# Patient Record
Sex: MALE | Race: WHITE | NOT HISPANIC OR LATINO | Employment: FULL TIME | URBAN - METROPOLITAN AREA
[De-identification: names, ages, dates, MRNs, and addresses within clinical notes are randomized per-mention and may not be internally consistent; named-entity substitution may affect disease eponyms.]

---

## 2018-11-20 ENCOUNTER — OFFICE VISIT (OUTPATIENT)
Dept: URGENT CARE | Facility: CLINIC | Age: 23
End: 2018-11-20
Payer: MEDICAID

## 2018-11-20 VITALS
DIASTOLIC BLOOD PRESSURE: 74 MMHG | RESPIRATION RATE: 14 BRPM | BODY MASS INDEX: 26.46 KG/M2 | SYSTOLIC BLOOD PRESSURE: 117 MMHG | OXYGEN SATURATION: 97 % | TEMPERATURE: 99 F | HEART RATE: 102 BPM | WEIGHT: 189 LBS | HEIGHT: 71 IN

## 2018-11-20 DIAGNOSIS — J40 BRONCHITIS: Primary | ICD-10-CM

## 2018-11-20 DIAGNOSIS — R50.9 FEVER, UNSPECIFIED FEVER CAUSE: ICD-10-CM

## 2018-11-20 DIAGNOSIS — H65.92 LEFT NON-SUPPURATIVE OTITIS MEDIA: ICD-10-CM

## 2018-11-20 PROCEDURE — 99203 OFFICE O/P NEW LOW 30 MIN: CPT | Mod: 25,S$GLB,, | Performed by: NURSE PRACTITIONER

## 2018-11-20 RX ORDER — AMOXICILLIN 875 MG/1
875 TABLET, FILM COATED ORAL 2 TIMES DAILY
Qty: 20 TABLET | Refills: 0 | Status: SHIPPED | OUTPATIENT
Start: 2018-11-20 | End: 2018-11-30

## 2018-11-20 RX ORDER — DEXAMETHASONE SODIUM PHOSPHATE 4 MG/ML
8 INJECTION, SOLUTION INTRA-ARTICULAR; INTRALESIONAL; INTRAMUSCULAR; INTRAVENOUS; SOFT TISSUE
Status: COMPLETED | OUTPATIENT
Start: 2018-11-20 | End: 2018-11-20

## 2018-11-20 RX ADMIN — DEXAMETHASONE SODIUM PHOSPHATE 8 MG: 4 INJECTION, SOLUTION INTRA-ARTICULAR; INTRALESIONAL; INTRAMUSCULAR; INTRAVENOUS; SOFT TISSUE at 06:11

## 2018-11-21 NOTE — PROGRESS NOTES
"Subjective:       Patient ID: Charly Mattson is a 23 y.o. male.    Vitals:  height is 5' 11" (1.803 m) and weight is 85.7 kg (189 lb). His temperature is 99.4 °F (37.4 °C). His blood pressure is 117/74 and his pulse is 102. His respiration is 14 and oxygen saturation is 97%.     Chief Complaint: Cough    PT states very achy and c/o sweats and chills. Decreased appetite and productive cough with thick green sputum production. Left ear pain x 2 days.       Cough   This is a new problem. The current episode started in the past 7 days. The problem has been gradually worsening. The cough is productive of sputum. Associated symptoms include chills, ear pain, a fever, myalgias and sweats. Pertinent negatives include no eye redness, hemoptysis, rash, sore throat, shortness of breath or wheezing. Treatments tried: Tylenol, ibuprofen  The treatment provided mild relief.       Constitution: Positive for chills and fever. Negative for sweating and fatigue.   HENT: Positive for ear pain. Negative for congestion, sinus pain, sinus pressure, sore throat and voice change.    Neck: Negative for painful lymph nodes.   Eyes: Negative for eye redness.   Respiratory: Positive for cough and sputum production. Negative for chest tightness, bloody sputum, COPD, shortness of breath, stridor, wheezing and asthma.    Gastrointestinal: Negative for nausea and vomiting.   Musculoskeletal: Positive for muscle ache.   Skin: Negative for rash.   Allergic/Immunologic: Negative for seasonal allergies and asthma.   Hematologic/Lymphatic: Negative for swollen lymph nodes.       Objective:      Physical Exam   Constitutional: He is oriented to person, place, and time. He appears well-developed and well-nourished. He is cooperative.  Non-toxic appearance. He does not appear ill. No distress.   HENT:   Head: Normocephalic and atraumatic.   Right Ear: Hearing, external ear and ear canal normal. Tympanic membrane is erythematous.   Left Ear: Hearing, " tympanic membrane, external ear and ear canal normal.   Nose: Nose normal. No mucosal edema, rhinorrhea or nasal deformity. No epistaxis. Right sinus exhibits no maxillary sinus tenderness and no frontal sinus tenderness. Left sinus exhibits no maxillary sinus tenderness and no frontal sinus tenderness.   Mouth/Throat: Uvula is midline, oropharynx is clear and moist and mucous membranes are normal. No trismus in the jaw. Normal dentition. No uvula swelling. No posterior oropharyngeal erythema.   Eyes: Conjunctivae and lids are normal. No scleral icterus.   Sclera clear bilat   Neck: Trachea normal, full passive range of motion without pain and phonation normal. Neck supple.   Cardiovascular: Normal rate, regular rhythm, normal heart sounds, intact distal pulses and normal pulses.   Pulmonary/Chest: Effort normal and breath sounds normal. No respiratory distress.   Abdominal: Soft. Normal appearance and bowel sounds are normal. He exhibits no distension. There is no tenderness.   Musculoskeletal: Normal range of motion. He exhibits no edema or deformity.   Lymphadenopathy:     He has cervical adenopathy.   Neurological: He is alert and oriented to person, place, and time. He exhibits normal muscle tone. Coordination normal.   Skin: Skin is warm, dry and intact. He is not diaphoretic. No pallor.   Psychiatric: He has a normal mood and affect. His speech is normal and behavior is normal. Judgment and thought content normal. Cognition and memory are normal.   Nursing note and vitals reviewed.      Assessment:       1. Bronchitis    2. Left non-suppurative otitis media    3. Fever, unspecified fever cause        Plan:         Bronchitis    Left non-suppurative otitis media    Fever, unspecified fever cause

## 2018-11-21 NOTE — PATIENT INSTRUCTIONS
Bronchitis, Antibiotic Treatment (Adult)    Bronchitis is an infection of the air passages (bronchial tubes) in your lungs. It often occurs when you have a cold. This illness is contagious during the first few days and is spread through the air by coughing and sneezing, or by direct contact (touching the sick person and then touching your own eyes, nose, or mouth).  Symptoms of bronchitis include cough with mucus (phlegm) and low-grade fever. Bronchitis usually lasts 7 to 14 days. Mild cases can be treated with simple home remedies. More severe infection is treated with an antibiotic.  Home care  Follow these guidelines when caring for yourself at home:  · If your symptoms are severe, rest at home for the first 2 to 3 days. When you go back to your usual activities, don't let yourself get too tired.  · Do not smoke. Also avoid being exposed to secondhand smoke.  · You may use over-the-counter medicines to control fever or pain, unless another medicine was prescribed. (Note: If you have chronic liver or kidney disease or have ever had a stomach ulcer or gastrointestinal bleeding, talk with your healthcare provider before using these medicines. Also talk to your provider if you are taking medicine to prevent blood clots.) Aspirin should never be given to anyone younger than 18 years of age who is ill with a viral infection or fever. It may cause severe liver or brain damage.  · Your appetite may be poor, so a light diet is fine. Avoid dehydration by drinking 6 to 8 glasses of fluids per day (such as water, soft drinks, sports drinks, juices, tea, or soup). Extra fluids will help loosen secretions in the nose and lungs.  · Over-the-counter cough, cold, and sore-throat medicines will not shorten the length of the illness, but they may be helpful to reduce symptoms. (Note: Do not use decongestants if you have high blood pressure.)  · Finish all antibiotic medicine. Do this even if you are feeling better after only a  few days.  Follow-up care  Follow up with your healthcare provider, or as advised. If you had an X-ray or ECG (electrocardiogram), a specialist will review it. You will be notified of any new findings that may affect your care.  Note: If you are age 65 or older, or if you have a chronic lung disease or condition that affects your immune system, or you smoke, talk to your healthcare provider about having pneumococcal vaccinations and a yearly influenza vaccination (flu shot).  When to seek medical advice  Call your healthcare provider right away if any of these occur:  · Fever of 100.4°F (38°C) or higher  · Coughing up increased amounts of colored sputum  · Weakness, drowsiness, headache, facial pain, ear pain, or a stiff neck  Call 911, or get immediate medical care  Contact emergency services right away if any of these occur.  · Coughing up blood  · Worsening weakness, drowsiness, headache, or stiff neck  · Trouble breathing, wheezing, or pain with breathing  Date Last Reviewed: 9/13/2015 © 2000-2017 [employer]. [address] [place] [address]. All rights reserved. This information is not intended as a substitute for professional medical care. Always follow your healthcare professional's instructions.        Middle Ear Infection (Adult)  You have an infection of the middle ear, the space behind the eardrum. This is also called acute otitis media (AOM). Sometimes it is caused by the common cold. This is because congestion can block the internal passage (eustachian tube) that drains fluid from the middle ear. When the middle ear fills with fluid, bacteria can grow there and cause an infection. Oral antibiotics are used to treat this illness, not ear drops. Symptoms usually start to improve within 1 to 2 days of treatment.    Home care  The following are general care guidelines:  · Finish all of the antibiotic medicine given, even though you may feel better after the first few days.  · You  may use over-the-counter medicine, such as acetaminophen or ibuprofen, to control pain and fever, unless something else was prescribed. If you have chronic liver or kidney disease or have ever had a stomach ulcer or gastrointestinal bleeding, talk with your healthcare provider before using these medicines. Do not give aspirin to anyone under 18 years of age who has a fever. It may cause severe illness or death.  Follow-up care  Follow up with your healthcare provider, or as advised, in 2 weeks if all symptoms have not gotten better, or if hearing doesn't go back to normal within 1 month.  When to seek medical advice  Call your healthcare provider right away if any of these occur:  · Ear pain gets worse or does not improve after 3 days of treatment  · Unusual drowsiness or confusion  · Neck pain, stiff neck, or headache  · Fluid or blood draining from the ear canal  · Fever of 100.4°F (38°C) or as advised   · Seizure  Date Last Reviewed: 6/1/2016  © 7593-0816 [employer], [facility]. [address] [place] [address], [place] [address]. All rights reserved. This information is not intended as a substitute for professional medical care. Always follow your healthcare professional's instructions.

## 2019-01-31 ENCOUNTER — CLINICAL SUPPORT (OUTPATIENT)
Dept: URGENT CARE | Facility: CLINIC | Age: 24
End: 2019-01-31
Payer: MEDICAID

## 2019-01-31 VITALS
OXYGEN SATURATION: 96 % | BODY MASS INDEX: 26.32 KG/M2 | SYSTOLIC BLOOD PRESSURE: 117 MMHG | WEIGHT: 188 LBS | HEART RATE: 98 BPM | TEMPERATURE: 100 F | DIASTOLIC BLOOD PRESSURE: 74 MMHG | HEIGHT: 71 IN | RESPIRATION RATE: 18 BRPM

## 2019-01-31 DIAGNOSIS — J40 BRONCHITIS: Primary | ICD-10-CM

## 2019-01-31 PROCEDURE — 94640 AIRWAY INHALATION TREATMENT: CPT | Mod: S$GLB,,, | Performed by: NURSE PRACTITIONER

## 2019-01-31 PROCEDURE — 99214 PR OFFICE/OUTPT VISIT, EST, LEVL IV, 30-39 MIN: ICD-10-PCS | Mod: 25,S$GLB,, | Performed by: NURSE PRACTITIONER

## 2019-01-31 PROCEDURE — 99214 OFFICE O/P EST MOD 30 MIN: CPT | Mod: 25,S$GLB,, | Performed by: NURSE PRACTITIONER

## 2019-01-31 PROCEDURE — 94640 PR INHAL RX, AIRWAY OBST/DX SPUTUM INDUCT: ICD-10-PCS | Mod: S$GLB,,, | Performed by: NURSE PRACTITIONER

## 2019-01-31 RX ORDER — ALBUTEROL SULFATE 90 UG/1
2 AEROSOL, METERED RESPIRATORY (INHALATION) EVERY 6 HOURS PRN
Qty: 18 G | Refills: 0 | Status: SHIPPED | OUTPATIENT
Start: 2019-01-31 | End: 2020-01-31

## 2019-01-31 RX ORDER — IPRATROPIUM BROMIDE 0.5 MG/2.5ML
0.5 SOLUTION RESPIRATORY (INHALATION)
Status: COMPLETED | OUTPATIENT
Start: 2019-01-31 | End: 2019-01-31

## 2019-01-31 RX ORDER — CETIRIZINE HYDROCHLORIDE 10 MG/1
10 TABLET ORAL DAILY
Qty: 30 TABLET | Refills: 2 | Status: SHIPPED | OUTPATIENT
Start: 2019-01-31 | End: 2019-05-14 | Stop reason: CLARIF

## 2019-01-31 RX ORDER — FLUTICASONE PROPIONATE 50 MCG
2 SPRAY, SUSPENSION (ML) NASAL 2 TIMES DAILY
Qty: 1 BOTTLE | Refills: 0 | Status: SHIPPED | OUTPATIENT
Start: 2019-01-31 | End: 2022-03-20 | Stop reason: SDUPTHER

## 2019-01-31 RX ORDER — ALBUTEROL SULFATE 0.83 MG/ML
2.5 SOLUTION RESPIRATORY (INHALATION)
Status: COMPLETED | OUTPATIENT
Start: 2019-01-31 | End: 2019-01-31

## 2019-01-31 RX ORDER — ALBUTEROL SULFATE 0.83 MG/ML
2.5 SOLUTION RESPIRATORY (INHALATION) EVERY 6 HOURS PRN
Qty: 1 BOX | Refills: 0 | Status: SHIPPED | OUTPATIENT
Start: 2019-01-31 | End: 2020-01-31

## 2019-01-31 RX ORDER — PREDNISONE 20 MG/1
20 TABLET ORAL 2 TIMES DAILY
Qty: 10 TABLET | Refills: 0 | Status: SHIPPED | OUTPATIENT
Start: 2019-01-31 | End: 2019-02-05

## 2019-01-31 RX ORDER — LEVALBUTEROL INHALATION SOLUTION 1.25 MG/3ML
1 SOLUTION RESPIRATORY (INHALATION) EVERY 4 HOURS PRN
Qty: 1 BOX | Refills: 0 | Status: SHIPPED | OUTPATIENT
Start: 2019-01-31 | End: 2020-01-31

## 2019-01-31 RX ORDER — DEXAMETHASONE SODIUM PHOSPHATE 4 MG/ML
8 INJECTION, SOLUTION INTRA-ARTICULAR; INTRALESIONAL; INTRAMUSCULAR; INTRAVENOUS; SOFT TISSUE
Status: COMPLETED | OUTPATIENT
Start: 2019-01-31 | End: 2019-01-31

## 2019-01-31 RX ORDER — PROMETHAZINE HYDROCHLORIDE AND DEXTROMETHORPHAN HYDROBROMIDE 6.25; 15 MG/5ML; MG/5ML
5 SYRUP ORAL EVERY 4 HOURS PRN
Qty: 240 ML | Refills: 0 | Status: SHIPPED | OUTPATIENT
Start: 2019-01-31 | End: 2019-02-10

## 2019-01-31 RX ADMIN — ALBUTEROL SULFATE 2.5 MG: 0.83 SOLUTION RESPIRATORY (INHALATION) at 07:01

## 2019-01-31 RX ADMIN — DEXAMETHASONE SODIUM PHOSPHATE 8 MG: 4 INJECTION, SOLUTION INTRA-ARTICULAR; INTRALESIONAL; INTRAMUSCULAR; INTRAVENOUS; SOFT TISSUE at 07:01

## 2019-01-31 RX ADMIN — IPRATROPIUM BROMIDE 0.5 MG: 0.5 SOLUTION RESPIRATORY (INHALATION) at 07:01

## 2019-01-31 NOTE — LETTER
January 31, 2019      [facility]  [address]  [facility] [address]  Phone: [phone]       Patient: Charly Mattson   YOB: 1995  Date of Visit: 01/31/2019    To Whom It May Concern:    Kj Mattson  was at [facility] on 01/31/2019. He may return to work/school on 2/2/19 with no restrictions. If you have any questions or concerns, or if I can be of further assistance, please do not hesitate to contact me.    Sincerely,    NIKKI Ruggiero

## 2019-02-01 NOTE — PROGRESS NOTES
"Subjective:       Patient ID: Charly Mattson is a 23 y.o. male.    Vitals:  height is 5' 11" (1.803 m) and weight is 85.3 kg (188 lb). His oral temperature is 100.1 °F (37.8 °C). His blood pressure is 117/74 and his pulse is 98. His respiration is 18 and oxygen saturation is 96%.     Chief Complaint: Epistaxis    C/O nose bleeds, cough, stopped up nose X 1 wk      Epistaxis    The bleeding has been from both nares. This is a new problem. The current episode started in the past 7 days. He has experienced no nasal trauma. The bleeding is associated with recent URI.   Sinusitis   This is a new problem. The current episode started 1 to 4 weeks ago. The problem has been gradually worsening since onset. The maximum temperature recorded prior to his arrival was 100.4 - 100.9 F. Associated symptoms include congestion, coughing, sinus pressure, sneezing and a sore throat. Pertinent negatives include no chills, headaches or shortness of breath. Past treatments include nothing.       Constitution: Negative for chills, fatigue and fever.   HENT: Positive for congestion, nosebleeds, postnasal drip, sinus pain, sinus pressure and sore throat.    Neck: Negative for painful lymph nodes.   Cardiovascular: Negative for chest pain and leg swelling.   Eyes: Negative for double vision and blurred vision.   Respiratory: Positive for cough, sputum production and wheezing. Negative for shortness of breath.    Gastrointestinal: Negative for nausea, vomiting and diarrhea.   Genitourinary: Negative for dysuria, frequency and urgency.   Musculoskeletal: Negative for joint pain, joint swelling, muscle cramps and muscle ache.   Skin: Negative for color change, pale and rash.   Allergic/Immunologic: Positive for sneezing. Negative for seasonal allergies.   Neurological: Negative for dizziness, history of vertigo, light-headedness, passing out and headaches.   Hematologic/Lymphatic: Negative for swollen lymph nodes, easy bruising/bleeding and " history of blood clots. Does not bruise/bleed easily.   Psychiatric/Behavioral: Negative for nervous/anxious, sleep disturbance and depression. The patient is not nervous/anxious.        Objective:      Physical Exam   Constitutional: He is oriented to person, place, and time. He appears well-developed and well-nourished. He is active and cooperative.   HENT:   Head: Normocephalic and atraumatic.   Right Ear: Hearing, external ear and ear canal normal. A middle ear effusion is present.   Left Ear: Hearing, external ear and ear canal normal. A middle ear effusion is present.   Nose: Mucosal edema and rhinorrhea present. Right sinus exhibits maxillary sinus tenderness. Left sinus exhibits maxillary sinus tenderness.   Mouth/Throat: Uvula is midline and mucous membranes are normal. Posterior oropharyngeal erythema present.   Eyes: Conjunctivae, EOM and lids are normal. Pupils are equal, round, and reactive to light.   Neck: Trachea normal, normal range of motion and phonation normal. Neck supple.   Cardiovascular: Normal rate, regular rhythm, normal heart sounds, intact distal pulses and normal pulses.   Pulmonary/Chest: Effort normal. He has decreased breath sounds. He has rhonchi in the right upper field, the right middle field, the right lower field, the left upper field, the left middle field and the left lower field.   Abdominal: Soft. Bowel sounds are normal.   Musculoskeletal: Normal range of motion.   Neurological: He is alert and oriented to person, place, and time. He has normal strength. GCS eye subscore is 4. GCS verbal subscore is 5. GCS motor subscore is 6.   Skin: Skin is warm, dry and intact.   Psychiatric: He has a normal mood and affect. His behavior is normal. Judgment and thought content normal.   Nursing note and vitals reviewed.      Assessment:       1. Bronchitis        Plan:         Bronchitis    Other orders  -     albuterol nebulizer solution 2.5 mg  -     ipratropium 0.02 % nebulizer  solution 0.5 mg  -     dexamethasone injection 8 mg  -     albuterol (PROVENTIL) 2.5 mg /3 mL (0.083 %) nebulizer solution; Take 3 mLs (2.5 mg total) by nebulization every 6 (six) hours as needed for Wheezing. Rescue  Dispense: 1 Box; Refill: 0  -     albuterol (VENTOLIN HFA) 90 mcg/actuation inhaler; Inhale 2 puffs into the lungs every 6 (six) hours as needed for Wheezing. Rescue  Dispense: 18 g; Refill: 0  -     cetirizine (ZYRTEC) 10 MG tablet; Take 1 tablet (10 mg total) by mouth once daily.  Dispense: 30 tablet; Refill: 2  -     fluticasone (FLONASE) 50 mcg/actuation nasal spray; 2 sprays (100 mcg total) by Each Nare route 2 (two) times daily.  Dispense: 1 Bottle; Refill: 0  -     predniSONE (DELTASONE) 20 MG tablet; Take 1 tablet (20 mg total) by mouth 2 (two) times daily. for 5 days  Dispense: 10 tablet; Refill: 0  -     promethazine-dextromethorphan (PROMETHAZINE-DM) 6.25-15 mg/5 mL Syrp; Take 5 mLs by mouth every 4 (four) hours as needed.  Dispense: 240 mL; Refill: 0  -     levalbuterol (XOPENEX) 1.25 mg/3 mL nebulizer solution; Take 3 mLs (1.25 mg total) by nebulization every 4 (four) hours as needed for Wheezing. Rescue  Dispense: 1 Box; Refill: 0

## 2019-02-01 NOTE — PATIENT INSTRUCTIONS
Symptomatic treatment:    Alternate Tylenol and Ibuprofen every 3 hrs for fever, pain and inflammation. Avoid Nsaids if you are pregnant or have advanced kidney disease.     salt water gargles to soothe throat from post nasal drip  Honey/lemon water or warm tea to soothe throat from post nasal drip  Cepachol helps to soothe the discomfort in throat from post nasal drip    Cold-eeze helps to reduce the duration of URI symptoms if taken early  Elderberry to reduce duration of viral URI symptoms    Nasal saline spray reduces inflammation and dryness  Warm face compresses/hot showers as often as you can to open sinuses and allow to drain.   Flonase OTC or Nasacort OTC to help decrease inflammation in nasal turbinates and allow sinuses to drain    Vicks vapor rub at night  Simple foods like chicken noodle soup help provide hydration and nutrition    Delsym helps with coughing at night    Zantac will help if there is reflux from the post nasal drip and helpful to take at night    Zyrtec/Claritin during the day and Benadryl at night may help if allergy component concurrently with URI    Rest as much as you can    Your symptoms will likely last 5-7 days, maybe longer depending on how it affects your body.  You are contagious 5-7, so minimize contact with others to reduce the spread to others and stay home from work or school as we discussed. Dehydration is preventable but is one of the main reasons why you will feel so badly. Drink pedialyte, gatorade or propel. Stay hydrated.  Antibiotics are not needed unless a complication(such as Otitis Media, Bacterial sinus infection or pneumonia) develops. Taking antibiotics for Flu/Cold is not supported by evidence-based medicine and can expose you to unnecessary side effects of the medication, such as anaphylaxis, yeast infection and leads to antibiotic resistance.   If you experience any:  Chest pain, shortness of breath, wheezing or difficulty breathing,  Severe headache, face,  neck or ear pain,  New rash,  Fever over 101.5º F (38.6 C) for more than three days,  Confusion, behavior change or seizure,  Severe weakness or dizziness, please go to the ER immediately for further testing.             What Is Acute Bronchitis?  Acute bronchitis is when the airways in your lungs (bronchial tubes) become red and swollen (inflamed). It is usually caused by a viral infection. But it can also occur because of a bacteria or allergen. Symptoms include a cough that produces yellow or greenish mucus and can last for days or sometimes weeks.  Inside healthy lungs    Air travels in and out of the lungs through the airways. The linings of these airways produce sticky mucus. This mucus traps particles that enter the lungs. Tiny structures called cilia then sweep the particles out of the airways.     Healthy airway: Airways are normally open. Air moves in and out easily.      Healthy cilia: Tiny, hairlike cilia sweep mucus and particles up and out of the airways.   Lungs with bronchitis  Bronchitis often occurs with a cold or the flu virus. The airways become inflamed (red and swollen). There is a deep hacking cough from the extra mucus. Other symptoms may include:  · Wheezing  · Chest discomfort  · Shortness of breath  · Mild fever  A second infection, this time due to bacteria, may then occur. And airways irritated by allergens or smoke are more likely to get infected.        Inflamed airway: Inflammation and extra mucus narrow the airway, causing shortness of breath.      Impaired cilia: Extra mucus impairs cilia, causing congestion and wheezing. Smoking makes the problem worse.   Making a diagnosis  A physical exam, health history, and certain tests help your healthcare provider make the diagnosis.  Health history  Your healthcare provider will ask you about your symptoms.  The exam  Your provider listens to your chest for signs of congestion. He or she may also check your ears, nose, and throat.  Possible  tests  · A sputum test for bacteria. This requires a sample of mucus from your lungs.  · A nasal or throat swab. This tests to see if you have a bacterial infection.  · A chest X-ray. This is done if your healthcare provider thinks you have pneumonia.  · Tests to check for an underlying condition. Other tests may be done to check for things such as allergies, asthma, or COPD (chronic obstructive pulmonary disease). You may need to see a specialist for more lung function testing.  Treating a cough  The main treatment for bronchitis is easing symptoms. Avoiding smoke, allergens, and other things that trigger coughing can often help. If the infection is bacterial, you may be given antibiotics. During the illness, it's important to get plenty of sleep. To ease symptoms:  · Dont smoke. Also avoid secondhand smoke.  · Use a humidifier. Or try breathing in steam from a hot shower. This may help loosen mucus.  · Drink a lot of water and juice. They can soothe the throat and may help thin mucus.  · Sit up or use extra pillows when in bed. This helps to lessen coughing and congestion.  · Ask your provider about using medicine. Ask about using cough medicine, pain and fever medicine, or a decongestant.  Antibiotics  Most cases of bronchitis are caused by cold or flu viruses. They dont need antibiotics to treat them, even if your mucus is thick and green or yellow. Antibiotics dont treat viral illness and antibiotics have not been shown to have any benefit in cases of acute bronchitis. Taking antibiotics when they are not needed increases your risk of getting an infection later that is antibiotic-resistant. Antibiotics can also cause severe cases of diarrhea that require other antibiotics to treat.  It is important that you accept your healthcare provider's opinion to not use antibiotics. Your provider will prescribe antibiotics if the infection is caused by bacteria. If they are prescribed:  · Take all of the medicine. Take  the medicine until it is used up, even if symptoms have improved. If you dont, the bronchitis may come back.  · Take the medicines as directed. For instance, some medicines should be taken with food.  · Ask about side effects. Ask your provider or pharmacist what side effects are common, and what to do about them.  Follow-up care  You should see your provider again in 2 to 3 weeks. By this time, symptoms should have improved. An infection that lasts longer may mean you have a more serious problem.  Prevention  · Avoid tobacco smoke. If you smoke, quit. Stay away from smoky places. Ask friends and family not to smoke around you, or in your home or car.  · Get checked for allergies.  · Ask your provider about getting a yearly flu shot. Also ask about pneumococcal or pneumonia shots.  · Wash your hands often. This helps reduce the chance of picking up viruses that cause colds and flu.  Call your healthcare provider if:  · Symptoms worsen, or you have new symptoms  · Breathing problems worsen or  become severe  · Symptoms dont get better within a week, or within 3 days of taking antibiotics   Date Last Reviewed: 2/1/2017  © 6165-5797 [employer], [employer]. [address] [place] [address], [place] [address]. All rights reserved. This information is not intended as a substitute for professional medical care. Always follow your healthcare professional's instructions.

## 2019-03-27 ENCOUNTER — CLINICAL SUPPORT (OUTPATIENT)
Dept: URGENT CARE | Facility: CLINIC | Age: 24
End: 2019-03-27
Payer: MEDICAID

## 2019-03-27 VITALS
DIASTOLIC BLOOD PRESSURE: 71 MMHG | WEIGHT: 191.63 LBS | SYSTOLIC BLOOD PRESSURE: 114 MMHG | RESPIRATION RATE: 16 BRPM | OXYGEN SATURATION: 96 % | HEIGHT: 71 IN | TEMPERATURE: 99 F | BODY MASS INDEX: 26.83 KG/M2 | HEART RATE: 93 BPM

## 2019-03-27 DIAGNOSIS — J18.9 COMMUNITY ACQUIRED PNEUMONIA OF RIGHT LOWER LOBE OF LUNG: ICD-10-CM

## 2019-03-27 DIAGNOSIS — R05.9 COUGH: Primary | ICD-10-CM

## 2019-03-27 LAB
CTP QC/QA: YES
FLUAV AG NPH QL: NEGATIVE
FLUBV AG NPH QL: NEGATIVE

## 2019-03-27 PROCEDURE — 99214 PR OFFICE/OUTPT VISIT, EST, LEVL IV, 30-39 MIN: ICD-10-PCS | Mod: 25,S$GLB,, | Performed by: NURSE PRACTITIONER

## 2019-03-27 PROCEDURE — 71046 X-RAY EXAM CHEST 2 VIEWS: CPT | Mod: S$GLB,,, | Performed by: NURSE PRACTITIONER

## 2019-03-27 PROCEDURE — 71046 PR XRAY, CHEST, 2 VIEWS: ICD-10-PCS | Mod: S$GLB,,, | Performed by: NURSE PRACTITIONER

## 2019-03-27 PROCEDURE — 94640 PR INHAL RX, AIRWAY OBST/DX SPUTUM INDUCT: ICD-10-PCS | Mod: S$GLB,,, | Performed by: NURSE PRACTITIONER

## 2019-03-27 PROCEDURE — 99214 OFFICE O/P EST MOD 30 MIN: CPT | Mod: 25,S$GLB,, | Performed by: NURSE PRACTITIONER

## 2019-03-27 PROCEDURE — 94640 AIRWAY INHALATION TREATMENT: CPT | Mod: S$GLB,,, | Performed by: NURSE PRACTITIONER

## 2019-03-27 PROCEDURE — 87804 POCT INFLUENZA A/B: ICD-10-PCS | Mod: QW,,, | Performed by: NURSE PRACTITIONER

## 2019-03-27 PROCEDURE — 87804 INFLUENZA ASSAY W/OPTIC: CPT | Mod: QW,,, | Performed by: NURSE PRACTITIONER

## 2019-03-27 RX ORDER — IPRATROPIUM BROMIDE AND ALBUTEROL SULFATE 2.5; .5 MG/3ML; MG/3ML
3 SOLUTION RESPIRATORY (INHALATION)
Status: COMPLETED | OUTPATIENT
Start: 2019-03-27 | End: 2019-03-27

## 2019-03-27 RX ORDER — DEXAMETHASONE SODIUM PHOSPHATE 4 MG/ML
8 INJECTION, SOLUTION INTRA-ARTICULAR; INTRALESIONAL; INTRAMUSCULAR; INTRAVENOUS; SOFT TISSUE ONCE
Status: COMPLETED | OUTPATIENT
Start: 2019-03-27 | End: 2019-03-27

## 2019-03-27 RX ORDER — LEVOFLOXACIN 750 MG/1
750 TABLET ORAL DAILY
Qty: 10 TABLET | Refills: 0 | Status: SHIPPED | OUTPATIENT
Start: 2019-03-27 | End: 2019-04-06

## 2019-03-27 RX ADMIN — IPRATROPIUM BROMIDE AND ALBUTEROL SULFATE 3 ML: 2.5; .5 SOLUTION RESPIRATORY (INHALATION) at 04:03

## 2019-03-27 RX ADMIN — DEXAMETHASONE SODIUM PHOSPHATE 8 MG: 4 INJECTION, SOLUTION INTRA-ARTICULAR; INTRALESIONAL; INTRAMUSCULAR; INTRAVENOUS; SOFT TISSUE at 04:03

## 2019-03-27 NOTE — LETTER
March 27, 2019      [facility]  [address]  [facility] [address]  Phone: [phone]       Patient: Charly Mattson   YOB: 1995  Date of Visit: 03/27/2019    To Whom It May Concern:    Kj Mattson  was at [facility] on 03/27/2019. He may return to work/school on April 1,2019 with no restrictions. If you have any questions or concerns, or if I can be of further assistance, please do not hesitate to contact me.    Sincerely,    [facility]

## 2019-03-27 NOTE — PROGRESS NOTES
"Subjective:       Patient ID: Charly Mattson is a 23 y.o. male.    Vitals:  height is 5' 11" (1.803 m) and weight is 86.9 kg (191 lb 9.6 oz). His oral temperature is 98.9 °F (37.2 °C). His blood pressure is 114/71 and his pulse is 93. His respiration is 16 and oxygen saturation is 96%.     Chief Complaint: Cough    Cough   This is a recurrent problem. The current episode started in the past 7 days. The problem has been unchanged. The cough is productive of purulent sputum. Associated symptoms include chills, headaches, nasal congestion, shortness of breath and wheezing. Risk factors for lung disease include occupational exposure. He has tried steroid inhaler (Nebulizer) for the symptoms. The treatment provided mild relief. His past medical history is significant for bronchitis and pneumonia.       Constitution: Positive for chills.   HENT: Negative.    Neck: negative.   Cardiovascular: Negative.    Eyes: Negative.    Respiratory: Positive for cough, shortness of breath and wheezing.    Endocrine: negative.   Musculoskeletal: Negative.    Skin: Negative.    Neurological: Positive for headaches.   Psychiatric/Behavioral: Negative.        Objective:      Physical Exam   Constitutional: He is oriented to person, place, and time. He appears well-developed and well-nourished. He is cooperative.  Non-toxic appearance. He does not appear ill. No distress.   HENT:   Head: Normocephalic and atraumatic.   Right Ear: Hearing, tympanic membrane, external ear and ear canal normal.   Left Ear: Hearing, tympanic membrane, external ear and ear canal normal.   Nose: Nose normal. No mucosal edema, rhinorrhea or nasal deformity. No epistaxis. Right sinus exhibits no maxillary sinus tenderness and no frontal sinus tenderness. Left sinus exhibits no maxillary sinus tenderness and no frontal sinus tenderness.   Mouth/Throat: Uvula is midline, oropharynx is clear and moist and mucous membranes are normal. No trismus in the jaw. Normal " dentition. No uvula swelling. No posterior oropharyngeal erythema.   Eyes: Conjunctivae and lids are normal. No scleral icterus.   Sclera clear bilat   Neck: Trachea normal, full passive range of motion without pain and phonation normal. Neck supple.   Cardiovascular: Normal rate, regular rhythm, normal heart sounds, intact distal pulses and normal pulses.   Pulmonary/Chest: Effort normal. No respiratory distress. He has wheezes in the right upper field, the right middle field, the right lower field, the left upper field, the left middle field and the left lower field.   Abdominal: Soft. Normal appearance and bowel sounds are normal. He exhibits no distension. There is no tenderness.   Musculoskeletal: Normal range of motion. He exhibits no edema or deformity.   Neurological: He is alert and oriented to person, place, and time. He exhibits normal muscle tone. Coordination normal.   Skin: Skin is warm, dry and intact. He is not diaphoretic. No pallor.   Psychiatric: He has a normal mood and affect. His speech is normal and behavior is normal. Judgment and thought content normal. Cognition and memory are normal.   Nursing note and vitals reviewed.      Assessment:       1. Cough    2. Community acquired pneumonia of right lower lobe of lung        Plan:     No hypoxia or respiratory distress. Wheezing markedly improved after Duoneb. CXR demonstrates patchy RLL opacity consistent with pneumonia. Influenza negative.  Rx: Levaquin.    Cough  -     dexamethasone injection 8 mg  -     albuterol-ipratropium 2.5 mg-0.5 mg/3 mL nebulizer solution 3 mL  -     POCT Influenza A/B  -     XR CHEST PA AND LATERAL; Future; Expected date: 03/27/2019  -     levoFLOXacin (LEVAQUIN) 750 MG tablet; Take 1 tablet (750 mg total) by mouth once daily. for 10 days  Dispense: 10 tablet; Refill: 0    Community acquired pneumonia of right lower lobe of lung  -     levoFLOXacin (LEVAQUIN) 750 MG tablet; Take 1 tablet (750 mg total) by mouth once  daily. for 10 days  Dispense: 10 tablet; Refill: 0

## 2019-03-27 NOTE — PATIENT INSTRUCTIONS
Treating Pneumonia  Pneumonia is an infection of one or both of the lungs. Pneumonia:  · Is usually caused by either a virus or a bacteria  · Can be very serious, especially in infants, young children, and older adults. Its also serious for those with other long-term health problems or weakened immune systems.  · Is sometimes treated at home and sometimes in the hospital    Antibiotic medicines  Antibiotics may be prescribed for pneumonia caused by bacteria. They may be pills (oral medicines), or shots (injections). Or they may be given by IV (intravenously) into a vein. If you are taking oral medicines at home:  · Fill your prescription and start taking your medicine as soon as you can.  · You will likely start to feel better in a day or 2, but dont stop taking the antibiotic.  · Use a pill organizer to help you remember to take your medicine.  · Let your healthcare provider know if you have side effects.  · Take your medicine exactly as directed on the label. Talk to your provider or pharmacist if you have any questions.  Antiviral medicines  Antiviral medicine may be prescribed for pneumonia caused by a virus. For example, antiviral medicine may be prescribed for pneumonia caused by the flu virus. Antibiotics do not work against viruses. If you are taking antiviral medicine at home:  · Fill your prescription and start taking your medicine as soon as you can.  · Talk with your provider or pharmacist about possible side effects.  · Take the medicine exactly as instructed.  To relieve symptoms  There are many medicines that can help relieve symptoms of pneumonia. Some are prescription and some are over-the-counter.  Your healthcare provider may recommend:  · Acetaminophen or ibuprofen to lower your fever and to lessen headache or other pain  · Cough medicine to loosen mucus or to reduce coughing  Make sure you check with your healthcare provider or pharmacist before taking any over-the-counter  medicines.  Special treatments  If you are hospitalized for pneumonia, you may have other therapies, including:  · Inhaled medicines to help with breathing or chest congestion  · Supplemental oxygen to increase low oxygen levels  Drink fluids and eat healthy  You should eat healthy to help your body fight the infection. Drinking a lot of fluids helps to replace fluids lost from fever and to loosen mucus in your chest.  · Diet. Make healthy food choices, including fruits and vegetables, lean meats and other proteins, 100% whole grain and low- or no-fat dairy products.  · Fluids. Drink at least 6 to 8 tall glasses a day. Water and 100% fruit or vegetable juice are best.  Get plenty of rest and sleep  You may be more tired than usual for a while. It is important to get enough sleep at night. Its also important to rest during the day. Talk with your healthcare provider if coughing or other symptoms are interfering with your sleep.  Preventing the spread of germs  The best thing you can do to prevent spreading germs is to wash your hands often. You should:  · Rub your hand with soap and water for 20 to 30 seconds.  · Clean in between your fingers, the backs of your hands, and around your nails.  · Dry your hands on a separate towel or use paper towels.  You should also:  · Keep alcohol-based hand  nearby.  · Make sure you also clean surfaces that you touch. Use a product that kills all types of germs.  · Stay away from others until you are feeling better.  When to call your healthcare provider  Call your healthcare provider if you have any of the following:  · Symptoms get worse  · Fever continues  · Shortness of breath gets worse  · Increased mucus or mucus that is darker in color  · Coughing gets worse  · Lips or fingers are bluish in color  · Side effects from your medicine   Date Last Reviewed: 12/1/2016  © 6218-3536 [employer] [address] [place] [address], [place] [address]. All rights reserved.  This information is not intended as a substitute for professional medical care. Always follow your healthcare professional's instructions.      Continue using albuterol and follow up with primary care. Go to ER for worsening.

## 2019-05-14 ENCOUNTER — HOSPITAL ENCOUNTER (OUTPATIENT)
Facility: HOSPITAL | Age: 24
Discharge: HOME | End: 2019-05-15
Attending: EMERGENCY MEDICINE | Admitting: INTERNAL MEDICINE
Payer: MEDICAID

## 2019-05-14 DIAGNOSIS — E86.0 DEHYDRATION: ICD-10-CM

## 2019-05-14 DIAGNOSIS — J45.51 SEVERE PERSISTENT ASTHMA WITH ACUTE EXACERBATION: ICD-10-CM

## 2019-05-14 DIAGNOSIS — J01.00 ACUTE MAXILLARY SINUSITIS, RECURRENCE NOT SPECIFIED: ICD-10-CM

## 2019-05-14 DIAGNOSIS — J41.1 BRONCHITIS, CHRONIC, MUCOPURULENT: ICD-10-CM

## 2019-05-14 DIAGNOSIS — H66.93 ACUTE BILATERAL OTITIS MEDIA: ICD-10-CM

## 2019-05-14 DIAGNOSIS — D83.9 CVID (COMMON VARIABLE IMMUNODEFICIENCY): Primary | ICD-10-CM

## 2019-05-14 PROBLEM — D89.3: Status: ACTIVE | Noted: 2019-05-14

## 2019-05-14 PROBLEM — R09.89 CHRONIC SINUS COMPLAINTS: Status: ACTIVE | Noted: 2019-05-14

## 2019-05-14 PROBLEM — D84.9 IMMUNE DEFICIENCY DISORDER: Status: ACTIVE | Noted: 2019-05-14

## 2019-05-14 LAB
ALBUMIN SERPL BCP-MCNC: 4.2 G/DL (ref 3.5–5.2)
ALP SERPL-CCNC: 122 U/L (ref 55–135)
ALT SERPL W/O P-5'-P-CCNC: 51 U/L (ref 10–44)
ANION GAP SERPL CALC-SCNC: 11 MMOL/L (ref 8–16)
AST SERPL-CCNC: 52 U/L (ref 10–40)
BASOPHILS # BLD AUTO: 0 K/UL (ref 0–0.2)
BASOPHILS NFR BLD: 0.4 % (ref 0–1.9)
BILIRUB SERPL-MCNC: 1.1 MG/DL (ref 0.1–1)
BILIRUB UR QL STRIP: NEGATIVE
BUN SERPL-MCNC: 10 MG/DL (ref 6–20)
CALCIUM SERPL-MCNC: 10.3 MG/DL (ref 8.7–10.5)
CHLORIDE SERPL-SCNC: 102 MMOL/L (ref 95–110)
CLARITY UR: CLEAR
CO2 SERPL-SCNC: 25 MMOL/L (ref 23–29)
COLOR UR: YELLOW
CREAT SERPL-MCNC: 1 MG/DL (ref 0.5–1.4)
DIFFERENTIAL METHOD: ABNORMAL
EOSINOPHIL # BLD AUTO: 0 K/UL (ref 0–0.5)
EOSINOPHIL NFR BLD: 0.1 % (ref 0–8)
ERYTHROCYTE [DISTWIDTH] IN BLOOD BY AUTOMATED COUNT: 15.2 % (ref 11.5–14.5)
EST. GFR  (AFRICAN AMERICAN): >60 ML/MIN/1.73 M^2
EST. GFR  (NON AFRICAN AMERICAN): >60 ML/MIN/1.73 M^2
GLUCOSE SERPL-MCNC: 89 MG/DL (ref 70–110)
GLUCOSE UR QL STRIP: NEGATIVE
HCT VFR BLD AUTO: 50 % (ref 40–54)
HGB BLD-MCNC: 16.2 G/DL (ref 14–18)
HGB UR QL STRIP: NEGATIVE
KETONES UR QL STRIP: ABNORMAL
LACTATE SERPL-SCNC: 1.2 MMOL/L (ref 0.5–2.2)
LACTATE SERPL-SCNC: 1.5 MMOL/L (ref 0.5–2.2)
LEUKOCYTE ESTERASE UR QL STRIP: NEGATIVE
LYMPHOCYTES # BLD AUTO: 0.9 K/UL (ref 1–4.8)
LYMPHOCYTES NFR BLD: 8.1 % (ref 18–48)
MCH RBC QN AUTO: 26.4 PG (ref 27–31)
MCHC RBC AUTO-ENTMCNC: 32.4 G/DL (ref 32–36)
MCV RBC AUTO: 81 FL (ref 82–98)
MONOCYTES # BLD AUTO: 0.9 K/UL (ref 0.3–1)
MONOCYTES NFR BLD: 7.6 % (ref 4–15)
NEUTROPHILS # BLD AUTO: 9.4 K/UL (ref 1.8–7.7)
NEUTROPHILS NFR BLD: 83.8 % (ref 38–73)
NITRITE UR QL STRIP: NEGATIVE
PH UR STRIP: 6 [PH] (ref 5–8)
PLATELET # BLD AUTO: 183 K/UL (ref 150–350)
PMV BLD AUTO: 8.9 FL (ref 9.2–12.9)
POTASSIUM SERPL-SCNC: 3.9 MMOL/L (ref 3.5–5.1)
PROCALCITONIN SERPL IA-MCNC: 0.11 NG/ML
PROT SERPL-MCNC: 8 G/DL (ref 6–8.4)
PROT UR QL STRIP: NEGATIVE
RBC # BLD AUTO: 6.14 M/UL (ref 4.6–6.2)
SODIUM SERPL-SCNC: 138 MMOL/L (ref 136–145)
SP GR UR STRIP: 1.01 (ref 1–1.03)
URN SPEC COLLECT METH UR: ABNORMAL
UROBILINOGEN UR STRIP-ACNC: NEGATIVE EU/DL
WBC # BLD AUTO: 11.3 K/UL (ref 3.9–12.7)

## 2019-05-14 PROCEDURE — 25000003 PHARM REV CODE 250: Performed by: EMERGENCY MEDICINE

## 2019-05-14 PROCEDURE — 96361 HYDRATE IV INFUSION ADD-ON: CPT

## 2019-05-14 PROCEDURE — 25000242 PHARM REV CODE 250 ALT 637 W/ HCPCS: Performed by: EMERGENCY MEDICINE

## 2019-05-14 PROCEDURE — 87040 BLOOD CULTURE FOR BACTERIA: CPT | Mod: 59

## 2019-05-14 PROCEDURE — 96375 TX/PRO/DX INJ NEW DRUG ADDON: CPT

## 2019-05-14 PROCEDURE — 99205 PR OFFICE/OUTPT VISIT, NEW, LEVL V, 60-74 MIN: ICD-10-PCS | Mod: ,,, | Performed by: INTERNAL MEDICINE

## 2019-05-14 PROCEDURE — 25000242 PHARM REV CODE 250 ALT 637 W/ HCPCS: Performed by: INTERNAL MEDICINE

## 2019-05-14 PROCEDURE — 84145 PROCALCITONIN (PCT): CPT

## 2019-05-14 PROCEDURE — 99205 OFFICE O/P NEW HI 60 MIN: CPT | Mod: ,,, | Performed by: INTERNAL MEDICINE

## 2019-05-14 PROCEDURE — 99285 EMERGENCY DEPT VISIT HI MDM: CPT | Mod: 25

## 2019-05-14 PROCEDURE — 63600175 PHARM REV CODE 636 W HCPCS: Performed by: INTERNAL MEDICINE

## 2019-05-14 PROCEDURE — 96365 THER/PROPH/DIAG IV INF INIT: CPT

## 2019-05-14 PROCEDURE — 83605 ASSAY OF LACTIC ACID: CPT | Mod: 91

## 2019-05-14 PROCEDURE — 96376 TX/PRO/DX INJ SAME DRUG ADON: CPT

## 2019-05-14 PROCEDURE — 81003 URINALYSIS AUTO W/O SCOPE: CPT

## 2019-05-14 PROCEDURE — G0378 HOSPITAL OBSERVATION PER HR: HCPCS

## 2019-05-14 PROCEDURE — 94640 AIRWAY INHALATION TREATMENT: CPT

## 2019-05-14 PROCEDURE — 36415 COLL VENOUS BLD VENIPUNCTURE: CPT

## 2019-05-14 PROCEDURE — 80074 ACUTE HEPATITIS PANEL: CPT

## 2019-05-14 PROCEDURE — 87070 CULTURE OTHR SPECIMN AEROBIC: CPT | Mod: 59

## 2019-05-14 PROCEDURE — 80053 COMPREHEN METABOLIC PANEL: CPT

## 2019-05-14 PROCEDURE — 25000003 PHARM REV CODE 250: Performed by: INTERNAL MEDICINE

## 2019-05-14 PROCEDURE — 63600175 PHARM REV CODE 636 W HCPCS: Performed by: EMERGENCY MEDICINE

## 2019-05-14 PROCEDURE — 87205 SMEAR GRAM STAIN: CPT

## 2019-05-14 PROCEDURE — 85025 COMPLETE CBC W/AUTO DIFF WBC: CPT

## 2019-05-14 RX ORDER — PREDNISONE 5 MG/1
10 TABLET ORAL 2 TIMES DAILY
Status: DISCONTINUED | OUTPATIENT
Start: 2019-05-14 | End: 2019-05-15 | Stop reason: HOSPADM

## 2019-05-14 RX ORDER — AMOXICILLIN 250 MG
1 CAPSULE ORAL 2 TIMES DAILY PRN
Status: DISCONTINUED | OUTPATIENT
Start: 2019-05-14 | End: 2019-05-15 | Stop reason: HOSPADM

## 2019-05-14 RX ORDER — ACETAMINOPHEN 325 MG/1
650 TABLET ORAL EVERY 6 HOURS PRN
Status: DISCONTINUED | OUTPATIENT
Start: 2019-05-14 | End: 2019-05-15 | Stop reason: HOSPADM

## 2019-05-14 RX ORDER — LEVALBUTEROL INHALATION SOLUTION 1.25 MG/3ML
1 SOLUTION RESPIRATORY (INHALATION) EVERY 4 HOURS PRN
Status: DISCONTINUED | OUTPATIENT
Start: 2019-05-14 | End: 2019-05-14 | Stop reason: SDUPTHER

## 2019-05-14 RX ORDER — ONDANSETRON 2 MG/ML
4 INJECTION INTRAMUSCULAR; INTRAVENOUS EVERY 8 HOURS PRN
Status: DISCONTINUED | OUTPATIENT
Start: 2019-05-14 | End: 2019-05-15 | Stop reason: HOSPADM

## 2019-05-14 RX ORDER — LEVALBUTEROL 1.25 MG/.5ML
1.25 SOLUTION, CONCENTRATE RESPIRATORY (INHALATION) EVERY 4 HOURS PRN
Status: DISCONTINUED | OUTPATIENT
Start: 2019-05-14 | End: 2019-05-15 | Stop reason: HOSPADM

## 2019-05-14 RX ORDER — CEFEPIME HYDROCHLORIDE 2 G/50ML
2 INJECTION, SOLUTION INTRAVENOUS
Status: COMPLETED | OUTPATIENT
Start: 2019-05-14 | End: 2019-05-14

## 2019-05-14 RX ORDER — CEFEPIME HYDROCHLORIDE 1 G/50ML
1 INJECTION, SOLUTION INTRAVENOUS
Status: DISCONTINUED | OUTPATIENT
Start: 2019-05-15 | End: 2019-05-15 | Stop reason: HOSPADM

## 2019-05-14 RX ORDER — SODIUM CHLORIDE 0.9 % (FLUSH) 0.9 %
10 SYRINGE (ML) INJECTION
Status: DISCONTINUED | OUTPATIENT
Start: 2019-05-14 | End: 2019-05-15 | Stop reason: HOSPADM

## 2019-05-14 RX ORDER — IPRATROPIUM BROMIDE AND ALBUTEROL SULFATE 2.5; .5 MG/3ML; MG/3ML
3 SOLUTION RESPIRATORY (INHALATION)
Status: COMPLETED | OUTPATIENT
Start: 2019-05-14 | End: 2019-05-14

## 2019-05-14 RX ORDER — FLUTICASONE PROPIONATE 50 MCG
2 SPRAY, SUSPENSION (ML) NASAL 2 TIMES DAILY
Status: DISCONTINUED | OUTPATIENT
Start: 2019-05-14 | End: 2019-05-15 | Stop reason: HOSPADM

## 2019-05-14 RX ORDER — CEFEPIME HYDROCHLORIDE 1 G/1
1 INJECTION, POWDER, FOR SOLUTION INTRAMUSCULAR; INTRAVENOUS
Status: DISCONTINUED | OUTPATIENT
Start: 2019-05-14 | End: 2019-05-14 | Stop reason: SDUPTHER

## 2019-05-14 RX ORDER — SODIUM CHLORIDE 9 MG/ML
INJECTION, SOLUTION INTRAVENOUS CONTINUOUS
Status: DISCONTINUED | OUTPATIENT
Start: 2019-05-14 | End: 2019-05-15 | Stop reason: HOSPADM

## 2019-05-14 RX ORDER — PANTOPRAZOLE SODIUM 40 MG/1
40 TABLET, DELAYED RELEASE ORAL DAILY
Status: DISCONTINUED | OUTPATIENT
Start: 2019-05-15 | End: 2019-05-15 | Stop reason: HOSPADM

## 2019-05-14 RX ORDER — ALBUTEROL SULFATE 2.5 MG/.5ML
2.5 SOLUTION RESPIRATORY (INHALATION) EVERY 4 HOURS
Status: DISCONTINUED | OUTPATIENT
Start: 2019-05-14 | End: 2019-05-14 | Stop reason: SDUPTHER

## 2019-05-14 RX ORDER — ALBUTEROL SULFATE 2.5 MG/.5ML
2.5 SOLUTION RESPIRATORY (INHALATION) EVERY 4 HOURS
Status: DISCONTINUED | OUTPATIENT
Start: 2019-05-14 | End: 2019-05-15 | Stop reason: HOSPADM

## 2019-05-14 RX ADMIN — SODIUM CHLORIDE: 0.9 INJECTION, SOLUTION INTRAVENOUS at 06:05

## 2019-05-14 RX ADMIN — PREDNISONE 10 MG: 5 TABLET ORAL at 08:05

## 2019-05-14 RX ADMIN — VANCOMYCIN HYDROCHLORIDE 1250 MG: 1 INJECTION, POWDER, FOR SOLUTION INTRAVENOUS at 08:05

## 2019-05-14 RX ADMIN — ALBUTEROL SULFATE 2.5 MG: 2.5 SOLUTION RESPIRATORY (INHALATION) at 08:05

## 2019-05-14 RX ADMIN — SODIUM CHLORIDE 1000 ML: 0.9 INJECTION, SOLUTION INTRAVENOUS at 04:05

## 2019-05-14 RX ADMIN — CEFEPIME HYDROCHLORIDE 2 G: 2 INJECTION, SOLUTION INTRAVENOUS at 03:05

## 2019-05-14 RX ADMIN — IPRATROPIUM BROMIDE AND ALBUTEROL SULFATE 3 ML: .5; 3 SOLUTION RESPIRATORY (INHALATION) at 03:05

## 2019-05-14 RX ADMIN — FLUTICASONE PROPIONATE 100 MCG: 50 SPRAY, METERED NASAL at 08:05

## 2019-05-14 RX ADMIN — CEFEPIME HYDROCHLORIDE 1 G: 1 INJECTION, SOLUTION INTRAVENOUS at 11:05

## 2019-05-14 RX ADMIN — SODIUM CHLORIDE 1000 ML: 0.9 INJECTION, SOLUTION INTRAVENOUS at 03:05

## 2019-05-14 NOTE — ED PROVIDER NOTES
Encounter Date: 5/14/2019    SCRIBE #1 NOTE: I, Prnicess Foster, am scribing for, and in the presence of, Dr. Mahmood.       History     Chief Complaint   Patient presents with    Cough    Fatigue     Time seen by provider: 3:04 PM on 05/14/2019    Charly Mattson is a 24 y.o. male with PMHx of common variable immunodeficency, who presents to the ED with an onset of congestion that began three days ago. He reports associated sinus pressure, sore throat, cough, and wheezing. Patient reports visiting [place] about three weeks ago on vacation and smoking marijuana. Patient reports giving himself immunoglobulin infusions at home weekly. The patient denies fever, shortness of breath, abdominal pain, vomiting, or any other symptoms at this time. SHx includes adenoidectomy, sinus surgery, tonsillectomy, tympanostomy tube placement x4. Singulair drug allergy noted.     The history is provided by the patient.     Review of patient's allergies indicates:   Allergen Reactions    Singulair [montelukast] Other (See Comments)     arrhythmias    Singulair [montelukast]      Past Medical History:   Diagnosis Date    Immunoglobulin deficiency     Pneumonia      Past Surgical History:   Procedure Laterality Date    ADENOIDECTOMY      MANDIBLE FRACTURE SURGERY      SINUS SURGERY      TONSILLECTOMY      TYMPANOSTOMY TUBE PLACEMENT      TYMPANOSTOMY TUBE PLACEMENT      x 4     Family History   Problem Relation Age of Onset    Cancer Mother     Liver disease Father      Social History     Tobacco Use    Smoking status: Never Smoker    Smokeless tobacco: Never Used   Substance Use Topics    Alcohol use: No     Frequency: Never    Drug use: No     Review of Systems   Constitutional: Negative for fever.   HENT: Positive for congestion, sinus pressure and sore throat.    Respiratory: Positive for cough and wheezing. Negative for shortness of breath.    Cardiovascular: Negative for chest pain.   Gastrointestinal: Negative  for abdominal pain, diarrhea, nausea and vomiting.   Genitourinary: Negative for dysuria.   Musculoskeletal: Negative for back pain.   Skin: Negative for rash.   Neurological: Negative for headaches.       Physical Exam     Initial Vitals [05/14/19 1451]   BP Pulse Resp Temp SpO2   (!) 114/59 (!) 118 20 98.7 °F (37.1 °C) (!) 94 %      MAP       --         Physical Exam    Nursing note and vitals reviewed.  Constitutional: He appears well-developed and well-nourished. No distress.   HENT:   Head: Normocephalic and atraumatic.   Right Ear: Tympanic membrane is erythematous. A middle ear effusion is present.   Left Ear: Tympanic membrane is erythematous. A middle ear effusion is present.   Nasal mucosal erythema with yellowish discharge.    Eyes: Conjunctivae and EOM are normal. Pupils are equal, round, and reactive to light.   Neck: Neck supple.   Cardiovascular: Normal rate, regular rhythm and normal heart sounds. Exam reveals no gallop and no friction rub.    No murmur heard.  Pulmonary/Chest: No respiratory distress. He has wheezes (Expiratory). He has no rhonchi. He has rales in the right lower field.   Abdominal: Soft. Bowel sounds are normal. He exhibits no distension. There is no tenderness.   Musculoskeletal: Normal range of motion. He exhibits no edema or tenderness.   Neurological: He is alert and oriented to person, place, and time.   Skin: Skin is warm and dry.   Psychiatric: He has a normal mood and affect.         ED Course   Procedures  Labs Reviewed   CBC W/ AUTO DIFFERENTIAL - Abnormal; Notable for the following components:       Result Value    Mean Corpuscular Volume 81 (*)     Mean Corpuscular Hemoglobin 26.4 (*)     RDW 15.2 (*)     MPV 8.9 (*)     Gran # (ANC) 9.4 (*)     Lymph # 0.9 (*)     Gran% 83.8 (*)     Lymph% 8.1 (*)     All other components within normal limits   COMPREHENSIVE METABOLIC PANEL - Abnormal; Notable for the following components:    Total Bilirubin 1.1 (*)     AST 52 (*)      ALT 51 (*)     All other components within normal limits   CULTURE, BLOOD   CULTURE, BLOOD   CULTURE, RESPIRATORY   LACTIC ACID, PLASMA   URINALYSIS, REFLEX TO URINE CULTURE   PROCALCITONIN          Imaging Results          X-Ray Chest AP Portable (Final result)  Result time 05/14/19 15:30:18    Final result by Antoni Mcnamara Jr., MD (05/14/19 15:30:18)                 Impression:      No acute abnormality.      Electronically signed by: Antoni Mcnamara MD  Date:    05/14/2019  Time:    15:30             Narrative:    EXAMINATION:  XR CHEST AP PORTABLE    CLINICAL HISTORY:  Sepsis;    TECHNIQUE:  Single frontal view of the chest was performed.    COMPARISON:  Prior chest of September 16, 2013    FINDINGS:  The lungs are clear, with normal appearance of pulmonary vasculature and no pleural effusion or pneumothorax.    The cardiac silhouette is normal in size. The hilar and mediastinal contours are unremarkable.    Bones are intact.                                 Medical Decision Making:   Clinical Tests:   Lab Tests: Ordered and Reviewed  Radiological Study: Ordered and Reviewed  Patient has combined variable immune deficiency disorder and has bilateral otitis media and acute sinusitis without any signs of pneumonia.  He feels dehydrated fatigued and weak.  Will admit him for IV fluids, IV antibiotics given multiple recent infections with outpatient oral antibiotic therapy that is recurrent.  The patient did smoke marijuana recently with some friends on a trip 2 weeks ago and became sick afterwards.  I explained him that smoking is definitely something that he should avoid given his increased risk for upper respiratory infections with his underlying disease process.  Especially, in the setting of sharing a joint with others            Scribe Attestation:   Scribe #1: I performed the above scribed service and the documentation accurately describes the services I performed. I attest to the accuracy of the  note.               Clinical Impression:       ICD-10-CM ICD-9-CM   1. CVID (common variable immunodeficiency) D83.9 279.06   2. Acute bilateral otitis media H66.93 382.9   3. Acute maxillary sinusitis, recurrence not specified J01.00 461.0   4. Dehydration E86.0 276.51   5. Severe persistent asthma with acute exacerbation J45.51 493.92   6. Bronchitis, chronic, mucopurulent J41.1 491.1                                Kishan Mahmood MD  05/15/19 1865

## 2019-05-14 NOTE — PROGRESS NOTES
2019      Admit Date: 2019  Charly LUCIE Palmer  New Patient Consult    Chief Complaint   Patient presents with    Cough    Fatigue       History of Present Illness:        Pt works car sales.  Dad may have had similar illness but  prior to dx.  Pt had recurrent pneumonias and sinus infections ppt dx cvid by Dr Ureña around 9 yo..  Was getting [facility] q [facility] til adult with subsequent shots self administered.  Pt had freq severe sinus infections til surg in [place] by Dr Orozco 2 yrs ago.  He still has freq sinus c/o with drainage and occ infections.    No known bronchiectasis but reports scarring right lower lung?    Pt has had freq wheezes, worse early am, uses albuterol 3+ weekly, has some beltran.  No controller nor prednisone use.    Pt will occasionally miss his igg rx, and on several occasions would have fever 1-2 days after resuming.    Pt has chr yellow green mucous production with 1/4 cup daily - he is unaware of MDR germs/pseudomonas infections.  Commonly goes out on augmentin/amoxil.    Pt was hosp for 2 wks in past assoc with pneumonia and wheezes.    He has no abx action plan - usually goes to urgent care or er.  Pneumonia 2x/yr +.    Pt  Vacationed recently missing ig shot, took shot  with illness  of fever, cough, sob, headache, weakness.        PFSH:  Past Medical History:   Diagnosis Date    Immunoglobulin deficiency     Pneumonia      Past Surgical History:   Procedure Laterality Date    ADENOIDECTOMY      MANDIBLE FRACTURE SURGERY      SINUS SURGERY      TONSILLECTOMY      TYMPANOSTOMY TUBE PLACEMENT      TYMPANOSTOMY TUBE PLACEMENT      x 4     Social History     Tobacco Use    Smoking status: Never Smoker    Smokeless tobacco: Never Used   Substance Use Topics    Alcohol use: No     Frequency: Never    Drug use: No     Family History   Problem Relation Age of Onset    Cancer Mother     Liver disease Father      Review of  "patient's allergies indicates:   Allergen Reactions    Singulair [montelukast] Other (See Comments)     arrhythmias    Singulair [montelukast]        Performance Status:Performance Status:The patient's activity level is functions out of house.    Review of Systems:  a review of eleven systems covering constitutional, Psych, Eye, HEENT, Respiratory, Cardiac, GI, , Musculoskeletal, Endocrine, Dermatologicwas negative except the above mentioned abnormalities and for any pertinent findings as listed below:  pertinent positive as above, rest is good         Exam:Comprehensive exam done. /60 (BP Location: Left arm, Patient Position: Lying)   Pulse 103   Temp 98.8 °F (37.1 °C) (Oral)   Resp 18   Ht 5' 11" (1.803 m)   Wt 78.9 kg (174 lb)   SpO2 95%   BMI 24.27 kg/m²   Exam included Vitals as listed, and patient's appearance and affect and alertness and mood, oral exam for yeast and hygiene and pharynx lesions and Mallapatti (M) score, neck with inspection for jvd and masses and thyroid abnormalities and lymph nodes (supraclavicular and infraclavicular nodes also examined and noted if abn), chest exam included symmetry and effort and fremitus and percussion and auscultation, cardiac exam included rhythm and gallops and murmur and rubs and jvd and edema, abdominal exam for mass and hepatosplenomegaly and tenderness and hernias and bowel sounds, Musculoskeletal exam with muscle tone and posture and mobility/gait and  strenght, and skin for rashes and cyanosis and pallor and turgor, extremity for clubbing.  Findings were normal except as listed below:  M2, sl wheezes, chest is symmetric, no distress, normal percussion, normal fremitus and  No clubbing, no edema      Radiographs reviewed: view by direct vision nad but vague increase marking rll.      No results found for this or any previous visit.]    Labs     Recent Labs   Lab 05/14/19  1538   WBC 11.30   HGB 16.2   HCT 50.0        Recent Labs   Lab " 05/14/19 1537      K 3.9      CO2 25   BUN 10   CREATININE 1.0   GLU 89   CALCIUM 10.3   AST 52*   ALT 51*   ALKPHOS 122   BILITOT 1.1*   PROT 8.0   ALBUMIN 4.2   PROCAL 0.11   LACTATE 1.2   No results for input(s): PH, PCO2, PO2, HCO3 in the last 24 hours.  Microbiology Results (last 7 days)     Procedure Component Value Units Date/Time    Culture, Respiratory with Gram Stain [[id number]] Collected:  05/14/19 1906    Order Status:  Sent Specimen:  Respiratory from Sputum, Expectorated Updated:  05/14/19 1906    Blood culture x two cultures. Draw prior to antibiotics. [[id number]] Collected:  05/14/19 1542    Order Status:  Sent Specimen:  Blood Updated:  05/14/19 1542    Blood culture x two cultures. Draw prior to antibiotics. 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     Plan:   May have bronchiectasis.  Culture needed, ct chest later (best once clear of infection).    Pt should be on asthma and sinus regimen.  Home abx action plan would be good - if no mdr pathogens.  Regular ig replacement needed.  F/u pft later. Pt relates allergy to singulair.

## 2019-05-14 NOTE — H&P
PCP: Primary Doctor No    History & Physical    Chief Complaint: Worsening cough and fatigue    History of Present Illness:  Patient is a 24 y.o. male admitted to Hospitalist Service from [facility] Room with complaint of worsening cough and fatigue. Patient reportedly has past medical history significant for Common Variable Immunodeficiency disorder and history of recurrent pneumonia. Patient reports sinus congestion, sore-throat, cough and wheezing. Patient recently travelled to [place] and smoked recreational Marijuana for the first time. Patient is under care by an immunologist at [facility] and received weekly SQ immunoglobulins, last dose 2 days ago. Missed a dose last week. Patient denied a sick contact. Patient reports profound weakness and lethargy.  Patient reported history of recurrent pneumonia, on an average 3 times per year.  Patient denied chest pain, shortness of breath, abdominal pain, nausea, vomiting, headache, vision changes, focal neuro-deficits, cough or fever.    Past Medical History:   Diagnosis Date    Immunoglobulin deficiency     Pneumonia      Past Surgical History:   Procedure Laterality Date    ADENOIDECTOMY      MANDIBLE FRACTURE SURGERY      SINUS SURGERY      TONSILLECTOMY      TYMPANOSTOMY TUBE PLACEMENT      TYMPANOSTOMY TUBE PLACEMENT      x 4     Family History   Problem Relation Age of Onset    Cancer Mother     Liver disease Father      Social History     Tobacco Use    Smoking status: Never Smoker    Smokeless tobacco: Never Used   Substance Use Topics    Alcohol use: No     Frequency: Never    Drug use: No      Review of patient's allergies indicates:   Allergen Reactions    Singulair [montelukast] Other (See Comments)     arrhythmias    Singulair [montelukast]        (Not in a hospital admission)  Review of Systems:  Constitutional: + Subjective fever  Eyes: no visual changes  Ears, nose, mouth, throat, and face: + sore throat/sinus  congestion/nasal congestion  Respiratory: see HPI  Cardiovascular: no chest pain or palpitations  Gastrointestinal: no nausea or vomiting, no abdominal pain or change in bowel habits  Genitourinary: no hematuria or dysuria  Integument/breast: no rash or pruritis  Hematologic/lymphatic: no easy bruising or lymphadenopathy  Musculoskeletal: no arthralgias or myalgias  Neurological: no seizures or tremors.  Behavioral/Psych: no auditory or visual hallucinations  Endocrine: no heat or cold intolerance     OBJECTIVE:     Vital Signs (Most Recent)  Temp: 98.7 °F (37.1 °C) (05/14/19 1451)  Pulse: 106 (05/14/19 1650)  Resp: 14 (05/14/19 1543)  BP: 113/71 (05/14/19 1650)  SpO2: 98 % (05/14/19 1650)    Physical Exam:  General appearance: well developed, appears stated age, anxious  Head: normocephalic, atraumatic  Eyes:  conjunctivae/corneas clear. PERRL.   Nose:  Nasal mucosa is congested with surface yellow exudate. Septum midline.  Throat: lips, mucosa, and tongue normal; teeth and gums normal, no throat erythema.  Neck: supple, symmetrical, trachea midline, no JVD and thyroid not enlarged, symmetric, no tenderness/mass/nodules  Lungs:  Bilateral scattered rhonchi  Chest wall: no tenderness  Heart: regular rate and rhythm, S1, S2 normal, no murmur, click, rub or gallop  Abdomen: soft, non-tender non-distended; bowel sounds normal; no masses,  no organomegaly  Extremities: no cyanosis, clubbing or edema.   Pulses: 2+ and symmetric  Skin: Skin color, texture, turgor normal. No rashes or lesions.  Lymph nodes: Cervical, supraclavicular, and axillary nodes normal.  Neurologic: Normal strength and tone. No focal numbness or weakness. CNII-XII intact.      Laboratory:   CBC:   Recent Labs   Lab 05/14/19  1538   WBC 11.30   RBC 6.14   HGB 16.2   HCT 50.0      MCV 81*   MCH 26.4*   MCHC 32.4   Lactate: 1.2    CMP:   Recent Labs   Lab 05/14/19  1537   GLU 89   CALCIUM 10.3   ALBUMIN 4.2   PROT 8.0      K 3.9   CO2 25       BUN 10   CREATININE 1.0   ALKPHOS 122   ALT 51*   AST 52*   BILITOT 1.1*     Microbiology Results (last 7 days)     Procedure Component Value Units Date/Time    Blood culture x two cultures. Draw prior to antibiotics. [[id number]] Collected:  05/14/19 1542    Order Status:  Sent Specimen:  Blood Updated:  05/14/19 1542    Blood culture x two cultures. Draw prior to antibiotics. [[id number]] Collected:  05/14/19 1537    Order Status:  Sent Specimen:  Blood Updated:  05/14/19 1538    Culture, Respiratory with Gram Stain [[id number]]     Order Status:  No result Specimen:  Respiratory         Diagnostic Results:  Chest X-Ray: No acute abnormality.    Assessment/Plan:     Active Hospital Problems    Diagnosis  POA    Acute bronchitis  Acute sinusitis  History of recurrent pneumonia  CVID (common variable immunodeficiency) [D83.9]  Pulmonary consultation with Dr. Allison.    Continue beta 2 agonist bronchodilator treatments.   Continue IV antibiotics - cefepime and vancomycin.  Pharmacist to dose vancomycin.  Check sputum GS and Cx.   Continue routine medications as before.   Continue intranasal steroid and oral antihistamine therapy    Marijuana use  Patient encouraged to abstain from marijuana use in future.    Transaminitis  Trend liver enzymes.  Check hepatitis panel.  Possibly related to use of intravenous immunoglobulin infusion.    Yes     DVT prophylaxis:  Use sequential compression stockings and Jaydon hose.  Early ambulation encouraged.     Mallory Em MD  Department of Hospital Medicine   [facility]

## 2019-05-15 VITALS
RESPIRATION RATE: 18 BRPM | OXYGEN SATURATION: 97 % | DIASTOLIC BLOOD PRESSURE: 58 MMHG | HEART RATE: 72 BPM | WEIGHT: 174 LBS | TEMPERATURE: 98 F | HEIGHT: 71 IN | BODY MASS INDEX: 24.36 KG/M2 | SYSTOLIC BLOOD PRESSURE: 126 MMHG

## 2019-05-15 LAB
ALBUMIN SERPL BCP-MCNC: 3.5 G/DL (ref 3.5–5.2)
ALP SERPL-CCNC: 111 U/L (ref 55–135)
ALT SERPL W/O P-5'-P-CCNC: 55 U/L (ref 10–44)
ANION GAP SERPL CALC-SCNC: 9 MMOL/L (ref 8–16)
AST SERPL-CCNC: 61 U/L (ref 10–40)
BASOPHILS # BLD AUTO: 0 K/UL (ref 0–0.2)
BASOPHILS NFR BLD: 0.4 % (ref 0–1.9)
BILIRUB SERPL-MCNC: 0.6 MG/DL (ref 0.1–1)
BUN SERPL-MCNC: 9 MG/DL (ref 6–20)
CALCIUM SERPL-MCNC: 9.8 MG/DL (ref 8.7–10.5)
CHLORIDE SERPL-SCNC: 104 MMOL/L (ref 95–110)
CO2 SERPL-SCNC: 26 MMOL/L (ref 23–29)
CREAT SERPL-MCNC: 0.9 MG/DL (ref 0.5–1.4)
DIFFERENTIAL METHOD: ABNORMAL
EOSINOPHIL # BLD AUTO: 0 K/UL (ref 0–0.5)
EOSINOPHIL NFR BLD: 0.1 % (ref 0–8)
ERYTHROCYTE [DISTWIDTH] IN BLOOD BY AUTOMATED COUNT: 16.1 % (ref 11.5–14.5)
EST. GFR  (AFRICAN AMERICAN): >60 ML/MIN/1.73 M^2
EST. GFR  (NON AFRICAN AMERICAN): >60 ML/MIN/1.73 M^2
GLUCOSE SERPL-MCNC: 147 MG/DL (ref 70–110)
HAV IGM SERPL QL IA: NEGATIVE
HBV CORE IGM SERPL QL IA: NEGATIVE
HBV SURFACE AG SERPL QL IA: NEGATIVE
HCT VFR BLD AUTO: 45.3 % (ref 40–54)
HCV AB SERPL QL IA: NEGATIVE
HGB BLD-MCNC: 14.6 G/DL (ref 14–18)
LYMPHOCYTES # BLD AUTO: 0.8 K/UL (ref 1–4.8)
LYMPHOCYTES NFR BLD: 9.7 % (ref 18–48)
MCH RBC QN AUTO: 26.5 PG (ref 27–31)
MCHC RBC AUTO-ENTMCNC: 32.3 G/DL (ref 32–36)
MCV RBC AUTO: 82 FL (ref 82–98)
MONOCYTES # BLD AUTO: 0.8 K/UL (ref 0.3–1)
MONOCYTES NFR BLD: 10 % (ref 4–15)
NEUTROPHILS # BLD AUTO: 6.2 K/UL (ref 1.8–7.7)
NEUTROPHILS NFR BLD: 79.8 % (ref 38–73)
PLATELET # BLD AUTO: 172 K/UL (ref 150–350)
PMV BLD AUTO: 8.7 FL (ref 9.2–12.9)
POTASSIUM SERPL-SCNC: 4.5 MMOL/L (ref 3.5–5.1)
PROT SERPL-MCNC: 7.2 G/DL (ref 6–8.4)
RBC # BLD AUTO: 5.51 M/UL (ref 4.6–6.2)
SODIUM SERPL-SCNC: 139 MMOL/L (ref 136–145)
WBC # BLD AUTO: 7.7 K/UL (ref 3.9–12.7)

## 2019-05-15 PROCEDURE — 25000003 PHARM REV CODE 250: Performed by: EMERGENCY MEDICINE

## 2019-05-15 PROCEDURE — 99214 PR OFFICE/OUTPT VISIT, EST, LEVL IV, 30-39 MIN: ICD-10-PCS | Mod: ,,, | Performed by: INTERNAL MEDICINE

## 2019-05-15 PROCEDURE — G0378 HOSPITAL OBSERVATION PER HR: HCPCS

## 2019-05-15 PROCEDURE — 94640 AIRWAY INHALATION TREATMENT: CPT

## 2019-05-15 PROCEDURE — 80053 COMPREHEN METABOLIC PANEL: CPT

## 2019-05-15 PROCEDURE — 36415 COLL VENOUS BLD VENIPUNCTURE: CPT

## 2019-05-15 PROCEDURE — 99900035 HC TECH TIME PER 15 MIN (STAT)

## 2019-05-15 PROCEDURE — 63600175 PHARM REV CODE 636 W HCPCS: Performed by: EMERGENCY MEDICINE

## 2019-05-15 PROCEDURE — 25000242 PHARM REV CODE 250 ALT 637 W/ HCPCS: Performed by: INTERNAL MEDICINE

## 2019-05-15 PROCEDURE — 94664 DEMO&/EVAL PT USE INHALER: CPT

## 2019-05-15 PROCEDURE — 63600175 PHARM REV CODE 636 W HCPCS: Performed by: INTERNAL MEDICINE

## 2019-05-15 PROCEDURE — 25000003 PHARM REV CODE 250: Performed by: INTERNAL MEDICINE

## 2019-05-15 PROCEDURE — 96376 TX/PRO/DX INJ SAME DRUG ADON: CPT

## 2019-05-15 PROCEDURE — 94761 N-INVAS EAR/PLS OXIMETRY MLT: CPT

## 2019-05-15 PROCEDURE — 85025 COMPLETE CBC W/AUTO DIFF WBC: CPT

## 2019-05-15 PROCEDURE — 27000646 HC AEROBIKA DEVICE

## 2019-05-15 PROCEDURE — 99214 OFFICE O/P EST MOD 30 MIN: CPT | Mod: ,,, | Performed by: INTERNAL MEDICINE

## 2019-05-15 RX ORDER — BUDESONIDE AND FORMOTEROL FUMARATE DIHYDRATE 160; 4.5 UG/1; UG/1
2 AEROSOL RESPIRATORY (INHALATION) EVERY 12 HOURS
Qty: 6 G | Refills: 0 | Status: SHIPPED | OUTPATIENT
Start: 2019-05-15 | End: 2020-09-08

## 2019-05-15 RX ORDER — AMOXICILLIN AND CLAVULANATE POTASSIUM 875; 125 MG/1; MG/1
1 TABLET, FILM COATED ORAL 2 TIMES DAILY
Qty: 14 TABLET | Refills: 0 | Status: SHIPPED | OUTPATIENT
Start: 2019-05-15 | End: 2019-05-22

## 2019-05-15 RX ORDER — PREDNISONE 20 MG/1
20 TABLET ORAL DAILY
Qty: 3 TABLET | Refills: 0 | Status: SHIPPED | OUTPATIENT
Start: 2019-05-15 | End: 2019-05-18

## 2019-05-15 RX ADMIN — VANCOMYCIN HYDROCHLORIDE 1250 MG: 1 INJECTION, POWDER, FOR SOLUTION INTRAVENOUS at 03:05

## 2019-05-15 RX ADMIN — PREDNISONE 10 MG: 5 TABLET ORAL at 08:05

## 2019-05-15 RX ADMIN — ALBUTEROL SULFATE 2.5 MG: 2.5 SOLUTION RESPIRATORY (INHALATION) at 12:05

## 2019-05-15 RX ADMIN — CEFEPIME HYDROCHLORIDE 1 G: 1 INJECTION, SOLUTION INTRAVENOUS at 08:05

## 2019-05-15 RX ADMIN — FLUTICASONE PROPIONATE 100 MCG: 50 SPRAY, METERED NASAL at 08:05

## 2019-05-15 RX ADMIN — VANCOMYCIN HYDROCHLORIDE 1250 MG: 1 INJECTION, POWDER, FOR SOLUTION INTRAVENOUS at 11:05

## 2019-05-15 RX ADMIN — PANTOPRAZOLE SODIUM 40 MG: 40 TABLET, DELAYED RELEASE ORAL at 08:05

## 2019-05-15 RX ADMIN — ALBUTEROL SULFATE 2.5 MG: 2.5 SOLUTION RESPIRATORY (INHALATION) at 07:05

## 2019-05-15 RX ADMIN — ALBUTEROL SULFATE 2.5 MG: 2.5 SOLUTION RESPIRATORY (INHALATION) at 11:05

## 2019-05-15 RX ADMIN — ALBUTEROL SULFATE 2.5 MG: 2.5 SOLUTION RESPIRATORY (INHALATION) at 04:05

## 2019-05-15 NOTE — NURSING
Discharge instructions and education reviewed with pt. Pt to follow up with pulmonary doctor in next coming weeks. Telemetry box and leads removed. Peripheral IV removed with catheter intact. prescriptions reviewed with pt. Pt escorted to [facility] for discharge.

## 2019-05-15 NOTE — PLAN OF CARE
Patient denies having a PCP, however on his [facility] Medicaid patient has been assigned Negro Espinoza; added in [employer].  Pharmacy is [facility] [address] or [address]; he uses both.  Denies HH.  Has a nebulizer at home.  Discharge plan is home, no needs.       05/15/19 1111   Discharge Assessment   Assessment Type Discharge Planning Assessment   Confirmed/corrected address and phone number on facesheet? Yes   Assessment information obtained from? Patient   Prior to hospitilization cognitive status: Alert/Oriented   Prior to hospitalization functional status: Independent   Current cognitive status: Alert/Oriented   Current Functional Status: Independent   Lives With alone   Able to Return to Prior Arrangements yes   Is patient able to care for self after discharge? Yes   Patient's perception of discharge disposition home or selfcare   Readmission Within the Last 30 Days no previous admission in last 30 days   Patient currently being followed by outpatient case management? No   Patient currently receives any other outside agency services? No   Equipment Currently Used at Home nebulizer   Do you have any problems affording any of your prescribed medications? No   Is the patient taking medications as prescribed? yes   Does the patient have transportation home? Yes   Transportation Anticipated family or friend will provide   Dialysis Name and Scheduled days none   Does the patient receive services at the [facility]madin Clinic? No   Discharge Plan A Home   DME Needed Upon Discharge  none   Patient/Family in Agreement with Plan yes

## 2019-05-15 NOTE — PROGRESS NOTES
Progress Note  PULMONARY    Admit Date: 5/14/2019   05/15/2019      SUBJECTIVE:     May 15th, cough perhaps somewhat better.  No fever.  Feeling better.      PFSH and Allergies reviewed.    OBJECTIVE:     Vitals (Most recent):  Vitals:    05/15/19 1137   BP:    Pulse: 72   Resp: 18   Temp:        Vitals (24 hour range):  Temp:  [96.4 °F (35.8 °C)-99.4 °F (37.4 °C)]   Pulse:  []   Resp:  [14-20]   BP: (102-133)/(58-71)   SpO2:  [94 %-99 %]       Intake/Output Summary (Last 24 hours) at 5/15/2019 1203  Last data filed at 5/15/2019 0535  Gross per 24 hour   Intake 2455 ml   Output --   Net 2455 ml          Physical Exam:  The patient's neuro status (alertness,orientation,cognitive function,motor skills,), pharyngeal exam (oral lesions, hygiene, abn dentition,), Neck (jvd,mass,thyroid,nodes in neck and above/below clavicle),RESPIRATORY(symmetry,effort,fremitus,percussion,auscultation),  Cor(rhythm,heart tones including gallops,perfusion,edema)ABD(distention,hepatic&splenomegaly,tenderness,masses), Skin(rash,cyanosis),Psyc(affect,judgement,).  Exam negative except for these pertinent findings:    Alert, some rhonchi, rest exam is good    Radiographs reviewed: view by direct vision no new  No results found for this or any previous visit.]    Labs     Recent Labs   Lab 05/15/19  0515   WBC 7.70   HGB 14.6   HCT 45.3        Recent Labs   Lab 05/14/19  1537 05/14/19  1922 05/15/19  0515     --  139   K 3.9  --  4.5     --  104   CO2 25  --  26   BUN 10  --  9   CREATININE 1.0  --  0.9   GLU 89  --  147*   CALCIUM 10.3  --  9.8   AST 52*  --  61*   ALT 51*  --  55*   ALKPHOS 122  --  111   BILITOT 1.1*  --  0.6   PROT 8.0  --  7.2   ALBUMIN 4.2  --  3.5   PROCAL 0.11  --   --    LACTATE 1.2 1.5  --    No results for input(s): PH, PCO2, PO2, HCO3 in the last 24 hours.  Microbiology Results (last 7 days)     Procedure Component Value Units Date/Time    Blood culture x two cultures. Draw prior to  antibiotics. [[id number]] Collected:  05/14/19 1537    Order Status:  Completed Specimen:  Blood from Antecubital, Right Updated:  05/15/19 0745     Blood Culture, Routine No Growth to date    Narrative:       Aerobic and anaerobic    Blood culture x two cultures. Draw prior to antibiotics. [[id number]] Collected:  05/14/19 1542    Order Status:  Completed Specimen:  Blood from Antecubital, Left Updated:  05/15/19 0745     Blood Culture, Routine No Growth to date    Narrative:       Aerobic and anaerobic    Culture, Respiratory with Gram Stain [[id number]] Collected:  05/14/19 1906    Order Status:  Completed Specimen:  Respiratory from Sputum, Expectorated Updated:  05/15/19 0626     Gram Stain (Respiratory) <10 epithelial cells per low power field.      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               Plan:   May 15th, patient should be on a controller such as Breo or Symbicort.  Short course of prednisone be useful.  Home on oral antibiotic would be good. Augmentin would be reasonable.  Acapella.  Patient should have follow-up, consider CT scan considered sputum for HODA evaluation.  Follow up sputum culture.  Needs antibiotic and steroid action plan.    Outpatient therapy reasonable today.  Prednisone 20 a day for 3 days would be reasonable                                    .

## 2019-05-15 NOTE — DISCHARGE SUMMARY
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     Discharged Condition: Good    Hospital Course:   Patient is a 24 y.o. male admitted to Hospitalist Service from [facility] Room with complaint of worsening cough and fatigue. Patient reportedly has past medical history significant for Common Variable Immunodeficiency disorder and history of recurrent pneumonia. Patient reported sinus congestion, sore-throat, cough and wheezing. Patient recently travelled to [place] and smoked recreational Marijuana for the first time. Patient is under care by an immunologist at [facility] and received weekly SQ immunoglobulins, last dose 2 days ago. Missed a dose last week. Patient denied a sick contact. Patient reported profound weakness and lethargy.  Patient reported history of recurrent pneumonia, on an average 3 times per year.  Patient denied chest pain, abdominal pain, nausea, vomiting, headache, vision changes, focal neuro-deficits, cough or fever. Patient was admitted to Hospitalist medicine service. Patient was evaluated by Dr. Allison. Patient was treated with IV antibiotics, IV Solumedrol therapy and beta 2 agonist nebulization treatments with improved symptoms.  Outpatient CT scan of the chest and pulmonary function test to be performed once patient is recovered from this current episode.  Patient will  continue close surveillance with Dr. Allison and his immunologist.  Patient voiced understanding.  Patient feels ready to go home.  Patient was discharged home in stable condition with following discharge plan of care.     Consults: Dr. Allison    Significant Diagnostic Studies:   Chest X-Ray: No acute abnormality.    Microbiology Results (last 7 days)     Procedure Component Value Units Date/Time    Blood culture x two cultures. Draw prior to antibiotics. [[id number]] Collected:  05/14/19 1537    Order Status:  Completed Specimen:  Blood from Antecubital, Right Updated:  05/15/19 0745     Blood Culture, Routine No Growth to date    Narrative:       Aerobic and anaerobic    Blood culture x two cultures. Draw prior to antibiotics. [[id number]] Collected:  05/14/19 1542    Order Status:  Completed Specimen:  Blood from Antecubital, Left Updated:  05/15/19 0745     Blood Culture, Routine No Growth to date    Narrative:       Aerobic and anaerobic    Culture, Respiratory with Gram Stain [[id number]] Collected:  05/14/19 1906    Order Status:  Completed Specimen:  Respiratory from Sputum, Expectorated Updated:  05/15/19 0626     Gram Stain (Respiratory) <10 epithelial cells per low power field.      Gram Stain (Respiratory) Few WBC's     Gram Stain (Respiratory) Rare Gram positive cocci     Gram Stain (Respiratory) Rare Gram negative rods        Special Treatments/Procedures: None  Disposition: Home or Self Care    Medications:  Reconciled Home Medications:   Current Discharge Medication List      START taking these medications    Details   amoxicillin-clavulanate 875-125mg (AUGMENTIN) 875-125 mg per tablet Take 1 tablet by mouth 2 (two) times daily. for 7 days  Qty: 14 tablet, Refills: 0      budesonide-formoterol 160-4.5 mcg (SYMBICORT) 160-4.5 mcg/actuation HFAA Inhale 2 puffs into the lungs every 12 (twelve) hours. Controller  Qty: 6 g, Refills: 0      predniSONE (DELTASONE) 20 MG tablet Take 1 tablet (20 mg total) by mouth  once daily. for 3 days  Qty: 3 tablet, Refills: 0         CONTINUE these medications which have NOT CHANGED    Details   albuterol (PROVENTIL) 2.5 mg /3 mL (0.083 %) nebulizer solution Take 3 mLs (2.5 mg total) by nebulization every 6 (six) hours as needed for Wheezing. Rescue  Qty: 1 Box, Refills: 0      albuterol (VENTOLIN HFA) 90 mcg/actuation inhaler Inhale 2 puffs into the lungs every 6 (six) hours as needed for Wheezing. Rescue  Qty: 18 g, Refills: 0      fluticasone (FLONASE) 50 mcg/actuation nasal spray 2 sprays (100 mcg total) by Each Nare route 2 (two) times daily.  Qty: 1 Bottle, Refills: 0      immun glob G,IgG,-pro-IgA 0-50 (HIZENTRA) 1 gram/5 mL (20 %) Soln Inject 200 mg/kg into the skin every 7 days. Sunday      levalbuterol (XOPENEX) 1.25 mg/3 mL nebulizer solution Take 3 mLs (1.25 mg total) by nebulization every 4 (four) hours as needed for Wheezing. Rescue  Qty: 1 Box, Refills: 0           Discharge Procedure Orders   Diet Adult Regular     Other restrictions (specify):   Order Comments: PLEASE OBSERVE FALL PRECAUTIONS     Call MD for:   Order Comments: For worsening symptoms, chest pain, shortness of breath, increased abdominal pain, high grade fever, stroke or stroke like symptoms, immediately go to the nearest Emergency Room or call 911 as soon as possible.     Follow-up Information     Negro Espinoza MD In 1 week.    Specialty:  Family Medicine  Contact information:  [address]  [facility] [address]  [phone]             Mina Allison MD In 3 weeks.    Specialty:  Pulmonary Disease  Contact information:  [address]  [address]  [facility] [address]  [phone]             Please follow up.    Contact information:  Continue [employer] use as directed.    Avoid use of recreational drugs or Marijuana.               Time spent on the discharge of patient: 32 minutes  Patient was seen and examined on the date of discharge and determined to be suitable for  discharge.

## 2019-05-15 NOTE — PLAN OF CARE
Problem: Adult Inpatient Plan of Care  Goal: Plan of Care Review  Patient alert and oriented.  Updated on plan of care.  Up ad mario.  Sinus/sinus tach on cardiac monitor.  Full code.  IV infusing per MD orders.  Call light in reach.  Bed in low/locked position.  No complaints/needs at this time.

## 2019-05-15 NOTE — PLAN OF CARE
05/14/19 2008   Patient Assessment/Suction   Level of Consciousness (AVPU) alert   Respiratory Effort Normal;Unlabored   Expansion/Accessory Muscles/Retractions expansion symmetric   All Lung Fields Breath Sounds diminished   Rhythm/Pattern, Respiratory pattern regular   Cough Frequency infrequent   PRE-TX-O2   O2 Device (Oxygen Therapy) room air   SpO2 95 %   Pulse 101   Resp 18   Aerosol Therapy   $ Aerosol Therapy Charges Aerosol Treatment   Respiratory Treatment Status (SVN) given   Treatment Route (SVN) mouthpiece   Patient Position (SVN) semi-[place]'s   Signs of Intolerance (SVN) none   Breath Sounds Post-Respiratory Treatment   Post-treatment Heart Rate (beats/min) 98   Post-treatment Resp Rate (breaths/min) 18

## 2019-05-16 NOTE — PLAN OF CARE
05/16/19 0815   Final Note   Assessment Type Final Discharge Note   Anticipated Discharge Disposition Home

## 2019-05-17 LAB
BACTERIA SPEC AEROBE CULT: NORMAL
BACTERIA SPEC AEROBE CULT: NORMAL
GRAM STN SPEC: NORMAL

## 2019-05-20 LAB
BACTERIA BLD CULT: NORMAL
BACTERIA BLD CULT: NORMAL

## 2019-09-29 ENCOUNTER — CLINICAL SUPPORT (OUTPATIENT)
Dept: URGENT CARE | Facility: CLINIC | Age: 24
End: 2019-09-29
Payer: MEDICAID

## 2019-09-29 VITALS
BODY MASS INDEX: 26.88 KG/M2 | WEIGHT: 192 LBS | HEART RATE: 83 BPM | DIASTOLIC BLOOD PRESSURE: 68 MMHG | HEIGHT: 71 IN | TEMPERATURE: 97 F | OXYGEN SATURATION: 98 % | SYSTOLIC BLOOD PRESSURE: 110 MMHG | RESPIRATION RATE: 18 BRPM

## 2019-09-29 DIAGNOSIS — M25.511 ACUTE PAIN OF RIGHT SHOULDER: ICD-10-CM

## 2019-09-29 DIAGNOSIS — S46.911A STRAIN OF RIGHT SHOULDER, INITIAL ENCOUNTER: Primary | ICD-10-CM

## 2019-09-29 PROCEDURE — 99204 PR OFFICE/OUTPT VISIT, NEW, LEVL IV, 45-59 MIN: ICD-10-PCS | Mod: 25,S$GLB,, | Performed by: NURSE PRACTITIONER

## 2019-09-29 PROCEDURE — 99204 OFFICE O/P NEW MOD 45 MIN: CPT | Mod: 25,S$GLB,, | Performed by: NURSE PRACTITIONER

## 2019-09-29 PROCEDURE — 99070 SPECIAL SUPPLIES PHYS/QHP: CPT | Mod: S$GLB,,, | Performed by: NURSE PRACTITIONER

## 2019-09-29 PROCEDURE — 73030 X-RAY EXAM OF SHOULDER: CPT | Mod: RT,S$GLB,, | Performed by: NURSE PRACTITIONER

## 2019-09-29 PROCEDURE — 99070 PR SPECIAL SUPPLIES: ICD-10-PCS | Mod: S$GLB,,, | Performed by: NURSE PRACTITIONER

## 2019-09-29 PROCEDURE — 73030 PR  X-RAY SHOULDER 2+ VW: ICD-10-PCS | Mod: RT,S$GLB,, | Performed by: NURSE PRACTITIONER

## 2019-09-29 RX ORDER — LEVALBUTEROL INHALATION SOLUTION 1.25 MG/3ML
1 SOLUTION RESPIRATORY (INHALATION) EVERY 4 HOURS PRN
Qty: 2 BOX | Refills: 0 | Status: ON HOLD | OUTPATIENT
Start: 2019-09-29 | End: 2020-12-04

## 2019-09-29 RX ORDER — DICLOFENAC SODIUM 50 MG/1
50 TABLET, DELAYED RELEASE ORAL EVERY 8 HOURS PRN
Qty: 30 TABLET | Refills: 0 | Status: SHIPPED | OUTPATIENT
Start: 2019-09-29 | End: 2022-05-03

## 2019-09-29 RX ORDER — PREDNISONE 20 MG/1
40 TABLET ORAL DAILY
Qty: 8 TABLET | Refills: 0 | Status: SHIPPED | OUTPATIENT
Start: 2019-09-29 | End: 2019-10-03

## 2019-09-29 RX ORDER — METHOCARBAMOL 750 MG/1
1500 TABLET, FILM COATED ORAL EVERY 8 HOURS PRN
Qty: 30 TABLET | Refills: 0 | Status: SHIPPED | OUTPATIENT
Start: 2019-09-29 | End: 2019-10-09

## 2019-09-29 NOTE — PROGRESS NOTES
"Subjective:       Patient ID: Charly Mattson is a 24 y.o. male.    Vitals:  height is 5' 11" (1.803 m) and weight is 87.1 kg (192 lb). His temperature is 96.9 °F (36.1 °C). His blood pressure is 110/68 and his pulse is 83. His respiration is 18 and oxygen saturation is 98%.     Chief Complaint: Shoulder Pain    Charly Mattson is a 24 year old male presenting to the clinic with c/o right shoulder pain. He denies direct injury/trauma to the joint but states it began hurting after working out at the gym. He has taken no medications for his pain and has tried no therapy at home.     Shoulder Pain    Pertinent negatives include no fever or headaches.       Constitution: Negative for chills, fatigue and fever.   HENT: Negative for congestion and sore throat.    Neck: Negative for painful lymph nodes.   Cardiovascular: Negative for chest pain and leg swelling.   Eyes: Negative for double vision and blurred vision.   Respiratory: Negative for cough and shortness of breath.    Gastrointestinal: Negative for nausea, vomiting and diarrhea.   Genitourinary: Negative for dysuria, frequency and urgency.   Musculoskeletal: Positive for pain (right shoulder). Negative for joint pain, joint swelling, muscle cramps and muscle ache.   Skin: Negative for color change, pale and rash.   Allergic/Immunologic: Negative for seasonal allergies.   Neurological: Negative for dizziness, history of vertigo, light-headedness, passing out and headaches.   Hematologic/Lymphatic: Negative for swollen lymph nodes, easy bruising/bleeding and history of blood clots. Does not bruise/bleed easily.   Psychiatric/Behavioral: Negative for nervous/anxious, sleep disturbance and depression. The patient is not nervous/anxious.        Objective:      Physical Exam   Constitutional: He is oriented to person, place, and time. He appears well-developed and well-nourished. He is cooperative.  Non-toxic appearance. He does not appear ill. No distress.   HENT: "   Head: Normocephalic and atraumatic.   Right Ear: Hearing, tympanic membrane, external ear and ear canal normal.   Left Ear: Hearing, tympanic membrane, external ear and ear canal normal.   Nose: Nose normal. No mucosal edema, rhinorrhea or nasal deformity. No epistaxis. Right sinus exhibits no maxillary sinus tenderness and no frontal sinus tenderness. Left sinus exhibits no maxillary sinus tenderness and no frontal sinus tenderness.   Mouth/Throat: Uvula is midline, oropharynx is clear and moist and mucous membranes are normal. No trismus in the jaw. Normal dentition. No uvula swelling. No posterior oropharyngeal erythema.   Eyes: Conjunctivae and lids are normal. Right eye exhibits no discharge. Left eye exhibits no discharge. No scleral icterus.   Sclera clear bilat   Neck: Trachea normal, normal range of motion, full passive range of motion without pain and phonation normal. Neck supple.   Cardiovascular: Normal rate, regular rhythm, normal heart sounds, intact distal pulses and normal pulses.   Pulmonary/Chest: Effort normal and breath sounds normal. No respiratory distress.   Abdominal: Soft. Normal appearance and bowel sounds are normal. He exhibits no distension, no pulsatile midline mass and no mass. There is no tenderness.   Musculoskeletal: Normal range of motion. He exhibits no edema or deformity.        Right shoulder: He exhibits pain. He exhibits normal range of motion, no tenderness and no bony tenderness.   Radial pulse 2+ in right upper extremity, full ROM of right shoulder   Neurological: He is alert and oriented to person, place, and time. He exhibits normal muscle tone. Coordination normal.   Skin: Skin is warm, dry and intact. He is not diaphoretic. No pallor.   Psychiatric: He has a normal mood and affect. His speech is normal and behavior is normal. Judgment and thought content normal. Cognition and memory are normal.   Nursing note and vitals reviewed.      Assessment:       No diagnosis  found.    Plan:         There are no diagnoses linked to this encounter.

## 2019-09-29 NOTE — PATIENT INSTRUCTIONS
Shoulder Sprain  A sprain is a stretching or tearing of the ligaments that hold a joint together. A sprain may take up to 8 weeks to fully heal, depending on how severe it is. Moderate to severe shoulder sprains are treated with a sling or shoulder immobilizer. Minor sprains can be treated without any special support.  Home care  The following guidelines will help you care for your injury at home:  · If a sling was given to you, leave it in place for the time advised by your healthcare provider. If you arent sure how long to wear it, ask for advice. If the sling becomes loose, adjust it so that your forearm is level with the ground. Your shoulder should feel well supported.  · Put an ice pack on the injured area for 20 minutes every 1 to 2 hours the first day. You can make your own ice pack by putting ice cubes in a plastic bag. A bag of frozen peas or something similar works well too. Wrap the bag in a thin towel. Continue with ice packs 3 to 4 times a day for the next 2 to 3 days. Then use the pack as needed to ease pain and swelling.  · You may use acetaminophen or ibuprofen to control pain, unless another pain medicine was prescribed. If you have chronic liver or kidney disease, talk with your healthcare provider before using these medicines. Also talk with your provider if youve had a stomach ulcer or gastrointestinal bleeding.  · Shoulder joints become stiff if left in a sling for too long. You should start range of motion exercises about 7 to 10 days after the injury. Talk with your provider to find out what type of exercises to do and how soon to start.  Follow-up care  Follow up with your healthcare provider, or as advised.  Any X-rays you had today dont show any broken bones, breaks, or fractures. Sometimes fractures dont show up on the first X-ray. Bruises and sprains can sometimes hurt as much as a fracture. These injuries can take time to heal completely. If your symptoms dont improve or they get  worse, talk with your provider. You may need a repeat X-ray or other treatments.  When to seek medical advice  Call your healthcare provider right away if any of these occur:  · Shoulder pain or swelling in your arm that gets worse  · Fingers become cold, blue, numb, or tingly  · Large amount of bruising of the shoulder or upper arm  · Fever or chills  Date Last Reviewed: 8/1/2016  © 5795-7847 [employer]. [address] [place] [address], [place] [address]. All rights reserved. This information is not intended as a substitute for professional medical care. Always follow your healthcare professional's instructions.

## 2020-03-20 ENCOUNTER — CLINICAL SUPPORT (OUTPATIENT)
Dept: URGENT CARE | Facility: CLINIC | Age: 25
End: 2020-03-20
Payer: COMMERCIAL

## 2020-03-20 DIAGNOSIS — R06.02 SHORTNESS OF BREATH: Primary | ICD-10-CM

## 2020-03-20 DIAGNOSIS — R05.9 COUGH: Primary | ICD-10-CM

## 2020-03-20 DIAGNOSIS — R05.9 COUGH: ICD-10-CM

## 2020-03-20 DIAGNOSIS — J47.1 BRONCHIECTASIS WITH ACUTE EXACERBATION: ICD-10-CM

## 2020-03-20 DIAGNOSIS — D84.9 IMMUNOCOMPROMISED: ICD-10-CM

## 2020-03-20 PROCEDURE — U0002 COVID-19 LAB TEST NON-CDC: HCPCS

## 2020-03-20 NOTE — PROGRESS NOTES
Mr Srinivasan has a history of common variable immune deficiency and bronchiectasis.  He contacted me regarding his recent symptoms of cough and shortness of breath.  His spouse works in a medical clinic and has had contact with a positive COVID-19 case.  I have placed an order for testing at the [facility] testing site as it is important that this immunocompromised patient avoid exposure to ER settings if possible.

## 2020-03-23 LAB — SARS-COV-2 RNA RESP QL NAA+PROBE: NOT DETECTED

## 2020-03-24 ENCOUNTER — TELEPHONE (OUTPATIENT)
Dept: URGENT CARE | Facility: CLINIC | Age: 25
End: 2020-03-24

## 2020-05-27 ENCOUNTER — LAB VISIT (OUTPATIENT)
Dept: PRIMARY CARE CLINIC | Facility: CLINIC | Age: 25
End: 2020-05-27
Payer: COMMERCIAL

## 2020-05-27 DIAGNOSIS — R05.9 COUGH: Primary | ICD-10-CM

## 2020-05-27 PROCEDURE — U0003 INFECTIOUS AGENT DETECTION BY NUCLEIC ACID (DNA OR RNA); SEVERE ACUTE RESPIRATORY SYNDROME CORONAVIRUS 2 (SARS-COV-2) (CORONAVIRUS DISEASE [COVID-19]), AMPLIFIED PROBE TECHNIQUE, MAKING USE OF HIGH THROUGHPUT TECHNOLOGIES AS DESCRIBED BY CMS-2020-01-R: HCPCS

## 2020-05-28 ENCOUNTER — HOSPITAL ENCOUNTER (INPATIENT)
Facility: HOSPITAL | Age: 25
LOS: 2 days | Discharge: HOME | DRG: 871 | End: 2020-05-30
Attending: EMERGENCY MEDICINE | Admitting: INTERNAL MEDICINE
Payer: COMMERCIAL

## 2020-05-28 DIAGNOSIS — R50.9 FEVER: ICD-10-CM

## 2020-05-28 DIAGNOSIS — J18.9 PNEUMONIA OF RIGHT LOWER LOBE DUE TO INFECTIOUS ORGANISM: Primary | ICD-10-CM

## 2020-05-28 DIAGNOSIS — R06.02 SOB (SHORTNESS OF BREATH): ICD-10-CM

## 2020-05-28 PROBLEM — R65.20 SEVERE SEPSIS: Status: ACTIVE | Noted: 2020-05-28

## 2020-05-28 PROBLEM — A41.9 SEVERE SEPSIS: Status: ACTIVE | Noted: 2020-05-28

## 2020-05-28 PROBLEM — D80.9 IMMUNOGLOBULIN DEFICIENCY: Chronic | Status: ACTIVE | Noted: 2020-05-28

## 2020-05-28 PROBLEM — R74.01 TRANSAMINITIS: Status: ACTIVE | Noted: 2020-05-28

## 2020-05-28 LAB
ALBUMIN SERPL BCP-MCNC: 4.5 G/DL (ref 3.5–5.2)
ALP SERPL-CCNC: 160 U/L (ref 55–135)
ALT SERPL W/O P-5'-P-CCNC: 169 U/L (ref 10–44)
ANION GAP SERPL CALC-SCNC: 12 MMOL/L (ref 8–16)
AST SERPL-CCNC: 129 U/L (ref 10–40)
BASOPHILS # BLD AUTO: 0.04 K/UL (ref 0–0.2)
BASOPHILS NFR BLD: 0.2 % (ref 0–1.9)
BILIRUB SERPL-MCNC: 1.6 MG/DL (ref 0.1–1)
BUN SERPL-MCNC: 12 MG/DL (ref 6–20)
CALCIUM SERPL-MCNC: 10.2 MG/DL (ref 8.7–10.5)
CHLORIDE SERPL-SCNC: 98 MMOL/L (ref 95–110)
CO2 SERPL-SCNC: 26 MMOL/L (ref 23–29)
CREAT SERPL-MCNC: 1.2 MG/DL (ref 0.5–1.4)
DIFFERENTIAL METHOD: ABNORMAL
EOSINOPHIL # BLD AUTO: 0 K/UL (ref 0–0.5)
EOSINOPHIL NFR BLD: 0.2 % (ref 0–8)
ERYTHROCYTE [DISTWIDTH] IN BLOOD BY AUTOMATED COUNT: 13.2 % (ref 11.5–14.5)
EST. GFR  (AFRICAN AMERICAN): >60 ML/MIN/1.73 M^2
EST. GFR  (NON AFRICAN AMERICAN): >60 ML/MIN/1.73 M^2
FERRITIN SERPL-MCNC: 585 NG/ML (ref 20–300)
GLUCOSE SERPL-MCNC: 101 MG/DL (ref 70–110)
HCT VFR BLD AUTO: 41.8 % (ref 40–54)
HGB BLD-MCNC: 13.8 G/DL (ref 14–18)
IMM GRANULOCYTES # BLD AUTO: 0.04 K/UL (ref 0–0.04)
IMM GRANULOCYTES NFR BLD AUTO: 0.2 % (ref 0–0.5)
LACTATE SERPL-SCNC: 2.3 MMOL/L (ref 0.5–1.9)
LYMPHOCYTES # BLD AUTO: 1 K/UL (ref 1–4.8)
LYMPHOCYTES NFR BLD: 6.3 % (ref 18–48)
MCH RBC QN AUTO: 26.9 PG (ref 27–31)
MCHC RBC AUTO-ENTMCNC: 33 G/DL (ref 32–36)
MCV RBC AUTO: 82 FL (ref 82–98)
MONOCYTES # BLD AUTO: 1.6 K/UL (ref 0.3–1)
MONOCYTES NFR BLD: 10 % (ref 4–15)
NEUTROPHILS # BLD AUTO: 13.4 K/UL (ref 1.8–7.7)
NEUTROPHILS NFR BLD: 83.1 % (ref 38–73)
NRBC BLD-RTO: 0 /100 WBC
PLATELET # BLD AUTO: 196 K/UL (ref 150–350)
PMV BLD AUTO: 11.1 FL (ref 9.2–12.9)
POTASSIUM SERPL-SCNC: 3.7 MMOL/L (ref 3.5–5.1)
PROCALCITONIN SERPL IA-MCNC: 0.06 NG/ML (ref 0–0.5)
PROT SERPL-MCNC: 9.4 G/DL (ref 6–8.4)
RBC # BLD AUTO: 5.13 M/UL (ref 4.6–6.2)
SARS-COV-2 RDRP RESP QL NAA+PROBE: NEGATIVE
SARS-COV-2 RNA RESP QL NAA+PROBE: NOT DETECTED
SODIUM SERPL-SCNC: 136 MMOL/L (ref 136–145)
WBC # BLD AUTO: 16.18 K/UL (ref 3.9–12.7)

## 2020-05-28 PROCEDURE — 93005 ELECTROCARDIOGRAM TRACING: CPT | Performed by: INTERNAL MEDICINE

## 2020-05-28 PROCEDURE — U0002 COVID-19 LAB TEST NON-CDC: HCPCS

## 2020-05-28 PROCEDURE — 85025 COMPLETE CBC W/AUTO DIFF WBC: CPT

## 2020-05-28 PROCEDURE — 94761 N-INVAS EAR/PLS OXIMETRY MLT: CPT

## 2020-05-28 PROCEDURE — 21400001 HC TELEMETRY ROOM

## 2020-05-28 PROCEDURE — 96374 THER/PROPH/DIAG INJ IV PUSH: CPT

## 2020-05-28 PROCEDURE — 84145 PROCALCITONIN (PCT): CPT

## 2020-05-28 PROCEDURE — 63600175 PHARM REV CODE 636 W HCPCS: Performed by: EMERGENCY MEDICINE

## 2020-05-28 PROCEDURE — 25000003 PHARM REV CODE 250: Performed by: EMERGENCY MEDICINE

## 2020-05-28 PROCEDURE — 99900035 HC TECH TIME PER 15 MIN (STAT)

## 2020-05-28 PROCEDURE — 99285 EMERGENCY DEPT VISIT HI MDM: CPT | Mod: 25

## 2020-05-28 PROCEDURE — 82728 ASSAY OF FERRITIN: CPT

## 2020-05-28 PROCEDURE — 83605 ASSAY OF LACTIC ACID: CPT

## 2020-05-28 PROCEDURE — 80053 COMPREHEN METABOLIC PANEL: CPT

## 2020-05-28 PROCEDURE — 96361 HYDRATE IV INFUSION ADD-ON: CPT

## 2020-05-28 PROCEDURE — 87040 BLOOD CULTURE FOR BACTERIA: CPT | Mod: 59

## 2020-05-28 PROCEDURE — 36415 COLL VENOUS BLD VENIPUNCTURE: CPT

## 2020-05-28 RX ORDER — POTASSIUM CHLORIDE 20 MEQ/1
20 TABLET, EXTENDED RELEASE ORAL
Status: DISCONTINUED | OUTPATIENT
Start: 2020-05-28 | End: 2020-05-30 | Stop reason: HOSPADM

## 2020-05-28 RX ORDER — SODIUM CHLORIDE, SODIUM LACTATE, POTASSIUM CHLORIDE, CALCIUM CHLORIDE 600; 310; 30; 20 MG/100ML; MG/100ML; MG/100ML; MG/100ML
INJECTION, SOLUTION INTRAVENOUS CONTINUOUS
Status: DISCONTINUED | OUTPATIENT
Start: 2020-05-28 | End: 2020-05-29

## 2020-05-28 RX ORDER — LANOLIN ALCOHOL/MO/W.PET/CERES
400 CREAM (GRAM) TOPICAL EVERY 12 HOURS PRN
Status: DISCONTINUED | OUTPATIENT
Start: 2020-05-28 | End: 2020-05-30 | Stop reason: HOSPADM

## 2020-05-28 RX ORDER — MELOXICAM 15 MG/1
15 TABLET ORAL DAILY
COMMUNITY
End: 2020-09-14

## 2020-05-28 RX ORDER — ACETAMINOPHEN 325 MG/1
650 TABLET ORAL
COMMUNITY
End: 2024-02-01 | Stop reason: ALTCHOICE

## 2020-05-28 RX ORDER — ACETAMINOPHEN 325 MG/1
650 TABLET ORAL
Status: COMPLETED | OUTPATIENT
Start: 2020-05-28 | End: 2020-05-28

## 2020-05-28 RX ORDER — IPRATROPIUM BROMIDE AND ALBUTEROL SULFATE 2.5; .5 MG/3ML; MG/3ML
3 SOLUTION RESPIRATORY (INHALATION) EVERY 4 HOURS PRN
Status: DISCONTINUED | OUTPATIENT
Start: 2020-05-28 | End: 2020-05-30 | Stop reason: HOSPADM

## 2020-05-28 RX ORDER — BUDESONIDE AND FORMOTEROL FUMARATE DIHYDRATE 160; 4.5 UG/1; UG/1
2 AEROSOL RESPIRATORY (INHALATION) EVERY 12 HOURS
Status: DISCONTINUED | OUTPATIENT
Start: 2020-05-28 | End: 2020-05-30 | Stop reason: HOSPADM

## 2020-05-28 RX ORDER — SODIUM CHLORIDE 9 MG/ML
1000 INJECTION, SOLUTION INTRAVENOUS
Status: COMPLETED | OUTPATIENT
Start: 2020-05-28 | End: 2020-05-28

## 2020-05-28 RX ORDER — IPRATROPIUM BROMIDE AND ALBUTEROL SULFATE 2.5; .5 MG/3ML; MG/3ML
3 SOLUTION RESPIRATORY (INHALATION) EVERY 6 HOURS
Status: DISCONTINUED | OUTPATIENT
Start: 2020-05-29 | End: 2020-05-28

## 2020-05-28 RX ORDER — HYDROCODONE BITARTRATE AND ACETAMINOPHEN 5; 325 MG/1; MG/1
1 TABLET ORAL EVERY 8 HOURS PRN
Status: DISCONTINUED | OUTPATIENT
Start: 2020-05-28 | End: 2020-05-30 | Stop reason: HOSPADM

## 2020-05-28 RX ORDER — LANOLIN ALCOHOL/MO/W.PET/CERES
800 CREAM (GRAM) TOPICAL EVERY 12 HOURS PRN
Status: DISCONTINUED | OUTPATIENT
Start: 2020-05-28 | End: 2020-05-30 | Stop reason: HOSPADM

## 2020-05-28 RX ORDER — LEVOFLOXACIN 5 MG/ML
750 INJECTION, SOLUTION INTRAVENOUS
Status: DISCONTINUED | OUTPATIENT
Start: 2020-05-28 | End: 2020-05-30 | Stop reason: HOSPADM

## 2020-05-28 RX ORDER — SODIUM,POTASSIUM PHOSPHATES 280-250MG
1 POWDER IN PACKET (EA) ORAL EVERY 12 HOURS PRN
Status: DISCONTINUED | OUTPATIENT
Start: 2020-05-28 | End: 2020-05-30 | Stop reason: HOSPADM

## 2020-05-28 RX ORDER — ACETAMINOPHEN 325 MG/1
650 TABLET ORAL EVERY 8 HOURS PRN
Status: DISCONTINUED | OUTPATIENT
Start: 2020-05-28 | End: 2020-05-30 | Stop reason: HOSPADM

## 2020-05-28 RX ORDER — SODIUM CHLORIDE 0.9 % (FLUSH) 0.9 %
10 SYRINGE (ML) INJECTION
Status: DISCONTINUED | OUTPATIENT
Start: 2020-05-28 | End: 2020-05-30 | Stop reason: HOSPADM

## 2020-05-28 RX ADMIN — AZITHROMYCIN MONOHYDRATE 500 MG: 500 INJECTION, POWDER, LYOPHILIZED, FOR SOLUTION INTRAVENOUS at 09:05

## 2020-05-28 RX ADMIN — CEFTRIAXONE 1 G: 1 INJECTION, SOLUTION INTRAVENOUS at 09:05

## 2020-05-28 RX ADMIN — SODIUM CHLORIDE 1000 ML: 0.9 INJECTION, SOLUTION INTRAVENOUS at 08:05

## 2020-05-28 RX ADMIN — ACETAMINOPHEN 650 MG: 325 TABLET ORAL at 08:05

## 2020-05-29 LAB
BASOPHILS # BLD AUTO: 0.04 K/UL (ref 0–0.2)
BASOPHILS NFR BLD: 0.4 % (ref 0–1.9)
DIFFERENTIAL METHOD: ABNORMAL
EOSINOPHIL # BLD AUTO: 0.1 K/UL (ref 0–0.5)
EOSINOPHIL NFR BLD: 0.4 % (ref 0–8)
ERYTHROCYTE [DISTWIDTH] IN BLOOD BY AUTOMATED COUNT: 13.2 % (ref 11.5–14.5)
HCT VFR BLD AUTO: 39.1 % (ref 40–54)
HGB BLD-MCNC: 13 G/DL (ref 14–18)
IMM GRANULOCYTES # BLD AUTO: 0.04 K/UL (ref 0–0.04)
IMM GRANULOCYTES NFR BLD AUTO: 0.4 % (ref 0–0.5)
LACTATE SERPL-SCNC: 1.2 MMOL/L (ref 0.5–1.9)
LYMPHOCYTES # BLD AUTO: 1.2 K/UL (ref 1–4.8)
LYMPHOCYTES NFR BLD: 10.8 % (ref 18–48)
MAGNESIUM SERPL-MCNC: 2.1 MG/DL (ref 1.6–2.6)
MCH RBC QN AUTO: 26.9 PG (ref 27–31)
MCHC RBC AUTO-ENTMCNC: 33.2 G/DL (ref 32–36)
MCV RBC AUTO: 81 FL (ref 82–98)
MONOCYTES # BLD AUTO: 1.5 K/UL (ref 0.3–1)
MONOCYTES NFR BLD: 13.3 % (ref 4–15)
NEUTROPHILS # BLD AUTO: 8.4 K/UL (ref 1.8–7.7)
NEUTROPHILS NFR BLD: 74.7 % (ref 38–73)
NRBC BLD-RTO: 0 /100 WBC
PHOSPHATE SERPL-MCNC: 3.5 MG/DL (ref 2.7–4.5)
PLATELET # BLD AUTO: 165 K/UL (ref 150–350)
PMV BLD AUTO: 11.3 FL (ref 9.2–12.9)
PROCALCITONIN SERPL IA-MCNC: 0.09 NG/ML (ref 0–0.5)
RBC # BLD AUTO: 4.83 M/UL (ref 4.6–6.2)
WBC # BLD AUTO: 11.21 K/UL (ref 3.9–12.7)

## 2020-05-29 PROCEDURE — 87205 SMEAR GRAM STAIN: CPT

## 2020-05-29 PROCEDURE — 25000242 PHARM REV CODE 250 ALT 637 W/ HCPCS: Performed by: INTERNAL MEDICINE

## 2020-05-29 PROCEDURE — 85025 COMPLETE CBC W/AUTO DIFF WBC: CPT

## 2020-05-29 PROCEDURE — 63600175 PHARM REV CODE 636 W HCPCS: Performed by: NURSE PRACTITIONER

## 2020-05-29 PROCEDURE — 36415 COLL VENOUS BLD VENIPUNCTURE: CPT

## 2020-05-29 PROCEDURE — 99255 IP/OBS CONSLTJ NEW/EST HI 80: CPT | Mod: ,,, | Performed by: INTERNAL MEDICINE

## 2020-05-29 PROCEDURE — 25000003 PHARM REV CODE 250: Performed by: INTERNAL MEDICINE

## 2020-05-29 PROCEDURE — 80074 ACUTE HEPATITIS PANEL: CPT

## 2020-05-29 PROCEDURE — 94640 AIRWAY INHALATION TREATMENT: CPT

## 2020-05-29 PROCEDURE — 83735 ASSAY OF MAGNESIUM: CPT

## 2020-05-29 PROCEDURE — 99255 PR INITIAL INPATIENT CONSULT,LEVL V: ICD-10-PCS | Mod: ,,, | Performed by: INTERNAL MEDICINE

## 2020-05-29 PROCEDURE — 25000003 PHARM REV CODE 250: Performed by: NURSE PRACTITIONER

## 2020-05-29 PROCEDURE — 99900035 HC TECH TIME PER 15 MIN (STAT)

## 2020-05-29 PROCEDURE — 21400001 HC TELEMETRY ROOM

## 2020-05-29 PROCEDURE — 94761 N-INVAS EAR/PLS OXIMETRY MLT: CPT

## 2020-05-29 PROCEDURE — 84145 PROCALCITONIN (PCT): CPT

## 2020-05-29 PROCEDURE — 84100 ASSAY OF PHOSPHORUS: CPT

## 2020-05-29 PROCEDURE — 87070 CULTURE OTHR SPECIMN AEROBIC: CPT

## 2020-05-29 PROCEDURE — 83605 ASSAY OF LACTIC ACID: CPT

## 2020-05-29 RX ORDER — SODIUM CHLORIDE 9 MG/ML
INJECTION, SOLUTION INTRAVENOUS CONTINUOUS
Status: ACTIVE | OUTPATIENT
Start: 2020-05-29 | End: 2020-05-30

## 2020-05-29 RX ADMIN — SODIUM CHLORIDE, SODIUM LACTATE, POTASSIUM CHLORIDE, AND CALCIUM CHLORIDE: .6; .31; .03; .02 INJECTION, SOLUTION INTRAVENOUS at 12:05

## 2020-05-29 RX ADMIN — LEVOFLOXACIN 750 MG: 5 INJECTION, SOLUTION INTRAVENOUS at 12:05

## 2020-05-29 RX ADMIN — IPRATROPIUM BROMIDE AND ALBUTEROL SULFATE 3 ML: .5; 3 SOLUTION RESPIRATORY (INHALATION) at 08:05

## 2020-05-29 RX ADMIN — IPRATROPIUM BROMIDE AND ALBUTEROL SULFATE 3 ML: .5; 3 SOLUTION RESPIRATORY (INHALATION) at 01:05

## 2020-05-29 RX ADMIN — IPRATROPIUM BROMIDE AND ALBUTEROL SULFATE 3 ML: .5; 3 SOLUTION RESPIRATORY (INHALATION) at 03:05

## 2020-05-29 RX ADMIN — ACETAMINOPHEN 650 MG: 325 TABLET ORAL at 08:05

## 2020-05-29 RX ADMIN — SODIUM CHLORIDE: 0.9 INJECTION, SOLUTION INTRAVENOUS at 05:05

## 2020-05-29 RX ADMIN — HYDROCODONE BITARTRATE AND ACETAMINOPHEN 1 TABLET: 5; 325 TABLET ORAL at 04:05

## 2020-05-29 RX ADMIN — LEVOFLOXACIN 750 MG: 5 INJECTION, SOLUTION INTRAVENOUS at 10:05

## 2020-05-29 RX ADMIN — HYDROCODONE BITARTRATE AND ACETAMINOPHEN 1 TABLET: 5; 325 TABLET ORAL at 12:05

## 2020-05-29 NOTE — ASSESSMENT & PLAN NOTE
· Continue Paulo antibiotics for community-acquired pneumonia.  Probably safe to transition to an oral agent.  · See no indication for continued inpatient management.  · Please obtain a CT of the chest prior to discharge nor evaluate for underlying bronchiectasis.  · He can follow-up with me in clinic in a couple weeks.  · He needs to continue his IVIG infusions with his immunologist.

## 2020-05-29 NOTE — ED NOTES
Patient's mother Marley called for an update. Patient gave permission to speak with mother about current visit. Mom would like to be updated on patient's status. [phone].

## 2020-05-29 NOTE — RESPIRATORY THERAPY
05/29/20 0151   Patient Assessment/Suction   Level of Consciousness (AVPU) alert   Respiratory Effort Unlabored   Expansion/Accessory Muscles/Retractions expansion symmetric;no retractions;no use of accessory muscles   All Lung Fields Breath Sounds diminished   Rhythm/Pattern, Respiratory shortness of breath   PRE-TX-O2   O2 Device (Oxygen Therapy) room air   SpO2 95 %   Pulse 92   Resp 20   Aerosol Therapy   $ Aerosol Therapy Charges Aerosol Treatment   Daily Review of Necessity (SVN) completed   Respiratory Treatment Status (SVN) given   Treatment Route (SVN) air;mouthpiece   Patient Position (SVN) semi-[place]'s   Post Treatment Assessment (SVN) increased aeration;patient reports breathing improved   Signs of Intolerance (SVN) none   Respiratory Interventions   Cough And Deep Breathing done with encouragement   Breathing Techniques/Airway Clearance deep/controlled cough encouraged;diaphragmatic breathing promoted   Respiratory Evaluation   $ Care Plan Tech Time 15 min

## 2020-05-29 NOTE — RESPIRATORY THERAPY
This note also relates to the following rows which could not be included:  SpO2 - Cannot attach notes to unvalidated device data  Pulse - Cannot attach notes to unvalidated device data       05/28/20 7013   Patient Assessment/Suction   Level of Consciousness (AVPU) alert   Respiratory Effort Unlabored   Expansion/Accessory Muscles/Retractions expansion symmetric;no retractions;no use of accessory muscles   All Lung Fields Breath Sounds diminished;clear   Rhythm/Pattern, Respiratory no shortness of breath reported;pattern regular;unlabored   PRE-TX-O2   O2 Device (Oxygen Therapy) room air   Pulse Oximetry Type Intermittent   $ Pulse Oximetry - Multiple Charge Pulse Oximetry - Multiple   Resp 20   Aerosol Therapy   $ Aerosol Therapy Charges PRN treatment not required   Daily Review of Necessity (SVN) completed   Respiratory Treatment Status (SVN) given   Respiratory Interventions   Cough And Deep Breathing done with encouragement   Breathing Techniques/Airway Clearance deep/controlled cough encouraged;diaphragmatic breathing promoted   Respiratory Evaluation   $ Care Plan Tech Time 15 min   Evaluation For New Orders   Admitting Diagnosis Pneumonia   Home Oxygen   Has Home Oxygen? No   Home Aerosol, MDI, DPI, and Other Treatments/Therapies   Home Respiratory Therapy Per Patient/Review of Chart Yes   Aerosol Home Meds/Freq Levalbuterol/prn   MDI Home Meds/Freq Symbicort/bid, Albuterol hfa/prn   Bronchodilator Care Plan   Bronchodilator Care Plan Aerosol;MDI   Aerosol Meds w/ frequency Albuterol - Ipratropium (DUO-NEB) 0.5mg-3mg(2.5ml) 3ml Nebulizer Solution2.5mg PRN   DPI Meds w/ frequency Breo- fluticasone-vilanterol diskus inhaler (200) ONCE DAILY

## 2020-05-29 NOTE — H&P
Formerly [facility] Medicine History & Physical Examination   Patient Name: Charly Mattson  MRN: [id number]  Patient Class: IP- Inpatient   Admission Date: 5/28/2020  6:57 PM  Length of Stay: 0  Attending Physician:   Primary Care Provider: Negro Espinoza MD  Face-to-Face encounter date: 05/28/2020  Code Status:Full Code  MPOA:  Chief Complaint: Fever (fever and cough x3 days, decreased appetite)        Patient information was obtained from patient, past medical records and ER records.   HISTORY OF PRESENT ILLNESS:   Charly Mattson is a 25 y.o. old  male who  has a past medical history of Immunoglobulin deficiency, Immunoglobulin deficiency, and Pneumonia.. The patient presented to [facility] on 5/28/2020 with a primary complaint of Fever (fever and cough x3 days, decreased appetite)  .   25-year-old  male presents emergency room with shortness of breath cough and fever.      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      The patient states his last bout of pneumonia was 6 months to a year ago.  He is being followed by an immunologist and receiving IVIG injections weekly.      The patient states for the past 2-3 days he has been having fever, chills, shortness of breath and a productive cough that he describes as yellow green in color and thick in consistency.      He also complaints of a headache to the frontal aspects of his face/head with associated sinus pressure.  The patient states he has been having a lot of thick green nasal drainage as well.  His T-max was a 103.0° a few days ago.      He endorses anorexia.  He denies loss of sense of smell or taste or any GI symptoms assessed diarrhea nausea vomiting.      He denied chest pain, palpitations, lightheadedness dizziness or syncope.  He describes his symptoms as severe in severity with no alleviating or exacerbating  factors.      The patient states he has been tested for the COVID-19 virus and results were negative.     He had a episode of nose bleeding in ED with coughing    REVIEW OF SYSTEMS:   10 Point Review of System was performed and was found to be negative except for that mentioned already in the HPI and   Review of Systems (Negative unless checked off)  Review of Systems   Constitutional: Positive for chills, fever, malaise/fatigue and weight loss.   HENT: Positive for nosebleeds.         Sinus pressure, sinus pain and pressure with HA   Eyes: Negative.    Respiratory: Positive for cough, sputum production and shortness of breath.    Cardiovascular: Negative.    Gastrointestinal: Negative.    Genitourinary: Negative.    Musculoskeletal: Positive for back pain.   Skin: Negative.    Neurological: Positive for weakness.   Endo/Heme/Allergies: Negative.    Psychiatric/Behavioral: Negative.            PAST MEDICAL HISTORY:     Past Medical History:   Diagnosis Date    Immunoglobulin deficiency     Immunoglobulin deficiency     Pneumonia        PAST SURGICAL HISTORY:     Past Surgical History:   Procedure Laterality Date    ADENOIDECTOMY      MANDIBLE FRACTURE SURGERY      SINUS SURGERY      TONSILLECTOMY      TYMPANOSTOMY TUBE PLACEMENT      TYMPANOSTOMY TUBE PLACEMENT      x 4       ALLERGIES:   Singulair [montelukast] and Singulair [montelukast]    FAMILY HISTORY:     Family History   Problem Relation Age of Onset    Cancer Mother     Liver disease Father        SOCIAL HISTORY:     Social History     Tobacco Use    Smoking status: Never Smoker    Smokeless tobacco: Never Used   Substance Use Topics    Alcohol use: Yes     Frequency: Never        Social History     Substance and Sexual Activity   Sexual Activity Yes    Partners: Female        HOME MEDICATIONS:     Prior to Admission medications    Medication Sig Start Date End Date Taking? Authorizing Provider   acetaminophen (TYLENOL) 325 MG tablet Take 650  "mg by mouth once.   Yes Historical Provider, MD   meloxicam (MOBIC) 15 MG tablet Take 15 mg by mouth once daily.   Yes Historical Provider, MD   albuterol (VENTOLIN HFA) 90 mcg/actuation inhaler Inhale 2 puffs into the lungs every 6 (six) hours as needed for Wheezing. Rescue 1/31/19 1/31/20  NIKKI Ruggiero   budesonide-formoterol 160-4.5 mcg (SYMBICORT) 160-4.5 mcg/actuation HFAA Inhale 2 puffs into the lungs every 12 (twelve) hours. Controller  Patient not taking: Reported on 9/29/2019 5/15/19 6/14/19  Mallory Em MD   diclofenac (VOLTAREN) 50 MG EC tablet Take 1 tablet (50 mg total) by mouth every 8 (eight) hours as needed (pain). 9/29/19   Minna العراقي NP   fluticasone (FLONASE) 50 mcg/actuation nasal spray 2 sprays (100 mcg total) by Each Nare route 2 (two) times daily.  Patient not taking: Reported on 9/29/2019 1/31/19   NIKKI Ruggiero   immun glob G,IgG,-pro-IgA 0-50 (HIZENTRA) 1 gram/5 mL (20 %) Soln Inject 200 mg/kg into the skin every 7 days. Sunday    Historical Provider, MD   levalbuterol (XOPENEX) 1.25 mg/3 mL nebulizer solution Take 3 mLs (1.25 mg total) by nebulization every 4 (four) hours as needed for Wheezing. Rescue 9/29/19 9/28/20  Minna العراقي NP         PHYSICAL EXAM:   /61 (BP Location: Right arm, Patient Position: Lying)   Pulse (!) 113   Temp 99.4 °F (37.4 °C) (Oral)   Resp 20   Ht 5' 11" (1.803 m)   Wt 86.2 kg (190 lb)   SpO2 97%   BMI 26.50 kg/m²   Vitals Reviewed  General appearance:  Flushed and ill-appearing male with mild to moderate tachypnea  Skin: No Rash.  Good turgor  Neuro: Motor and sensory exams grossly intact. Good tone. Power in all 4 extremities 5/5.   HENT: Atraumatic head. Moist mucous membranes of oral cavity.Epistaxis   - one episode of nose bleeding with coughing in ED - Bright red bleeding moderate amt with quick bleeding control  + tenderness to sinuses  Eyes: Normal extraocular movements.   Neck: Supple. No evidence of lymphadenopathy. " No thyroidomegaly.  Lungs:  Bilateral lower lobe crackles with the right being greater than the left, moderate tachypnea. No wheezing present.   Heart:  Tachycardia rhythm. S1 and S2 present with no murmurs/gallop/rub. No pedal edema. No JVD present.   Abdomen: Soft, non-distended, non-tender. No rebound tenderness/guarding. No masses or organomegaly. Bowel sounds are normal. Bladder is not palpable.   Extremities: No cyanosis, clubbing, or edema.  Psych/mental status: Alert and oriented. Cooperative. Responds appropriately to questions.   EMERGENCY DEPARTMENT LABS AND IMAGING:   Following labs were Reviewed   Recent Labs   Lab 05/28/20 1927   WBC 16.18*   HGB 13.8*   HCT 41.8      CALCIUM 10.2   ALBUMIN 4.5   PROT 9.4*      K 3.7   CO2 26   CL 98   BUN 12   CREATININE 1.2   ALKPHOS 160*   *   *   BILITOT 1.6*         BMP:   Recent Labs   Lab 05/28/20 1927         K 3.7   CL 98   CO2 26   BUN 12   CREATININE 1.2   CALCIUM 10.2   , CMP   Recent Labs   Lab 05/28/20 1927      K 3.7   CL 98   CO2 26      BUN 12   CREATININE 1.2   CALCIUM 10.2   PROT 9.4*   ALBUMIN 4.5   BILITOT 1.6*   ALKPHOS 160*   *   *   ANIONGAP 12   ESTGFRAFRICA >60.0   EGFRNONAA >60.0   , CBC   Recent Labs   Lab 05/28/20 1927   WBC 16.18*   HGB 13.8*   HCT 41.8      , INR No results found for: INR, PROTIME, Lipid Panel No results found for: CHOL, HDL, LDLCALC, TRIG, CHOLHDL, Troponin No results for input(s): TROPONINI in the last 168 hours., A1C: No results for input(s): HGBA1C in the last 4320 hours. and All labs within the past 24 hours have been reviewed  Microbiology Results (last 7 days)     Procedure Component Value Units Date/Time    Gram Stain [[id number]]     Order Status:  Sent Specimen:  Sputum from Lung, Lingula     Culture, Respiratory [[id number]]     Order Status:  Sent Specimen:  Respiratory from Sputum     Blood culture #1 **CANNOT BE ORDERED STAT**  [[id number]] Collected:  05/28/20 1920    Order Status:  Sent Specimen:  Blood from Peripheral, Forearm, Left Updated:  05/28/20 2128    Blood culture #2 **CANNOT BE ORDERED STAT** [[id number]] Collected:  05/28/20 2117    Order Status:  Sent Specimen:  Blood from Peripheral, Antecubital, Right Updated:  05/28/20 2128        X-Ray Chest AP Portable   Final Result      X-ray Chest Ap Portable    Result Date: 5/28/2020  Chest single view CLINICAL DATA: Cough FINDINGS: AP view is compared to May 14, 2019. Heart size is normal. The mediastinum is unremarkable. The left lung is clear. There is minor atelectasis or infiltrate at the right lung base. There are no pleural effusions. IMPRESSION: 1. Mild atelectasis or infiltrate at the right lung base. 2. No other abnormalities. Electronically Signed by Rajiv Ren M.D. on 5/28/2020 7:51 PM    ASSESSMENT & PLAN:   Charly Mattson is a 25 y.o. male admitted for    1. Right Lower Lobe Pneumonia  - IV Levaquin  for now ( with probable sinuitis as well)  -Sputum gram stain and culture  - antipyretics for fever  - Pt is requesting to see a Pulmonologist on this admit       2. Early Sepsis w/o Shock  - Got fluid bolus in ED  - Continue IV hydration  - IV antibiotic  -Sputum gram stain and culture  - antipyretics for fever    3. Transaminitis  - will check Hepatitis Panel  - Maybe from dehydration and medications  -Monitor LFTs  - Ultrasound of liver    4. Immunoglobulin Deficiency by History  - On IVIG treatments weekly with (Hizentra) last SQ injection was 2 days ago      5. HA  - Possible Sinusitis  - On Zithromax and Rocephin   - H/O of nasal septal repair    7. Epistaxis / Probable Sinusitis  - + sinus tenderness with palpation  - Levaquin IV  - one episode of nose bleeding with coughing in ED - Bright red bleeding moderate amt with quick bleeding control        DVT Prophylaxis: will be placed on SCDsx for DVT prophylaxis and will be advised to be as mobile as possible and  sit in a chair as tolerated.   ________________________________________________________________________________    Discharge Planning and Disposition: No mobility needs. Ambulating well. Good social support system. Patient will be discharged in   Face-to-Face encounter date: 05/28/2020  Encounter included review of the medical records, interviewing and examining the patient face-to-face, discussion with family and other health care providers including emergency medicine physician, admission orders, interpreting lab/test results and formulating a plan of care.   Medical Decision Making during this encounter was  [_] Low Complexity  [_] Moderate Complexity  [x] High Complexity  _________________________________________________________________________________    INPATIENT LIST OF MEDICATIONS     Current Facility-Administered Medications:     acetaminophen tablet 650 mg, 650 mg, Oral, Q8H PRN, Jamaica Dubois NP    [START ON 5/29/2020] albuterol-ipratropium 2.5 mg-0.5 mg/3 mL nebulizer solution 3 mL, 3 mL, Nebulization, Q6H, Jamaica Dubois NP    azithromycin (ZITHROMAX) 250 mg in dextrose 5 % 250 mL IVPB, 250 mg, Intravenous, Q24H, Jamaica Dubois NP    azithromycin 500 mg in dextrose 5 % 250 mL IVPB (ready to mix system), 500 mg, Intravenous, Once, Boone Farooq MD    budesonide-formoterol 160-4.5 mcg inhaler 2 puff, 2 puff, Inhalation, Q12H, Jamaica Dubois NP    cefTRIAXone (ROCEPHIN) 1 g/50 mL D5W IVPB, 1 g, Intravenous, ED 1 Time, Boone Farooq MD, Last Rate: 100 mL/hr at 05/28/20 2120, 1 g at 05/28/20 2120    cefTRIAXone (ROCEPHIN) 2 g/50 mL D5W IVPB, 2 g, Intravenous, Q24H, Jamaica Dubois NP    HYDROcodone-acetaminophen 5-325 mg per tablet 1 tablet, 1 tablet, Oral, Q8H PRN, Jamaica Dubois, NP    lactated ringers infusion, , Intravenous, Continuous, Jamaica Dubois NP    promethazine (PHENERGAN) 6.25 mg in dextrose 5 % 50 mL IVPB, 6.25 mg, Intravenous, Q6H PRN, Jamaica Dubois NP    sodium chloride  0.9% flush 10 mL, 10 mL, Intravenous, PRN, Jamaica Dubois, NP    Current Outpatient Medications:     acetaminophen (TYLENOL) 325 MG tablet, Take 650 mg by mouth once., Disp: , Rfl:     meloxicam (MOBIC) 15 MG tablet, Take 15 mg by mouth once daily., Disp: , Rfl:     albuterol (VENTOLIN HFA) 90 mcg/actuation inhaler, Inhale 2 puffs into the lungs every 6 (six) hours as needed for Wheezing. Rescue, Disp: 18 g, Rfl: 0    budesonide-formoterol 160-4.5 mcg (SYMBICORT) 160-4.5 mcg/actuation HFAA, Inhale 2 puffs into the lungs every 12 (twelve) hours. Controller (Patient not taking: Reported on 9/29/2019), Disp: 6 g, Rfl: 0    diclofenac (VOLTAREN) 50 MG EC tablet, Take 1 tablet (50 mg total) by mouth every 8 (eight) hours as needed (pain)., Disp: 30 tablet, Rfl: 0    fluticasone (FLONASE) 50 mcg/actuation nasal spray, 2 sprays (100 mcg total) by Each Nare route 2 (two) times daily. (Patient not taking: Reported on 9/29/2019), Disp: 1 Bottle, Rfl: 0    immun glob G,IgG,-pro-IgA 0-50 (HIZENTRA) 1 gram/5 mL (20 %) Soln, Inject 200 mg/kg into the skin every 7 days. Sunday, Disp: , Rfl:     levalbuterol (XOPENEX) 1.25 mg/3 mL nebulizer solution, Take 3 mLs (1.25 mg total) by nebulization every 4 (four) hours as needed for Wheezing. Rescue, Disp: 2 Box, Rfl: 0      Scheduled Meds:   [START ON 5/29/2020] albuterol-ipratropium  3 mL Nebulization Q6H    azithromycin  250 mg Intravenous Q24H    azithromycin  500 mg Intravenous Once    budesonide-formoterol 160-4.5 mcg  2 puff Inhalation Q12H    cefTRIAXone (ROCEPHIN) IVPB  1 g Intravenous ED 1 Time    cefTRIAXone (ROCEPHIN) IVPB  2 g Intravenous Q24H     Continuous Infusions:   lactated ringers       PRN Meds:.      Jamaica Dubois  [facility] Hospitalist NP  05/28/2020

## 2020-05-29 NOTE — PLAN OF CARE
05/29/20 1526   Patient Assessment/Suction   Level of Consciousness (AVPU) alert   Respiratory Effort Unlabored   All Lung Fields Breath Sounds diminished   PRE-TX-O2   O2 Device (Oxygen Therapy) room air   SpO2 98 %   Pulse Oximetry Type Intermittent   $ Pulse Oximetry - Multiple Charge Pulse Oximetry - Multiple   Pulse 84   Resp 16   Aerosol Therapy   $ Aerosol Therapy Charges Aerosol Treatment   Daily Review of Necessity (SVN) completed   Respiratory Treatment Status (SVN) given   Treatment Route (SVN) mouthpiece   Patient Position (SVN) HOB elevated   Post Treatment Assessment (SVN) breath sounds unchanged   Signs of Intolerance (SVN) none

## 2020-05-29 NOTE — CONSULTS
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  -  obtain a CT chest to evaluate for bronchiectasis.  -  stable to discharge from my point of view.  -  he can follow-up with me as an outpatient a couple weeks.  -  continue to follow-up with his immunologist at [facility].         Subjective     Chief Complaint   Patient presents with    Fever      History of Present Illness:  Mr. Charly Mattson is a 25-year-old gentleman with past medical history significant for IgG deficiency receiving monthly IVIG infusions from Dr. Martinez at [facility].  He has suffered from recurrent pneumonias on yearly basis with his most recent 1 being approximately 6 months ago.  He presented to the [facility] emergency department last night complaining of symptoms similar to his previous episodes of pneumonia.  He reports being in his usual state of health until approximately 3-4 days ago when he developed relatively subacute onset of cough productive of green/rust-colored sputum, dyspnea, right-sided pleuritic chest pain, fevers, and rigors.  He denies any associated abdominal pain, nausea, vomiting or other sick contacts.  The emergency department he was found have evidence of community-acquired pneumonia.  Hospital Medicine was consulted.  He was admitted to the medical-surgical floor and started on IV antibiotics.  He requested that he be allowed to establish care with a pulmonologist.  That is why I was consulted.  The patient reports that he may have been diagnosed with bronchiectasis previously.  He reports a chronic cough productive of  green sputum.  Does not usually have a red tinge.  He does have an episodic temp but this too.  He does not use any bronchial hygiene maneuvers are pulmonary.  He was diagnosed IgG deficiency years ago has been receiving IVIG infusions for some time.  Despite this, he is still suffered from recurrent pneumonia.     Past Medical History:   Diagnosis Date    Immunoglobulin deficiency     Immunoglobulin deficiency     Pneumonia      Past Surgical History:   Procedure Laterality Date    ADENOIDECTOMY      MANDIBLE FRACTURE SURGERY      SINUS SURGERY      TONSILLECTOMY      TYMPANOSTOMY TUBE PLACEMENT      TYMPANOSTOMY TUBE PLACEMENT      x 4     Family History   Problem Relation Age of Onset    Cancer Mother     Liver disease Father       Social History     Tobacco Use    Smoking status: Never Smoker    Smokeless tobacco: Never Used   Substance and Sexual Activity    Alcohol use: Yes     Frequency: Never    Drug use: No    Sexual activity: Yes     Partners: Female      Allergies:  Singulair [montelukast] and Singulair [montelukast]     Facility-Administered Medications as of 5/29/2020   Medication Route Frequency    [COMPLETED] 0.9%  NaCl infusion Intravenous ED 1 Time    [COMPLETED] acetaminophen tablet 650 mg Oral ED 1 Time    acetaminophen tablet 650 mg Oral Q8H PRN    albuterol-ipratropium 2.5 mg-0.5 mg/3 mL nebulizer solution 3 mL Nebulization Q4H PRN    [COMPLETED] azithromycin 500 mg in dextrose 5 % 250 mL IVPB (ready to mix system) Intravenous Once    budesonide-formoterol 160-4.5 mcg inhaler 2 puff Inhalation Q12H    [COMPLETED] cefTRIAXone (ROCEPHIN) 1 g/50 mL D5W IVPB Intravenous ED 1 Time    HYDROcodone-acetaminophen 5-325 mg per tablet 1 tablet Oral Q8H PRN    levoFLOXacin 750 mg/150 mL IVPB 750 mg Intravenous Q24H    magnesium oxide tablet 400 mg Oral Q12H PRN    magnesium oxide tablet 400 mg Oral Q12H PRN    magnesium oxide tablet 800 mg Oral Q12H PRN    potassium chloride SA  CR tablet 20 mEq Oral PRN    potassium chloride SA CR tablet 20 mEq Oral Q2H PRN    potassium chloride SA CR tablet 20 mEq Oral Q2H PRN    potassium, sodium phosphates 280-160-250 mg packet 1 packet Oral Q12H PRN    promethazine (PHENERGAN) 6.25 mg in dextrose 5 % 50 mL IVPB Intravenous Q6H PRN    sodium chloride 0.9% flush 10 mL Intravenous PRN     Outpatient Medications as of 5/29/2020   Medication    diclofenac (VOLTAREN) 50 MG EC tablet    fluticasone (FLONASE) 50 mcg/actuation nasal spray    immun glob G,IgG,-pro-IgA 0-50 (HIZENTRA) 1 gram/5 mL (20 %) Soln    levalbuterol (XOPENEX) 1.25 mg/3 mL nebulizer solution      Review of Systems   Constitutional: Positive for fever and chills. Negative for weight loss, weight gain and fatigue.   HENT: Positive for postnasal drip, rhinorrhea and sinus pressure.    Eyes: Negative for redness and itching.   Respiratory: Positive for cough, sputum production, shortness of breath and pleurisy. Negative for hemoptysis and wheezing.    Cardiovascular: Negative for chest pain, palpitations and leg swelling.   Genitourinary: Negative for difficulty urinating and hematuria.   Endocrine: Negative for polydipsia, polyphagia and polyuria.    Musculoskeletal: Negative for back pain, gait problem and joint swelling.   Skin: Negative for rash.   Gastrointestinal: Negative for nausea, vomiting and abdominal pain.   Neurological: Negative for syncope, weakness and headaches.   Hematological: Negative for adenopathy. Does not bruise/bleed easily and no excessive bruising.   Psychiatric/Behavioral: Negative for confusion and sleep disturbance. The patient is not nervous/anxious.    All other systems reviewed and are negative.     I have personally reviewed the following: active problem list, medication list, allergies, family history, social history, health maintenance, notes from last encounter, notes from last several encounters, lab results, endoscopy procedure notes, imaging  "and nursing/provider documentation .    Objective     VS: Temp:  [98 °F (36.7 °C)-101 °F (38.3 °C)]   Pulse:  []   Resp:  [9-26]   BP: ()/(49-73)   SpO2:  [94 %-98 %]    Estimated body mass index is 26.47 kg/m² as calculated from the following:    Height as of this encounter: 5' 11" (1.803 m).    Weight as of this encounter: 86.1 kg (189 lb 13.1 oz).   Ideal body weight: 75.3 kg (166 lb 0.1 oz)  Adjusted ideal body weight: 79.6 kg (175 lb 8.5 oz)   I/O:   Intake/Output Summary (Last 24 hours) at 5/29/2020 1049  Last data filed at 5/29/2020 0900  Gross per 24 hour   Intake 1490 ml   Output 1000 ml   Net 490 ml        Vent: SpO2 97% on room air   PE Physical Exam   Constitutional: He appears well-developed and well-nourished. He is cooperative.  Non-toxic appearance. He does not have a sickly appearance. No distress.   HENT:   Head: Normocephalic.   Right Ear: External ear normal.   Left Ear: External ear normal.   Nose: Nose normal.   Mouth/Throat: Oropharynx is clear and moist. No oropharyngeal exudate. Mallampati Score: I.   Neck: Normal range of motion. Neck supple. No JVD present. No thyromegaly present.   Cardiovascular: Normal rate, regular rhythm, normal heart sounds and intact distal pulses.   No murmur heard.  Pulmonary/Chest: Normal expansion, symmetric chest wall expansion and breath sounds normal. He has no wheezes. He has no rhonchi. He has no rales.   Abdominal: Soft. Bowel sounds are normal. He exhibits no distension. There is no tenderness.   Musculoskeletal: Normal range of motion. He exhibits no edema or tenderness.   Lymphadenopathy: No supraclavicular adenopathy is present.     He has no cervical adenopathy.     He has no axillary adenopathy.   Neurological: He is alert. No cranial nerve deficit.   Skin: Skin is warm and dry. Nails show no clubbing.   Psychiatric: He has a normal mood and affect. His behavior is normal. Thought content normal.   Nursing note and vitals reviewed.   "     Recent Diagnostic Studies:  Labs I have personally reviewed and interpreted all recent labs / diagnostic studies, and noted the following relevant results:  Ferritin:  585  Serum lactate:  2.3, 1.2  Procalcitonin:  0.06, 0.09  COVID-19 PCR:  Negative  COVID-19 RNA amplification:  Negative  Recent Labs   Lab 05/28/20 1927 05/29/20  0149   WBC 16.18* 11.21   RBC 5.13 4.83   HGB 13.8* 13.0*   HCT 41.8 39.1*    165   MCV 82 81*   MCH 26.9* 26.9*   MCHC 33.0 33.2     --    K 3.7  --    CL 98  --    CO2 26  --    BUN 12  --    CREATININE 1.2  --    MG  --  2.1   *  --    *  --    ALKPHOS 160*  --    BILITOT 1.6*  --    PROT 9.4*  --    ALBUMIN 4.5  --       Imaging I have personally reviewed and interpreted all available images and reviewed the associated Radiology reports.  My personal impression of the relevant studies is as follows:  CXR: I have reviewed all pertinent results/findings within the past 24 hours and my personal findings are:  Some faint opacity at the right base which she reports as being chronic.  Otherwise no focal infiltrates.  Abdominal ultrasound:  Unremarkable  EKG:  Sinus tachycardia, otherwise unremarkable.   Micro I have personally reviewed and interpreted the available culture data.  Relevant results are as follows.  Blood Culture   Lab Results   Component Value Date    LABBLOO No Growth to date 05/28/2020   , Sputum Culture   Lab Results   Component Value Date    GSRESP <10 epithelial cells per low power field.  05/14/2019    GSRESP Few WBC's 05/14/2019    GSRESP Rare Gram positive cocci 05/14/2019    GSRESP Rare Gram negative rods 05/14/2019    RESPIRATORYC Normal respiratory ashlee 05/14/2019    RESPIRATORYC No S aureus or Pseudomonas isolated. 05/14/2019    and Urine Culture  No results found for: LABURIN     Assessment       Active Hospital Problems    Diagnosis    *Pneumonia/ Right Lower lobe    Immunoglobulin deficiency    Transaminitis    Severe sepsis     Pneumonia of right lower lobe due to infectious organism      My Impression:  Given his chronic radiographic abnormalities in his history of IgG deficiency, I suspect that his underlying problems actually bronchiectasis.  There is sufficient evidence of bacterial pneumonia to warrant empiric treatment, but this never needed to be done in the inpatient setting.    Plan     Pulmonary  · Continue Paulo antibiotics for community-acquired pneumonia.  Probably safe to transition to an oral agent.  · See no indication for continued inpatient management.  · Please obtain a CT of the chest prior to discharge nor evaluate for underlying bronchiectasis.  · He can follow-up with me in clinic in a couple weeks.  · He needs to continue his IVIG infusions with his immunologist.       Thank you for your consult. Please call Dr. Allison with any additional questions or concerns over the weekend.    Ricco Alvares MD  Pulmonary / Critical Care Medicine  [facility]

## 2020-05-29 NOTE — ED PROVIDER NOTES
Encounter Date: 5/28/2020       History     Chief Complaint   Patient presents with    Fever     fever and cough x3 days, decreased appetite     Patient here with reported fever, chills, cough over last 3 days with associated green yellow sputum production decreased appetite no nausea vomiting or diarrhea he reports sinus congestion and sinus pressure no significant sore throat there is no sick contacts at home patient states he had a corona virus screen which was negative however his symptoms have worsened patient does have a history of recurrent pneumonias during his childhood was told he had an immune no vomiting when deficiency and bronchiectasis he does not have a pulmonologist that he sees regularly now it was followed by pulmonologist at [facility] when he was younger        Review of patient's allergies indicates:   Allergen Reactions    Singulair [montelukast] Other (See Comments)     arrhythmias    Singulair [montelukast]      Past Medical History:   Diagnosis Date    Immunoglobulin deficiency     Pneumonia      Past Surgical History:   Procedure Laterality Date    ADENOIDECTOMY      MANDIBLE FRACTURE SURGERY      SINUS SURGERY      TONSILLECTOMY      TYMPANOSTOMY TUBE PLACEMENT      TYMPANOSTOMY TUBE PLACEMENT      x 4     Family History   Problem Relation Age of Onset    Cancer Mother     Liver disease Father      Social History     Tobacco Use    Smoking status: Never Smoker    Smokeless tobacco: Never Used   Substance Use Topics    Alcohol use: No     Frequency: Never    Drug use: No     Review of Systems   Constitutional: Positive for activity change, appetite change, chills, fatigue and fever.   HENT: Positive for congestion, sinus pressure and sinus pain. Negative for ear pain, postnasal drip and sore throat.    Eyes: Negative.    Respiratory: Positive for cough and shortness of breath. Negative for chest tightness.    Cardiovascular: Negative.    Gastrointestinal: Negative.     Endocrine: Negative.    Genitourinary: Negative.    Musculoskeletal: Negative.    Skin: Negative.    Allergic/Immunologic: Negative.    Neurological: Negative.    Hematological: Negative.    Psychiatric/Behavioral: Negative.        Physical Exam     Initial Vitals [05/28/20 1851]   BP Pulse Resp Temp SpO2   128/66 (!) 120 (!) 26 (!) 100.4 °F (38 °C) (!) 94 %      MAP       --         Physical Exam    Constitutional: He appears well-developed and well-nourished. He appears distressed.   HENT:   Head: Normocephalic and atraumatic.   Right Ear: External ear normal.   Left Ear: External ear normal.   Mouth/Throat: Oropharynx is clear and moist.   Eyes: Conjunctivae and EOM are normal. Pupils are equal, round, and reactive to light.   Neck: Normal range of motion. Neck supple.   Cardiovascular: Regular rhythm, normal heart sounds and intact distal pulses.   Tachycardic   Pulmonary/Chest: He is in respiratory distress. He has wheezes. He has rhonchi.   Abdominal: Soft. Bowel sounds are normal. He exhibits no distension. There is no tenderness.   Musculoskeletal: Normal range of motion. He exhibits no edema or tenderness.   Neurological: He is alert and oriented to person, place, and time. He has normal strength. GCS score is 15. GCS eye subscore is 4. GCS verbal subscore is 5. GCS motor subscore is 6.   Skin: Skin is warm and dry. Capillary refill takes less than 2 seconds.   Psychiatric: He has a normal mood and affect. His behavior is normal.         ED Course   Procedures  Labs Reviewed   CBC W/ AUTO DIFFERENTIAL - Abnormal; Notable for the following components:       Result Value    WBC 16.18 (*)     Hemoglobin 13.8 (*)     Mean Corpuscular Hemoglobin 26.9 (*)     Gran # (ANC) 13.4 (*)     Mono # 1.6 (*)     Gran% 83.1 (*)     Lymph% 6.3 (*)     All other components within normal limits   COMPREHENSIVE METABOLIC PANEL - Abnormal; Notable for the following components:    Total Protein 9.4 (*)     Total Bilirubin 1.6  (*)     Alkaline Phosphatase 160 (*)      (*)      (*)     All other components within normal limits   LACTIC ACID, PLASMA - Abnormal; Notable for the following components:    Lactate (Lactic Acid) 2.3 (*)     All other components within normal limits    Narrative:      lactic acid critical result(s) repeated. Called and verbal readback   obtained from kyleigh carr rn er  by MARICEL 05/28/2020 20:14   CULTURE, BLOOD   CULTURE, BLOOD   SARS-COV-2 RNA AMPLIFICATION, QUAL          Imaging Results          X-Ray Chest AP Portable (Final result)  Result time 05/28/20 19:48:34    Final result by Vaughn Ren MD (05/28/20 19:48:34)                 Narrative:    Chest single view    CLINICAL DATA: Cough    FINDINGS: AP view is compared to May 14, 2019.    Heart size is normal. The mediastinum is unremarkable. The left lung  is clear. There is minor atelectasis or infiltrate at the right lung  base. There are no pleural effusions.    IMPRESSION:  1. Mild atelectasis or infiltrate at the right lung base.  2. No other abnormalities.    Electronically Signed by Rajiv Ren M.D. on 5/28/2020 7:51 PM                               Medical Decision Making:   ED Management:  Patient here with reported fever, cough productive of yellow sputum chest x-ray is consistent with infiltrate right lower lobe white blood cell count is elevated lactate is elevated as well patient will be admitted for further evaluation treatment I have ordered Rocephin and Zithromax begun fluid resuscitation discussed care with hospitalist for admission                                 Clinical Impression:       ICD-10-CM ICD-9-CM   1. Pneumonia of right lower lobe due to infectious organism J18.1 486   2. Fever R50.9 780.60                                Boone Farooq MD  05/28/20 2059

## 2020-05-29 NOTE — HPI
Mr. Charly Mattson is a 25-year-old gentleman with past medical history significant for IgG deficiency receiving monthly IVIG infusions from Dr. Martinez at [facility].  He has suffered from recurrent pneumonias on yearly basis with his most recent 1 being approximately 6 months ago.  He presented to the [facility] emergency department last night complaining of symptoms similar to his previous episodes of pneumonia.  He reports being in his usual state of health until approximately 3-4 days ago when he developed relatively subacute onset of cough productive of green/rust-colored sputum, dyspnea, right-sided pleuritic chest pain, fevers, and rigors.  He denies any associated abdominal pain, nausea, vomiting or other sick contacts.  The emergency department he was found have evidence of community-acquired pneumonia.  Hospital Medicine was consulted.  He was admitted to the medical-surgical floor and started on IV antibiotics.  He requested that he be allowed to establish care with a pulmonologist.  That is why I was consulted.  The patient reports that he may have been diagnosed with bronchiectasis previously.  He reports a chronic cough productive of green sputum.  Does not usually have a red tinge.  He does have an episodic temp but this too.  He does not use any bronchial hygiene maneuvers are pulmonary.  He was diagnosed IgG deficiency years ago has been receiving IVIG infusions for some time.  Despite this, he is still suffered from recurrent pneumonia.

## 2020-05-29 NOTE — CARE UPDATE
Spoke with patient's Mother Malrey, Gave her detailed update on her son condition. All questions addressed.

## 2020-05-29 NOTE — ED NOTES
Patient identifiers for Charly Mattson checked and correct.  LOC:  Charly Mattson is awake, alert, and aware of environment with an appropriate affect. He is oriented x 3 and speaking appropriately.  APPEARANCE:  He is resting comfortably and in no acute distress. He is clean and well groomed, patient's clothing is properly fastened.  SKIN:  The skin is warm and dry. Skin is intact; no bruising or breakdown noted.  MUSCULOSKELETAL:  He is moving all extremities well, no obvious deformities noted. Pulses intact.   RESPIRATORY:  Airway is open and patent. Respirations are spontaneous but slightly labored. Normal rate noted.  CARDIAC:  He is tachycardiac, NSR. No peripheral edema noted.    Allergies reported:    Review of patient's allergies indicates:   Allergen Reactions    Singulair [montelukast] Other (See Comments)     arrhythmias    Singulair [montelukast]

## 2020-05-30 VITALS
BODY MASS INDEX: 26.73 KG/M2 | HEART RATE: 86 BPM | DIASTOLIC BLOOD PRESSURE: 52 MMHG | TEMPERATURE: 98 F | HEIGHT: 71 IN | OXYGEN SATURATION: 96 % | RESPIRATION RATE: 18 BRPM | SYSTOLIC BLOOD PRESSURE: 93 MMHG | WEIGHT: 190.94 LBS

## 2020-05-30 PROBLEM — A41.9 SEVERE SEPSIS: Status: RESOLVED | Noted: 2020-05-28 | Resolved: 2020-05-30

## 2020-05-30 PROBLEM — R65.20 SEVERE SEPSIS: Status: RESOLVED | Noted: 2020-05-28 | Resolved: 2020-05-30

## 2020-05-30 LAB
ALBUMIN SERPL BCP-MCNC: 3.3 G/DL (ref 3.5–5.2)
ALP SERPL-CCNC: 130 U/L (ref 55–135)
ALT SERPL W/O P-5'-P-CCNC: 153 U/L (ref 10–44)
ANION GAP SERPL CALC-SCNC: 8 MMOL/L (ref 8–16)
AST SERPL-CCNC: 126 U/L (ref 10–40)
BASOPHILS # BLD AUTO: 0.03 K/UL (ref 0–0.2)
BASOPHILS NFR BLD: 0.4 % (ref 0–1.9)
BILIRUB SERPL-MCNC: 0.8 MG/DL (ref 0.1–1)
BUN SERPL-MCNC: 13 MG/DL (ref 6–20)
CALCIUM SERPL-MCNC: 8.8 MG/DL (ref 8.7–10.5)
CHLORIDE SERPL-SCNC: 104 MMOL/L (ref 95–110)
CO2 SERPL-SCNC: 25 MMOL/L (ref 23–29)
CREAT SERPL-MCNC: 0.9 MG/DL (ref 0.5–1.4)
DIFFERENTIAL METHOD: ABNORMAL
EOSINOPHIL # BLD AUTO: 0.2 K/UL (ref 0–0.5)
EOSINOPHIL NFR BLD: 2.2 % (ref 0–8)
ERYTHROCYTE [DISTWIDTH] IN BLOOD BY AUTOMATED COUNT: 13.1 % (ref 11.5–14.5)
EST. GFR  (AFRICAN AMERICAN): >60 ML/MIN/1.73 M^2
EST. GFR  (NON AFRICAN AMERICAN): >60 ML/MIN/1.73 M^2
GLUCOSE SERPL-MCNC: 95 MG/DL (ref 70–110)
HAV IGM SERPL QL IA: NEGATIVE
HBV CORE IGM SERPL QL IA: NEGATIVE
HBV SURFACE AG SERPL QL IA: NEGATIVE
HCT VFR BLD AUTO: 38.4 % (ref 40–54)
HCV AB S/CO SERPL IA: 0.2 S/CO RATIO (ref 0–0.9)
HGB BLD-MCNC: 12.6 G/DL (ref 14–18)
IMM GRANULOCYTES # BLD AUTO: 0.02 K/UL (ref 0–0.04)
IMM GRANULOCYTES NFR BLD AUTO: 0.3 % (ref 0–0.5)
LYMPHOCYTES # BLD AUTO: 1.5 K/UL (ref 1–4.8)
LYMPHOCYTES NFR BLD: 21.2 % (ref 18–48)
MAGNESIUM SERPL-MCNC: 2.1 MG/DL (ref 1.6–2.6)
MCH RBC QN AUTO: 26.8 PG (ref 27–31)
MCHC RBC AUTO-ENTMCNC: 32.8 G/DL (ref 32–36)
MCV RBC AUTO: 82 FL (ref 82–98)
MONOCYTES # BLD AUTO: 0.8 K/UL (ref 0.3–1)
MONOCYTES NFR BLD: 11.4 % (ref 4–15)
NEUTROPHILS # BLD AUTO: 4.5 K/UL (ref 1.8–7.7)
NEUTROPHILS NFR BLD: 64.5 % (ref 38–73)
NRBC BLD-RTO: 0 /100 WBC
PHOSPHATE SERPL-MCNC: 4.7 MG/DL (ref 2.7–4.5)
PLATELET # BLD AUTO: 165 K/UL (ref 150–350)
PMV BLD AUTO: 10.7 FL (ref 9.2–12.9)
POTASSIUM SERPL-SCNC: 4.1 MMOL/L (ref 3.5–5.1)
PROCALCITONIN SERPL IA-MCNC: <0.05 NG/ML (ref 0–0.5)
PROT SERPL-MCNC: 7.3 G/DL (ref 6–8.4)
RBC # BLD AUTO: 4.7 M/UL (ref 4.6–6.2)
SODIUM SERPL-SCNC: 137 MMOL/L (ref 136–145)
WBC # BLD AUTO: 6.9 K/UL (ref 3.9–12.7)

## 2020-05-30 PROCEDURE — 94640 AIRWAY INHALATION TREATMENT: CPT

## 2020-05-30 PROCEDURE — 84100 ASSAY OF PHOSPHORUS: CPT

## 2020-05-30 PROCEDURE — 85025 COMPLETE CBC W/AUTO DIFF WBC: CPT

## 2020-05-30 PROCEDURE — 25000242 PHARM REV CODE 250 ALT 637 W/ HCPCS: Performed by: INTERNAL MEDICINE

## 2020-05-30 PROCEDURE — 94761 N-INVAS EAR/PLS OXIMETRY MLT: CPT

## 2020-05-30 PROCEDURE — 84145 PROCALCITONIN (PCT): CPT

## 2020-05-30 PROCEDURE — 83735 ASSAY OF MAGNESIUM: CPT

## 2020-05-30 PROCEDURE — 80053 COMPREHEN METABOLIC PANEL: CPT

## 2020-05-30 PROCEDURE — 36415 COLL VENOUS BLD VENIPUNCTURE: CPT

## 2020-05-30 PROCEDURE — 99900035 HC TECH TIME PER 15 MIN (STAT)

## 2020-05-30 RX ORDER — LEVOFLOXACIN 750 MG/1
750 TABLET ORAL DAILY
Qty: 6 TABLET | Refills: 0 | Status: SHIPPED | OUTPATIENT
Start: 2020-05-30 | End: 2020-09-08

## 2020-05-30 RX ADMIN — IPRATROPIUM BROMIDE AND ALBUTEROL SULFATE 3 ML: .5; 3 SOLUTION RESPIRATORY (INHALATION) at 07:05

## 2020-05-30 RX ADMIN — IPRATROPIUM BROMIDE AND ALBUTEROL SULFATE 3 ML: .5; 3 SOLUTION RESPIRATORY (INHALATION) at 02:05

## 2020-05-30 NOTE — PROGRESS NOTES
"[facility] Medicine Progress Note    Subjective:     Was feeling better this morning, but now feels bad again. C/o subj fevers/chills, cough, sob.      Objective:   Last 24 Hour Vital Signs:  BP  Min: 99/49  Max: 125/66  Temp  Av.2 °F (37.3 °C)  Min: 98 °F (36.7 °C)  Max: 101 °F (38.3 °C)  Pulse  Av.4  Min: 81  Max: 113  Resp  Av.3  Min: 9  Max: 26  SpO2  Av.3 %  Min: 94 %  Max: 99 %  Height  Av' 11" (180.3 cm)  Min: 5' 11" (180.3 cm)  Max: 5' 11" (180.3 cm)  Weight  Av.1 kg (189 lb 13.1 oz)  Min: 86.1 kg (189 lb 13.1 oz)  Max: 86.1 kg (189 lb 13.1 oz)  I/O last 3 completed shifts:  In: 1610 [P.O.:360; I.V.:1200; IV Piggyback:50]  Out: 1000 [Urine:1000]    Physical Examination:  General appearance:  NAD  Skin: No Rash.  Good turgor  Neuro: Motor and sensory exams grossly intact. Good tone. Power in all 4 extremities 5/5.   HENT: Atraumatic head. Moist mucous membranes of oral cavity.  + tenderness to sinuses  Eyes: Normal extraocular movements.   Neck: Supple. No evidence of lymphadenopathy. No thyroidomegaly.  Lungs:  Bilateral lower lobe crackles with the right being greater than the left, moderate tachypnea. No wheezing present.   Heart:  regular rhythm. S1 and S2 present with no murmurs/gallop/rub. No pedal edema. No JVD present.   Abdomen: Soft, non-distended, non-tender. No rebound tenderness/guarding. No masses or organomegaly. Bowel sounds are normal. Bladder is not palpable.   Extremities: No cyanosis, clubbing, or edema.  Psych/mental status: Alert and oriented. Cooperative. Responds appropriately to questions    Laboratory:  Laboratory Data Reviewed: yes    Most Recent Data:  CBC:   Lab Results   Component Value Date    WBC 11.21 2020    HGB 13.0 (L) 2020    HCT 39.1 (L) 2020     2020    MCV 81 (L) 2020    RDW 13.2 2020     BMP:   Lab Results   Component Value Date     2020    K 3.7 2020    CL 98 2020    CO2 " 26 05/28/2020    BUN 12 05/28/2020     05/28/2020    CALCIUM 10.2 05/28/2020    MG 2.1 05/29/2020    PHOS 3.5 05/29/2020     LFTs:   Lab Results   Component Value Date    PROT 9.4 (H) 05/28/2020    ALBUMIN 4.5 05/28/2020    BILITOT 1.6 (H) 05/28/2020     (H) 05/28/2020    ALKPHOS 160 (H) 05/28/2020     (H) 05/28/2020     Coags: No results found for: INR, PROTIME, PTT  FLP: No results found for: CHOL, HDL, LDLCALC, TRIG, CHOLHDL  DM:   Lab Results   Component Value Date    CREATININE 1.2 05/28/2020     Thyroid:   Lab Results   Component Value Date    TSH 2.228 09/16/2013     Anemia:   Lab Results   Component Value Date    FERRITIN 585 (H) 05/28/2020     Cardiac: No results found for: TROPONINI, CKTOTAL, CKMB, BNP  Urinalysis:   Lab Results   Component Value Date    COLORU Yellow 05/14/2019    CLARITYU CLEAR 11/18/2012    SPECGRAV 1.015 05/14/2019    NITRITE Negative 05/14/2019    GLUCOSEU NEG 11/18/2012    KETONESU 1+ (A) 05/14/2019    UROBILINOGEN Negative 05/14/2019    BLOODU NEG 11/18/2012        Radiology:  Data Reviewed: yes  XR CHEST AP PORTABLE  US ABDOMEN LIMITED  CT CHEST WITHOUT CONTRAST      Current Medications:     Infusions:   sodium chloride 0.9% 75 mL/hr at 05/29/20 4510        Scheduled:   budesonide-formoterol 160-4.5 mcg  2 puff Inhalation Q12H    levoFLOXacin  750 mg Intravenous Q24H        PRN:  acetaminophen, albuterol-ipratropium, HYDROcodone-acetaminophen, magnesium oxide, magnesium oxide, magnesium oxide, potassium chloride, potassium chloride, potassium chloride, potassium, sodium phosphates, promethazine (PHENERGAN) IVPB, sodium chloride 0.9%    Lines and Day Number of Therapy:      Microbiology Data:  Reviewed: yes  Microbiology Results (last 7 days)     Procedure Component Value Units Date/Time    Culture, Respiratory [[id number]] Collected:  05/29/20 0900    Order Status:  Completed Specimen:  Respiratory from Sputum Updated:  05/29/20 1641     Gram Stain  (Respiratory) <10 epithelial cells per low power field.     Gram Stain (Respiratory) Many WBC's     Gram Stain (Respiratory) Rare Gram positive cocci     Gram Stain (Respiratory) Rare Gram positive rods    Blood culture #2 **CANNOT BE ORDERED STAT** [[id number]] Collected:  05/28/20 2117    Order Status:  Completed Specimen:  Blood from Peripheral, Antecubital, Right Updated:  05/29/20 0517     Blood Culture, Routine No Growth to date    Blood culture #1 **CANNOT BE ORDERED STAT** [[id number]] Collected:  05/28/20 1920    Order Status:  Completed Specimen:  Blood from Peripheral, Forearm, Left Updated:  05/29/20 0517     Blood Culture, Routine No Growth to date    Gram Stain [[id number]]     Order Status:  Sent Specimen:  Sputum from Lung, Lingula            Antibiotics and Day Number of Therapy:  Antibiotics (From admission, onward)    Start     Stop Route Frequency Ordered    05/28/20 2330  levoFLOXacin 750 mg/150 mL IVPB 750 mg      -- IV Every 24 hours (non-standard times) 05/28/20 2219         Antivirals (From admission, onward)    None             Assessment/Plan:     Charly Mattson is a 25 y.o.male with    1. Right Lower Lobe Pneumonia  - IV Levaquin  for now ( with probable sinusitis as well)  -Sputum gram stain and culture  - antipyretics for fever  - appreciate pulm recs     2. Early Sepsis w/o Shock  - Got fluid bolus in ED  - Continue IV hydration  - IV antibiotic  - antipyretics for fever     3. Transaminitis  - will check Hepatitis Panel  - Maybe from dehydration and medications  -Monitor LFTs  - Ultrasound of liver unremarkable     4. Immunoglobulin Deficiency by History  - On IVIG treatments weekly with (Hizentra) last SQ injection was 2 days ago       7. Probable Sinusitis  - + sinus tenderness with palpation  - Levaquin IV              Steven Julian  [facility] Medicine

## 2020-05-30 NOTE — NURSING
Discharge instructions provided, pt voices understanding. IV/tele monitor discontinued. Pt discharged to home via wheelchair with mask, pt belongings with pt including cell phone,  and laptop.

## 2020-05-30 NOTE — PLAN OF CARE
05/30/20 1415   Final Note   Assessment Type Final Discharge Note   Anticipated Discharge Disposition Home

## 2020-05-30 NOTE — PLAN OF CARE
05/29/20 2037   Patient Assessment/Suction   Level of Consciousness (AVPU) alert   Respiratory Effort Unlabored   Expansion/Accessory Muscles/Retractions no use of accessory muscles   All Lung Fields Breath Sounds diminished   Rhythm/Pattern, Respiratory unlabored;pattern regular   Cough Frequency infrequent   Cough Type nonproductive   PRE-TX-O2   O2 Device (Oxygen Therapy) room air   SpO2 (!) 94 %   Pulse Oximetry Type Intermittent   $ Pulse Oximetry - Multiple Charge Pulse Oximetry - Multiple   Pulse 94   Resp 20   Aerosol Therapy   $ Aerosol Therapy Charges Aerosol Treatment   Daily Review of Necessity (SVN) completed   Respiratory Treatment Status (SVN) given   Treatment Route (SVN) mouthpiece;oxygen   Patient Position (SVN) HOB elevated   Post Treatment Assessment (SVN) breath sounds improved   Signs of Intolerance (SVN) none   Respiratory Evaluation   $ Care Plan Tech Time 15 min

## 2020-05-30 NOTE — PLAN OF CARE
05/30/20 0753   Patient Assessment/Suction   Level of Consciousness (AVPU) alert   Respiratory Effort Normal;Unlabored   All Lung Fields Breath Sounds diminished;coarse   PRE-TX-O2   O2 Device (Oxygen Therapy) room air   SpO2 95 %   Pulse Oximetry Type Intermittent   $ Pulse Oximetry - Multiple Charge Pulse Oximetry - Multiple   Pulse 85   Resp 18   Aerosol Therapy   $ Aerosol Therapy Charges Aerosol Treatment   Daily Review of Necessity (SVN) completed   Respiratory Treatment Status (SVN) given   Treatment Route (SVN) mouthpiece;air   Patient Position (SVN) HOB elevated   Post Treatment Assessment (SVN) breath sounds improved   Signs of Intolerance (SVN) none   Respiratory Evaluation   $ Care Plan Tech Time 15 min

## 2020-05-31 LAB
BACTERIA SPEC AEROBE CULT: NORMAL
GRAM STN SPEC: NORMAL

## 2020-05-31 NOTE — DISCHARGE SUMMARY
[facility] Medicine Discharge Summary    Date of Admit: 5/28/2020  Date of Discharge: 5/30/2020    Discharge to: Home or Self Care  Condition: fair    Discharge Diagnoses     Problem Noted   Pneumonia/ Right Lower lobe 5/28/2020   Immunoglobulin Deficiency 5/28/2020   Transaminitis 5/28/2020   Pneumonia of Right Lower Lobe Due to Infectious Organism 5/28/2020   Severe Sepsis (Resolved) 5/28/2020        Patient Active Problem List   Diagnosis    CVID (common variable immunodeficiency)    Severe persistent asthma with acute exacerbation    Immune reconstitution inflammatory syndrome    Bronchitis, chronic, mucopurulent    Chronic sinus complaints    Immune deficiency disorder    Pneumonia/ Right Lower lobe    Immunoglobulin deficiency    Transaminitis    Pneumonia of right lower lobe due to infectious organism        Brief History of Present Illness      Charly Mattson is a 25 y.o. male who  has a past medical history of Immunoglobulin deficiency, Immunoglobulin deficiency, and Pneumonia.  The patient presented to [facility] on 5/28/2020 with a primary complaint of Fever  .       For the full HPI please refer to the History & Physical from this admission.    Hospital Course     He was treated with IV abx and seen in consultation with pulmonary. CT chest was obtained which confirmed some degree of bronchiectasis.   Today, he feels better and is oxygenating ok on room air. He wants to go home to complete course of po abx which at this point is very reasonable. Dicussed keeping follow up with Dr Alvares and seeing his PCP as scheduled/needed.   Advised him to return to the ED with any new, worsening, or recurrent symptoms.     Physical exam     General appearance:  NAD  Skin: No Rash.  Good turgor  Neuro: Motor and sensory exams grossly intact. Good tone. Power in all 4 extremities 5/5.   HENT: Atraumatic head. Moist mucous membranes of oral cavity.    Eyes: Normal extraocular movements.   Neck: Supple. No evidence of  lymphadenopathy. No thyroidomegaly.  Lungs:  Bilateral lower lobe crackles with the right being greater than the left, no tachypnea. No wheezing present.   Heart:  regular rhythm. S1 and S2 present with no murmurs/gallop/rub. No pedal edema. No JVD present.   Abdomen: Soft, non-distended, non-tender. No rebound tenderness/guarding. No masses or organomegaly. Bowel sounds are normal. Bladder is not palpable.   Extremities: No cyanosis, clubbing, or edema.  Psych/mental status: Alert and oriented. Cooperative. Responds appropriately to questions    Discharge Medications        Medication List      START taking these medications    levoFLOXacin 750 MG tablet  Commonly known as:  LEVAQUIN  Take 1 tablet (750 mg total) by mouth once daily.        CONTINUE taking these medications    acetaminophen 325 MG tablet  Commonly known as:  TYLENOL     albuterol 90 mcg/actuation inhaler  Commonly known as:  VENTOLIN HFA  Inhale 2 puffs into the lungs every 6 (six) hours as needed for Wheezing. Rescue     budesonide-formoterol 160-4.5 mcg 160-4.5 mcg/actuation Hfaa  Commonly known as:  SYMBICORT  Inhale 2 puffs into the lungs every 12 (twelve) hours. Controller     diclofenac 50 MG EC tablet  Commonly known as:  VOLTAREN  Take 1 tablet (50 mg total) by mouth every 8 (eight) hours as needed (pain).     fluticasone propionate 50 mcg/actuation nasal spray  Commonly known as:  FLONASE  2 sprays (100 mcg total) by Each Nare route 2 (two) times daily.     HIZENTRA 1 gram/5 mL (20 %) Soln  Generic drug:  immun glob G(IgG)-pro-IgA 0-50     levalbuterol 1.25 mg/3 mL nebulizer solution  Commonly known as:  XOPENEX  Take 3 mLs (1.25 mg total) by nebulization every 4 (four) hours as needed for Wheezing. Rescue     meloxicam 15 MG tablet  Commonly known as:  MOBIC           Where to Get Your Medications      These medications were sent to [id number] - [place], [place] - [address] & [facility]  [address] ,  [place] [address]    Hours:  24-hours Phone:  [phone]   · levoFLOXacin 750 MG tablet         Instructions:  1. Take all medications as prescribed  2. Keep all follow-up appointments  3. Return to the hospital or call your primary care physicians for any new, worsening, or recurrent symptoms.    Follow-Up:  Follow-up Information     Ricco Alvares MD In 2 weeks.    Specialties:  Hospitalist, Pulmonary Disease  Contact information:  [address]  [address]  [place] [address]  [phone]             Negro Espinoza MD In 2 weeks.    Specialty:  Family Medicine  Contact information:  [address]  [place] [address]  [phone]                   Time spent on discharge was 39 minutes     Steven Julian MD  7:06 PM  05/30/2020

## 2020-06-01 ENCOUNTER — TELEPHONE (OUTPATIENT)
Dept: FAMILY MEDICINE | Facility: CLINIC | Age: 25
End: 2020-06-01

## 2020-06-01 NOTE — TELEPHONE ENCOUNTER
DC Date 5/30/20  Reached out to patient to schedule HFU.  Pt says the does not need a HFU appointment scheduled with a PCP he needs to see Dr. Ricco Alvares for his lung problems.  Dr. Alvares's clinical staff notified to reach out to patient.

## 2020-06-02 ENCOUNTER — OFFICE VISIT (OUTPATIENT)
Dept: PULMONOLOGY | Facility: CLINIC | Age: 25
End: 2020-06-02
Payer: COMMERCIAL

## 2020-06-02 VITALS
DIASTOLIC BLOOD PRESSURE: 70 MMHG | OXYGEN SATURATION: 97 % | WEIGHT: 188 LBS | SYSTOLIC BLOOD PRESSURE: 132 MMHG | HEART RATE: 86 BPM | BODY MASS INDEX: 26.22 KG/M2

## 2020-06-02 DIAGNOSIS — J47.0 BRONCHIECTASIS WITH ACUTE LOWER RESPIRATORY INFECTION: Primary | ICD-10-CM

## 2020-06-02 DIAGNOSIS — J18.9 PNEUMONIA DUE TO INFECTIOUS ORGANISM, UNSPECIFIED LATERALITY, UNSPECIFIED PART OF LUNG: ICD-10-CM

## 2020-06-02 DIAGNOSIS — B96.89 BACTERIAL SINUSITIS: ICD-10-CM

## 2020-06-02 DIAGNOSIS — D80.3 IGG DEFICIENCY: ICD-10-CM

## 2020-06-02 DIAGNOSIS — J32.9 BACTERIAL SINUSITIS: ICD-10-CM

## 2020-06-02 LAB
BACTERIA BLD CULT: NORMAL
BACTERIA BLD CULT: NORMAL

## 2020-06-02 PROCEDURE — 1111F PR DISCHARGE MEDS RECONCILED W/ CURRENT OUTPATIENT MED LIST: ICD-10-PCS | Mod: S$GLB,,, | Performed by: INTERNAL MEDICINE

## 2020-06-02 PROCEDURE — 1111F DSCHRG MED/CURRENT MED MERGE: CPT | Mod: S$GLB,,, | Performed by: INTERNAL MEDICINE

## 2020-06-02 PROCEDURE — 3008F PR BODY MASS INDEX (BMI) DOCUMENTED: ICD-10-PCS | Mod: S$GLB,,, | Performed by: INTERNAL MEDICINE

## 2020-06-02 PROCEDURE — 99214 OFFICE O/P EST MOD 30 MIN: CPT | Mod: S$GLB,,, | Performed by: INTERNAL MEDICINE

## 2020-06-02 PROCEDURE — 99214 PR OFFICE/OUTPT VISIT, EST, LEVL IV, 30-39 MIN: ICD-10-PCS | Mod: S$GLB,,, | Performed by: INTERNAL MEDICINE

## 2020-06-02 PROCEDURE — 3008F BODY MASS INDEX DOCD: CPT | Mod: S$GLB,,, | Performed by: INTERNAL MEDICINE

## 2020-06-02 RX ORDER — AMOXICILLIN AND CLAVULANATE POTASSIUM 562.5; 437.5; 62.5 MG/1; MG/1; MG/1
2 TABLET, FILM COATED, EXTENDED RELEASE ORAL EVERY 12 HOURS
Qty: 20 TABLET | Refills: 0 | Status: SHIPPED | OUTPATIENT
Start: 2020-06-02 | End: 2020-06-12

## 2020-06-02 RX ORDER — SODIUM CHLORIDE FOR INHALATION 3 %
4 VIAL, NEBULIZER (ML) INHALATION 2 TIMES DAILY
Qty: 500 ML | Refills: 3 | Status: SHIPPED | OUTPATIENT
Start: 2020-06-02 | End: 2020-06-02

## 2020-06-02 RX ORDER — SODIUM CHLORIDE FOR INHALATION 3 %
4 VIAL, NEBULIZER (ML) INHALATION 2 TIMES DAILY
Qty: 500 ML | Refills: 3 | Status: SHIPPED | OUTPATIENT
Start: 2020-06-02 | End: 2022-05-03

## 2020-06-02 RX ORDER — ALBUTEROL SULFATE 0.83 MG/ML
2.5 SOLUTION RESPIRATORY (INHALATION) 2 TIMES DAILY PRN
Qty: 500 ML | Refills: 3 | Status: ON HOLD | OUTPATIENT
Start: 2020-06-02 | End: 2020-12-04 | Stop reason: SDUPTHER

## 2020-06-02 RX ORDER — AMOXICILLIN AND CLAVULANATE POTASSIUM 875; 125 MG/1; MG/1
TABLET, FILM COATED ORAL
COMMUNITY
Start: 2020-03-20 | End: 2020-09-08

## 2020-06-02 NOTE — PATIENT INSTRUCTIONS
AIRWAY CLEARANCE MANEUVER:  *  Do this twice a day, every day.  1.  Do a breathing treatment with nebulized 3% saline.  2.  Do a breathing treatment with nebulized albuterol.  3.  Lie on your side and have your partner beat on your chest.  4.  Use your accapella device.  5.  Cough up as much as you can.  6.  Lie on your other side and repeat step 3-6.    Get Augmentin (antibioitc) filled and take twice daily for 10 days.  Continue to use flonase daily.  Start using saline nasal spray daily.  Get in to see ENT (I placed a referral).

## 2020-06-02 NOTE — PROGRESS NOTES
"[facility]  Pulmonology  Initial Clinic Visit    Subjective   Reason for Referral:    Charly Mattson is a 25 y.o. male here for hospital follow up for evaluation of IgG defeciency and bronchiectasis.    Chief Complaint   Patient presents with    Hospital Follow Up      5/28/2020:  Mr. Charly Mattson is a 25-year-old gentleman with past medical history significant for IgG deficiency receiving monthly IVIG infusions from Dr. Martinez at [facility].  He has suffered from recurrent "pneumonias" on yearly basis with his most recent 1 being approximately 6 months ago.  He presented to the [facility] emergency department last night complaining of symptoms similar to his previous episodes of pneumonia.  He reports being in his usual state of health until approximately 3-4 days ago when he developed relatively subacute onset of cough productive of green/rust-colored sputum, dyspnea, right-sided pleuritic chest pain, fevers, and rigors.  He denies any associated abdominal pain, nausea, vomiting or other sick contacts.  The emergency department he was found have evidence of community-acquired pneumonia.  Hospital Medicine was consulted.  He was admitted to the medical-surgical floor and started on IV antibiotics.  He requested that he be allowed to establish care with a pulmonologist.  That is why I was consulted.  The patient reports that he may have been diagnosed with bronchiectasis previously.  He reports a chronic cough productive of green sputum.  Does not usually have a red tinge.  He does have an episodic temp but this too.  He does not use any bronchial hygiene maneuvers are pulmonary.  He was diagnosed IgG deficiency years ago has been receiving IVIG infusions for some time.  Despite this, he is still suffered from recurrent pneumonia.     6/2/2020:  Having a lot of thick green snot and epistaxis since discharge.  Was sent home on levqauin which he is almost done with.  Still coughing a " lot but not getting a lot of sputum up.  No fevers, chills or nightsweats.  Lots of headaches.     Past Medical History:   Diagnosis Date    Immunoglobulin deficiency     Immunoglobulin deficiency     Pneumonia      Past Surgical History:   Procedure Laterality Date    ADENOIDECTOMY      MANDIBLE FRACTURE SURGERY      SINUS SURGERY      TONSILLECTOMY      TYMPANOSTOMY TUBE PLACEMENT      TYMPANOSTOMY TUBE PLACEMENT      x 4     Family History   Problem Relation Age of Onset    Cancer Mother     Liver disease Father       Social History     Tobacco Use    Smoking status: Never Smoker    Smokeless tobacco: Never Used   Substance and Sexual Activity    Alcohol use: Yes     Frequency: Never    Drug use: No    Sexual activity: Yes     Partners: Female        Allergies:  Singulair [montelukast] and Singulair [montelukast]     Outpatient Medications as of 6/2/2020   Medication    acetaminophen (TYLENOL) 325 MG tablet    diclofenac (VOLTAREN) 50 MG EC tablet    fluticasone (FLONASE) 50 mcg/actuation nasal spray    immun glob G,IgG,-pro-IgA 0-50 (HIZENTRA) 1 gram/5 mL (20 %) Soln    levalbuterol (XOPENEX) 1.25 mg/3 mL nebulizer solution    levoFLOXacin (LEVAQUIN) 750 MG tablet    meloxicam (MOBIC) 15 MG tablet    albuterol (PROVENTIL) 2.5 mg /3 mL (0.083 %) nebulizer solution    amoxicillin-clavulanate 875-125mg (AUGMENTIN) 875-125 mg per tablet    budesonide-formoterol 160-4.5 mcg (SYMBICORT) 160-4.5 mcg/actuation HFAA     No current facility-administered medications on file as of 6/2/2020.       Review of Systems   Constitutional: Negative for fever, chills, weight loss, weight gain and fatigue.   HENT: Positive for nosebleeds, postnasal drip, rhinorrhea, sinus pressure and congestion.    Eyes: Negative for redness and itching.   Respiratory: Positive for cough, shortness of breath and pleurisy. Negative for hemoptysis, sputum production and wheezing.    Cardiovascular: Negative for chest pain,  palpitations and leg swelling.   Genitourinary: Negative for difficulty urinating and hematuria.   Endocrine: Negative for polydipsia, polyphagia and polyuria.    Musculoskeletal: Negative for back pain, gait problem and joint swelling.   Skin: Negative for rash.   Gastrointestinal: Negative for nausea, vomiting and abdominal pain.   Neurological: Negative for syncope, weakness and headaches.   Hematological: Negative for adenopathy. Does not bruise/bleed easily and no excessive bruising.   Psychiatric/Behavioral: Negative for confusion and sleep disturbance. The patient is not nervous/anxious.    All other systems reviewed and are negative.       Previous Reports Reviewed:   ER records, historical medical records, lab reports, nursing home notes, office notes, operative reports, radiology reports, referral letter/letters and x-ray reports   The following portions of the patient's history were reviewed and updated as appropriate: allergies, current medications, past family history, past medical history, past social history, past surgical history and problem list.    Objective:      /70 (BP Location: Left arm, Patient Position: Sitting)   Pulse 86   Wt 85.3 kg (188 lb)   SpO2 97%   BMI 26.22 kg/m²   Body mass index is 26.22 kg/m².     Physical Exam   Constitutional: He appears well-developed and well-nourished. He is cooperative.  Non-toxic appearance. He does not have a sickly appearance. No distress.   HENT:   Head: Normocephalic.   Right Ear: External ear normal.   Left Ear: External ear normal.   Nose: Mucosal edema present. Epistaxis is observed. Right sinus exhibits maxillary sinus tenderness. Left sinus exhibits maxillary sinus tenderness.   Mouth/Throat: Oropharynx is clear and moist. No oropharyngeal exudate, posterior oropharyngeal edema, posterior oropharyngeal erythema or tonsillar abscesses. Mallampati Score: I.   Neck: Normal range of motion. Neck supple. No JVD present. No thyromegaly  present.   Cardiovascular: Normal rate, regular rhythm, normal heart sounds and intact distal pulses.   No murmur heard.  Pulmonary/Chest: Normal expansion, symmetric chest wall expansion and breath sounds normal. He has no wheezes. He has no rhonchi. He has no rales.   Abdominal: Soft. Bowel sounds are normal. He exhibits no distension. There is no tenderness.   Musculoskeletal: Normal range of motion. He exhibits no edema or tenderness.   Lymphadenopathy: No supraclavicular adenopathy is present.     He has no cervical adenopathy.     He has no axillary adenopathy.   Neurological: He is alert. No cranial nerve deficit.   Skin: Skin is warm and dry. Nails show no clubbing.   Psychiatric: He has a normal mood and affect. His behavior is normal. Thought content normal.   Nursing note and vitals reviewed.     Personal Review of Relevant Diagnostic Studies:  I have personally reviewed and interpreted the following labs/studies/images.   CT Chest:  o 1. Multifocal right lower lobe pneumonia, with scattered bronchiolitis throughout both lungs as described.  o 2. Diffuse bronchial wall thickening with multifocal bilateral lower lobe bronchial occlusion, suggesting acute and or chronic nonspecific bronchitis-bronchiolitis.  o 3. Localized cylindrical bronchiectasis in the right middle lobe, with no additional areas of bronchiectasis    Assessment:       1. Bronchiectasis with acute lower respiratory infection    2. Bacterial sinusitis    3. Pneumonia due to infectious organism, unspecified laterality, unspecified part of lung    4. IgG deficiency        Impression:  IgG deficiency with RML bronchiectasis, recent pneumonia and bacterial sinusitis.    Plan:   · Transition to augmenting for 14 days.  · Continue IN fluticasone.  · Start IN saline.  · ENT referral for sinusitis in the setting of prior ENT surgery.  · Start nebulized saline, chest CPT, and postural drainage BID.  · Will attempt to arrange for a vibratory  vest.  · Obtain sputum for AFB Cx/smear.    I informed the patient of my working diagnosis, it's etiology, risk factors, expected symptoms, diagnostic work up, treatment options and prognosis., I personally reviewed the results of relevant imaging/labs/studies with the patient, and discussed their clinical significance., I spent >10 minutes counseling the patient about their condition., Plan discussed with the patient, who is in agreement., Opportunity provided for the the patient to voice any additional questions or concerns., All questions were answered to the patient's satisfaction., Educational material provided and Patient given date for next visit.    Follow up in about 1 week (around 6/9/2020).    Orders Placed This Visit:  Orders Placed This Encounter   Procedures    VEST FOR AIRWAY CLEARANCE FOR HOME USE     Order Specific Question:   Height:     Answer:   180     Order Specific Question:   Weight:     Answer:   85.3 kg (188 lb)     Order Specific Question:   Does patient have medical equipment at home?     Answer:   none     Order Specific Question:   Length of need (1-99 months):     Answer:   99    NEBULIZER FOR HOME USE     Order Specific Question:   Height:     Answer:   180     Order Specific Question:   Weight:     Answer:   85.3 kg (188 lb)     Order Specific Question:   Does patient have medical equipment at home?     Answer:   none     Order Specific Question:   Length of need (1-99 months):     Answer:   99    NEBULIZER KIT (SUPPLIES) FOR HOME USE     Order Specific Question:   Height:     Answer:   180     Order Specific Question:   Weight:     Answer:   85.3 kg (188 lb)     Order Specific Question:   Does patient have medical equipment at home?     Answer:   none     Order Specific Question:   Length of need (1-99 months):     Answer:   99     Order Specific Question:   Mask or Mouthpiece?     Answer:   Mouthpiece    Ambulatory referral/consult to ENT     Standing Status:   Future      Standing Expiration Date:   7/2/2021     Referral Priority:   Routine     Referral Type:   Consultation     Referral Reason:   Specialty Services Required     Referred to Provider:   Nico Alvarez MD     Requested Specialty:   Otolaryngology     Number of Visits Requested:   1    Complete PFT with bronchodilator     Standing Status:   Future     Standing Expiration Date:   6/2/2021       Ricco Alvares MD  Pulmonary / Critical Care Medicine  [facility]

## 2020-06-08 ENCOUNTER — OFFICE VISIT (OUTPATIENT)
Dept: PULMONOLOGY | Facility: CLINIC | Age: 25
End: 2020-06-08
Payer: COMMERCIAL

## 2020-06-08 VITALS
TEMPERATURE: 97 F | BODY MASS INDEX: 26.5 KG/M2 | WEIGHT: 190 LBS | DIASTOLIC BLOOD PRESSURE: 70 MMHG | OXYGEN SATURATION: 97 % | HEART RATE: 73 BPM | SYSTOLIC BLOOD PRESSURE: 110 MMHG

## 2020-06-08 DIAGNOSIS — J47.0 BRONCHIECTASIS WITH ACUTE LOWER RESPIRATORY INFECTION: Primary | ICD-10-CM

## 2020-06-08 DIAGNOSIS — D83.9 CVID (COMMON VARIABLE IMMUNODEFICIENCY): ICD-10-CM

## 2020-06-08 DIAGNOSIS — B96.89 BACTERIAL SINUSITIS: ICD-10-CM

## 2020-06-08 DIAGNOSIS — J32.9 BACTERIAL SINUSITIS: ICD-10-CM

## 2020-06-08 PROCEDURE — 99213 PR OFFICE/OUTPT VISIT, EST, LEVL III, 20-29 MIN: ICD-10-PCS | Mod: S$GLB,,, | Performed by: INTERNAL MEDICINE

## 2020-06-08 PROCEDURE — 99213 OFFICE O/P EST LOW 20 MIN: CPT | Mod: S$GLB,,, | Performed by: INTERNAL MEDICINE

## 2020-06-08 PROCEDURE — 1111F DSCHRG MED/CURRENT MED MERGE: CPT | Mod: S$GLB,,, | Performed by: INTERNAL MEDICINE

## 2020-06-08 PROCEDURE — 3008F PR BODY MASS INDEX (BMI) DOCUMENTED: ICD-10-PCS | Mod: S$GLB,,, | Performed by: INTERNAL MEDICINE

## 2020-06-08 PROCEDURE — 3008F BODY MASS INDEX DOCD: CPT | Mod: S$GLB,,, | Performed by: INTERNAL MEDICINE

## 2020-06-08 PROCEDURE — 1111F PR DISCHARGE MEDS RECONCILED W/ CURRENT OUTPATIENT MED LIST: ICD-10-PCS | Mod: S$GLB,,, | Performed by: INTERNAL MEDICINE

## 2020-06-08 NOTE — PATIENT INSTRUCTIONS
Understanding Bronchiectasis  Bronchiectasis is a condition in which the airways of the lungs (bronchi and bronchioles) become wider than normal. Over time, the walls of the airways become thick and scarred. The damaged airways cant clear mucus as well. Because of this, mucus builds up in the airways. This increases the risk for lung infections. Bronchiectasis is a long-term (chronic) condition.  What causes bronchiectasis?  Doctors do not know exactly what causes this condition. It occurs in people with lung infections who have long-term damage to the airways from other health problems.  Smokers and those with long-term lung disease are more likely to develop bronchiectasis. Some of the conditions that increase the chance for bronchiectasis include:  · Cystic fibrosis  · Lung infections such as pneumonia, tuberculosis, or whooping cough  · Chronic obstructive pulmonary disease (COPD)  · Problems with the bodys defense (immune) system  · Allergic bronchopulmonary aspergillosis (ABPA)  · Long-term problem with inhaling food or liquids into the lungs (aspiration)  · Certain autoimmune diseases such as rheumatoid arthritis  · Blocked airway, such as from a tumor or inhaled object  · Lung problems that are present at birth (congenital)  What are the symptoms of bronchiectasis?  Damage to the airways often starts in childhood. You may not have symptoms until months or years after repeated lung infections. Some people have few or no symptoms. Others have daily symptoms that get worse over time. Common symptoms include:  · Long-term cough  · Coughing up a lot of thick mucus that may have blood in it  · Trouble breathing  · Inflammation of the nose and sinuses (rhinosinusitis)  · Inflammation of the covering of the lungs (pleurisy or pleuritis)  · Feeling tired  How is bronchiectasis diagnosed?  Your healthcare provider will ask about your past health and symptoms. Youll also have a physical exam. This includes  listening to your chest with a stethoscope. You may need tests to help with the diagnosis, including:  · Blood tests. These check for infection, immune system problems, and overall health.  · Sputum culture. This is a test of the mucus in your lungs. Its checked for a bacterial or fungal infection.  · Chest X-ray. This is done to get information about your heart and lungs.  · Chest CT scan. This gives detailed pictures of your airway. Its most often used to make the diagnosis.  · Lung function and exercise tests. They include spirometry and a 6-minute walk test. These tests show how well your lungs work and if you are able to get enough oxygen into your body, even when exerting yourself.  Date Last Reviewed: 3/1/2017  © 7635-3461 [employer]. [address] [place] [address], [place] [address]. All rights reserved. This information is not intended as a substitute for professional medical care. Always follow your healthcare professional's instructions.

## 2020-06-08 NOTE — PROGRESS NOTES
"  [facility]  Pulmonology  Initial Clinic Visit    Subjective   Reason for Referral:    Charly Mattson is a 25 y.o. male here for hospital follow up for evaluation of IgG defeciency and bronchiectasis.    Chief Complaint   Patient presents with    Bronchiectasis     follow up taking a deep brath wheezing nothing comes up       5/28/2020:  Mr. Charly Mattson is a 25-year-old gentleman with past medical history significant for IgG deficiency receiving monthly IVIG infusions from Dr. Martinez at [facility].  He has suffered from recurrent "pneumonias" on yearly basis with his most recent 1 being approximately 6 months ago.  He presented to the [facility] emergency department last night complaining of symptoms similar to his previous episodes of pneumonia.  He reports being in his usual state of health until approximately 3-4 days ago when he developed relatively subacute onset of cough productive of green/rust-colored sputum, dyspnea, right-sided pleuritic chest pain, fevers, and rigors.  He denies any associated abdominal pain, nausea, vomiting or other sick contacts.  The emergency department he was found have evidence of community-acquired pneumonia.  Hospital Medicine was consulted.  He was admitted to the medical-surgical floor and started on IV antibiotics.  He requested that he be allowed to establish care with a pulmonologist.  That is why I was consulted.  The patient reports that he may have been diagnosed with bronchiectasis previously.  He reports a chronic cough productive of green sputum.  Does not usually have a red tinge.  He does have an episodic temp but this too.  He does not use any bronchial hygiene maneuvers are pulmonary.  He was diagnosed IgG deficiency years ago has been receiving IVIG infusions for some time.  Despite this, he is still suffered from recurrent pneumonia.     6/2/2020:  Having a lot of thick green snot and epistaxis since discharge.  Was sent home on " levqauin which he is almost done with.  Still coughing a lot but not getting a lot of sputum up.  No fevers, chills or nightsweats.  Lots of headaches.    6/8/2020:  Subjectively improved.  Still having a non-productive cough.  Improving sinus pressure/drainage.  No fevers.     Past Medical History:   Diagnosis Date    Immunoglobulin deficiency     Immunoglobulin deficiency     Pneumonia      Past Surgical History:   Procedure Laterality Date    ADENOIDECTOMY      MANDIBLE FRACTURE SURGERY      SINUS SURGERY      TONSILLECTOMY      TYMPANOSTOMY TUBE PLACEMENT      TYMPANOSTOMY TUBE PLACEMENT      x 4     Family History   Problem Relation Age of Onset    Cancer Mother     Liver disease Father       Social History     Tobacco Use    Smoking status: Never Smoker    Smokeless tobacco: Never Used   Substance and Sexual Activity    Alcohol use: Yes     Frequency: Never    Drug use: No    Sexual activity: Yes     Partners: Female        Allergies:  Singulair [montelukast] and Singulair [montelukast]     Outpatient Medications as of 6/8/2020   Medication    acetaminophen (TYLENOL) 325 MG tablet    albuterol (PROVENTIL) 2.5 mg /3 mL (0.083 %) nebulizer solution    amoxicillin-clavulanate 1,000-62.5 mg (AUGMENTIN XR) 1,000-62.5 mg per tablet    amoxicillin-clavulanate 875-125mg (AUGMENTIN) 875-125 mg per tablet    diclofenac (VOLTAREN) 50 MG EC tablet    fluticasone (FLONASE) 50 mcg/actuation nasal spray    immun glob G,IgG,-pro-IgA 0-50 (HIZENTRA) 1 gram/5 mL (20 %) Soln    levalbuterol (XOPENEX) 1.25 mg/3 mL nebulizer solution    levoFLOXacin (LEVAQUIN) 750 MG tablet    meloxicam (MOBIC) 15 MG tablet    mucus clearing device (ACAPELLA, FLUTTER)    sodium chloride (SALINE NASAL) 0.65 % nasal spray    sodium chloride 3% 3 % nebulizer solution    budesonide-formoterol 160-4.5 mcg (SYMBICORT) 160-4.5 mcg/actuation AA     No current facility-administered medications on file as of 6/8/2020.        Review of Systems   Constitutional: Positive for night sweats. Negative for fever, chills and weight loss.   HENT: Positive for sinus pressure (improving). Negative for nosebleeds, postnasal drip and rhinorrhea.    Eyes: Negative for redness and itching.   Respiratory: Positive for cough, shortness of breath and pleurisy. Negative for hemoptysis, sputum production and wheezing.    Cardiovascular: Negative for palpitations and leg swelling.   Genitourinary: Negative for difficulty urinating and hematuria.   Endocrine: Negative for polydipsia, polyphagia and polyuria.    Musculoskeletal: Negative for back pain and gait problem.   Neurological: Negative for syncope.   Hematological: Negative for adenopathy. Does not bruise/bleed easily and no excessive bruising.   Psychiatric/Behavioral: Negative for confusion and sleep disturbance. The patient is not nervous/anxious.    All other systems reviewed and are negative.       Previous Reports Reviewed:   ER records, historical medical records, lab reports, nursing home notes, office notes, operative reports, radiology reports, referral letter/letters and x-ray reports   The following portions of the patient's history were reviewed and updated as appropriate: allergies, current medications, past family history, past medical history, past social history, past surgical history and problem list.    Objective:      /70 (BP Location: Right arm, Patient Position: Sitting)   Pulse 73   Temp 97.3 °F (36.3 °C)   Wt 86.2 kg (190 lb)   SpO2 97%   BMI 26.50 kg/m²   Body mass index is 26.5 kg/m².     Physical Exam   Constitutional: He appears well-developed and well-nourished. He is cooperative.  Non-toxic appearance. He does not have a sickly appearance. No distress.   HENT:   Head: Normocephalic.   Right Ear: External ear normal.   Left Ear: External ear normal.   Nose: Mucosal edema present. Epistaxis is observed. Right sinus exhibits maxillary sinus tenderness. Left  sinus exhibits maxillary sinus tenderness.   Mouth/Throat: Oropharynx is clear and moist. No oropharyngeal exudate, posterior oropharyngeal edema, posterior oropharyngeal erythema or tonsillar abscesses. Mallampati Score: I.   Neck: Normal range of motion. Neck supple. No JVD present. No thyromegaly present.   Cardiovascular: Normal rate, regular rhythm, normal heart sounds and intact distal pulses.   No murmur heard.  Pulmonary/Chest: Normal expansion, symmetric chest wall expansion and breath sounds normal. He has no wheezes. He has no rhonchi. He has no rales.   Abdominal: Soft. Bowel sounds are normal. He exhibits no distension. There is no tenderness.   Musculoskeletal: Normal range of motion. He exhibits no edema or tenderness.   Lymphadenopathy: No supraclavicular adenopathy is present.     He has no cervical adenopathy.     He has no axillary adenopathy.   Neurological: He is alert. No cranial nerve deficit.   Skin: Skin is warm and dry. Nails show no clubbing.   Psychiatric: He has a normal mood and affect. His behavior is normal. Thought content normal.   Nursing note and vitals reviewed.     Personal Review of Relevant Diagnostic Studies:  I have personally reviewed and interpreted the following labs/studies/images.   CT Chest:  o 1. Multifocal right lower lobe pneumonia, with scattered bronchiolitis throughout both lungs as described.  o 2. Diffuse bronchial wall thickening with multifocal bilateral lower lobe bronchial occlusion, suggesting acute and or chronic nonspecific bronchitis-bronchiolitis.  o 3. Localized cylindrical bronchiectasis in the right middle lobe, with no additional areas of bronchiectasis    Assessment:       1. Bronchiectasis with acute lower respiratory infection    2. CVID (common variable immunodeficiency)    3. Bacterial sinusitis        Impression: CVID with RML bronchiectasis, recent pneumonia and bacterial sinusitis.    Plan:   · Complete 14 days of augmentin.  · Continue IN  fluticasone.  · Continue IN saline.  · Awaiting ENT appointment.  · Continue nebulized saline, chest CPT, and postural drainage BID.  · Making arrangements for a vibratory vest.  · Needs [employer] Cx/smear.    I informed the patient of my working diagnosis, it's etiology, risk factors, expected symptoms, diagnostic work up, treatment options and prognosis., I personally reviewed the results of relevant imaging/labs/studies with the patient, and discussed their clinical significance., I spent >10 minutes counseling the patient about their condition., Plan discussed with the patient, who is in agreement., Opportunity provided for the the patient to voice any additional questions or concerns., All questions were answered to the patient's satisfaction., Educational material provided and Patient given date for next visit.    Follow up in about 3 months (around 9/8/2020).    Orders Placed This Visit:  No orders of the defined types were placed in this encounter.      Ricco Alvares MD  Pulmonary / Critical Care Medicine  [facility]

## 2020-06-10 ENCOUNTER — TELEPHONE (OUTPATIENT)
Dept: PULMONOLOGY | Facility: CLINIC | Age: 25
End: 2020-06-10

## 2020-06-10 NOTE — TELEPHONE ENCOUNTER
Please write a addendum for him states Patient does not have a care giver to perform adequate CPT . So that the insurance will pay for the vest .

## 2020-06-17 ENCOUNTER — HOSPITAL ENCOUNTER (OUTPATIENT)
Dept: PULMONOLOGY | Facility: HOSPITAL | Age: 25
Discharge: HOME | End: 2020-06-17
Attending: INTERNAL MEDICINE
Payer: COMMERCIAL

## 2020-06-17 DIAGNOSIS — J47.0 BRONCHIECTASIS WITH ACUTE LOWER RESPIRATORY INFECTION: ICD-10-CM

## 2020-06-17 PROCEDURE — 94729 DIFFUSING CAPACITY: CPT

## 2020-06-17 PROCEDURE — 94010 BREATHING CAPACITY TEST: CPT | Mod: XB

## 2020-06-17 PROCEDURE — 94060 EVALUATION OF WHEEZING: CPT

## 2020-06-17 PROCEDURE — 94727 GAS DIL/WSHOT DETER LNG VOL: CPT

## 2020-07-09 DIAGNOSIS — D83.9 CVID (COMMON VARIABLE IMMUNODEFICIENCY): Primary | ICD-10-CM

## 2020-07-09 DIAGNOSIS — R74.8 ABNORMAL LIVER ENZYMES: ICD-10-CM

## 2020-07-10 DIAGNOSIS — D69.6 THROMBOCYTOPENIA: Primary | ICD-10-CM

## 2020-07-10 DIAGNOSIS — D83.9 CVID (COMMON VARIABLE IMMUNODEFICIENCY): ICD-10-CM

## 2020-07-17 DIAGNOSIS — D83.9 CVID (COMMON VARIABLE IMMUNODEFICIENCY): Primary | ICD-10-CM

## 2020-07-17 DIAGNOSIS — R74.01 TRANSAMINITIS: ICD-10-CM

## 2020-07-21 ENCOUNTER — HOSPITAL ENCOUNTER (OUTPATIENT)
Dept: RADIOLOGY | Facility: CLINIC | Age: 25
Discharge: HOME | End: 2020-07-21
Attending: ALLERGY & IMMUNOLOGY
Payer: COMMERCIAL

## 2020-07-21 DIAGNOSIS — R74.01 TRANSAMINITIS: ICD-10-CM

## 2020-07-21 DIAGNOSIS — D83.9 CVID (COMMON VARIABLE IMMUNODEFICIENCY): ICD-10-CM

## 2020-07-21 PROCEDURE — 76700 US ABDOMEN COMPLETE: ICD-10-PCS | Mod: 26,,, | Performed by: RADIOLOGY

## 2020-07-21 PROCEDURE — 76700 US EXAM ABDOM COMPLETE: CPT | Mod: 26,,, | Performed by: RADIOLOGY

## 2020-07-21 PROCEDURE — 76700 US EXAM ABDOM COMPLETE: CPT | Mod: TC,PO

## 2020-09-08 ENCOUNTER — OFFICE VISIT (OUTPATIENT)
Dept: PULMONOLOGY | Facility: CLINIC | Age: 25
End: 2020-09-08
Payer: COMMERCIAL

## 2020-09-08 VITALS
BODY MASS INDEX: 2.79 KG/M2 | WEIGHT: 20 LBS | TEMPERATURE: 97 F | DIASTOLIC BLOOD PRESSURE: 70 MMHG | OXYGEN SATURATION: 97 % | SYSTOLIC BLOOD PRESSURE: 102 MMHG | HEART RATE: 82 BPM

## 2020-09-08 DIAGNOSIS — J47.9 BRONCHIECTASIS WITHOUT COMPLICATION: Primary | ICD-10-CM

## 2020-09-08 DIAGNOSIS — D83.9 CVID (COMMON VARIABLE IMMUNODEFICIENCY): ICD-10-CM

## 2020-09-08 DIAGNOSIS — J45.51 SEVERE PERSISTENT ASTHMA WITH ACUTE EXACERBATION: ICD-10-CM

## 2020-09-08 DIAGNOSIS — R09.89 CHRONIC SINUS COMPLAINTS: ICD-10-CM

## 2020-09-08 DIAGNOSIS — J41.1 BRONCHITIS, CHRONIC, MUCOPURULENT: ICD-10-CM

## 2020-09-08 PROCEDURE — 99214 PR OFFICE/OUTPT VISIT, EST, LEVL IV, 30-39 MIN: ICD-10-PCS | Mod: S$GLB,,, | Performed by: INTERNAL MEDICINE

## 2020-09-08 PROCEDURE — 99214 OFFICE O/P EST MOD 30 MIN: CPT | Mod: S$GLB,,, | Performed by: INTERNAL MEDICINE

## 2020-09-08 RX ORDER — OXYCODONE AND ACETAMINOPHEN 10; 325 MG/1; MG/1
TABLET ORAL
COMMUNITY
Start: 2020-07-14 | End: 2020-09-14

## 2020-09-08 RX ORDER — HYDROCODONE BITARTRATE AND ACETAMINOPHEN 10; 325 MG/1; MG/1
TABLET ORAL
COMMUNITY
Start: 2020-07-26 | End: 2020-09-14

## 2020-09-08 NOTE — PROGRESS NOTES
"[facility]  Pulmonology  Follow-Up Clinic Visit    Subjective   Reason for Referral:    Charly Mattson is a 25 y.o. male here for hospital follow up for evaluation of IgG defeciency and bronchiectasis.    Chief Complaint   Patient presents with    Bronchiectasis     follow up       5/28/2020:  Mr. Charly Mattson is a 25-year-old gentleman with past medical history significant for IgG deficiency receiving monthly IVIG infusions from Dr. Martinez at [facility].  He has suffered from recurrent "pneumonias" on yearly basis with his most recent 1 being approximately 6 months ago.  He presented to the [facility] emergency department last night complaining of symptoms similar to his previous episodes of pneumonia.  He reports being in his usual state of health until approximately 3-4 days ago when he developed relatively subacute onset of cough productive of green/rust-colored sputum, dyspnea, right-sided pleuritic chest pain, fevers, and rigors.  He denies any associated abdominal pain, nausea, vomiting or other sick contacts.  The emergency department he was found have evidence of community-acquired pneumonia.  Hospital Medicine was consulted.  He was admitted to the medical-surgical floor and started on IV antibiotics.  He requested that he be allowed to establish care with a pulmonologist.  That is why I was consulted.  The patient reports that he may have been diagnosed with bronchiectasis previously.  He reports a chronic cough productive of green sputum.  Does not usually have a red tinge.  He does have an episodic temp but this too.  He does not use any bronchial hygiene maneuvers are pulmonary.  He was diagnosed IgG deficiency years ago has been receiving IVIG infusions for some time.  Despite this, he is still suffered from recurrent pneumonia.     6/2/2020:  Having a lot of thick green snot and epistaxis since discharge.  Was sent home on levqauin which he is almost done with.  Still " coughing a lot but not getting a lot of sputum up.  No fevers, chills or nightsweats.  Lots of headaches.    6/8/2020:  Subjectively improved.  Still having a non-productive cough.  Improving sinus pressure/drainage.  No fevers.    9/8/2020:  Doing well since our last visit.  No exacerbations.  Hasn't been wearing his vest the past couple of weeks after a recent shoulder surgery.  Otherwise using his nebulized saline/albuterol and accapella device, with good clearance of his airways.  No new complaints.     Past Medical History:   Diagnosis Date    Immunoglobulin deficiency     Immunoglobulin deficiency     Pneumonia      Past Surgical History:   Procedure Laterality Date    ADENOIDECTOMY      MANDIBLE FRACTURE SURGERY      SINUS SURGERY      TONSILLECTOMY      TYMPANOSTOMY TUBE PLACEMENT      TYMPANOSTOMY TUBE PLACEMENT      x 4     Family History   Problem Relation Age of Onset    Cancer Mother     Liver disease Father       Social History     Tobacco Use    Smoking status: Never Smoker    Smokeless tobacco: Never Used   Substance and Sexual Activity    Alcohol use: Yes     Frequency: Never    Drug use: No    Sexual activity: Yes     Partners: Female        Allergies:  Singulair [montelukast] and Singulair [montelukast]     Outpatient Medications as of 9/8/2020   Medication    acetaminophen (TYLENOL) 325 MG tablet    albuterol (PROVENTIL) 2.5 mg /3 mL (0.083 %) nebulizer solution    diclofenac (VOLTAREN) 50 MG EC tablet    fluticasone (FLONASE) 50 mcg/actuation nasal spray    immun glob G,IgG,-pro-IgA 0-50 (HIZENTRA) 1 gram/5 mL (20 %) Soln    levalbuterol (XOPENEX) 1.25 mg/3 mL nebulizer solution    meloxicam (MOBIC) 15 MG tablet    mucus clearing device (ACAPELLA, FLUTTER)    oxyCODONE-acetaminophen (PERCOCET)  mg per tablet    sodium chloride (SALINE NASAL) 0.65 % nasal spray    sodium chloride 3% 3 % nebulizer solution    HYDROcodone-acetaminophen (NORCO)  mg per tablet      No current facility-administered medications on file as of 9/8/2020.       Review of Systems   Constitutional: Negative for weight loss and night sweats.   HENT: Negative for nosebleeds, postnasal drip, rhinorrhea and sinus pressure.    Eyes: Negative for redness and itching.   Respiratory: Positive for sputum production. Negative for hemoptysis, shortness of breath, wheezing and pleurisy.    Cardiovascular: Negative for palpitations and leg swelling.   Genitourinary: Negative for difficulty urinating and hematuria.   Endocrine: Negative for polydipsia, polyphagia and polyuria.    Musculoskeletal: Negative for back pain and gait problem.   Neurological: Negative for syncope.   Hematological: Negative for adenopathy. Does not bruise/bleed easily and no excessive bruising.   Psychiatric/Behavioral: Negative for confusion and sleep disturbance. The patient is not nervous/anxious.    All other systems reviewed and are negative.       Previous Reports Reviewed:   ER records, historical medical records, lab reports, nursing home notes, office notes, operative reports, radiology reports, referral letter/letters and x-ray reports   The following portions of the patient's history were reviewed and updated as appropriate: allergies, current medications, past family history, past medical history, past social history, past surgical history and problem list.    Objective:      /70 (BP Location: Left arm, Patient Position: Sitting)   Pulse 82   Temp 97 °F (36.1 °C)   Wt 9.072 kg (20 lb)   SpO2 97%   BMI 2.79 kg/m²   Body mass index is 2.79 kg/m².     Physical Exam   Constitutional: He appears well-developed and well-nourished. He is cooperative.  Non-toxic appearance. He does not have a sickly appearance. No distress.   HENT:   Head: Normocephalic.   Right Ear: External ear normal.   Left Ear: External ear normal.   Nose: No mucosal edema. Epistaxis is observed. Right sinus exhibits maxillary sinus tenderness. Left  sinus exhibits maxillary sinus tenderness.   Mouth/Throat: Oropharynx is clear and moist. No oropharyngeal exudate, posterior oropharyngeal edema, posterior oropharyngeal erythema or tonsillar abscesses. Mallampati Score: I.   Neck: Normal range of motion. Neck supple. No JVD present. No thyromegaly present.   Cardiovascular: Normal rate, regular rhythm, normal heart sounds and intact distal pulses.   No murmur heard.  Pulmonary/Chest: Normal expansion, symmetric chest wall expansion and breath sounds normal. He has no wheezes. He has no rhonchi. He has no rales.   Abdominal: Soft. Bowel sounds are normal. He exhibits no distension. There is no abdominal tenderness.   Musculoskeletal: Normal range of motion.         General: No tenderness or edema.   Lymphadenopathy: No supraclavicular adenopathy is present.     He has no cervical adenopathy.     He has no axillary adenopathy.   Neurological: He is alert. No cranial nerve deficit.   Skin: Skin is warm and dry. Nails show no clubbing.   Psychiatric: He has a normal mood and affect. His behavior is normal. Thought content normal.   Nursing note and vitals reviewed.     Personal Review of Relevant Diagnostic Studies:  I have personally reviewed and interpreted the following labs/studies/images.   PFTs (2020):  o FVC:  5.49 (96%)  o FEV1:  3.96 (83%)  o FEV1/FVC:  72  o T.81 (92%)  o DLCO:  91%  o My impression:  Normal PFTs (NO OBSTRUCTION).     CT Chest:  o 1. Multifocal right lower lobe pneumonia, with scattered bronchiolitis throughout both lungs as described.  o 2. Diffuse bronchial wall thickening with multifocal bilateral lower lobe bronchial occlusion, suggesting acute and or chronic nonspecific bronchitis-bronchiolitis.  o 3. Localized cylindrical bronchiectasis in the right middle lobe, with no additional areas of bronchiectasis    Assessment:       1. Bronchiectasis without complication    2. Bronchitis, chronic, mucopurulent    3. Severe  persistent asthma with acute exacerbation    4. CVID (common variable immunodeficiency)    5. Chronic sinus complaints        Impression: CVID with RML bronchiectasis, recurrent pneumonia and bacterial sinusitis.  Doing relatively well with current regimen.    Plan:     · Continue IN fluticasone.  · Continue IN saline.  · Awaiting ENT appointment.  · Continue nebulized saline, chest CPT, and postural drainage BID.  · Flu shot next month when its available.    I informed the patient of my working diagnosis, it's etiology, risk factors, expected symptoms, diagnostic work up, treatment options and prognosis., I personally reviewed the results of relevant imaging/labs/studies with the patient, and discussed their clinical significance., I spent >10 minutes counseling the patient about their condition., Plan discussed with the patient, who is in agreement., Opportunity provided for the the patient to voice any additional questions or concerns., All questions were answered to the patient's satisfaction., Educational material provided and Patient given date for next visit.    Follow up in about 4 weeks (around 10/6/2020) for a flu shot.    Orders Placed This Visit:  No orders of the defined types were placed in this encounter.    Ricco Alvares MD  Pulmonary / Critical Care Medicine  [facility]

## 2020-09-08 NOTE — PATIENT INSTRUCTIONS
Understanding Bronchiectasis  Bronchiectasis is a condition in which the airways of the lungs (bronchi and bronchioles) become wider than normal. Over time, the walls of the airways become thick and scarred. The damaged airways cant clear mucus as well. Because of this, mucus builds up in the airways. This increases the risk for lung infections. Bronchiectasis is a long-term (chronic) condition.  What causes bronchiectasis?  Doctors do not know exactly what causes this condition. It occurs in people with lung infections who have long-term damage to the airways from other health problems.  Smokers and those with long-term lung disease are more likely to develop bronchiectasis. Some of the conditions that increase the chance for bronchiectasis include:  · Cystic fibrosis  · Lung infections such as pneumonia, tuberculosis, or whooping cough  · Chronic obstructive pulmonary disease (COPD)  · Problems with the bodys defense (immune) system  · Allergic bronchopulmonary aspergillosis (ABPA)  · Long-term problem with inhaling food or liquids into the lungs (aspiration)  · Certain autoimmune diseases such as rheumatoid arthritis  · Blocked airway, such as from a tumor or inhaled object  · Lung problems that are present at birth (congenital)  What are the symptoms of bronchiectasis?  Damage to the airways often starts in childhood. You may not have symptoms until months or years after repeated lung infections. Some people have few or no symptoms. Others have daily symptoms that get worse over time. Common symptoms include:  · Long-term cough  · Coughing up a lot of thick mucus that may have blood in it  · Trouble breathing  · Inflammation of the nose and sinuses (rhinosinusitis)  · Inflammation of the covering of the lungs (pleurisy or pleuritis)  · Feeling tired  How is bronchiectasis diagnosed?  Your healthcare provider will ask about your past health and symptoms. Youll also have a physical exam. This includes  listening to your chest with a stethoscope. You may need tests to help with the diagnosis, including:  · Blood tests. These check for infection, immune system problems, and overall health.  · Sputum culture. This is a test of the mucus in your lungs. Its checked for a bacterial or fungal infection.  · Chest X-ray. This is done to get information about your heart and lungs.  · Chest CT scan. This gives detailed pictures of your airway. Its most often used to make the diagnosis.  · Lung function and exercise tests. They include spirometry and a 6-minute walk test. These tests show how well your lungs work and if you are able to get enough oxygen into your body, even when exerting yourself.  Date Last Reviewed: 3/1/2017  © 5561-6694 [employer]. [address] [place] [address]. All rights reserved. This information is not intended as a substitute for professional medical care. Always follow your healthcare professional's instructions.          Treatment for Bronchiectasis  Bronchiectasis is a condition in which the airways of the lungs become wider than normal. These airways are called bronchi and bronchioles. Over time the walls of the airways become thick and scarred. The damaged airways cant clear mucus as well. Because of this, mucus builds up in the airways. This increases the risk for lung infections. Bronchiectasis is a long-term (chronic) condition.  Types of treatment  Treating the cause of bronchiectasis can help to prevent more damage. For example, you may take antibiotic medicine for an infection caused by bacteria.  Bronchiectasis is a long-term (chronic) condition. There may be times when you have an infection and your symptoms get worse. During these times, you may be given antibiotic medicine to take by mouth.  If oral antibiotic medicine does not work, or if you have severe symptoms, you may need to stay in the hospital. While in the hospital, you may get antibiotic medicine  in one of your veins (IV). You will get other treatment to help with breathing and other symptoms. For example, you may be given oxygen.  Surgery is another treatment. Your health care provider can tell you more about what kinds of treatment may work best for you.  Possible complications of bronchiectasis  Possible complications of bronchiectasis include:  · Collapsed lung  · Respiratory failure, when you dont have enough oxygen in your blood  · Heart failure, when your heart cant pump well  Managing bronchiectasis  Your health care provider will talk with you about managing your condition. You can take steps to help prevent bronchiectasis from getting worse, such as:  · Avoiding people with respiratory infections, if possible  · Keeping your hands clean by washing with soap and water or using antibacterial gel  · Getting all the vaccines your health care provider advises  · Taking antibiotic medicine exactly as prescribed, to treat or to prevent infections  · Following all instructions to clear mucus from your airways, using special equipment or methods  Staying healthy with bronchiectasis  Youll need to take good care of your overall health. Make sure to:  · Quit smoking, if you smoke. Talk with your health care provider. He or she can recommend medicines and programs that can help. Or call the national quit-line at 258-QUITNOW (670-966-9089).  · Make sure you drink a lot of water or other healthy drinks every day.  · Stay healthy by eating fresh fruits and vegetables, whole grains, low-fat milk foods, and lean meats.  · Keep active and get exercise.     When to call the health care provider  Call your health care provider right away if you have any of these:  · Fever of 100.4°F (38°C) or higher  · Trouble breathing  · Coughing that gets worse  · Increased amount of mucus  · Mucus thats darker in color      Date Last Reviewed: 7/21/2015  © 9373-5248 [employer]. [address] [facility]  "[place] [address]. All rights reserved. This information is not intended as a substitute for professional medical care. Always follow your healthcare professional's instructions.          Using an Inhaler  Your healthcare provider may prescribe medicine that you breathe in using a metered-dose inhaler (MDI). An inhaler sends a measured amount of medicine in a fine mist.  Step 1:  · Shake the inhaler and remove the cap.  · Take a deep breath and let it out.  Step 2:  · Close your lips around the end of the inhaler mouthpiece. Or if you were told to use the "open-mouth" method, hold the inhaler 1 to 2 inches from your mouth.  Step 3:  · Breathe in slowly and deeply as you press down on the inhaler to release the medicine.  · Inhale fully.  Step 4:  · Hold your breath for a count of 10, or as long as you can comfortably.  · Then breathe out slowly through your mouth.  · Repeat these steps for each puff of medicine prescribed.             Important  · If the inhaler is being used for the first time or has not been used for a while, prime it as directed by the product maker. You can find important information about the medicine in the package insert. This is the paper that comes with the medicine.  · If you use more than one inhaler, make sure you know which one to use first.  · Your healthcare provider or pharmacist can show you how to use your inhaler the right way. Even if you think you are using it the right way, it is still a good idea to check.   Date Last Reviewed: 10/1/2016  © 9194-6560 [employer]. [address] [place] [address], [place] [address]. All rights reserved. This information is not intended as a substitute for professional medical care. Always follow your healthcare professional's instructions.          "

## 2020-09-11 DIAGNOSIS — R74.8 ABNORMAL LEVELS OF OTHER SERUM ENZYMES: Primary | ICD-10-CM

## 2020-09-14 ENCOUNTER — TELEPHONE (OUTPATIENT)
Dept: RADIOLOGY | Facility: HOSPITAL | Age: 25
End: 2020-09-14

## 2020-09-14 DIAGNOSIS — Z01.812 PRE-PROCEDURE LAB EXAM: ICD-10-CM

## 2020-09-20 ENCOUNTER — LAB VISIT (OUTPATIENT)
Dept: PRIMARY CARE CLINIC | Facility: CLINIC | Age: 25
End: 2020-09-20
Payer: COMMERCIAL

## 2020-09-20 DIAGNOSIS — Z01.812 PRE-PROCEDURE LAB EXAM: ICD-10-CM

## 2020-09-20 LAB — SARS-COV-2 RNA RESP QL NAA+PROBE: NOT DETECTED

## 2020-09-20 PROCEDURE — U0003 INFECTIOUS AGENT DETECTION BY NUCLEIC ACID (DNA OR RNA); SEVERE ACUTE RESPIRATORY SYNDROME CORONAVIRUS 2 (SARS-COV-2) (CORONAVIRUS DISEASE [COVID-19]), AMPLIFIED PROBE TECHNIQUE, MAKING USE OF HIGH THROUGHPUT TECHNOLOGIES AS DESCRIBED BY CMS-2020-01-R: HCPCS

## 2020-09-23 ENCOUNTER — HOSPITAL ENCOUNTER (OUTPATIENT)
Facility: HOSPITAL | Age: 25
Discharge: HOME | End: 2020-09-23
Attending: INTERNAL MEDICINE | Admitting: INTERNAL MEDICINE
Payer: COMMERCIAL

## 2020-09-23 VITALS
DIASTOLIC BLOOD PRESSURE: 78 MMHG | BODY MASS INDEX: 28.28 KG/M2 | RESPIRATION RATE: 18 BRPM | SYSTOLIC BLOOD PRESSURE: 123 MMHG | HEART RATE: 84 BPM | WEIGHT: 202 LBS | OXYGEN SATURATION: 98 % | TEMPERATURE: 99 F | HEIGHT: 71 IN

## 2020-09-23 DIAGNOSIS — R74.8 ABNORMAL LEVELS OF OTHER SERUM ENZYMES: ICD-10-CM

## 2020-09-23 LAB
APTT PPP: 29.5 SEC (ref 23.6–33.3)
BASOPHILS # BLD AUTO: 0.06 K/UL (ref 0–0.2)
BASOPHILS NFR BLD: 1 % (ref 0–1.9)
DIFFERENTIAL METHOD: ABNORMAL
EOSINOPHIL # BLD AUTO: 0.1 K/UL (ref 0–0.5)
EOSINOPHIL NFR BLD: 1.9 % (ref 0–8)
ERYTHROCYTE [DISTWIDTH] IN BLOOD BY AUTOMATED COUNT: 13.2 % (ref 11.5–14.5)
HCT VFR BLD AUTO: 44.5 % (ref 40–54)
HGB BLD-MCNC: 14.7 G/DL (ref 14–18)
IMM GRANULOCYTES # BLD AUTO: 0.01 K/UL (ref 0–0.04)
IMM GRANULOCYTES NFR BLD AUTO: 0.2 % (ref 0–0.5)
INR PPP: 1.1
LYMPHOCYTES # BLD AUTO: 1.4 K/UL (ref 1–4.8)
LYMPHOCYTES NFR BLD: 22.6 % (ref 18–48)
MCH RBC QN AUTO: 26.3 PG (ref 27–31)
MCHC RBC AUTO-ENTMCNC: 33 G/DL (ref 32–36)
MCV RBC AUTO: 80 FL (ref 82–98)
MONOCYTES # BLD AUTO: 0.9 K/UL (ref 0.3–1)
MONOCYTES NFR BLD: 13.8 % (ref 4–15)
NEUTROPHILS # BLD AUTO: 3.8 K/UL (ref 1.8–7.7)
NEUTROPHILS NFR BLD: 60.5 % (ref 38–73)
NRBC BLD-RTO: 0 /100 WBC
PLATELET # BLD AUTO: 157 K/UL (ref 150–350)
PMV BLD AUTO: 11.5 FL (ref 9.2–12.9)
PROTHROMBIN TIME: 13.5 SEC (ref 10.6–14.8)
RBC # BLD AUTO: 5.58 M/UL (ref 4.6–6.2)
WBC # BLD AUTO: 6.24 K/UL (ref 3.9–12.7)

## 2020-09-23 PROCEDURE — 71000016 HC POSTOP RECOV ADDL HR

## 2020-09-23 PROCEDURE — 85025 COMPLETE CBC W/AUTO DIFF WBC: CPT

## 2020-09-23 PROCEDURE — 71000015 HC POSTOP RECOV 1ST HR

## 2020-09-23 PROCEDURE — 63600175 PHARM REV CODE 636 W HCPCS: Performed by: RADIOLOGY

## 2020-09-23 PROCEDURE — 85730 THROMBOPLASTIN TIME PARTIAL: CPT

## 2020-09-23 PROCEDURE — 85610 PROTHROMBIN TIME: CPT

## 2020-09-23 PROCEDURE — 99152 MOD SED SAME PHYS/QHP 5/>YRS: CPT

## 2020-09-23 PROCEDURE — 25000003 PHARM REV CODE 250: Performed by: RADIOLOGY

## 2020-09-23 RX ORDER — MIDAZOLAM HYDROCHLORIDE 1 MG/ML
INJECTION INTRAMUSCULAR; INTRAVENOUS CODE/TRAUMA/SEDATION MEDICATION
Status: COMPLETED | OUTPATIENT
Start: 2020-09-23 | End: 2020-09-23

## 2020-09-23 RX ORDER — SODIUM CHLORIDE 9 MG/ML
INJECTION, SOLUTION INTRAVENOUS
Status: COMPLETED | OUTPATIENT
Start: 2020-09-23 | End: 2020-09-23

## 2020-09-23 RX ORDER — FENTANYL CITRATE 50 UG/ML
INJECTION, SOLUTION INTRAMUSCULAR; INTRAVENOUS CODE/TRAUMA/SEDATION MEDICATION
Status: COMPLETED | OUTPATIENT
Start: 2020-09-23 | End: 2020-09-23

## 2020-09-23 RX ADMIN — FENTANYL CITRATE 25 MCG: 50 INJECTION INTRAMUSCULAR; INTRAVENOUS at 11:09

## 2020-09-23 RX ADMIN — FENTANYL CITRATE 50 MCG: 50 INJECTION INTRAMUSCULAR; INTRAVENOUS at 10:09

## 2020-09-23 RX ADMIN — SODIUM CHLORIDE 250 ML/HR: 0.9 INJECTION, SOLUTION INTRAVENOUS at 10:09

## 2020-09-23 RX ADMIN — MIDAZOLAM HYDROCHLORIDE 2 MG: 1 INJECTION, SOLUTION INTRAMUSCULAR; INTRAVENOUS at 10:09

## 2020-09-23 NOTE — PLAN OF CARE
Back to room in asu via stretcher no co pain, no acute distress noted bedside report given to andra arizmendi rn to right upper abdominal quadrant cdi , specimen to lab for pathology as per md order.

## 2020-12-03 ENCOUNTER — HOSPITAL ENCOUNTER (OUTPATIENT)
Facility: HOSPITAL | Age: 25
Discharge: HOME | End: 2020-12-04
Attending: EMERGENCY MEDICINE | Admitting: INTERNAL MEDICINE
Payer: COMMERCIAL

## 2020-12-03 DIAGNOSIS — Z20.822 SUSPECTED COVID-19 VIRUS INFECTION: ICD-10-CM

## 2020-12-03 DIAGNOSIS — R06.03 RESPIRATORY DISTRESS: Primary | ICD-10-CM

## 2020-12-03 DIAGNOSIS — R09.02 HYPOXIA: ICD-10-CM

## 2020-12-03 DIAGNOSIS — R07.9 CHEST PAIN: ICD-10-CM

## 2020-12-03 DIAGNOSIS — J18.9 PNEUMONIA DUE TO INFECTIOUS ORGANISM, UNSPECIFIED LATERALITY, UNSPECIFIED PART OF LUNG: ICD-10-CM

## 2020-12-03 LAB
ALBUMIN SERPL BCP-MCNC: 4.3 G/DL (ref 3.5–5.2)
ALP SERPL-CCNC: 103 U/L (ref 55–135)
ALT SERPL W/O P-5'-P-CCNC: 146 U/L (ref 10–44)
ANION GAP SERPL CALC-SCNC: 11 MMOL/L (ref 8–16)
AST SERPL-CCNC: 146 U/L (ref 10–40)
BASOPHILS # BLD AUTO: 0.07 K/UL (ref 0–0.2)
BASOPHILS NFR BLD: 0.4 % (ref 0–1.9)
BILIRUB SERPL-MCNC: 1.1 MG/DL (ref 0.1–1)
BILIRUB UR QL STRIP: NEGATIVE
BNP SERPL-MCNC: 18 PG/ML (ref 0–99)
BUN SERPL-MCNC: 16 MG/DL (ref 6–20)
CALCIUM SERPL-MCNC: 9.2 MG/DL (ref 8.7–10.5)
CHLORIDE SERPL-SCNC: 102 MMOL/L (ref 95–110)
CK SERPL-CCNC: 91 U/L (ref 20–200)
CLARITY UR: CLEAR
CO2 SERPL-SCNC: 20 MMOL/L (ref 23–29)
COLOR UR: YELLOW
CREAT SERPL-MCNC: 1.2 MG/DL (ref 0.5–1.4)
CRP SERPL-MCNC: 0.26 MG/DL
DIFFERENTIAL METHOD: ABNORMAL
EOSINOPHIL # BLD AUTO: 0 K/UL (ref 0–0.5)
EOSINOPHIL NFR BLD: 0.2 % (ref 0–8)
ERYTHROCYTE [DISTWIDTH] IN BLOOD BY AUTOMATED COUNT: 13.3 % (ref 11.5–14.5)
EST. GFR  (AFRICAN AMERICAN): >60 ML/MIN/1.73 M^2
EST. GFR  (NON AFRICAN AMERICAN): >60 ML/MIN/1.73 M^2
FERRITIN SERPL-MCNC: 241 NG/ML (ref 20–300)
GLUCOSE SERPL-MCNC: 104 MG/DL (ref 70–110)
GLUCOSE UR QL STRIP: NEGATIVE
HCT VFR BLD AUTO: 46.9 % (ref 40–54)
HGB BLD-MCNC: 15.3 G/DL (ref 14–18)
HGB UR QL STRIP: NEGATIVE
IMM GRANULOCYTES # BLD AUTO: 0.06 K/UL (ref 0–0.04)
IMM GRANULOCYTES NFR BLD AUTO: 0.4 % (ref 0–0.5)
INFLUENZA A, MOLECULAR: NEGATIVE
INFLUENZA B, MOLECULAR: NEGATIVE
KETONES UR QL STRIP: NEGATIVE
LACTATE SERPL-SCNC: 1.8 MMOL/L (ref 0.5–1.9)
LDH SERPL L TO P-CCNC: 220 U/L (ref 110–260)
LEUKOCYTE ESTERASE UR QL STRIP: NEGATIVE
LYMPHOCYTES # BLD AUTO: 0.7 K/UL (ref 1–4.8)
LYMPHOCYTES NFR BLD: 3.9 % (ref 18–48)
MCH RBC QN AUTO: 26.2 PG (ref 27–31)
MCHC RBC AUTO-ENTMCNC: 32.6 G/DL (ref 32–36)
MCV RBC AUTO: 80 FL (ref 82–98)
MONOCYTES # BLD AUTO: 1.2 K/UL (ref 0.3–1)
MONOCYTES NFR BLD: 7.3 % (ref 4–15)
NEUTROPHILS # BLD AUTO: 14.6 K/UL (ref 1.8–7.7)
NEUTROPHILS NFR BLD: 87.8 % (ref 38–73)
NITRITE UR QL STRIP: NEGATIVE
NRBC BLD-RTO: 0 /100 WBC
PH UR STRIP: 6 [PH] (ref 5–8)
PLATELET # BLD AUTO: 165 K/UL (ref 150–350)
PMV BLD AUTO: 11.7 FL (ref 9.2–12.9)
POTASSIUM SERPL-SCNC: 3.5 MMOL/L (ref 3.5–5.1)
PROCALCITONIN SERPL IA-MCNC: 0.16 NG/ML (ref 0–0.5)
PROT SERPL-MCNC: 8 G/DL (ref 6–8.4)
PROT UR QL STRIP: NEGATIVE
RBC # BLD AUTO: 5.84 M/UL (ref 4.6–6.2)
SARS-COV-2 RDRP RESP QL NAA+PROBE: NEGATIVE
SODIUM SERPL-SCNC: 133 MMOL/L (ref 136–145)
SP GR UR STRIP: 1.01 (ref 1–1.03)
SPECIMEN SOURCE: NORMAL
TROPONIN I SERPL DL<=0.01 NG/ML-MCNC: <0.03 NG/ML
URN SPEC COLLECT METH UR: NORMAL
UROBILINOGEN UR STRIP-ACNC: NEGATIVE EU/DL
WBC # BLD AUTO: 16.64 K/UL (ref 3.9–12.7)

## 2020-12-03 PROCEDURE — 87502 INFLUENZA DNA AMP PROBE: CPT

## 2020-12-03 PROCEDURE — 96374 THER/PROPH/DIAG INJ IV PUSH: CPT

## 2020-12-03 PROCEDURE — 25000003 PHARM REV CODE 250: Performed by: INTERNAL MEDICINE

## 2020-12-03 PROCEDURE — 84145 PROCALCITONIN (PCT): CPT

## 2020-12-03 PROCEDURE — 83605 ASSAY OF LACTIC ACID: CPT

## 2020-12-03 PROCEDURE — 25000003 PHARM REV CODE 250: Performed by: EMERGENCY MEDICINE

## 2020-12-03 PROCEDURE — G0378 HOSPITAL OBSERVATION PER HR: HCPCS

## 2020-12-03 PROCEDURE — 99285 EMERGENCY DEPT VISIT HI MDM: CPT | Mod: 25

## 2020-12-03 PROCEDURE — 87040 BLOOD CULTURE FOR BACTERIA: CPT

## 2020-12-03 PROCEDURE — 85025 COMPLETE CBC W/AUTO DIFF WBC: CPT

## 2020-12-03 PROCEDURE — 83615 LACTATE (LD) (LDH) ENZYME: CPT

## 2020-12-03 PROCEDURE — 93005 ELECTROCARDIOGRAM TRACING: CPT | Performed by: INTERNAL MEDICINE

## 2020-12-03 PROCEDURE — U0002 COVID-19 LAB TEST NON-CDC: HCPCS

## 2020-12-03 PROCEDURE — 81003 URINALYSIS AUTO W/O SCOPE: CPT

## 2020-12-03 PROCEDURE — 82550 ASSAY OF CK (CPK): CPT

## 2020-12-03 PROCEDURE — 80053 COMPREHEN METABOLIC PANEL: CPT

## 2020-12-03 PROCEDURE — 86140 C-REACTIVE PROTEIN: CPT

## 2020-12-03 PROCEDURE — 82728 ASSAY OF FERRITIN: CPT

## 2020-12-03 PROCEDURE — 63600175 PHARM REV CODE 636 W HCPCS: Performed by: EMERGENCY MEDICINE

## 2020-12-03 PROCEDURE — 83880 ASSAY OF NATRIURETIC PEPTIDE: CPT

## 2020-12-03 PROCEDURE — 27000221 HC OXYGEN, UP TO 24 HOURS

## 2020-12-03 PROCEDURE — 93010 ELECTROCARDIOGRAM REPORT: CPT | Mod: ,,, | Performed by: INTERNAL MEDICINE

## 2020-12-03 PROCEDURE — 84484 ASSAY OF TROPONIN QUANT: CPT

## 2020-12-03 PROCEDURE — 93010 EKG 12-LEAD: ICD-10-PCS | Mod: ,,, | Performed by: INTERNAL MEDICINE

## 2020-12-03 RX ORDER — ACETAMINOPHEN 325 MG/1
650 TABLET ORAL EVERY 4 HOURS PRN
Status: DISCONTINUED | OUTPATIENT
Start: 2020-12-03 | End: 2020-12-04 | Stop reason: HOSPADM

## 2020-12-03 RX ORDER — SODIUM CHLORIDE FOR INHALATION 3 %
4 VIAL, NEBULIZER (ML) INHALATION ONCE
Status: COMPLETED | OUTPATIENT
Start: 2020-12-04 | End: 2020-12-04

## 2020-12-03 RX ORDER — IBUPROFEN 200 MG
16 TABLET ORAL
Status: DISCONTINUED | OUTPATIENT
Start: 2020-12-03 | End: 2020-12-04 | Stop reason: HOSPADM

## 2020-12-03 RX ORDER — LEVOFLOXACIN 5 MG/ML
750 INJECTION, SOLUTION INTRAVENOUS
Status: DISCONTINUED | OUTPATIENT
Start: 2020-12-03 | End: 2020-12-03 | Stop reason: SDUPTHER

## 2020-12-03 RX ORDER — IBUPROFEN 200 MG
24 TABLET ORAL
Status: DISCONTINUED | OUTPATIENT
Start: 2020-12-03 | End: 2020-12-04 | Stop reason: HOSPADM

## 2020-12-03 RX ORDER — HYDROCODONE BITARTRATE AND ACETAMINOPHEN 5; 325 MG/1; MG/1
1 TABLET ORAL EVERY 6 HOURS PRN
Status: DISCONTINUED | OUTPATIENT
Start: 2020-12-03 | End: 2020-12-04 | Stop reason: HOSPADM

## 2020-12-03 RX ORDER — SODIUM CHLORIDE 0.9 % (FLUSH) 0.9 %
10 SYRINGE (ML) INJECTION EVERY 6 HOURS PRN
Status: DISCONTINUED | OUTPATIENT
Start: 2020-12-03 | End: 2020-12-04 | Stop reason: HOSPADM

## 2020-12-03 RX ORDER — SODIUM CHLORIDE, SODIUM LACTATE, POTASSIUM CHLORIDE, CALCIUM CHLORIDE 600; 310; 30; 20 MG/100ML; MG/100ML; MG/100ML; MG/100ML
INJECTION, SOLUTION INTRAVENOUS CONTINUOUS
Status: ACTIVE | OUTPATIENT
Start: 2020-12-03 | End: 2020-12-04

## 2020-12-03 RX ORDER — ONDANSETRON 4 MG/1
8 TABLET, ORALLY DISINTEGRATING ORAL EVERY 8 HOURS PRN
Status: DISCONTINUED | OUTPATIENT
Start: 2020-12-03 | End: 2020-12-04 | Stop reason: HOSPADM

## 2020-12-03 RX ORDER — FLUTICASONE PROPIONATE 50 MCG
2 SPRAY, SUSPENSION (ML) NASAL 2 TIMES DAILY
Status: DISCONTINUED | OUTPATIENT
Start: 2020-12-03 | End: 2020-12-04 | Stop reason: HOSPADM

## 2020-12-03 RX ORDER — VANCOMYCIN HCL IN 5 % DEXTROSE 1G/250ML
1000 PLASTIC BAG, INJECTION (ML) INTRAVENOUS ONCE
Status: DISCONTINUED | OUTPATIENT
Start: 2020-12-03 | End: 2020-12-03

## 2020-12-03 RX ORDER — TALC
9 POWDER (GRAM) TOPICAL NIGHTLY PRN
Status: DISCONTINUED | OUTPATIENT
Start: 2020-12-03 | End: 2020-12-04 | Stop reason: HOSPADM

## 2020-12-03 RX ORDER — LEVOFLOXACIN 5 MG/ML
750 INJECTION, SOLUTION INTRAVENOUS
Status: DISCONTINUED | OUTPATIENT
Start: 2020-12-03 | End: 2020-12-04 | Stop reason: HOSPADM

## 2020-12-03 RX ORDER — GLUCAGON 1 MG
1 KIT INJECTION
Status: DISCONTINUED | OUTPATIENT
Start: 2020-12-03 | End: 2020-12-04 | Stop reason: HOSPADM

## 2020-12-03 RX ADMIN — SODIUM CHLORIDE 1000 ML: 0.9 INJECTION, SOLUTION INTRAVENOUS at 11:12

## 2020-12-03 RX ADMIN — SODIUM CHLORIDE 500 ML: 0.9 INJECTION, SOLUTION INTRAVENOUS at 09:12

## 2020-12-03 RX ADMIN — HYDROCODONE BITARTRATE AND ACETAMINOPHEN 1 TABLET: 5; 325 TABLET ORAL at 11:12

## 2020-12-03 RX ADMIN — PIPERACILLIN AND TAZOBACTAM 4.5 G: 4; .5 INJECTION, POWDER, LYOPHILIZED, FOR SOLUTION INTRAVENOUS; PARENTERAL at 10:12

## 2020-12-04 VITALS
RESPIRATION RATE: 17 BRPM | OXYGEN SATURATION: 95 % | TEMPERATURE: 98 F | DIASTOLIC BLOOD PRESSURE: 67 MMHG | SYSTOLIC BLOOD PRESSURE: 110 MMHG | HEART RATE: 73 BPM | WEIGHT: 207.69 LBS | HEIGHT: 71 IN | BODY MASS INDEX: 29.08 KG/M2

## 2020-12-04 PROBLEM — R06.03 RESPIRATORY DISTRESS: Status: RESOLVED | Noted: 2020-12-03 | Resolved: 2020-12-04

## 2020-12-04 LAB
ALBUMIN SERPL BCP-MCNC: 3.5 G/DL (ref 3.5–5.2)
ALP SERPL-CCNC: 76 U/L (ref 55–135)
ALT SERPL W/O P-5'-P-CCNC: 130 U/L (ref 10–44)
ANION GAP SERPL CALC-SCNC: 6 MMOL/L (ref 8–16)
AST SERPL-CCNC: 118 U/L (ref 10–40)
BASOPHILS # BLD AUTO: 0.04 K/UL (ref 0–0.2)
BASOPHILS NFR BLD: 0.3 % (ref 0–1.9)
BILIRUB SERPL-MCNC: 1.5 MG/DL (ref 0.1–1)
BUN SERPL-MCNC: 12 MG/DL (ref 6–20)
CALCIUM SERPL-MCNC: 8.5 MG/DL (ref 8.7–10.5)
CHLORIDE SERPL-SCNC: 108 MMOL/L (ref 95–110)
CO2 SERPL-SCNC: 23 MMOL/L (ref 23–29)
CREAT SERPL-MCNC: 1 MG/DL (ref 0.5–1.4)
DIFFERENTIAL METHOD: ABNORMAL
EOSINOPHIL # BLD AUTO: 0.1 K/UL (ref 0–0.5)
EOSINOPHIL NFR BLD: 0.5 % (ref 0–8)
ERYTHROCYTE [DISTWIDTH] IN BLOOD BY AUTOMATED COUNT: 13.2 % (ref 11.5–14.5)
EST. GFR  (AFRICAN AMERICAN): >60 ML/MIN/1.73 M^2
EST. GFR  (NON AFRICAN AMERICAN): >60 ML/MIN/1.73 M^2
GLUCOSE SERPL-MCNC: 94 MG/DL (ref 70–110)
HCT VFR BLD AUTO: 41 % (ref 40–54)
HGB BLD-MCNC: 13.1 G/DL (ref 14–18)
IMM GRANULOCYTES # BLD AUTO: 0.04 K/UL (ref 0–0.04)
IMM GRANULOCYTES NFR BLD AUTO: 0.3 % (ref 0–0.5)
LYMPHOCYTES # BLD AUTO: 1.3 K/UL (ref 1–4.8)
LYMPHOCYTES NFR BLD: 9.9 % (ref 18–48)
MAGNESIUM SERPL-MCNC: 1.8 MG/DL (ref 1.6–2.6)
MCH RBC QN AUTO: 26 PG (ref 27–31)
MCHC RBC AUTO-ENTMCNC: 32 G/DL (ref 32–36)
MCV RBC AUTO: 82 FL (ref 82–98)
MONOCYTES # BLD AUTO: 1.2 K/UL (ref 0.3–1)
MONOCYTES NFR BLD: 9 % (ref 4–15)
NEUTROPHILS # BLD AUTO: 10.3 K/UL (ref 1.8–7.7)
NEUTROPHILS NFR BLD: 80 % (ref 38–73)
NRBC BLD-RTO: 0 /100 WBC
PLATELET # BLD AUTO: 133 K/UL (ref 150–350)
PMV BLD AUTO: 11.5 FL (ref 9.2–12.9)
POTASSIUM SERPL-SCNC: 3.7 MMOL/L (ref 3.5–5.1)
PROT SERPL-MCNC: 6.2 G/DL (ref 6–8.4)
RBC # BLD AUTO: 5.03 M/UL (ref 4.6–6.2)
SODIUM SERPL-SCNC: 137 MMOL/L (ref 136–145)
WBC # BLD AUTO: 12.85 K/UL (ref 3.9–12.7)

## 2020-12-04 PROCEDURE — 99900035 HC TECH TIME PER 15 MIN (STAT)

## 2020-12-04 PROCEDURE — 94640 AIRWAY INHALATION TREATMENT: CPT

## 2020-12-04 PROCEDURE — 99900031 HC PATIENT EDUCATION (STAT)

## 2020-12-04 PROCEDURE — 25000242 PHARM REV CODE 250 ALT 637 W/ HCPCS: Performed by: INTERNAL MEDICINE

## 2020-12-04 PROCEDURE — 83735 ASSAY OF MAGNESIUM: CPT

## 2020-12-04 PROCEDURE — 63600175 PHARM REV CODE 636 W HCPCS: Performed by: INTERNAL MEDICINE

## 2020-12-04 PROCEDURE — 85025 COMPLETE CBC W/AUTO DIFF WBC: CPT

## 2020-12-04 PROCEDURE — 36415 COLL VENOUS BLD VENIPUNCTURE: CPT

## 2020-12-04 PROCEDURE — 94664 DEMO&/EVAL PT USE INHALER: CPT | Mod: 59

## 2020-12-04 PROCEDURE — 27000221 HC OXYGEN, UP TO 24 HOURS

## 2020-12-04 PROCEDURE — 80053 COMPREHEN METABOLIC PANEL: CPT

## 2020-12-04 PROCEDURE — 96375 TX/PRO/DX INJ NEW DRUG ADDON: CPT

## 2020-12-04 PROCEDURE — G0378 HOSPITAL OBSERVATION PER HR: HCPCS

## 2020-12-04 RX ORDER — ALBUTEROL SULFATE 0.83 MG/ML
2.5 SOLUTION RESPIRATORY (INHALATION) 2 TIMES DAILY PRN
Qty: 500 ML | Refills: 3 | Status: SHIPPED | OUTPATIENT
Start: 2020-12-04 | End: 2022-03-20 | Stop reason: SDUPTHER

## 2020-12-04 RX ORDER — LEVALBUTEROL INHALATION SOLUTION 1.25 MG/3ML
1 SOLUTION RESPIRATORY (INHALATION) EVERY 4 HOURS PRN
Qty: 2 BOX | Refills: 1 | Status: SHIPPED | OUTPATIENT
Start: 2020-12-04 | End: 2022-08-01

## 2020-12-04 RX ORDER — LEVOFLOXACIN 500 MG/1
500 TABLET, FILM COATED ORAL DAILY
Qty: 14 TABLET | Refills: 0 | Status: ON HOLD | OUTPATIENT
Start: 2020-12-04 | End: 2021-03-29 | Stop reason: HOSPADM

## 2020-12-04 RX ORDER — LEVOFLOXACIN 500 MG/1
500 TABLET, FILM COATED ORAL DAILY
Qty: 5 TABLET | Refills: 0 | Status: ON HOLD | OUTPATIENT
Start: 2020-12-04 | End: 2021-03-29 | Stop reason: HOSPADM

## 2020-12-04 RX ADMIN — FLUTICASONE PROPIONATE 100 MCG: 50 SPRAY, METERED NASAL at 01:12

## 2020-12-04 RX ADMIN — SODIUM CHLORIDE SOLN NEBU 3% 4 ML: 3 NEBU SOLN at 01:12

## 2020-12-04 RX ADMIN — LEVOFLOXACIN 750 MG: 750 INJECTION, SOLUTION INTRAVENOUS at 12:12

## 2020-12-04 RX ADMIN — FLUTICASONE PROPIONATE 100 MCG: 50 SPRAY, METERED NASAL at 09:12

## 2020-12-04 RX ADMIN — SODIUM CHLORIDE, SODIUM LACTATE, POTASSIUM CHLORIDE, AND CALCIUM CHLORIDE: .6; .31; .03; .02 INJECTION, SOLUTION INTRAVENOUS at 01:12

## 2020-12-04 NOTE — PLAN OF CARE
Chart reviewed no needs identified.     12/04/20 1155   Final Note   Assessment Type Final Discharge Note   Anticipated Discharge Disposition Home

## 2020-12-04 NOTE — H&P
[facility] Medicine History & Physical Examination   Patient Name: Charly Mattson  MRN: [id number]  Patient Class: OP- Observation   Admission Date: 12/3/2020  8:19 PM  Length of Stay: 0  Attending Physician: Westno Gautam MD  Primary Care Provider: Primary Doctor No  Face-to-Face encounter date: 12/03/2020  Code Status: Full code  Chief Complaint: fever        Patient information was obtained from patient, past medical records and ER records.   HISTORY OF PRESENT ILLNESS:   Charly Mattson is a 25 y.o. White male with known history of IgG defeciency and bronchiectasis receiving monthly IVIG infusions from Dr. Martinez at [facility]. who presnted with a primary complaint of fever. Patient reported not feeling well since yesterday. He also developed fever with highest temperature was 103. This was associated with cough productive of green sputum, shortness of breath, myalgia and loss of energy. He recently had exposure to a person who was diagnosed with COVID 19. He was concerned about that so decided to come to the ER. He has history of pneumonias and was admitted in our facility for similar symptoms. Patient denies change in vision, hearing, headache,  runny nose, chest pain,  palpitations, abdominal pain,  constipation, vomiting, dysuria, hematuria, joint pain and back pain. He does report loose stool this morning.     I have reviewed the labs and imaging obtained in emergency room. Patient CBC shows leukocytosis. CMP showed transaminitis. BNP 18 and troponin within reference ranges. I have reviewed the EKG and does not show new ischemic changes. CXR looks unchanged.    Decision to admit was taken and patient was informed about the plan of care.   REVIEW OF SYSTEMS:   10 Point Review of System was performed and was found to be negative except for that mentioned already in the HPI above.     PAST MEDICAL HISTORY:     Past Medical History:   Diagnosis Date    Immunoglobulin deficiency      Immunoglobulin deficiency     Pneumonia        PAST SURGICAL HISTORY:     Past Surgical History:   Procedure Laterality Date    ADENOIDECTOMY      BIOPSY N/A 9/23/2020    Procedure: BIOPSY;  Surgeon: Kaleb Diagnostic Provider;  Location: [facility] OR;  Service: Interventional Radiology;  Laterality: N/A;    MANDIBLE FRACTURE SURGERY      SINUS SURGERY      TONSILLECTOMY      TYMPANOSTOMY TUBE PLACEMENT      TYMPANOSTOMY TUBE PLACEMENT      x 4       ALLERGIES:   Singulair [montelukast]    FAMILY HISTORY:     Family History   Problem Relation Age of Onset    Cancer Mother     Liver disease Father        SOCIAL HISTORY:     Social History     Tobacco Use    Smoking status: Never Smoker    Smokeless tobacco: Never Used   Substance Use Topics    Alcohol use: Yes     Frequency: Never        Social History     Substance and Sexual Activity   Sexual Activity Yes    Partners: Female        HOME MEDICATIONS:     Prior to Admission medications    Medication Sig Start Date End Date Taking? Authorizing Provider   acetaminophen (TYLENOL) 325 MG tablet Take 650 mg by mouth once.    Historical Provider   albuterol (PROVENTIL) 2.5 mg /3 mL (0.083 %) nebulizer solution Take 3 mLs (2.5 mg total) by nebulization 2 (two) times daily as needed for Wheezing. Use twice daily prior to 3% saline nebulized 6/2/20   Ricco Alvares MD   diclofenac (VOLTAREN) 50 MG EC tablet Take 1 tablet (50 mg total) by mouth every 8 (eight) hours as needed (pain).  Patient not taking: Reported on 9/14/2020 9/29/19   Minna العراقي NP   fluticasone (FLONASE) 50 mcg/actuation nasal spray 2 sprays (100 mcg total) by Each Nare route 2 (two) times daily. 1/31/19   NIKKI Ruggiero   immun glob G,IgG,-pro-IgA 0-50 (HIZENTRA) 1 gram/5 mL (20 %) Soln Inject 200 mg/kg into the skin every 7 days. Sunday    Historical Provider   levalbuterol (XOPENEX) 1.25 mg/3 mL nebulizer solution Take 3 mLs (1.25 mg total) by nebulization every 4 (four) hours as  "needed for Wheezing. Rescue 9/29/19 9/28/20  Minna العراقي NP   mucus clearing device (ACAPELLA, FLUTTER) by Misc.(Non-Drug; Combo Route) route 3 (three) times daily. 6/2/20   Ricco Alvares MD   sodium chloride (SALINE NASAL) 0.65 % nasal spray 1 spray by Nasal route as needed for Congestion. Each side 6/2/20   Ricco Alvares MD   sodium chloride 3% 3 % nebulizer solution Take 4 mLs by nebulization 2 (two) times a day. Use 2 times daily prior to airway clearance maneuvers 6/2/20   Ricco Alvares MD         PHYSICAL EXAM:   /62   Pulse 107   Temp 100.3 °F (37.9 °C) (Oral)   Resp 18   Ht 5' 11" (1.803 m)   Wt 91.6 kg (202 lb)   SpO2 98%   BMI 28.17 kg/m²   Vitals Reviewed  General appearance: Well-developed, well-nourished, White male in no apparent distress.  Skin: No Rash.   Neuro: Motor and sensory exams grossly intact. Good tone. Power in all 4 extremities 5/5.   HENT: Atraumatic head. Moist mucous membranes of oral cavity.  Eyes: Normal extraocular movements.   Neck: Supple. No evidence of lymphadenopathy. No thyroidomegaly.  Lungs: Clear to auscultation bilaterally. No wheezing present.   Heart: tachycardia. S1 and S2 present with no murmurs/gallop/rub. No pedal edema. No JVD present.   Abdomen: Soft, non-distended, non-tender. No rebound tenderness/guarding. No masses or organomegaly. Bowel sounds are normal. Bladder is not palpable.   Extremities: No cyanosis, clubbing.  Psych/mental status: Alert and oriented. Cooperative. Responds appropriately to questions.   EMERGENCY DEPARTMENT LABS AND IMAGING:     Labs Reviewed   CBC W/ AUTO DIFFERENTIAL - Abnormal; Notable for the following components:       Result Value    WBC 16.64 (*)     MCV 80 (*)     MCH 26.2 (*)     Gran # (ANC) 14.6 (*)     Immature Grans (Abs) 0.06 (*)     Lymph # 0.7 (*)     Mono # 1.2 (*)     Gran % 87.8 (*)     Lymph % 3.9 (*)     All other components within normal limits   COMPREHENSIVE METABOLIC PANEL - Abnormal; Notable " for the following components:    Sodium 133 (*)     CO2 20 (*)     Total Bilirubin 1.1 (*)      (*)      (*)     All other components within normal limits   CULTURE, BLOOD   CULTURE, BLOOD   C-REACTIVE PROTEIN   FERRITIN   LACTATE DEHYDROGENASE   CK   LACTIC ACID, PLASMA   TROPONIN I   SARS-COV-2 RNA AMPLIFICATION, QUAL   PROCALCITONIN   B-TYPE NATRIURETIC PEPTIDE   URINALYSIS, REFLEX TO URINE CULTURE    Narrative:     Specimen Source->Urine   INFLUENZA A AND B ANTIGEN       X-Ray Chest AP Portable    (Results Pending)       ASSESSMENT & PLAN:   Charly Mattson is a 25 y.o. male admitted for    Community acquired pneumonia  CVID  bronchiectasis    Plan  Admit to [facility]  IV fluids  IV levofloxacin  Continue nebulized saline, chest CPT, and postural drainage BID.    DVT Prophylaxis: will be placed on SCD for DVT prophylaxis and will be advised to be as mobile as possible and sit in a chair as tolerated.   ________________________________________________________________________________    Discharge Planning and Disposition: No mobility needs. Ambulating well.  Face-to-Face encounter date: 12/03/2020  Encounter included review of the medical records, interviewing and examining the patient face-to-face, discussion with family and other health care providers including emergency medicine physician, admission orders, interpreting lab/test results and formulating a plan of care.   Medical Decision Making during this encounter was  [_] Low Complexity  [_] Moderate Complexity  [x] High Complexity  _________________________________________________________________________________    INPATIENT LIST OF MEDICATIONS     Current Facility-Administered Medications:     lactated ringers infusion, , Intravenous, Continuous, Weston Gautam MD    levoFLOXacin 750 mg/150 mL IVPB 750 mg, 750 mg, Intravenous, Q24H, Weston Gautam MD    sodium chloride 0.9% bolus 1,000 mL, 1,000 mL, Intravenous, Once, Sreekanth SILVEIRA  MD Darrius    Current Outpatient Medications:     acetaminophen (TYLENOL) 325 MG tablet, Take 650 mg by mouth once., Disp: , Rfl:     albuterol (PROVENTIL) 2.5 mg /3 mL (0.083 %) nebulizer solution, Take 3 mLs (2.5 mg total) by nebulization 2 (two) times daily as needed for Wheezing. Use twice daily prior to 3% saline nebulized, Disp: 500 mL, Rfl: 3    diclofenac (VOLTAREN) 50 MG EC tablet, Take 1 tablet (50 mg total) by mouth every 8 (eight) hours as needed (pain). (Patient not taking: Reported on 9/14/2020), Disp: 30 tablet, Rfl: 0    fluticasone (FLONASE) 50 mcg/actuation nasal spray, 2 sprays (100 mcg total) by Each Nare route 2 (two) times daily., Disp: 1 Bottle, Rfl: 0    immun glob G,IgG,-pro-IgA 0-50 (HIZENTRA) 1 gram/5 mL (20 %) Soln, Inject 200 mg/kg into the skin every 7 days. Sunday, Disp: , Rfl:     levalbuterol (XOPENEX) 1.25 mg/3 mL nebulizer solution, Take 3 mLs (1.25 mg total) by nebulization every 4 (four) hours as needed for Wheezing. Rescue, Disp: 2 Box, Rfl: 0    mucus clearing device (ACAPELLA, FLUTTER), by Misc.(Non-Drug; Combo Route) route 3 (three) times daily., Disp: 1 Device, Rfl: 0    sodium chloride (SALINE NASAL) 0.65 % nasal spray, 1 spray by Nasal route as needed for Congestion. Each side, Disp: 104 mL, Rfl: 12    sodium chloride 3% 3 % nebulizer solution, Take 4 mLs by nebulization 2 (two) times a day. Use 2 times daily prior to airway clearance maneuvers, Disp: 500 mL, Rfl: 3      Scheduled Meds:   levoFLOXacin  750 mg Intravenous Q24H    sodium chloride 0.9%  1,000 mL Intravenous Once     Continuous Infusions:   lactated ringers       PRN Meds:.      Weston Gautam  [facility] Hospitalist  12/03/2020

## 2020-12-04 NOTE — ED PROVIDER NOTES
Encounter Date: 12/3/2020       History     Chief Complaint   Patient presents with    COVID-19 Concerns     [age] year male past medical history significant of common variable immune deficiency presents secondary to flu-like illness.  Patient states his symptoms have progressed over the past day with worsening shortness of breath and fevers at home.  Patient states his temperature was 101° he took ibuprofen prior to arrival.  Patient states he had exposure to COVID-19 in a co-worker who tested positive on yesterday but had not been wearing masks previously.  Patient was hypoxic at 92% upon arrival with heart rate greater than 130. He reports muscle aches as well as chest tightness with worsening shortness of breath.  Patient is otherwise stable and has no other complaints.        Review of patient's allergies indicates:   Allergen Reactions    Singulair [montelukast] Other (See Comments)     arrhythmias     Past Medical History:   Diagnosis Date    Immunoglobulin deficiency     Immunoglobulin deficiency     Pneumonia      Past Surgical History:   Procedure Laterality Date    ADENOIDECTOMY      BIOPSY N/A 9/23/2020    Procedure: BIOPSY;  Surgeon: Kaleb Diagnostic Provider;  Location: [facility] OR;  Service: Interventional Radiology;  Laterality: N/A;    MANDIBLE FRACTURE SURGERY      SINUS SURGERY      TONSILLECTOMY      TYMPANOSTOMY TUBE PLACEMENT      TYMPANOSTOMY TUBE PLACEMENT      x 4     Family History   Problem Relation Age of Onset    Cancer Mother     Liver disease Father      Social History     Tobacco Use    Smoking status: Never Smoker    Smokeless tobacco: Never Used   Substance Use Topics    Alcohol use: Yes     Frequency: Never    Drug use: No     Review of Systems   Constitutional: Positive for fatigue and fever.   HENT: Negative for sore throat.    Respiratory: Positive for shortness of breath.    Cardiovascular: Negative for chest pain.   Gastrointestinal: Negative for nausea.    Genitourinary: Negative for dysuria.   Musculoskeletal: Positive for myalgias. Negative for back pain.   Skin: Negative for rash.   Neurological: Positive for weakness and headaches.   Hematological: Does not bruise/bleed easily.   All other systems reviewed and are negative.      Physical Exam     Initial Vitals [12/03/20 2016]   BP Pulse Resp Temp SpO2   (!) 153/60 (!) 130 18 99.3 °F (37.4 °C) (!) 94 %      MAP       --         Physical Exam    Nursing note and vitals reviewed.  Constitutional: He appears well-developed and well-nourished. He is not diaphoretic. He appears distressed.   HENT:   Head: Normocephalic and atraumatic.   Mouth/Throat: Oropharynx is clear and moist.   Eyes: Conjunctivae and EOM are normal. Pupils are equal, round, and reactive to light.   Neck: Normal range of motion. Neck supple. No thyromegaly present. No JVD present.   Cardiovascular: Regular rhythm and normal heart sounds. Tachycardia present.  Exam reveals no gallop and no friction rub.    No murmur heard.  Pulmonary/Chest: He is in respiratory distress (92% O2 sat on RA). He has no wheezes. He exhibits no tenderness.   Abdominal: Soft. Bowel sounds are normal. He exhibits no distension. There is no abdominal tenderness. There is no rebound and no guarding.   Musculoskeletal: Normal range of motion. No tenderness or edema.   Neurological: He is alert and oriented to person, place, and time. He has normal strength. No cranial nerve deficit or sensory deficit.   Skin: Skin is warm and dry. Capillary refill takes less than 2 seconds. No rash noted. No erythema.   Psychiatric: He has a normal mood and affect.         ED Course   Procedures  Labs Reviewed   CBC W/ AUTO DIFFERENTIAL - Abnormal; Notable for the following components:       Result Value    WBC 16.64 (*)     MCV 80 (*)     MCH 26.2 (*)     Gran # (ANC) 14.6 (*)     Immature Grans (Abs) 0.06 (*)     Lymph # 0.7 (*)     Mono # 1.2 (*)     Gran % 87.8 (*)     Lymph % 3.9 (*)      All other components within normal limits   COMPREHENSIVE METABOLIC PANEL - Abnormal; Notable for the following components:    Sodium 133 (*)     CO2 20 (*)     Total Bilirubin 1.1 (*)      (*)      (*)     All other components within normal limits   CULTURE, BLOOD   CULTURE, BLOOD   C-REACTIVE PROTEIN   FERRITIN   LACTATE DEHYDROGENASE   CK   LACTIC ACID, PLASMA   TROPONIN I   SARS-COV-2 RNA AMPLIFICATION, QUAL   PROCALCITONIN   B-TYPE NATRIURETIC PEPTIDE   INFLUENZA A AND B ANTIGEN   URINALYSIS, REFLEX TO URINE CULTURE          Imaging Results          X-Ray Chest AP Portable (In process)                  Medical Decision Making:   Initial Assessment:   [age] year male on initial assessment in moderate distress secondary to shortness of breath.  Patient is alert and oriented x3, neurologically neurovascular intact no focal deficits.  He is tachycardic and has subjective fevers but vitals otherwise stable at this time.  Differential Diagnosis:   Pneumonia, PE, COVID-19, influenza, URI, dehydration, electrolyte abnormality  Independently Interpreted Test(s):   I have ordered and independently interpreted X-rays - see prior notes.  I have ordered and independently interpreted EKG Reading(s) - see prior notes  Clinical Tests:   Lab Tests: Ordered and Reviewed  The following lab test(s) were unremarkable: CBC, CMP, Lactate, Troponin, BNP and Urinalysis  Radiological Study: Ordered and Reviewed  Medical Tests: Ordered and Reviewed  Sepsis Perfusion Assessment: I attest, a sepsis perfusion exam was performed within 6 hours of Septic Shock presentation, following fluid resuscitation.  ED Management:  Patient has been reassessed noted to have no acute changes condition.  Labs remarkable for leukocytosis with no obvious source of infection.  Patient COVID-19 is negative and no pulmonary infiltrate noted on chest x-ray.  Patient is immunosuppressed and will be covered with antibiotics and awaiting UA  results.  Patient has been consulted to hospitalist secondary to hypoxia and fevers.  Patient has remained stable while in the ED and Mr. Mattson is aware of the plan and in agreement with admission.    Laboratory results and radiology imaging results reviewed.  Based on the patient's history, physical, and workup here in the emergency department I believe the patient requires admission for a diagnosis of hypoxia, fever, leukocytosis.  I discussed the patient's case with the on-call NP who has agreed to evaluate the patient for admission.  In addition, I discussed the results with the patient and they have verbalized understanding of the results, plan, and need for admission.    ________________________  PRIMITIVO Rizo MD   Emergency Medicine                               Clinical Impression:       ICD-10-CM ICD-9-CM   1. Respiratory distress  R06.03 786.09   2. Suspected COVID-19 virus infection  Z20.828 V01.79   3. Hypoxia  R09.02 799.02                          ED Disposition Condition    Observation                             Sreekanth Rizo MD  12/03/20 [id number]

## 2020-12-05 NOTE — DISCHARGE SUMMARY
[facility]  Discharge Summary      Patient Name: Charly Mattson  MRN: [id number]  Admission Date: 12/3/2020  Hospital Length of Stay: 0 days  Discharge Date and Time: 12/4/2020  1:48 PM  Attending Physician: No att. providers found   Discharging Provider: Carolina Lorenzo MD  Primary Care Provider: Primary Doctor Nicole      HPI:   No notes on file    * No surgery found *      Hospital Course:   Patient admitted with pneumonia.  He has history of IVIG on infusions and bronchiectasis.  He was initially tachycardic and hypoxic on exertion.  Tachycardia improved with fluids.  He was started on IV abx and feels much better.  O2 per NC was weaned and he has maintained good O2 saturations.  Discussed with Dr. Alvaers, his pulmonologist, and will discharge on 14 days of abx.  Rx for levaquin given.  I've encouraged him to use his vibratory vest and acapella.  Rx for refills on his nebs given.  Examined on day of discharge and NAD, good air movement, no wheezing.  Return precautions given.     Consults:     No new Assessment & Plan notes have been filed under this hospital service since the last note was generated.  Service: Hospital Medicine    Final Active Diagnoses:      Problems Resolved During this Admission:    Diagnosis Date Noted Date Resolved POA    PRINCIPAL PROBLEM:  Respiratory distress [R06.03] 12/03/2020 12/04/2020 Yes       Discharged Condition: good    Disposition: Home or Self Care    Follow Up:  Follow-up Information     Primary Doctor No In 2 weeks.    Why: post discharge follow up               Patient Instructions:      Diet Adult Regular     Notify your health care provider if you experience any of the following:  temperature >100.4     Notify your health care provider if you experience any of the following:  persistent nausea and vomiting or diarrhea     Notify your health care provider if you experience any of the following:  difficulty breathing or increased cough      Activity as tolerated       Significant Diagnostic Studies: Labs:   CMP   Recent Labs   Lab 12/03/20 2038 12/04/20  0514   * 137   K 3.5 3.7    108   CO2 20* 23    94   BUN 16 12   CREATININE 1.2 1.0   CALCIUM 9.2 8.5*   PROT 8.0 6.2   ALBUMIN 4.3 3.5   BILITOT 1.1* 1.5*   ALKPHOS 103 76   * 118*   * 130*   ANIONGAP 11 6*   ESTGFRAFRICA >60.0 >60.0   EGFRNONAA >60.0 >60.0    and CBC   Recent Labs   Lab 12/03/20 2038 12/04/20  0514   WBC 16.64* 12.85*   HGB 15.3 13.1*   HCT 46.9 41.0    133*       Pending Diagnostic Studies:     None         Medications:  Reconciled Home Medications:      Medication List      START taking these medications    * levoFLOXacin 500 MG tablet  Commonly known as: LEVAQUIN  Take 1 tablet (500 mg total) by mouth once daily.     * levoFLOXacin 500 MG tablet  Commonly known as: LEVAQUIN  Take 1 tablet (500 mg total) by mouth once daily.         * This list has 2 medication(s) that are the same as other medications prescribed for you. Read the directions carefully, and ask your doctor or other care provider to review them with you.            CONTINUE taking these medications    acetaminophen 325 MG tablet  Commonly known as: TYLENOL  Take 650 mg by mouth once.     albuterol 2.5 mg /3 mL (0.083 %) nebulizer solution  Commonly known as: PROVENTIL  Take 3 mLs (2.5 mg total) by nebulization 2 (two) times daily as needed for Wheezing. Use twice daily prior to 3% saline nebulized     diclofenac 50 MG EC tablet  Commonly known as: VOLTAREN  Take 1 tablet (50 mg total) by mouth every 8 (eight) hours as needed (pain).     fluticasone propionate 50 mcg/actuation nasal spray  Commonly known as: FLONASE  2 sprays (100 mcg total) by Each Nare route 2 (two) times daily.     HIZENTRA 1 gram/5 mL (20 %) Soln  Generic drug: immun glob G(IgG)-pro-IgA 0-50  Inject 200 mg/kg into the skin every 7 days. Sunday     levalbuterol 1.25 mg/3 mL nebulizer solution  Commonly  known as: XOPENEX  Take 3 mLs (1.25 mg total) by nebulization every 4 (four) hours as needed for Wheezing. Rescue     mucus clearing device  Commonly known as: ACAPELLA, FLUTTER  by Misc.(Non-Drug; Combo Route) route 3 (three) times daily.     sodium chloride 0.65 % nasal spray  Commonly known as: SALINE NASAL  1 spray by Nasal route as needed for Congestion. Each side     sodium chloride 3% 3 % nebulizer solution  Take 4 mLs by nebulization 2 (two) times a day. Use 2 times daily prior to airway clearance maneuvers            Indwelling Lines/Drains at time of discharge:   Lines/Drains/Airways     None                 Time spent on the discharge of patient: 31 minutes  Patient was seen and examined on the date of discharge and determined to be suitable for discharge.         Carolina Lorenzo MD  Department of Hospital Medicine  [facility]

## 2020-12-05 NOTE — HOSPITAL COURSE
Patient admitted with pneumonia.  He has history of IVIG on infusions and bronchiectasis.  He was initially tachycardic and hypoxic on exertion.  Tachycardia improved with fluids.  He was started on IV abx and feels much better.  O2 per NC was weaned and he has maintained good O2 saturations.  Discussed with Dr. Alvares, his pulmonologist, and will discharge on 14 days of abx.  Rx for levaquin given.  I've encouraged him to use his vibratory vest and acapella.  Rx for refills on his nebs given.  Examined on day of discharge and NAD, good air movement, no wheezing.  Return precautions given.

## 2020-12-08 LAB
BACTERIA BLD CULT: NORMAL
BACTERIA BLD CULT: NORMAL

## 2021-01-29 ENCOUNTER — HOSPITAL ENCOUNTER (EMERGENCY)
Facility: HOSPITAL | Age: 26
Discharge: HOME | End: 2021-01-29
Attending: EMERGENCY MEDICINE
Payer: COMMERCIAL

## 2021-01-29 VITALS
HEIGHT: 71 IN | BODY MASS INDEX: 29.4 KG/M2 | SYSTOLIC BLOOD PRESSURE: 111 MMHG | HEART RATE: 91 BPM | TEMPERATURE: 98 F | DIASTOLIC BLOOD PRESSURE: 71 MMHG | WEIGHT: 210 LBS | RESPIRATION RATE: 20 BRPM | OXYGEN SATURATION: 98 %

## 2021-01-29 DIAGNOSIS — R06.02 SHORTNESS OF BREATH: ICD-10-CM

## 2021-01-29 DIAGNOSIS — J47.0 BRONCHIECTASIS WITH ACUTE LOWER RESPIRATORY INFECTION: Primary | ICD-10-CM

## 2021-01-29 LAB
ALBUMIN SERPL BCP-MCNC: 4.2 G/DL (ref 3.5–5.2)
ALP SERPL-CCNC: 96 U/L (ref 55–135)
ALT SERPL W/O P-5'-P-CCNC: 75 U/L (ref 10–44)
ANION GAP SERPL CALC-SCNC: 9 MMOL/L (ref 8–16)
APTT PPP: 29.6 SEC (ref 23.6–33.3)
AST SERPL-CCNC: 67 U/L (ref 10–40)
BASOPHILS # BLD AUTO: 0.05 K/UL (ref 0–0.2)
BASOPHILS NFR BLD: 0.7 % (ref 0–1.9)
BILIRUB SERPL-MCNC: 0.9 MG/DL (ref 0.1–1)
BILIRUB UR QL STRIP: NEGATIVE
BNP SERPL-MCNC: 17 PG/ML (ref 0–99)
BUN SERPL-MCNC: 15 MG/DL (ref 6–20)
CALCIUM SERPL-MCNC: 9.5 MG/DL (ref 8.7–10.5)
CHLORIDE SERPL-SCNC: 104 MMOL/L (ref 95–110)
CK MB SERPL-MCNC: 0.9 NG/ML (ref 0.1–6.5)
CK SERPL-CCNC: 80 U/L (ref 20–200)
CLARITY UR: CLEAR
CO2 SERPL-SCNC: 23 MMOL/L (ref 23–29)
COLOR UR: YELLOW
CREAT SERPL-MCNC: 1 MG/DL (ref 0.5–1.4)
DIFFERENTIAL METHOD: ABNORMAL
EOSINOPHIL # BLD AUTO: 0.1 K/UL (ref 0–0.5)
EOSINOPHIL NFR BLD: 1.5 % (ref 0–8)
ERYTHROCYTE [DISTWIDTH] IN BLOOD BY AUTOMATED COUNT: 13.5 % (ref 11.5–14.5)
EST. GFR  (AFRICAN AMERICAN): >60 ML/MIN/1.73 M^2
EST. GFR  (NON AFRICAN AMERICAN): >60 ML/MIN/1.73 M^2
GLUCOSE SERPL-MCNC: 115 MG/DL (ref 70–110)
GLUCOSE UR QL STRIP: NEGATIVE
HCT VFR BLD AUTO: 50.3 % (ref 40–54)
HGB BLD-MCNC: 16.3 G/DL (ref 14–18)
HGB UR QL STRIP: NEGATIVE
IMM GRANULOCYTES # BLD AUTO: 0.01 K/UL (ref 0–0.04)
IMM GRANULOCYTES NFR BLD AUTO: 0.1 % (ref 0–0.5)
INR PPP: 1
KETONES UR QL STRIP: NEGATIVE
LACTATE SERPL-SCNC: 1.4 MMOL/L (ref 0.5–1.9)
LEUKOCYTE ESTERASE UR QL STRIP: NEGATIVE
LYMPHOCYTES # BLD AUTO: 1.6 K/UL (ref 1–4.8)
LYMPHOCYTES NFR BLD: 21.2 % (ref 18–48)
MAGNESIUM SERPL-MCNC: 1.8 MG/DL (ref 1.6–2.6)
MCH RBC QN AUTO: 26.5 PG (ref 27–31)
MCHC RBC AUTO-ENTMCNC: 32.4 G/DL (ref 32–36)
MCV RBC AUTO: 82 FL (ref 82–98)
MONOCYTES # BLD AUTO: 0.5 K/UL (ref 0.3–1)
MONOCYTES NFR BLD: 6 % (ref 4–15)
NEUTROPHILS # BLD AUTO: 5.3 K/UL (ref 1.8–7.7)
NEUTROPHILS NFR BLD: 70.5 % (ref 38–73)
NITRITE UR QL STRIP: NEGATIVE
NRBC BLD-RTO: 0 /100 WBC
PH UR STRIP: 7 [PH] (ref 5–8)
PLATELET # BLD AUTO: 192 K/UL (ref 150–350)
PMV BLD AUTO: 10.8 FL (ref 9.2–12.9)
POTASSIUM SERPL-SCNC: 3.3 MMOL/L (ref 3.5–5.1)
PROCALCITONIN SERPL IA-MCNC: <0.05 NG/ML (ref 0–0.5)
PROT SERPL-MCNC: 8.4 G/DL (ref 6–8.4)
PROT UR QL STRIP: NEGATIVE
PROTHROMBIN TIME: 13 SEC (ref 10.6–14.8)
RBC # BLD AUTO: 6.14 M/UL (ref 4.6–6.2)
SARS-COV-2 RDRP RESP QL NAA+PROBE: NEGATIVE
SODIUM SERPL-SCNC: 136 MMOL/L (ref 136–145)
SP GR UR STRIP: 1.02 (ref 1–1.03)
TROPONIN I SERPL DL<=0.01 NG/ML-MCNC: <0.03 NG/ML
URN SPEC COLLECT METH UR: NORMAL
UROBILINOGEN UR STRIP-ACNC: NEGATIVE EU/DL
WBC # BLD AUTO: 7.5 K/UL (ref 3.9–12.7)

## 2021-01-29 PROCEDURE — 81003 URINALYSIS AUTO W/O SCOPE: CPT

## 2021-01-29 PROCEDURE — 83605 ASSAY OF LACTIC ACID: CPT

## 2021-01-29 PROCEDURE — 99284 EMERGENCY DEPT VISIT MOD MDM: CPT | Mod: 25

## 2021-01-29 PROCEDURE — U0002 COVID-19 LAB TEST NON-CDC: HCPCS

## 2021-01-29 PROCEDURE — 63600175 PHARM REV CODE 636 W HCPCS: Performed by: EMERGENCY MEDICINE

## 2021-01-29 PROCEDURE — 94644 CONT INHLJ TX 1ST HOUR: CPT

## 2021-01-29 PROCEDURE — 36415 COLL VENOUS BLD VENIPUNCTURE: CPT

## 2021-01-29 PROCEDURE — 80053 COMPREHEN METABOLIC PANEL: CPT

## 2021-01-29 PROCEDURE — 82553 CREATINE MB FRACTION: CPT

## 2021-01-29 PROCEDURE — 85730 THROMBOPLASTIN TIME PARTIAL: CPT

## 2021-01-29 PROCEDURE — 85610 PROTHROMBIN TIME: CPT

## 2021-01-29 PROCEDURE — 96374 THER/PROPH/DIAG INJ IV PUSH: CPT

## 2021-01-29 PROCEDURE — 84145 PROCALCITONIN (PCT): CPT

## 2021-01-29 PROCEDURE — 84484 ASSAY OF TROPONIN QUANT: CPT

## 2021-01-29 PROCEDURE — 82550 ASSAY OF CK (CPK): CPT

## 2021-01-29 PROCEDURE — 87040 BLOOD CULTURE FOR BACTERIA: CPT | Mod: 59

## 2021-01-29 PROCEDURE — 83880 ASSAY OF NATRIURETIC PEPTIDE: CPT

## 2021-01-29 PROCEDURE — 83735 ASSAY OF MAGNESIUM: CPT

## 2021-01-29 PROCEDURE — 94761 N-INVAS EAR/PLS OXIMETRY MLT: CPT

## 2021-01-29 PROCEDURE — 25000003 PHARM REV CODE 250: Performed by: EMERGENCY MEDICINE

## 2021-01-29 PROCEDURE — 85025 COMPLETE CBC W/AUTO DIFF WBC: CPT

## 2021-01-29 PROCEDURE — 94645 CONT INHLJ TX EACH ADDL HOUR: CPT

## 2021-01-29 PROCEDURE — 25000242 PHARM REV CODE 250 ALT 637 W/ HCPCS: Performed by: EMERGENCY MEDICINE

## 2021-01-29 RX ORDER — AZITHROMYCIN 250 MG/1
250 TABLET, FILM COATED ORAL DAILY
Qty: 6 TABLET | Refills: 0 | Status: ON HOLD | OUTPATIENT
Start: 2021-01-29 | End: 2021-03-29 | Stop reason: HOSPADM

## 2021-01-29 RX ORDER — METHYLPREDNISOLONE SOD SUCC 125 MG
125 VIAL (EA) INJECTION
Status: COMPLETED | OUTPATIENT
Start: 2021-01-29 | End: 2021-01-29

## 2021-01-29 RX ORDER — LEVALBUTEROL 1.25 MG/.5ML
1.25 SOLUTION, CONCENTRATE RESPIRATORY (INHALATION)
Status: COMPLETED | OUTPATIENT
Start: 2021-01-29 | End: 2021-01-29

## 2021-01-29 RX ORDER — IPRATROPIUM BROMIDE 0.5 MG/2.5ML
1 SOLUTION RESPIRATORY (INHALATION)
Status: COMPLETED | OUTPATIENT
Start: 2021-01-29 | End: 2021-01-29

## 2021-01-29 RX ORDER — POTASSIUM CHLORIDE 20 MEQ/1
40 TABLET, EXTENDED RELEASE ORAL
Status: COMPLETED | OUTPATIENT
Start: 2021-01-29 | End: 2021-01-29

## 2021-01-29 RX ORDER — PREDNISONE 20 MG/1
40 TABLET ORAL DAILY
Qty: 10 TABLET | Refills: 0 | Status: SHIPPED | OUTPATIENT
Start: 2021-01-29 | End: 2021-02-03

## 2021-01-29 RX ADMIN — LEVALBUTEROL HYDROCHLORIDE 1.25 MG: 1.25 SOLUTION, CONCENTRATE RESPIRATORY (INHALATION) at 06:01

## 2021-01-29 RX ADMIN — METHYLPREDNISOLONE SODIUM SUCCINATE 125 MG: 125 INJECTION, POWDER, FOR SOLUTION INTRAMUSCULAR; INTRAVENOUS at 06:01

## 2021-01-29 RX ADMIN — POTASSIUM CHLORIDE 40 MEQ: 20 TABLET, EXTENDED RELEASE ORAL at 04:01

## 2021-01-29 RX ADMIN — IPRATROPIUM BROMIDE 1 MG: 0.5 SOLUTION RESPIRATORY (INHALATION) at 06:01

## 2021-02-03 LAB
BACTERIA BLD CULT: NORMAL
BACTERIA BLD CULT: NORMAL

## 2021-03-15 ENCOUNTER — OFFICE VISIT (OUTPATIENT)
Dept: URGENT CARE | Facility: CLINIC | Age: 26
End: 2021-03-15
Payer: MEDICAID

## 2021-03-15 VITALS
RESPIRATION RATE: 18 BRPM | OXYGEN SATURATION: 98 % | BODY MASS INDEX: 29.96 KG/M2 | HEIGHT: 71 IN | DIASTOLIC BLOOD PRESSURE: 76 MMHG | SYSTOLIC BLOOD PRESSURE: 116 MMHG | WEIGHT: 214 LBS | HEART RATE: 79 BPM | TEMPERATURE: 97 F

## 2021-03-15 DIAGNOSIS — H60.391 OTHER INFECTIVE ACUTE OTITIS EXTERNA OF RIGHT EAR: ICD-10-CM

## 2021-03-15 DIAGNOSIS — H66.001 NON-RECURRENT ACUTE SUPPURATIVE OTITIS MEDIA OF RIGHT EAR WITHOUT SPONTANEOUS RUPTURE OF TYMPANIC MEMBRANE: Primary | ICD-10-CM

## 2021-03-15 PROCEDURE — 99214 OFFICE O/P EST MOD 30 MIN: CPT | Mod: S$GLB,,, | Performed by: NURSE PRACTITIONER

## 2021-03-15 PROCEDURE — 99214 PR OFFICE/OUTPT VISIT, EST, LEVL IV, 30-39 MIN: ICD-10-PCS | Mod: S$GLB,,, | Performed by: NURSE PRACTITIONER

## 2021-03-15 RX ORDER — AMOXICILLIN 875 MG/1
875 TABLET, FILM COATED ORAL 2 TIMES DAILY
Qty: 20 TABLET | Refills: 0 | Status: SHIPPED | OUTPATIENT
Start: 2021-03-15 | End: 2021-03-25

## 2021-03-15 RX ORDER — NEOMYCIN SULFATE, POLYMYXIN B SULFATE, HYDROCORTISONE 3.5; 10000; 1 MG/ML; [USP'U]/ML; MG/ML
4 SOLUTION/ DROPS AURICULAR (OTIC) 3 TIMES DAILY
Qty: 10 ML | Refills: 0 | Status: SHIPPED | OUTPATIENT
Start: 2021-03-15 | End: 2021-03-25

## 2021-03-27 ENCOUNTER — HOSPITAL ENCOUNTER (OUTPATIENT)
Facility: HOSPITAL | Age: 26
Discharge: HOME | End: 2021-03-29
Attending: EMERGENCY MEDICINE | Admitting: INTERNAL MEDICINE
Payer: MEDICAID

## 2021-03-27 DIAGNOSIS — J18.9 PNEUMONIA OF RIGHT MIDDLE LOBE DUE TO INFECTIOUS ORGANISM: Primary | ICD-10-CM

## 2021-03-27 DIAGNOSIS — A41.9 SEPSIS, DUE TO UNSPECIFIED ORGANISM, UNSPECIFIED WHETHER ACUTE ORGAN DYSFUNCTION PRESENT: ICD-10-CM

## 2021-03-27 DIAGNOSIS — R05.9 COUGH: ICD-10-CM

## 2021-03-27 LAB
ALBUMIN SERPL BCP-MCNC: 4.5 G/DL (ref 3.5–5.2)
ALP SERPL-CCNC: 95 U/L (ref 55–135)
ALT SERPL W/O P-5'-P-CCNC: 97 U/L (ref 10–44)
ANION GAP SERPL CALC-SCNC: 10 MMOL/L (ref 8–16)
AST SERPL-CCNC: 84 U/L (ref 10–40)
BASOPHILS # BLD AUTO: 0.06 K/UL (ref 0–0.2)
BASOPHILS NFR BLD: 0.4 % (ref 0–1.9)
BILIRUB SERPL-MCNC: 1.3 MG/DL (ref 0.1–1)
BILIRUB UR QL STRIP: NEGATIVE
BUN SERPL-MCNC: 13 MG/DL (ref 6–20)
CALCIUM SERPL-MCNC: 9.4 MG/DL (ref 8.7–10.5)
CHLORIDE SERPL-SCNC: 104 MMOL/L (ref 95–110)
CLARITY UR: CLEAR
CO2 SERPL-SCNC: 24 MMOL/L (ref 23–29)
COLOR UR: ABNORMAL
CREAT SERPL-MCNC: 0.9 MG/DL (ref 0.5–1.4)
DIFFERENTIAL METHOD: ABNORMAL
EOSINOPHIL # BLD AUTO: 0 K/UL (ref 0–0.5)
EOSINOPHIL NFR BLD: 0.2 % (ref 0–8)
ERYTHROCYTE [DISTWIDTH] IN BLOOD BY AUTOMATED COUNT: 13.2 % (ref 11.5–14.5)
EST. GFR  (AFRICAN AMERICAN): >60 ML/MIN/1.73 M^2
EST. GFR  (NON AFRICAN AMERICAN): >60 ML/MIN/1.73 M^2
GLUCOSE SERPL-MCNC: 109 MG/DL (ref 70–110)
GLUCOSE UR QL STRIP: NEGATIVE
HCT VFR BLD AUTO: 45 % (ref 40–54)
HGB BLD-MCNC: 14.9 G/DL (ref 14–18)
HGB UR QL STRIP: NEGATIVE
IMM GRANULOCYTES # BLD AUTO: 0.07 K/UL (ref 0–0.04)
IMM GRANULOCYTES NFR BLD AUTO: 0.5 % (ref 0–0.5)
INFLUENZA A, MOLECULAR: NEGATIVE
INFLUENZA B, MOLECULAR: NEGATIVE
KETONES UR QL STRIP: NEGATIVE
LACTATE SERPL-SCNC: 2.1 MMOL/L (ref 0.5–1.9)
LEUKOCYTE ESTERASE UR QL STRIP: NEGATIVE
LYMPHOCYTES # BLD AUTO: 1 K/UL (ref 1–4.8)
LYMPHOCYTES NFR BLD: 6.7 % (ref 18–48)
MCH RBC QN AUTO: 27 PG (ref 27–31)
MCHC RBC AUTO-ENTMCNC: 33.1 G/DL (ref 32–36)
MCV RBC AUTO: 82 FL (ref 82–98)
MONOCYTES # BLD AUTO: 1.2 K/UL (ref 0.3–1)
MONOCYTES NFR BLD: 8 % (ref 4–15)
NEUTROPHILS # BLD AUTO: 12.5 K/UL (ref 1.8–7.7)
NEUTROPHILS NFR BLD: 84.2 % (ref 38–73)
NITRITE UR QL STRIP: NEGATIVE
NRBC BLD-RTO: 0 /100 WBC
PH UR STRIP: 8 [PH] (ref 5–8)
PLATELET # BLD AUTO: 163 K/UL (ref 150–350)
PMV BLD AUTO: 11.4 FL (ref 9.2–12.9)
POTASSIUM SERPL-SCNC: 3.7 MMOL/L (ref 3.5–5.1)
PROCALCITONIN SERPL IA-MCNC: 0.09 NG/ML (ref 0–0.5)
PROT SERPL-MCNC: 7.8 G/DL (ref 6–8.4)
PROT UR QL STRIP: NEGATIVE
RBC # BLD AUTO: 5.51 M/UL (ref 4.6–6.2)
SARS-COV-2 RDRP RESP QL NAA+PROBE: NEGATIVE
SODIUM SERPL-SCNC: 138 MMOL/L (ref 136–145)
SP GR UR STRIP: 1.01 (ref 1–1.03)
SPECIMEN SOURCE: NORMAL
URN SPEC COLLECT METH UR: ABNORMAL
UROBILINOGEN UR STRIP-ACNC: 1 EU/DL
WBC # BLD AUTO: 14.87 K/UL (ref 3.9–12.7)

## 2021-03-27 PROCEDURE — 63700000 PHARM REV CODE 250 ALT 637 W/O HCPCS: Performed by: PHYSICIAN ASSISTANT

## 2021-03-27 PROCEDURE — 25000242 PHARM REV CODE 250 ALT 637 W/ HCPCS: Performed by: PHYSICIAN ASSISTANT

## 2021-03-27 PROCEDURE — 94761 N-INVAS EAR/PLS OXIMETRY MLT: CPT

## 2021-03-27 PROCEDURE — 25000003 PHARM REV CODE 250: Performed by: PHYSICIAN ASSISTANT

## 2021-03-27 PROCEDURE — 83605 ASSAY OF LACTIC ACID: CPT | Performed by: PHYSICIAN ASSISTANT

## 2021-03-27 PROCEDURE — 96361 HYDRATE IV INFUSION ADD-ON: CPT

## 2021-03-27 PROCEDURE — 99900035 HC TECH TIME PER 15 MIN (STAT)

## 2021-03-27 PROCEDURE — 80053 COMPREHEN METABOLIC PANEL: CPT | Performed by: PHYSICIAN ASSISTANT

## 2021-03-27 PROCEDURE — 84145 PROCALCITONIN (PCT): CPT | Performed by: PHYSICIAN ASSISTANT

## 2021-03-27 PROCEDURE — 85025 COMPLETE CBC W/AUTO DIFF WBC: CPT | Performed by: PHYSICIAN ASSISTANT

## 2021-03-27 PROCEDURE — 99900031 HC PATIENT EDUCATION (STAT)

## 2021-03-27 PROCEDURE — 81003 URINALYSIS AUTO W/O SCOPE: CPT | Performed by: PHYSICIAN ASSISTANT

## 2021-03-27 PROCEDURE — 96365 THER/PROPH/DIAG IV INF INIT: CPT

## 2021-03-27 PROCEDURE — 87502 INFLUENZA DNA AMP PROBE: CPT | Performed by: PHYSICIAN ASSISTANT

## 2021-03-27 PROCEDURE — 96375 TX/PRO/DX INJ NEW DRUG ADDON: CPT

## 2021-03-27 PROCEDURE — U0002 COVID-19 LAB TEST NON-CDC: HCPCS | Performed by: EMERGENCY MEDICINE

## 2021-03-27 PROCEDURE — 87040 BLOOD CULTURE FOR BACTERIA: CPT | Mod: 59 | Performed by: PHYSICIAN ASSISTANT

## 2021-03-27 PROCEDURE — G0378 HOSPITAL OBSERVATION PER HR: HCPCS

## 2021-03-27 PROCEDURE — 63600175 PHARM REV CODE 636 W HCPCS: Performed by: PHYSICIAN ASSISTANT

## 2021-03-27 PROCEDURE — 99285 EMERGENCY DEPT VISIT HI MDM: CPT | Mod: 25

## 2021-03-27 PROCEDURE — 94640 AIRWAY INHALATION TREATMENT: CPT

## 2021-03-27 RX ORDER — ACETAMINOPHEN 325 MG/1
650 TABLET ORAL EVERY 4 HOURS PRN
Status: DISCONTINUED | OUTPATIENT
Start: 2021-03-27 | End: 2021-03-29 | Stop reason: HOSPADM

## 2021-03-27 RX ORDER — METHYLPREDNISOLONE SOD SUCC 125 MG
125 VIAL (EA) INJECTION
Status: COMPLETED | OUTPATIENT
Start: 2021-03-27 | End: 2021-03-27

## 2021-03-27 RX ORDER — IPRATROPIUM BROMIDE AND ALBUTEROL SULFATE 2.5; .5 MG/3ML; MG/3ML
3 SOLUTION RESPIRATORY (INHALATION)
Status: COMPLETED | OUTPATIENT
Start: 2021-03-27 | End: 2021-03-27

## 2021-03-27 RX ORDER — ACETAMINOPHEN 500 MG
1000 TABLET ORAL
Status: COMPLETED | OUTPATIENT
Start: 2021-03-27 | End: 2021-03-27

## 2021-03-27 RX ORDER — SODIUM CHLORIDE 0.9 % (FLUSH) 0.9 %
10 SYRINGE (ML) INJECTION
Status: DISCONTINUED | OUTPATIENT
Start: 2021-03-27 | End: 2021-03-29 | Stop reason: HOSPADM

## 2021-03-27 RX ORDER — AZITHROMYCIN 250 MG/1
500 TABLET, FILM COATED ORAL
Status: COMPLETED | OUTPATIENT
Start: 2021-03-27 | End: 2021-03-27

## 2021-03-27 RX ORDER — ONDANSETRON 2 MG/ML
4 INJECTION INTRAMUSCULAR; INTRAVENOUS EVERY 6 HOURS PRN
Status: DISCONTINUED | OUTPATIENT
Start: 2021-03-27 | End: 2021-03-29 | Stop reason: HOSPADM

## 2021-03-27 RX ORDER — POLYETHYLENE GLYCOL 3350 17 G/17G
17 POWDER, FOR SOLUTION ORAL 2 TIMES DAILY PRN
Status: DISCONTINUED | OUTPATIENT
Start: 2021-03-27 | End: 2021-03-29 | Stop reason: HOSPADM

## 2021-03-27 RX ORDER — SODIUM CHLORIDE 9 MG/ML
INJECTION, SOLUTION INTRAVENOUS CONTINUOUS
Status: DISCONTINUED | OUTPATIENT
Start: 2021-03-28 | End: 2021-03-29 | Stop reason: HOSPADM

## 2021-03-27 RX ORDER — LEVALBUTEROL 1.25 MG/.5ML
1 SOLUTION, CONCENTRATE RESPIRATORY (INHALATION) EVERY 6 HOURS PRN
Status: DISCONTINUED | OUTPATIENT
Start: 2021-03-27 | End: 2021-03-28

## 2021-03-27 RX ORDER — TALC
6 POWDER (GRAM) TOPICAL NIGHTLY PRN
Status: DISCONTINUED | OUTPATIENT
Start: 2021-03-27 | End: 2021-03-29 | Stop reason: HOSPADM

## 2021-03-27 RX ADMIN — SODIUM CHLORIDE 1000 ML: 0.9 INJECTION, SOLUTION INTRAVENOUS at 09:03

## 2021-03-27 RX ADMIN — ACETAMINOPHEN 1000 MG: 500 TABLET, FILM COATED ORAL at 07:03

## 2021-03-27 RX ADMIN — SODIUM CHLORIDE 1000 ML: 0.9 INJECTION, SOLUTION INTRAVENOUS at 07:03

## 2021-03-27 RX ADMIN — METHYLPREDNISOLONE SODIUM SUCCINATE 125 MG: 125 INJECTION, POWDER, FOR SOLUTION INTRAMUSCULAR; INTRAVENOUS at 07:03

## 2021-03-27 RX ADMIN — IPRATROPIUM BROMIDE AND ALBUTEROL SULFATE 3 ML: .5; 3 SOLUTION RESPIRATORY (INHALATION) at 07:03

## 2021-03-27 RX ADMIN — AZITHROMYCIN MONOHYDRATE 500 MG: 250 TABLET ORAL at 08:03

## 2021-03-27 RX ADMIN — CEFTRIAXONE 2 G: 2 INJECTION, SOLUTION INTRAVENOUS at 08:03

## 2021-03-28 LAB
ALBUMIN SERPL BCP-MCNC: 3.7 G/DL (ref 3.5–5.2)
ALP SERPL-CCNC: 89 U/L (ref 55–135)
ALT SERPL W/O P-5'-P-CCNC: 96 U/L (ref 10–44)
ANION GAP SERPL CALC-SCNC: 8 MMOL/L (ref 8–16)
AST SERPL-CCNC: 74 U/L (ref 10–40)
BASOPHILS # BLD AUTO: 0.01 K/UL (ref 0–0.2)
BASOPHILS NFR BLD: 0.1 % (ref 0–1.9)
BILIRUB SERPL-MCNC: 0.8 MG/DL (ref 0.1–1)
BUN SERPL-MCNC: 13 MG/DL (ref 6–20)
CALCIUM SERPL-MCNC: 9.2 MG/DL (ref 8.7–10.5)
CHLORIDE SERPL-SCNC: 108 MMOL/L (ref 95–110)
CO2 SERPL-SCNC: 21 MMOL/L (ref 23–29)
CREAT SERPL-MCNC: 0.9 MG/DL (ref 0.5–1.4)
DIFFERENTIAL METHOD: ABNORMAL
EOSINOPHIL # BLD AUTO: 0 K/UL (ref 0–0.5)
EOSINOPHIL NFR BLD: 0 % (ref 0–8)
ERYTHROCYTE [DISTWIDTH] IN BLOOD BY AUTOMATED COUNT: 13.2 % (ref 11.5–14.5)
EST. GFR  (AFRICAN AMERICAN): >60 ML/MIN/1.73 M^2
EST. GFR  (NON AFRICAN AMERICAN): >60 ML/MIN/1.73 M^2
GLUCOSE SERPL-MCNC: 266 MG/DL (ref 70–110)
HCT VFR BLD AUTO: 42.5 % (ref 40–54)
HGB BLD-MCNC: 13.9 G/DL (ref 14–18)
IMM GRANULOCYTES # BLD AUTO: 0.03 K/UL (ref 0–0.04)
IMM GRANULOCYTES NFR BLD AUTO: 0.4 % (ref 0–0.5)
LACTATE SERPL-SCNC: 2.2 MMOL/L (ref 0.5–1.9)
LACTATE SERPL-SCNC: 3.2 MMOL/L (ref 0.5–1.9)
LACTATE SERPL-SCNC: 3.2 MMOL/L (ref 0.5–1.9)
LYMPHOCYTES # BLD AUTO: 0.4 K/UL (ref 1–4.8)
LYMPHOCYTES NFR BLD: 5.4 % (ref 18–48)
MAGNESIUM SERPL-MCNC: 2 MG/DL (ref 1.6–2.6)
MCH RBC QN AUTO: 26.8 PG (ref 27–31)
MCHC RBC AUTO-ENTMCNC: 32.7 G/DL (ref 32–36)
MCV RBC AUTO: 82 FL (ref 82–98)
MONOCYTES # BLD AUTO: 0.2 K/UL (ref 0.3–1)
MONOCYTES NFR BLD: 1.8 % (ref 4–15)
NEUTROPHILS # BLD AUTO: 7.5 K/UL (ref 1.8–7.7)
NEUTROPHILS NFR BLD: 92.3 % (ref 38–73)
NRBC BLD-RTO: 0 /100 WBC
PLATELET # BLD AUTO: 143 K/UL (ref 150–350)
PMV BLD AUTO: 11.6 FL (ref 9.2–12.9)
POTASSIUM SERPL-SCNC: 4.6 MMOL/L (ref 3.5–5.1)
PROT SERPL-MCNC: 7.3 G/DL (ref 6–8.4)
RBC # BLD AUTO: 5.19 M/UL (ref 4.6–6.2)
SODIUM SERPL-SCNC: 137 MMOL/L (ref 136–145)
WBC # BLD AUTO: 8.17 K/UL (ref 3.9–12.7)

## 2021-03-28 PROCEDURE — 83735 ASSAY OF MAGNESIUM: CPT | Performed by: INTERNAL MEDICINE

## 2021-03-28 PROCEDURE — 94640 AIRWAY INHALATION TREATMENT: CPT

## 2021-03-28 PROCEDURE — 83605 ASSAY OF LACTIC ACID: CPT | Mod: 91 | Performed by: HOSPITALIST

## 2021-03-28 PROCEDURE — 80053 COMPREHEN METABOLIC PANEL: CPT | Performed by: INTERNAL MEDICINE

## 2021-03-28 PROCEDURE — 25000242 PHARM REV CODE 250 ALT 637 W/ HCPCS: Performed by: HOSPITALIST

## 2021-03-28 PROCEDURE — 83605 ASSAY OF LACTIC ACID: CPT | Performed by: INTERNAL MEDICINE

## 2021-03-28 PROCEDURE — 96367 TX/PROPH/DG ADDL SEQ IV INF: CPT

## 2021-03-28 PROCEDURE — 63600175 PHARM REV CODE 636 W HCPCS: Performed by: INTERNAL MEDICINE

## 2021-03-28 PROCEDURE — 36415 COLL VENOUS BLD VENIPUNCTURE: CPT | Performed by: INTERNAL MEDICINE

## 2021-03-28 PROCEDURE — 63600175 PHARM REV CODE 636 W HCPCS: Performed by: HOSPITALIST

## 2021-03-28 PROCEDURE — 25000003 PHARM REV CODE 250: Performed by: HOSPITALIST

## 2021-03-28 PROCEDURE — 99900035 HC TECH TIME PER 15 MIN (STAT)

## 2021-03-28 PROCEDURE — 99900031 HC PATIENT EDUCATION (STAT)

## 2021-03-28 PROCEDURE — 94761 N-INVAS EAR/PLS OXIMETRY MLT: CPT

## 2021-03-28 PROCEDURE — 96376 TX/PRO/DX INJ SAME DRUG ADON: CPT

## 2021-03-28 PROCEDURE — G0378 HOSPITAL OBSERVATION PER HR: HCPCS

## 2021-03-28 PROCEDURE — 96375 TX/PRO/DX INJ NEW DRUG ADDON: CPT

## 2021-03-28 PROCEDURE — 36415 COLL VENOUS BLD VENIPUNCTURE: CPT | Performed by: HOSPITALIST

## 2021-03-28 PROCEDURE — 25000003 PHARM REV CODE 250: Performed by: INTERNAL MEDICINE

## 2021-03-28 PROCEDURE — 94664 DEMO&/EVAL PT USE INHALER: CPT

## 2021-03-28 PROCEDURE — 85025 COMPLETE CBC W/AUTO DIFF WBC: CPT | Performed by: INTERNAL MEDICINE

## 2021-03-28 PROCEDURE — 96366 THER/PROPH/DIAG IV INF ADDON: CPT

## 2021-03-28 PROCEDURE — 27000221 HC OXYGEN, UP TO 24 HOURS

## 2021-03-28 RX ORDER — PANTOPRAZOLE SODIUM 40 MG/1
40 TABLET, DELAYED RELEASE ORAL
Status: DISCONTINUED | OUTPATIENT
Start: 2021-03-28 | End: 2021-03-29 | Stop reason: HOSPADM

## 2021-03-28 RX ORDER — FAMOTIDINE 10 MG/ML
20 INJECTION INTRAVENOUS ONCE
Status: COMPLETED | OUTPATIENT
Start: 2021-03-28 | End: 2021-03-28

## 2021-03-28 RX ORDER — LEVALBUTEROL 1.25 MG/.5ML
1 SOLUTION, CONCENTRATE RESPIRATORY (INHALATION) EVERY 6 HOURS
Status: DISCONTINUED | OUTPATIENT
Start: 2021-03-28 | End: 2021-03-28

## 2021-03-28 RX ORDER — LEVALBUTEROL 1.25 MG/.5ML
1 SOLUTION, CONCENTRATE RESPIRATORY (INHALATION) EVERY 6 HOURS
Status: DISCONTINUED | OUTPATIENT
Start: 2021-03-29 | End: 2021-03-29 | Stop reason: HOSPADM

## 2021-03-28 RX ADMIN — FAMOTIDINE 20 MG: 10 INJECTION INTRAVENOUS at 08:03

## 2021-03-28 RX ADMIN — PANTOPRAZOLE SODIUM 40 MG: 40 TABLET, DELAYED RELEASE ORAL at 08:03

## 2021-03-28 RX ADMIN — MELATONIN 6 MG: at 09:03

## 2021-03-28 RX ADMIN — PIPERACILLIN AND TAZOBACTAM 3.38 G: 3; .375 INJECTION, POWDER, LYOPHILIZED, FOR SOLUTION INTRAVENOUS; PARENTERAL at 04:03

## 2021-03-28 RX ADMIN — SODIUM CHLORIDE: 0.9 INJECTION, SOLUTION INTRAVENOUS at 12:03

## 2021-03-28 RX ADMIN — LEVALBUTEROL HYDROCHLORIDE 1.25 MG: 1.25 SOLUTION, CONCENTRATE RESPIRATORY (INHALATION) at 01:03

## 2021-03-28 RX ADMIN — LEVALBUTEROL HYDROCHLORIDE 1.25 MG: 1.25 SOLUTION, CONCENTRATE RESPIRATORY (INHALATION) at 07:03

## 2021-03-28 RX ADMIN — AZITHROMYCIN MONOHYDRATE 250 MG: 500 INJECTION, POWDER, LYOPHILIZED, FOR SOLUTION INTRAVENOUS at 08:03

## 2021-03-28 RX ADMIN — PIPERACILLIN AND TAZOBACTAM 3.38 G: 3; .375 INJECTION, POWDER, LYOPHILIZED, FOR SOLUTION INTRAVENOUS; PARENTERAL at 10:03

## 2021-03-29 VITALS
BODY MASS INDEX: 30.77 KG/M2 | HEART RATE: 100 BPM | WEIGHT: 219.81 LBS | TEMPERATURE: 98 F | SYSTOLIC BLOOD PRESSURE: 122 MMHG | DIASTOLIC BLOOD PRESSURE: 78 MMHG | RESPIRATION RATE: 16 BRPM | HEIGHT: 71 IN | OXYGEN SATURATION: 95 %

## 2021-03-29 LAB
ALBUMIN SERPL BCP-MCNC: 3.6 G/DL (ref 3.5–5.2)
ALP SERPL-CCNC: 91 U/L (ref 55–135)
ALT SERPL W/O P-5'-P-CCNC: 105 U/L (ref 10–44)
ANION GAP SERPL CALC-SCNC: 9 MMOL/L (ref 8–16)
AST SERPL-CCNC: 74 U/L (ref 10–40)
BASOPHILS # BLD AUTO: 0.03 K/UL (ref 0–0.2)
BASOPHILS NFR BLD: 0.2 % (ref 0–1.9)
BILIRUB SERPL-MCNC: 0.8 MG/DL (ref 0.1–1)
BUN SERPL-MCNC: 16 MG/DL (ref 6–20)
CALCIUM SERPL-MCNC: 8.9 MG/DL (ref 8.7–10.5)
CHLORIDE SERPL-SCNC: 108 MMOL/L (ref 95–110)
CO2 SERPL-SCNC: 23 MMOL/L (ref 23–29)
CREAT SERPL-MCNC: 0.9 MG/DL (ref 0.5–1.4)
DIFFERENTIAL METHOD: ABNORMAL
EOSINOPHIL # BLD AUTO: 0.1 K/UL (ref 0–0.5)
EOSINOPHIL NFR BLD: 0.4 % (ref 0–8)
ERYTHROCYTE [DISTWIDTH] IN BLOOD BY AUTOMATED COUNT: 13.9 % (ref 11.5–14.5)
EST. GFR  (AFRICAN AMERICAN): >60 ML/MIN/1.73 M^2
EST. GFR  (NON AFRICAN AMERICAN): >60 ML/MIN/1.73 M^2
GLUCOSE SERPL-MCNC: 114 MG/DL (ref 70–110)
HCT VFR BLD AUTO: 44.5 % (ref 40–54)
HGB BLD-MCNC: 14.3 G/DL (ref 14–18)
IMM GRANULOCYTES # BLD AUTO: 0.05 K/UL (ref 0–0.04)
IMM GRANULOCYTES NFR BLD AUTO: 0.4 % (ref 0–0.5)
LYMPHOCYTES # BLD AUTO: 0.7 K/UL (ref 1–4.8)
LYMPHOCYTES NFR BLD: 5.7 % (ref 18–48)
MAGNESIUM SERPL-MCNC: 2 MG/DL (ref 1.6–2.6)
MCH RBC QN AUTO: 26.7 PG (ref 27–31)
MCHC RBC AUTO-ENTMCNC: 32.1 G/DL (ref 32–36)
MCV RBC AUTO: 83 FL (ref 82–98)
MONOCYTES # BLD AUTO: 0.9 K/UL (ref 0.3–1)
MONOCYTES NFR BLD: 7 % (ref 4–15)
NEUTROPHILS # BLD AUTO: 11.2 K/UL (ref 1.8–7.7)
NEUTROPHILS NFR BLD: 86.3 % (ref 38–73)
NRBC BLD-RTO: 0 /100 WBC
PLATELET # BLD AUTO: 164 K/UL (ref 150–450)
PMV BLD AUTO: 11.6 FL (ref 9.2–12.9)
POTASSIUM SERPL-SCNC: 3.9 MMOL/L (ref 3.5–5.1)
PROCALCITONIN SERPL IA-MCNC: 0.1 NG/ML (ref 0–0.5)
PROT SERPL-MCNC: 7 G/DL (ref 6–8.4)
RBC # BLD AUTO: 5.35 M/UL (ref 4.6–6.2)
SODIUM SERPL-SCNC: 140 MMOL/L (ref 136–145)
WBC # BLD AUTO: 12.92 K/UL (ref 3.9–12.7)

## 2021-03-29 PROCEDURE — 83735 ASSAY OF MAGNESIUM: CPT | Performed by: INTERNAL MEDICINE

## 2021-03-29 PROCEDURE — 63700000 PHARM REV CODE 250 ALT 637 W/O HCPCS: Performed by: HOSPITALIST

## 2021-03-29 PROCEDURE — 87070 CULTURE OTHR SPECIMN AEROBIC: CPT | Performed by: HOSPITALIST

## 2021-03-29 PROCEDURE — 84145 PROCALCITONIN (PCT): CPT | Performed by: INTERNAL MEDICINE

## 2021-03-29 PROCEDURE — 63600175 PHARM REV CODE 636 W HCPCS: Performed by: HOSPITALIST

## 2021-03-29 PROCEDURE — 25000003 PHARM REV CODE 250: Performed by: INTERNAL MEDICINE

## 2021-03-29 PROCEDURE — G0378 HOSPITAL OBSERVATION PER HR: HCPCS

## 2021-03-29 PROCEDURE — 99900035 HC TECH TIME PER 15 MIN (STAT)

## 2021-03-29 PROCEDURE — 85025 COMPLETE CBC W/AUTO DIFF WBC: CPT | Performed by: INTERNAL MEDICINE

## 2021-03-29 PROCEDURE — 25000003 PHARM REV CODE 250: Performed by: HOSPITALIST

## 2021-03-29 PROCEDURE — 25000242 PHARM REV CODE 250 ALT 637 W/ HCPCS: Performed by: HOSPITALIST

## 2021-03-29 PROCEDURE — 80053 COMPREHEN METABOLIC PANEL: CPT | Performed by: INTERNAL MEDICINE

## 2021-03-29 PROCEDURE — 94664 DEMO&/EVAL PT USE INHALER: CPT | Mod: 59

## 2021-03-29 PROCEDURE — 96376 TX/PRO/DX INJ SAME DRUG ADON: CPT

## 2021-03-29 PROCEDURE — 36415 COLL VENOUS BLD VENIPUNCTURE: CPT | Performed by: INTERNAL MEDICINE

## 2021-03-29 PROCEDURE — 94640 AIRWAY INHALATION TREATMENT: CPT

## 2021-03-29 PROCEDURE — 87205 SMEAR GRAM STAIN: CPT | Performed by: HOSPITALIST

## 2021-03-29 RX ORDER — CIPROFLOXACIN 500 MG/1
500 TABLET ORAL EVERY 12 HOURS
Qty: 20 TABLET | Refills: 0 | Status: SHIPPED | OUTPATIENT
Start: 2021-03-29 | End: 2021-04-08

## 2021-03-29 RX ORDER — AZITHROMYCIN 250 MG/1
250 TABLET, FILM COATED ORAL DAILY
Status: DISCONTINUED | OUTPATIENT
Start: 2021-03-29 | End: 2021-03-29 | Stop reason: HOSPADM

## 2021-03-29 RX ADMIN — AZITHROMYCIN MONOHYDRATE 250 MG: 250 TABLET ORAL at 09:03

## 2021-03-29 RX ADMIN — PIPERACILLIN AND TAZOBACTAM 3.38 G: 3; .375 INJECTION, POWDER, LYOPHILIZED, FOR SOLUTION INTRAVENOUS; PARENTERAL at 09:03

## 2021-03-29 RX ADMIN — LEVALBUTEROL HYDROCHLORIDE 1.25 MG: 1.25 SOLUTION, CONCENTRATE RESPIRATORY (INHALATION) at 12:03

## 2021-03-29 RX ADMIN — PANTOPRAZOLE SODIUM 40 MG: 40 TABLET, DELAYED RELEASE ORAL at 05:03

## 2021-03-29 RX ADMIN — LEVALBUTEROL HYDROCHLORIDE 1.25 MG: 1.25 SOLUTION, CONCENTRATE RESPIRATORY (INHALATION) at 07:03

## 2021-03-29 RX ADMIN — ACETAMINOPHEN 650 MG: 325 TABLET ORAL at 06:03

## 2021-03-29 RX ADMIN — PIPERACILLIN AND TAZOBACTAM 3.38 G: 3; .375 INJECTION, POWDER, LYOPHILIZED, FOR SOLUTION INTRAVENOUS; PARENTERAL at 01:03

## 2021-03-30 ENCOUNTER — HOSPITAL ENCOUNTER (EMERGENCY)
Facility: HOSPITAL | Age: 26
Discharge: HOME | End: 2021-03-30
Attending: EMERGENCY MEDICINE
Payer: MEDICAID

## 2021-03-30 VITALS
DIASTOLIC BLOOD PRESSURE: 64 MMHG | SYSTOLIC BLOOD PRESSURE: 117 MMHG | HEIGHT: 71 IN | TEMPERATURE: 98 F | OXYGEN SATURATION: 94 % | RESPIRATION RATE: 16 BRPM | BODY MASS INDEX: 28.42 KG/M2 | HEART RATE: 94 BPM | WEIGHT: 203 LBS

## 2021-03-30 DIAGNOSIS — R11.10 VOMITING AND DIARRHEA: Primary | ICD-10-CM

## 2021-03-30 DIAGNOSIS — R19.7 VOMITING AND DIARRHEA: Primary | ICD-10-CM

## 2021-03-30 DIAGNOSIS — R10.30 LOWER ABDOMINAL PAIN: ICD-10-CM

## 2021-03-30 LAB
ALBUMIN SERPL BCP-MCNC: 3.6 G/DL (ref 3.5–5.2)
ALP SERPL-CCNC: 115 U/L (ref 55–135)
ALT SERPL W/O P-5'-P-CCNC: 156 U/L (ref 10–44)
ANION GAP SERPL CALC-SCNC: 12 MMOL/L (ref 8–16)
AST SERPL-CCNC: 126 U/L (ref 10–40)
BASOPHILS # BLD AUTO: 0.05 K/UL (ref 0–0.2)
BASOPHILS NFR BLD: 0.7 % (ref 0–1.9)
BILIRUB SERPL-MCNC: 0.9 MG/DL (ref 0.1–1)
BUN SERPL-MCNC: 13 MG/DL (ref 6–20)
C DIFF GDH STL QL: NEGATIVE
C DIFF TOX A+B STL QL IA: NEGATIVE
CALCIUM SERPL-MCNC: 9.1 MG/DL (ref 8.7–10.5)
CHLORIDE SERPL-SCNC: 106 MMOL/L (ref 95–110)
CO2 SERPL-SCNC: 20 MMOL/L (ref 23–29)
CREAT SERPL-MCNC: 0.9 MG/DL (ref 0.5–1.4)
DIFFERENTIAL METHOD: ABNORMAL
EOSINOPHIL # BLD AUTO: 0 K/UL (ref 0–0.5)
EOSINOPHIL NFR BLD: 0.4 % (ref 0–8)
ERYTHROCYTE [DISTWIDTH] IN BLOOD BY AUTOMATED COUNT: 13.3 % (ref 11.5–14.5)
EST. GFR  (AFRICAN AMERICAN): >60 ML/MIN/1.73 M^2
EST. GFR  (NON AFRICAN AMERICAN): >60 ML/MIN/1.73 M^2
GLUCOSE SERPL-MCNC: 98 MG/DL (ref 70–110)
HCT VFR BLD AUTO: 46.5 % (ref 40–54)
HGB BLD-MCNC: 15.6 G/DL (ref 14–18)
IMM GRANULOCYTES # BLD AUTO: 0.03 K/UL (ref 0–0.04)
IMM GRANULOCYTES NFR BLD AUTO: 0.4 % (ref 0–0.5)
LYMPHOCYTES # BLD AUTO: 0.6 K/UL (ref 1–4.8)
LYMPHOCYTES NFR BLD: 9.2 % (ref 18–48)
MCH RBC QN AUTO: 27.2 PG (ref 27–31)
MCHC RBC AUTO-ENTMCNC: 33.5 G/DL (ref 32–36)
MCV RBC AUTO: 81 FL (ref 82–98)
MONOCYTES # BLD AUTO: 0.9 K/UL (ref 0.3–1)
MONOCYTES NFR BLD: 13.1 % (ref 4–15)
NEUTROPHILS # BLD AUTO: 5.3 K/UL (ref 1.8–7.7)
NEUTROPHILS NFR BLD: 76.2 % (ref 38–73)
NRBC BLD-RTO: 0 /100 WBC
PLATELET # BLD AUTO: 167 K/UL (ref 150–450)
PMV BLD AUTO: 10.3 FL (ref 9.2–12.9)
POTASSIUM SERPL-SCNC: 3.7 MMOL/L (ref 3.5–5.1)
PROT SERPL-MCNC: 7.1 G/DL (ref 6–8.4)
RBC # BLD AUTO: 5.73 M/UL (ref 4.6–6.2)
SARS-COV-2 RDRP RESP QL NAA+PROBE: NEGATIVE
SODIUM SERPL-SCNC: 138 MMOL/L (ref 136–145)
WBC # BLD AUTO: 6.95 K/UL (ref 3.9–12.7)

## 2021-03-30 PROCEDURE — 25500020 PHARM REV CODE 255

## 2021-03-30 PROCEDURE — 25000003 PHARM REV CODE 250: Performed by: EMERGENCY MEDICINE

## 2021-03-30 PROCEDURE — 96361 HYDRATE IV INFUSION ADD-ON: CPT

## 2021-03-30 PROCEDURE — 85025 COMPLETE CBC W/AUTO DIFF WBC: CPT | Performed by: EMERGENCY MEDICINE

## 2021-03-30 PROCEDURE — 63600175 PHARM REV CODE 636 W HCPCS: Performed by: EMERGENCY MEDICINE

## 2021-03-30 PROCEDURE — 36415 COLL VENOUS BLD VENIPUNCTURE: CPT | Performed by: EMERGENCY MEDICINE

## 2021-03-30 PROCEDURE — U0002 COVID-19 LAB TEST NON-CDC: HCPCS | Performed by: EMERGENCY MEDICINE

## 2021-03-30 PROCEDURE — 99285 EMERGENCY DEPT VISIT HI MDM: CPT | Mod: 25

## 2021-03-30 PROCEDURE — 80053 COMPREHEN METABOLIC PANEL: CPT | Performed by: EMERGENCY MEDICINE

## 2021-03-30 PROCEDURE — 87324 CLOSTRIDIUM AG IA: CPT | Performed by: EMERGENCY MEDICINE

## 2021-03-30 PROCEDURE — 96374 THER/PROPH/DIAG INJ IV PUSH: CPT

## 2021-03-30 PROCEDURE — 96375 TX/PRO/DX INJ NEW DRUG ADDON: CPT

## 2021-03-30 PROCEDURE — 87449 NOS EACH ORGANISM AG IA: CPT | Performed by: EMERGENCY MEDICINE

## 2021-03-30 RX ORDER — KETOROLAC TROMETHAMINE 30 MG/ML
10 INJECTION, SOLUTION INTRAMUSCULAR; INTRAVENOUS
Status: COMPLETED | OUTPATIENT
Start: 2021-03-30 | End: 2021-03-30

## 2021-03-30 RX ORDER — ONDANSETRON 4 MG/1
4 TABLET, ORALLY DISINTEGRATING ORAL EVERY 8 HOURS PRN
Qty: 12 TABLET | Refills: 0 | Status: SHIPPED | OUTPATIENT
Start: 2021-03-30 | End: 2022-08-01

## 2021-03-30 RX ORDER — ONDANSETRON 2 MG/ML
4 INJECTION INTRAMUSCULAR; INTRAVENOUS
Status: COMPLETED | OUTPATIENT
Start: 2021-03-30 | End: 2021-03-30

## 2021-03-30 RX ORDER — MORPHINE SULFATE 8 MG/ML
8 INJECTION INTRAMUSCULAR; INTRAVENOUS; SUBCUTANEOUS
Status: COMPLETED | OUTPATIENT
Start: 2021-03-30 | End: 2021-03-30

## 2021-03-30 RX ADMIN — IOHEXOL 100 ML: 350 INJECTION, SOLUTION INTRAVENOUS at 09:03

## 2021-03-30 RX ADMIN — MORPHINE SULFATE 8 MG: 8 INJECTION, SOLUTION INTRAMUSCULAR; INTRAVENOUS at 08:03

## 2021-03-30 RX ADMIN — ONDANSETRON 4 MG: 2 INJECTION INTRAMUSCULAR; INTRAVENOUS at 08:03

## 2021-03-30 RX ADMIN — KETOROLAC TROMETHAMINE 10 MG: 30 INJECTION, SOLUTION INTRAMUSCULAR; INTRAVENOUS at 08:03

## 2021-03-30 RX ADMIN — SODIUM CHLORIDE 1000 ML: 0.9 INJECTION, SOLUTION INTRAVENOUS at 08:03

## 2021-03-31 LAB
BACTERIA SPEC AEROBE CULT: NORMAL
GRAM STN SPEC: NORMAL

## 2021-04-02 LAB
BACTERIA BLD CULT: NORMAL
BACTERIA BLD CULT: NORMAL

## 2021-04-08 ENCOUNTER — IMMUNIZATION (OUTPATIENT)
Dept: PRIMARY CARE CLINIC | Facility: CLINIC | Age: 26
End: 2021-04-08
Payer: MEDICAID

## 2021-04-08 DIAGNOSIS — Z23 NEED FOR VACCINATION: Primary | ICD-10-CM

## 2021-04-08 PROCEDURE — 91300 COVID-19, MRNA, LNP-S, PF, 30 MCG/0.3 ML DOSE VACCINE: ICD-10-PCS | Mod: S$GLB,,, | Performed by: FAMILY MEDICINE

## 2021-04-08 PROCEDURE — 91300 COVID-19, MRNA, LNP-S, PF, 30 MCG/0.3 ML DOSE VACCINE: CPT | Mod: S$GLB,,, | Performed by: FAMILY MEDICINE

## 2021-04-08 PROCEDURE — 0001A COVID-19, MRNA, LNP-S, PF, 30 MCG/0.3 ML DOSE VACCINE: CPT | Mod: CV19,S$GLB,, | Performed by: FAMILY MEDICINE

## 2021-04-08 PROCEDURE — 0001A COVID-19, MRNA, LNP-S, PF, 30 MCG/0.3 ML DOSE VACCINE: ICD-10-PCS | Mod: CV19,S$GLB,, | Performed by: FAMILY MEDICINE

## 2021-04-29 ENCOUNTER — IMMUNIZATION (OUTPATIENT)
Dept: PRIMARY CARE CLINIC | Facility: CLINIC | Age: 26
End: 2021-04-29
Payer: MEDICAID

## 2021-04-29 DIAGNOSIS — Z23 NEED FOR VACCINATION: Primary | ICD-10-CM

## 2021-04-29 PROCEDURE — 0002A COVID-19, MRNA, LNP-S, PF, 30 MCG/0.3 ML DOSE VACCINE: CPT | Mod: CV19,S$GLB,, | Performed by: PHYSICIAN ASSISTANT

## 2021-04-29 PROCEDURE — 91300 COVID-19, MRNA, LNP-S, PF, 30 MCG/0.3 ML DOSE VACCINE: ICD-10-PCS | Mod: S$GLB,,, | Performed by: PHYSICIAN ASSISTANT

## 2021-04-29 PROCEDURE — 91300 COVID-19, MRNA, LNP-S, PF, 30 MCG/0.3 ML DOSE VACCINE: CPT | Mod: S$GLB,,, | Performed by: PHYSICIAN ASSISTANT

## 2021-04-29 PROCEDURE — 0002A COVID-19, MRNA, LNP-S, PF, 30 MCG/0.3 ML DOSE VACCINE: ICD-10-PCS | Mod: CV19,S$GLB,, | Performed by: PHYSICIAN ASSISTANT

## 2021-08-03 ENCOUNTER — OCCUPATIONAL HEALTH (OUTPATIENT)
Dept: URGENT CARE | Facility: CLINIC | Age: 26
End: 2021-08-03

## 2021-08-03 PROCEDURE — 80305 PR HAIR COLLECTION ONLY: ICD-10-PCS | Mod: S$GLB,,, | Performed by: EMERGENCY MEDICINE

## 2021-08-03 PROCEDURE — 80305 DRUG TEST PRSMV DIR OPT OBS: CPT | Mod: S$GLB,,, | Performed by: EMERGENCY MEDICINE

## 2021-08-09 PROCEDURE — 96361 HYDRATE IV INFUSION ADD-ON: CPT

## 2021-08-09 PROCEDURE — U0002 COVID-19 LAB TEST NON-CDC: HCPCS | Performed by: EMERGENCY MEDICINE

## 2021-08-09 PROCEDURE — 96365 THER/PROPH/DIAG IV INF INIT: CPT

## 2021-08-09 PROCEDURE — 96366 THER/PROPH/DIAG IV INF ADDON: CPT

## 2021-08-09 PROCEDURE — 96367 TX/PROPH/DG ADDL SEQ IV INF: CPT

## 2021-08-09 PROCEDURE — 96375 TX/PRO/DX INJ NEW DRUG ADDON: CPT

## 2021-08-09 PROCEDURE — 99285 EMERGENCY DEPT VISIT HI MDM: CPT | Mod: 25

## 2021-08-10 ENCOUNTER — HOSPITAL ENCOUNTER (INPATIENT)
Facility: HOSPITAL | Age: 26
LOS: 1 days | Discharge: HOME | DRG: 872 | End: 2021-08-11
Attending: EMERGENCY MEDICINE | Admitting: INTERNAL MEDICINE
Payer: MEDICAID

## 2021-08-10 DIAGNOSIS — J41.1 BRONCHITIS, CHRONIC, MUCOPURULENT: ICD-10-CM

## 2021-08-10 DIAGNOSIS — D83.9 CVID (COMMON VARIABLE IMMUNODEFICIENCY): ICD-10-CM

## 2021-08-10 DIAGNOSIS — J47.1 BRONCHIECTASIS WITH (ACUTE) EXACERBATION: ICD-10-CM

## 2021-08-10 DIAGNOSIS — D84.9 IMMUNOCOMPROMISED STATE: Primary | ICD-10-CM

## 2021-08-10 DIAGNOSIS — R50.9 FEBRILE ILLNESS: ICD-10-CM

## 2021-08-10 PROBLEM — A41.9 SEPSIS: Status: ACTIVE | Noted: 2021-08-10

## 2021-08-10 LAB
ALBUMIN SERPL BCP-MCNC: 4.5 G/DL (ref 3.5–5.2)
ALP SERPL-CCNC: 156 U/L (ref 55–135)
ALT SERPL W/O P-5'-P-CCNC: 152 U/L (ref 10–44)
ANION GAP SERPL CALC-SCNC: 13 MMOL/L (ref 8–16)
AST SERPL-CCNC: 126 U/L (ref 10–40)
BASOPHILS # BLD AUTO: 0.07 K/UL (ref 0–0.2)
BASOPHILS NFR BLD: 0.4 % (ref 0–1.9)
BILIRUB SERPL-MCNC: 0.8 MG/DL (ref 0.1–1)
BILIRUB UR QL STRIP: NEGATIVE
BUN SERPL-MCNC: 10 MG/DL (ref 6–20)
CALCIUM SERPL-MCNC: 10.1 MG/DL (ref 8.7–10.5)
CHLORIDE SERPL-SCNC: 104 MMOL/L (ref 95–110)
CLARITY UR: CLEAR
CO2 SERPL-SCNC: 20 MMOL/L (ref 23–29)
COLOR UR: YELLOW
CREAT SERPL-MCNC: 1.2 MG/DL (ref 0.5–1.4)
DIFFERENTIAL METHOD: ABNORMAL
EOSINOPHIL # BLD AUTO: 0.1 K/UL (ref 0–0.5)
EOSINOPHIL NFR BLD: 0.4 % (ref 0–8)
ERYTHROCYTE [DISTWIDTH] IN BLOOD BY AUTOMATED COUNT: 13.2 % (ref 11.5–14.5)
EST. GFR  (AFRICAN AMERICAN): >60 ML/MIN/1.73 M^2
EST. GFR  (NON AFRICAN AMERICAN): >60 ML/MIN/1.73 M^2
GLUCOSE SERPL-MCNC: 97 MG/DL (ref 70–110)
GLUCOSE UR QL STRIP: NEGATIVE
HCT VFR BLD AUTO: 47.6 % (ref 40–54)
HGB BLD-MCNC: 15.9 G/DL (ref 14–18)
HGB UR QL STRIP: NEGATIVE
IMM GRANULOCYTES # BLD AUTO: 0.04 K/UL (ref 0–0.04)
IMM GRANULOCYTES NFR BLD AUTO: 0.3 % (ref 0–0.5)
KETONES UR QL STRIP: NEGATIVE
LACTATE SERPL-SCNC: 1.5 MMOL/L (ref 0.5–2.2)
LEUKOCYTE ESTERASE UR QL STRIP: NEGATIVE
LYMPHOCYTES # BLD AUTO: 1 K/UL (ref 1–4.8)
LYMPHOCYTES NFR BLD: 6.3 % (ref 18–48)
MCH RBC QN AUTO: 27.2 PG (ref 27–31)
MCHC RBC AUTO-ENTMCNC: 33.4 G/DL (ref 32–36)
MCV RBC AUTO: 82 FL (ref 82–98)
MONOCYTES # BLD AUTO: 1.2 K/UL (ref 0.3–1)
MONOCYTES NFR BLD: 7.8 % (ref 4–15)
NEUTROPHILS # BLD AUTO: 13.4 K/UL (ref 1.8–7.7)
NEUTROPHILS NFR BLD: 84.8 % (ref 38–73)
NITRITE UR QL STRIP: NEGATIVE
NRBC BLD-RTO: 0 /100 WBC
PH UR STRIP: 6 [PH] (ref 5–8)
PLATELET # BLD AUTO: 188 K/UL (ref 150–450)
PMV BLD AUTO: 11.7 FL (ref 9.2–12.9)
POTASSIUM SERPL-SCNC: 3.8 MMOL/L (ref 3.5–5.1)
PROCALCITONIN SERPL IA-MCNC: 0.24 NG/ML
PROT SERPL-MCNC: 8.6 G/DL (ref 6–8.4)
PROT UR QL STRIP: NEGATIVE
RBC # BLD AUTO: 5.84 M/UL (ref 4.6–6.2)
SARS-COV-2 RDRP RESP QL NAA+PROBE: NEGATIVE
SODIUM SERPL-SCNC: 137 MMOL/L (ref 136–145)
SP GR UR STRIP: 1.02 (ref 1–1.03)
URN SPEC COLLECT METH UR: NORMAL
UROBILINOGEN UR STRIP-ACNC: NEGATIVE EU/DL
WBC # BLD AUTO: 15.76 K/UL (ref 3.9–12.7)

## 2021-08-10 PROCEDURE — 99223 1ST HOSP IP/OBS HIGH 75: CPT | Mod: ,,, | Performed by: INTERNAL MEDICINE

## 2021-08-10 PROCEDURE — 25000003 PHARM REV CODE 250: Performed by: NURSE PRACTITIONER

## 2021-08-10 PROCEDURE — U0005 INFEC AGEN DETEC AMPLI PROBE: HCPCS | Performed by: INTERNAL MEDICINE

## 2021-08-10 PROCEDURE — 12000002 HC ACUTE/MED SURGE SEMI-PRIVATE ROOM

## 2021-08-10 PROCEDURE — 85025 COMPLETE CBC W/AUTO DIFF WBC: CPT | Performed by: EMERGENCY MEDICINE

## 2021-08-10 PROCEDURE — 63600175 PHARM REV CODE 636 W HCPCS: Performed by: EMERGENCY MEDICINE

## 2021-08-10 PROCEDURE — 94761 N-INVAS EAR/PLS OXIMETRY MLT: CPT

## 2021-08-10 PROCEDURE — 25000242 PHARM REV CODE 250 ALT 637 W/ HCPCS: Performed by: NURSE PRACTITIONER

## 2021-08-10 PROCEDURE — 25000003 PHARM REV CODE 250: Performed by: EMERGENCY MEDICINE

## 2021-08-10 PROCEDURE — 36415 COLL VENOUS BLD VENIPUNCTURE: CPT | Performed by: EMERGENCY MEDICINE

## 2021-08-10 PROCEDURE — 83605 ASSAY OF LACTIC ACID: CPT | Performed by: EMERGENCY MEDICINE

## 2021-08-10 PROCEDURE — 27000207 HC ISOLATION

## 2021-08-10 PROCEDURE — 25000003 PHARM REV CODE 250: Performed by: INTERNAL MEDICINE

## 2021-08-10 PROCEDURE — 80053 COMPREHEN METABOLIC PANEL: CPT | Performed by: EMERGENCY MEDICINE

## 2021-08-10 PROCEDURE — 63600175 PHARM REV CODE 636 W HCPCS: Performed by: INTERNAL MEDICINE

## 2021-08-10 PROCEDURE — 36415 COLL VENOUS BLD VENIPUNCTURE: CPT | Performed by: INTERNAL MEDICINE

## 2021-08-10 PROCEDURE — 99223 PR INITIAL HOSPITAL CARE,LEVL III: ICD-10-PCS | Mod: ,,, | Performed by: INTERNAL MEDICINE

## 2021-08-10 PROCEDURE — 80074 ACUTE HEPATITIS PANEL: CPT | Performed by: INTERNAL MEDICINE

## 2021-08-10 PROCEDURE — 94640 AIRWAY INHALATION TREATMENT: CPT

## 2021-08-10 PROCEDURE — 81003 URINALYSIS AUTO W/O SCOPE: CPT | Performed by: NURSE PRACTITIONER

## 2021-08-10 PROCEDURE — 87040 BLOOD CULTURE FOR BACTERIA: CPT | Mod: 59 | Performed by: NURSE PRACTITIONER

## 2021-08-10 PROCEDURE — U0003 INFECTIOUS AGENT DETECTION BY NUCLEIC ACID (DNA OR RNA); SEVERE ACUTE RESPIRATORY SYNDROME CORONAVIRUS 2 (SARS-COV-2) (CORONAVIRUS DISEASE [COVID-19]), AMPLIFIED PROBE TECHNIQUE, MAKING USE OF HIGH THROUGHPUT TECHNOLOGIES AS DESCRIBED BY CMS-2020-01-R: HCPCS | Performed by: INTERNAL MEDICINE

## 2021-08-10 PROCEDURE — 87040 BLOOD CULTURE FOR BACTERIA: CPT | Performed by: EMERGENCY MEDICINE

## 2021-08-10 PROCEDURE — 84145 PROCALCITONIN (PCT): CPT | Performed by: NURSE PRACTITIONER

## 2021-08-10 RX ORDER — LEVOFLOXACIN 5 MG/ML
750 INJECTION, SOLUTION INTRAVENOUS
Status: DISCONTINUED | OUTPATIENT
Start: 2021-08-10 | End: 2021-08-11 | Stop reason: HOSPADM

## 2021-08-10 RX ORDER — LANOLIN ALCOHOL/MO/W.PET/CERES
800 CREAM (GRAM) TOPICAL
Status: DISCONTINUED | OUTPATIENT
Start: 2021-08-10 | End: 2021-08-11 | Stop reason: HOSPADM

## 2021-08-10 RX ORDER — ONDANSETRON 2 MG/ML
8 INJECTION INTRAMUSCULAR; INTRAVENOUS EVERY 8 HOURS PRN
Status: DISCONTINUED | OUTPATIENT
Start: 2021-08-10 | End: 2021-08-11 | Stop reason: HOSPADM

## 2021-08-10 RX ORDER — ENOXAPARIN SODIUM 100 MG/ML
40 INJECTION SUBCUTANEOUS
Status: DISCONTINUED | OUTPATIENT
Start: 2021-08-10 | End: 2021-08-11 | Stop reason: HOSPADM

## 2021-08-10 RX ORDER — GLUCAGON 1 MG
1 KIT INJECTION
Status: DISCONTINUED | OUTPATIENT
Start: 2021-08-10 | End: 2021-08-11 | Stop reason: HOSPADM

## 2021-08-10 RX ORDER — IPRATROPIUM BROMIDE AND ALBUTEROL SULFATE 2.5; .5 MG/3ML; MG/3ML
3 SOLUTION RESPIRATORY (INHALATION) EVERY 6 HOURS
Status: DISCONTINUED | OUTPATIENT
Start: 2021-08-10 | End: 2021-08-10

## 2021-08-10 RX ORDER — FAMOTIDINE 20 MG/1
20 TABLET, FILM COATED ORAL 2 TIMES DAILY
Status: DISCONTINUED | OUTPATIENT
Start: 2021-08-10 | End: 2021-08-11 | Stop reason: HOSPADM

## 2021-08-10 RX ORDER — KETOROLAC TROMETHAMINE 30 MG/ML
10 INJECTION, SOLUTION INTRAMUSCULAR; INTRAVENOUS
Status: COMPLETED | OUTPATIENT
Start: 2021-08-10 | End: 2021-08-10

## 2021-08-10 RX ORDER — SODIUM CHLORIDE 9 MG/ML
INJECTION, SOLUTION INTRAVENOUS CONTINUOUS
Status: DISCONTINUED | OUTPATIENT
Start: 2021-08-10 | End: 2021-08-11 | Stop reason: HOSPADM

## 2021-08-10 RX ORDER — IBUPROFEN 200 MG
16 TABLET ORAL
Status: DISCONTINUED | OUTPATIENT
Start: 2021-08-10 | End: 2021-08-11 | Stop reason: HOSPADM

## 2021-08-10 RX ORDER — SODIUM CHLORIDE 0.9 % (FLUSH) 0.9 %
10 SYRINGE (ML) INJECTION
Status: DISCONTINUED | OUTPATIENT
Start: 2021-08-10 | End: 2021-08-11 | Stop reason: HOSPADM

## 2021-08-10 RX ORDER — LEVOFLOXACIN 5 MG/ML
750 INJECTION, SOLUTION INTRAVENOUS
Status: DISCONTINUED | OUTPATIENT
Start: 2021-08-11 | End: 2021-08-10 | Stop reason: SDUPTHER

## 2021-08-10 RX ORDER — MOXIFLOXACIN HYDROCHLORIDE 400 MG/250ML
400 INJECTION, SOLUTION INTRAVENOUS
Status: DISCONTINUED | OUTPATIENT
Start: 2021-08-10 | End: 2021-08-10

## 2021-08-10 RX ORDER — TALC
9 POWDER (GRAM) TOPICAL NIGHTLY PRN
Status: DISCONTINUED | OUTPATIENT
Start: 2021-08-10 | End: 2021-08-11 | Stop reason: HOSPADM

## 2021-08-10 RX ORDER — ACETAMINOPHEN 500 MG
1000 TABLET ORAL EVERY 6 HOURS PRN
Status: DISCONTINUED | OUTPATIENT
Start: 2021-08-10 | End: 2021-08-11 | Stop reason: HOSPADM

## 2021-08-10 RX ORDER — GUAIFENESIN 600 MG/1
1200 TABLET, EXTENDED RELEASE ORAL 2 TIMES DAILY
Status: DISCONTINUED | OUTPATIENT
Start: 2021-08-10 | End: 2021-08-11 | Stop reason: HOSPADM

## 2021-08-10 RX ORDER — IPRATROPIUM BROMIDE AND ALBUTEROL SULFATE 2.5; .5 MG/3ML; MG/3ML
3 SOLUTION RESPIRATORY (INHALATION) EVERY 8 HOURS
Status: DISCONTINUED | OUTPATIENT
Start: 2021-08-11 | End: 2021-08-11 | Stop reason: HOSPADM

## 2021-08-10 RX ORDER — IBUPROFEN 200 MG
24 TABLET ORAL
Status: DISCONTINUED | OUTPATIENT
Start: 2021-08-10 | End: 2021-08-11 | Stop reason: HOSPADM

## 2021-08-10 RX ORDER — ACETAMINOPHEN 500 MG
1000 TABLET ORAL
Status: COMPLETED | OUTPATIENT
Start: 2021-08-10 | End: 2021-08-10

## 2021-08-10 RX ADMIN — FAMOTIDINE 20 MG: 20 TABLET, FILM COATED ORAL at 08:08

## 2021-08-10 RX ADMIN — GUAIFENESIN 1200 MG: 600 TABLET, EXTENDED RELEASE ORAL at 08:08

## 2021-08-10 RX ADMIN — SODIUM CHLORIDE: 0.9 INJECTION, SOLUTION INTRAVENOUS at 09:08

## 2021-08-10 RX ADMIN — ENOXAPARIN SODIUM 40 MG: 40 INJECTION SUBCUTANEOUS at 05:08

## 2021-08-10 RX ADMIN — CEFTRIAXONE 1 G: 1 INJECTION, SOLUTION INTRAVENOUS at 02:08

## 2021-08-10 RX ADMIN — METHYLPREDNISOLONE SODIUM SUCCINATE 40 MG: 40 INJECTION, POWDER, FOR SOLUTION INTRAMUSCULAR; INTRAVENOUS at 08:08

## 2021-08-10 RX ADMIN — CEFTRIAXONE 1 G: 1 INJECTION, SOLUTION INTRAVENOUS at 11:08

## 2021-08-10 RX ADMIN — Medication 9 MG: at 09:08

## 2021-08-10 RX ADMIN — SODIUM CHLORIDE, SODIUM LACTATE, POTASSIUM CHLORIDE, AND CALCIUM CHLORIDE 1000 ML: .6; .31; .03; .02 INJECTION, SOLUTION INTRAVENOUS at 02:08

## 2021-08-10 RX ADMIN — KETOROLAC TROMETHAMINE 10 MG: 30 INJECTION, SOLUTION INTRAMUSCULAR; INTRAVENOUS at 05:08

## 2021-08-10 RX ADMIN — ACETAMINOPHEN 1000 MG: 500 TABLET ORAL at 02:08

## 2021-08-10 RX ADMIN — ACETAMINOPHEN 1000 MG: 500 TABLET, COATED ORAL at 09:08

## 2021-08-10 RX ADMIN — IPRATROPIUM BROMIDE AND ALBUTEROL SULFATE 3 ML: .5; 2.5 SOLUTION RESPIRATORY (INHALATION) at 12:08

## 2021-08-10 RX ADMIN — LEVOFLOXACIN 750 MG: 750 INJECTION, SOLUTION INTRAVENOUS at 05:08

## 2021-08-11 VITALS
TEMPERATURE: 98 F | RESPIRATION RATE: 14 BRPM | SYSTOLIC BLOOD PRESSURE: 125 MMHG | WEIGHT: 209.81 LBS | DIASTOLIC BLOOD PRESSURE: 61 MMHG | HEIGHT: 71 IN | BODY MASS INDEX: 29.37 KG/M2 | OXYGEN SATURATION: 98 % | HEART RATE: 86 BPM

## 2021-08-11 LAB
ALBUMIN SERPL BCP-MCNC: 3.6 G/DL (ref 3.5–5.2)
ALP SERPL-CCNC: 125 U/L (ref 55–135)
ALT SERPL W/O P-5'-P-CCNC: 141 U/L (ref 10–44)
ANION GAP SERPL CALC-SCNC: 10 MMOL/L (ref 8–16)
AST SERPL-CCNC: 102 U/L (ref 10–40)
BASOPHILS # BLD AUTO: 0.01 K/UL (ref 0–0.2)
BASOPHILS NFR BLD: 0.1 % (ref 0–1.9)
BILIRUB SERPL-MCNC: 0.5 MG/DL (ref 0.1–1)
BUN SERPL-MCNC: 12 MG/DL (ref 6–20)
CALCIUM SERPL-MCNC: 9.7 MG/DL (ref 8.7–10.5)
CHLORIDE SERPL-SCNC: 109 MMOL/L (ref 95–110)
CO2 SERPL-SCNC: 18 MMOL/L (ref 23–29)
CREAT SERPL-MCNC: 1 MG/DL (ref 0.5–1.4)
DIFFERENTIAL METHOD: ABNORMAL
EOSINOPHIL # BLD AUTO: 0 K/UL (ref 0–0.5)
EOSINOPHIL NFR BLD: 0 % (ref 0–8)
ERYTHROCYTE [DISTWIDTH] IN BLOOD BY AUTOMATED COUNT: 13.1 % (ref 11.5–14.5)
EST. GFR  (AFRICAN AMERICAN): >60 ML/MIN/1.73 M^2
EST. GFR  (NON AFRICAN AMERICAN): >60 ML/MIN/1.73 M^2
GLUCOSE SERPL-MCNC: 189 MG/DL (ref 70–110)
HAV IGM SERPL QL IA: NEGATIVE
HBV CORE IGM SERPL QL IA: NEGATIVE
HBV SURFACE AG SERPL QL IA: NEGATIVE
HCT VFR BLD AUTO: 43.6 % (ref 40–54)
HCV AB SERPL QL IA: NEGATIVE
HGB BLD-MCNC: 14.6 G/DL (ref 14–18)
IMM GRANULOCYTES # BLD AUTO: 0.02 K/UL (ref 0–0.04)
IMM GRANULOCYTES NFR BLD AUTO: 0.3 % (ref 0–0.5)
LYMPHOCYTES # BLD AUTO: 0.4 K/UL (ref 1–4.8)
LYMPHOCYTES NFR BLD: 5.8 % (ref 18–48)
MAGNESIUM SERPL-MCNC: 2 MG/DL (ref 1.6–2.6)
MCH RBC QN AUTO: 27.8 PG (ref 27–31)
MCHC RBC AUTO-ENTMCNC: 33.5 G/DL (ref 32–36)
MCV RBC AUTO: 83 FL (ref 82–98)
MONOCYTES # BLD AUTO: 0.1 K/UL (ref 0.3–1)
MONOCYTES NFR BLD: 1.7 % (ref 4–15)
NEUTROPHILS # BLD AUTO: 7.1 K/UL (ref 1.8–7.7)
NEUTROPHILS NFR BLD: 92.1 % (ref 38–73)
NRBC BLD-RTO: 0 /100 WBC
PHOSPHATE SERPL-MCNC: 1.4 MG/DL (ref 2.7–4.5)
PLATELET # BLD AUTO: 140 K/UL (ref 150–450)
PMV BLD AUTO: 12 FL (ref 9.2–12.9)
POTASSIUM SERPL-SCNC: 4.3 MMOL/L (ref 3.5–5.1)
PROT SERPL-MCNC: 7.4 G/DL (ref 6–8.4)
RBC # BLD AUTO: 5.26 M/UL (ref 4.6–6.2)
SARS-COV-2 RDRP RESP QL NAA+PROBE: NEGATIVE
SARS-COV-2 RNA RESP QL NAA+PROBE: DETECTED
SARS-COV-2- CYCLE NUMBER: 38.02
SODIUM SERPL-SCNC: 137 MMOL/L (ref 136–145)
WBC # BLD AUTO: 7.65 K/UL (ref 3.9–12.7)

## 2021-08-11 PROCEDURE — 63600175 PHARM REV CODE 636 W HCPCS: Performed by: EMERGENCY MEDICINE

## 2021-08-11 PROCEDURE — 80053 COMPREHEN METABOLIC PANEL: CPT | Performed by: NURSE PRACTITIONER

## 2021-08-11 PROCEDURE — 94664 DEMO&/EVAL PT USE INHALER: CPT

## 2021-08-11 PROCEDURE — 27000646 HC AEROBIKA DEVICE

## 2021-08-11 PROCEDURE — 85025 COMPLETE CBC W/AUTO DIFF WBC: CPT | Performed by: NURSE PRACTITIONER

## 2021-08-11 PROCEDURE — 25000242 PHARM REV CODE 250 ALT 637 W/ HCPCS: Performed by: INTERNAL MEDICINE

## 2021-08-11 PROCEDURE — 83735 ASSAY OF MAGNESIUM: CPT | Performed by: NURSE PRACTITIONER

## 2021-08-11 PROCEDURE — 25000003 PHARM REV CODE 250: Performed by: INTERNAL MEDICINE

## 2021-08-11 PROCEDURE — 36415 COLL VENOUS BLD VENIPUNCTURE: CPT | Performed by: NURSE PRACTITIONER

## 2021-08-11 PROCEDURE — U0002 COVID-19 LAB TEST NON-CDC: HCPCS | Performed by: INTERNAL MEDICINE

## 2021-08-11 PROCEDURE — 99233 PR SUBSEQUENT HOSPITAL CARE,LEVL III: ICD-10-PCS | Mod: ,,, | Performed by: INTERNAL MEDICINE

## 2021-08-11 PROCEDURE — 99233 SBSQ HOSP IP/OBS HIGH 50: CPT | Mod: ,,, | Performed by: INTERNAL MEDICINE

## 2021-08-11 PROCEDURE — 94640 AIRWAY INHALATION TREATMENT: CPT

## 2021-08-11 PROCEDURE — 63600175 PHARM REV CODE 636 W HCPCS: Performed by: NURSE PRACTITIONER

## 2021-08-11 PROCEDURE — 94761 N-INVAS EAR/PLS OXIMETRY MLT: CPT

## 2021-08-11 PROCEDURE — 84100 ASSAY OF PHOSPHORUS: CPT | Performed by: NURSE PRACTITIONER

## 2021-08-11 PROCEDURE — 99900035 HC TECH TIME PER 15 MIN (STAT)

## 2021-08-11 PROCEDURE — 94667 MNPJ CHEST WALL 1ST: CPT

## 2021-08-11 PROCEDURE — 94669 MECHANICAL CHEST WALL OSCILL: CPT

## 2021-08-11 RX ORDER — BUDESONIDE AND FORMOTEROL FUMARATE DIHYDRATE 160; 4.5 UG/1; UG/1
2 AEROSOL RESPIRATORY (INHALATION) EVERY 12 HOURS
Qty: 10.2 G | Refills: 1 | Status: SHIPPED | OUTPATIENT
Start: 2021-08-11 | End: 2022-03-20 | Stop reason: SDUPTHER

## 2021-08-11 RX ORDER — LEVOFLOXACIN 750 MG/1
750 TABLET ORAL DAILY
Qty: 7 TABLET | Refills: 0 | Status: SHIPPED | OUTPATIENT
Start: 2021-08-11 | End: 2021-08-18

## 2021-08-11 RX ADMIN — FAMOTIDINE 20 MG: 20 TABLET, FILM COATED ORAL at 08:08

## 2021-08-11 RX ADMIN — IPRATROPIUM BROMIDE AND ALBUTEROL SULFATE 3 ML: .5; 2.5 SOLUTION RESPIRATORY (INHALATION) at 12:08

## 2021-08-11 RX ADMIN — GUAIFENESIN 1200 MG: 600 TABLET, EXTENDED RELEASE ORAL at 08:08

## 2021-08-11 RX ADMIN — LEVOFLOXACIN 750 MG: 750 INJECTION, SOLUTION INTRAVENOUS at 04:08

## 2021-08-11 RX ADMIN — IPRATROPIUM BROMIDE AND ALBUTEROL SULFATE 3 ML: .5; 2.5 SOLUTION RESPIRATORY (INHALATION) at 07:08

## 2021-08-12 ENCOUNTER — PATIENT MESSAGE (OUTPATIENT)
Dept: ADMINISTRATIVE | Facility: CLINIC | Age: 26
End: 2021-08-12

## 2021-08-15 LAB
BACTERIA BLD CULT: NORMAL

## 2021-08-16 ENCOUNTER — TELEPHONE (OUTPATIENT)
Dept: ADMINISTRATIVE | Facility: CLINIC | Age: 26
End: 2021-08-16

## 2021-08-16 ENCOUNTER — NURSE TRIAGE (OUTPATIENT)
Dept: ADMINISTRATIVE | Facility: CLINIC | Age: 26
End: 2021-08-16

## 2021-12-10 ENCOUNTER — HOSPITAL ENCOUNTER (EMERGENCY)
Facility: HOSPITAL | Age: 26
Discharge: HOME | End: 2021-12-10
Attending: EMERGENCY MEDICINE
Payer: MEDICAID

## 2021-12-10 VITALS
DIASTOLIC BLOOD PRESSURE: 78 MMHG | TEMPERATURE: 98 F | HEIGHT: 71 IN | OXYGEN SATURATION: 98 % | BODY MASS INDEX: 30.1 KG/M2 | RESPIRATION RATE: 16 BRPM | HEART RATE: 88 BPM | SYSTOLIC BLOOD PRESSURE: 132 MMHG | WEIGHT: 215 LBS

## 2021-12-10 DIAGNOSIS — J06.9 VIRAL URI: Primary | ICD-10-CM

## 2021-12-10 DIAGNOSIS — J40 BRONCHITIS: ICD-10-CM

## 2021-12-10 LAB
INFLUENZA A, MOLECULAR: NEGATIVE
INFLUENZA B, MOLECULAR: NEGATIVE
SARS-COV-2 RDRP RESP QL NAA+PROBE: NEGATIVE
SPECIMEN SOURCE: NORMAL

## 2021-12-10 PROCEDURE — 99283 EMERGENCY DEPT VISIT LOW MDM: CPT | Mod: 25

## 2021-12-10 PROCEDURE — U0002 COVID-19 LAB TEST NON-CDC: HCPCS | Performed by: EMERGENCY MEDICINE

## 2021-12-10 PROCEDURE — 87502 INFLUENZA DNA AMP PROBE: CPT | Performed by: EMERGENCY MEDICINE

## 2021-12-10 RX ORDER — AZITHROMYCIN 250 MG/1
250 TABLET, FILM COATED ORAL DAILY
Qty: 6 TABLET | Refills: 0 | Status: SHIPPED | OUTPATIENT
Start: 2021-12-10 | End: 2021-12-20

## 2021-12-13 ENCOUNTER — PATIENT OUTREACH (OUTPATIENT)
Dept: EMERGENCY MEDICINE | Facility: HOSPITAL | Age: 26
End: 2021-12-13
Payer: MEDICAID

## 2022-03-20 ENCOUNTER — HOSPITAL ENCOUNTER (EMERGENCY)
Facility: HOSPITAL | Age: 27
Discharge: HOME | End: 2022-03-20
Attending: EMERGENCY MEDICINE
Payer: MEDICAID

## 2022-03-20 VITALS
WEIGHT: 215 LBS | OXYGEN SATURATION: 94 % | HEIGHT: 71 IN | SYSTOLIC BLOOD PRESSURE: 114 MMHG | DIASTOLIC BLOOD PRESSURE: 70 MMHG | TEMPERATURE: 99 F | HEART RATE: 92 BPM | RESPIRATION RATE: 18 BRPM | BODY MASS INDEX: 30.1 KG/M2

## 2022-03-20 DIAGNOSIS — R05.9 COUGH: Primary | ICD-10-CM

## 2022-03-20 DIAGNOSIS — D84.9 IMMUNOCOMPROMISED: ICD-10-CM

## 2022-03-20 DIAGNOSIS — J20.9 ACUTE BRONCHITIS, UNSPECIFIED ORGANISM: ICD-10-CM

## 2022-03-20 PROCEDURE — 99284 EMERGENCY DEPT VISIT MOD MDM: CPT | Mod: 25

## 2022-03-20 PROCEDURE — U0002 COVID-19 LAB TEST NON-CDC: HCPCS | Performed by: EMERGENCY MEDICINE

## 2022-03-20 PROCEDURE — 94640 AIRWAY INHALATION TREATMENT: CPT

## 2022-03-20 PROCEDURE — 25000242 PHARM REV CODE 250 ALT 637 W/ HCPCS: Performed by: EMERGENCY MEDICINE

## 2022-03-20 PROCEDURE — 87502 INFLUENZA DNA AMP PROBE: CPT | Performed by: EMERGENCY MEDICINE

## 2022-03-20 PROCEDURE — 25000003 PHARM REV CODE 250: Performed by: EMERGENCY MEDICINE

## 2022-03-20 RX ORDER — ALBUTEROL SULFATE 0.83 MG/ML
2.5 SOLUTION RESPIRATORY (INHALATION) 2 TIMES DAILY PRN
Qty: 500 ML | Refills: 3 | Status: SHIPPED | OUTPATIENT
Start: 2022-03-20 | End: 2022-08-01

## 2022-03-20 RX ORDER — IPRATROPIUM BROMIDE AND ALBUTEROL SULFATE 2.5; .5 MG/3ML; MG/3ML
3 SOLUTION RESPIRATORY (INHALATION)
Status: COMPLETED | OUTPATIENT
Start: 2022-03-20 | End: 2022-03-20

## 2022-03-20 RX ORDER — MOMETASONE FUROATE AND FORMOTEROL FUMARATE DIHYDRATE 100; 5 UG/1; UG/1
2 AEROSOL RESPIRATORY (INHALATION) 2 TIMES DAILY
Qty: 8.3 G | Refills: 1 | Status: SHIPPED | OUTPATIENT
Start: 2022-03-20 | End: 2023-06-13

## 2022-03-20 RX ORDER — LEVOFLOXACIN 750 MG/1
750 TABLET ORAL DAILY
Qty: 7 TABLET | Refills: 0 | Status: SHIPPED | OUTPATIENT
Start: 2022-03-20 | End: 2022-03-27

## 2022-03-20 RX ORDER — FLUTICASONE PROPIONATE 50 MCG
2 SPRAY, SUSPENSION (ML) NASAL 2 TIMES DAILY
Qty: 16 G | Refills: 3 | Status: SHIPPED | OUTPATIENT
Start: 2022-03-20 | End: 2022-04-19

## 2022-03-20 RX ADMIN — IPRATROPIUM BROMIDE AND ALBUTEROL SULFATE 3 ML: 2.5; .5 SOLUTION RESPIRATORY (INHALATION) at 04:03

## 2022-03-20 RX ADMIN — LEVOFLOXACIN 750 MG: 500 TABLET, FILM COATED ORAL at 05:03

## 2022-03-20 NOTE — ED PROVIDER NOTES
Encounter Date: 3/20/2022       History     Chief Complaint   Patient presents with    Fever     No appetite     Cough     Patient is a 26-year-old male with a past medical history of immunoglobulin deficiency who gives himself home injections and follows with immunologist and several prior pneumonias who presents the emergency room for evaluation of increasing cough.  The patient states he had a fever yesterday 103.8.  He has increasing cough and body aches with productive sputum.  He has nausea but no vomiting no diarrhea.  His immunoglobulin injection is supposed to be today but his immunologist was not able to get prior authorization for this dose and therefore he will follow-up with them tomorrow.  He has been taking amoxicillin since Thursday which is a prescription provided to him by his immunologist in case he starts to get sick.  He has no rashes chest pain hemoptysis significant shortness of breath.  He is not a smoker.  He does have some wheezing.        Review of patient's allergies indicates:   Allergen Reactions    Singulair [montelukast] Other (See Comments)     arrhythmias     Past Medical History:   Diagnosis Date    Immunoglobulin deficiency     Immunoglobulin deficiency     Pneumonia      Past Surgical History:   Procedure Laterality Date    ADENOIDECTOMY      BIOPSY N/A 9/23/2020    Procedure: BIOPSY;  Surgeon: Jeanmariec Diagnostic Provider;  Location: [facility];  Service: Interventional Radiology;  Laterality: N/A;    MANDIBLE FRACTURE SURGERY      SINUS SURGERY      TONSILLECTOMY      TYMPANOSTOMY TUBE PLACEMENT      TYMPANOSTOMY TUBE PLACEMENT      x 4     Family History   Problem Relation Age of Onset    Cancer Mother     Liver disease Father      Social History     Tobacco Use    Smoking status: Never Smoker    Smokeless tobacco: Never Used   Substance Use Topics    Alcohol use: Yes    Drug use: No     Review of Systems   Constitutional: Positive for fatigue and fever. Negative  for appetite change.   HENT: Positive for congestion, rhinorrhea and sinus pressure. Negative for ear pain and sore throat.    Eyes: Negative for pain and visual disturbance.   Respiratory: Positive for cough and wheezing. Negative for shortness of breath.    Cardiovascular: Negative for chest pain.   Gastrointestinal: Negative for abdominal pain, diarrhea, nausea and vomiting.   Genitourinary: Negative for difficulty urinating, dysuria and flank pain.   Musculoskeletal: Negative for arthralgias.   Skin: Negative for rash.   Neurological: Negative for weakness, light-headedness, numbness and headaches.   All other systems reviewed and are negative.      Physical Exam     Initial Vitals [03/20/22 1540]   BP Pulse Resp Temp SpO2   114/70 109 20 98.9 °F (37.2 °C) 97 %      MAP       --         Physical Exam    Nursing note and vitals reviewed.  Constitutional: He appears well-developed and well-nourished. No distress.   HENT:   Head: Normocephalic and atraumatic.   Mouth/Throat: Oropharynx is clear and moist.   Eyes: Conjunctivae and EOM are normal. Pupils are equal, round, and reactive to light.   Neck: Neck supple.   Normal range of motion.  Cardiovascular: Normal rate, regular rhythm, normal heart sounds and intact distal pulses. Exam reveals no gallop and no friction rub.    No murmur heard.  Pulmonary/Chest: No respiratory distress. He has wheezes. He has rales (Right lower lobe greater than left lower lobe posterior and here).   Abdominal: Abdomen is soft. Bowel sounds are normal. There is no abdominal tenderness.   Musculoskeletal:         General: Normal range of motion.      Cervical back: Normal range of motion and neck supple.     Neurological: He is alert and oriented to person, place, and time. He has normal strength. No sensory deficit. GCS score is 15. GCS eye subscore is 4. GCS verbal subscore is 5. GCS motor subscore is 6.   Skin: Skin is warm and dry.   Psychiatric: He has a normal mood and affect.          ED Course   Procedures  Labs Reviewed   INFLUENZA A & B BY MOLECULAR   SARS-COV-2 RNA AMPLIFICATION, QUAL          Imaging Results          X-Ray Chest PA And Lateral (Final result)  Result time 03/20/22 16:17:33   Procedure changed from X-Ray Chest AP Portable     Final result by Antoni Mcnamara Jr., MD (03/20/22 16:17:33)                 Impression:      No acute abnormality.      Electronically signed by: Antoni Mcnamara MD  Date:    03/20/2022  Time:    16:17             Narrative:    EXAMINATION:  XR CHEST PA AND LATERAL    CLINICAL HISTORY:  pain; Cough, unspecified    TECHNIQUE:  PA and lateral views of the chest were performed.    COMPARISON:  Chest of December 10, 2021    FINDINGS:  The lungs are clear, with normal appearance of pulmonary vasculature and no pleural effusion or pneumothorax.    The cardiac silhouette is normal in size. The hilar and mediastinal contours are unremarkable.    Bones are intact.                                 Medications   levoFLOXacin tablet 750 mg (has no administration in time range)   albuterol-ipratropium 2.5 mg-0.5 mg/3 mL nebulizer solution 3 mL (3 mLs Nebulization Given 3/20/22 1646)     Medical Decision Making:   Patient has a history of being immunocompromised.  X-ray shows no signs of pneumonia but patient appears have some rales at the bases with subtle wheezing.  Given his history of being immune compromised with fever yesterday will start him on antibiotics and also refill his pulmonary meds.  Patient needs referral to a new pulmonologist given that he used to see Dr. Alvares who left.  He is stable for discharge at this time with return precautions.                      Clinical Impression:   Final diagnoses:  [R05.9] Cough (Primary)  [J20.9] Acute bronchitis, unspecified organism  [D84.9] Immunocompromised          ED Disposition Condition    Discharge Stable        ED Prescriptions     Medication Sig Dispense Start Date End Date Auth. Provider     albuterol (PROVENTIL) 2.5 mg /3 mL (0.083 %) nebulizer solution Take 3 mLs (2.5 mg total) by nebulization 2 (two) times daily as needed for Wheezing. Use twice daily prior to 3% saline nebulized 500 mL 3/20/2022  Kishan Mahmood MD    mometasone-formoterol (DULERA) 100-5 mcg/actuation HFAA Inhale 2 Inhalers into the lungs 2 (two) times a day. Controller 8.3 g 3/20/2022 4/19/2022 Kishan Mahmood MD    fluticasone propionate (FLONASE) 50 mcg/actuation nasal spray 2 sprays (100 mcg total) by Each Nostril route 2 (two) times daily. 16 g 3/20/2022 4/19/2022 Kishan Mahmood MD    levoFLOXacin (LEVAQUIN) 750 MG tablet Take 1 tablet (750 mg total) by mouth once daily. for 7 doses 7 tablet 3/20/2022 3/27/2022 Kishan Mahmood MD        Follow-up Information     Follow up With Specialties Details Why Contact Info    Chrissy Mckeon MD Pulmonary Disease, Critical Care Medicine Schedule an appointment as soon as possible for a visit  Call to schedule follow-up appointment with the pulmonologist [facility]  [address]  [facility] [address]  [phone]      [facility] Emergency Dept Emergency Medicine  If symptoms worsen [address] [place] [address] [id number] [phone]           Kishan Mahmood MD  03/20/22 7527

## 2022-03-20 NOTE — ED NOTES
Pt identifiers checked and accurate with Charly Mattson    Pt presents to ED with complaints of cough and fever x 3 days, reports taking PRN amoxicillin x 4 days. Pt denies all other complaints at this time.

## 2022-04-20 NOTE — CONSULTS
Charly FAULKNER [place] [id number] is a 24 y.o. male who has been consulted for vancomycin dosing.    The patient has the following labs:     Date Creatinine (mg/dl)    BUN WBC Count   5/14/2019 Estimated Creatinine Clearance: 121.3 mL/min (based on SCr of 1 mg/dL). Lab Results   Component Value Date    BUN 10 05/14/2019     Lab Results   Component Value Date    WBC 11.30 05/14/2019        Current weight is 78.9 kg (174 lb)    The patient will be started on vancomycin at a dose of 1250 mg every 8 hours (16 mg/kg/dose).  The vancomycin trough has been ordered for 05/15 at 1930.  Target trough goal is 15 to 20 mg/dL for pneumonia.    Patient will be followed by pharmacy for changes in renal function, toxicity, and efficacy.   Thank you for allowing us to participate in this patient's care.     Rich Jessica, PharmD        FAMILY MEDICINE  - PROGRESS NOTE    Date:  4/20/2022  Mirella Garnica  [id number]      Chief Complaint   Patient presents with    Shortness of Breath         Interval History:  Improved, she is feeling a little better and her vitals have improved. No significant events overnight. Blood sugars improved since I decreased her insulin dose. No significant events overnight. Specialists notes, labs & imaging reviewed.       Subjective  Constitutional: positive for obesity  Respiratory: positive for shortness of breath  Cardiovascular: positive for lower extremity edema  Musculoskeletal:positive for arthralgias and myalgias  Behavioral/Psych: positive for anxiety and depression  Endocrine: positive for diabetic symptoms including hyperglycemia:    Objective:    BP (!) 113/53   Pulse 65   Temp 98.2 °F (36.8 °C) (Axillary)   Resp 18   Ht 5' 5\" (1.651 m)   Wt 264 lb 12.4 oz (120.1 kg)   SpO2 96%   BMI 44.06 kg/m²   General appearance - alert, well appearing, and in no distress and overweight  Mental status - alert, oriented to person, place, and time  Eyes - pupils equal and reactive, extraocular eye movements intact  Ears - hearing grossly normal bilaterally  Nose - normal and patent, no erythema, discharge or polyps  Mouth - mucous membranes moist, pharynx normal without lesions  Neck - supple, no significant adenopathy  Lymphatics - no palpable lymphadenopathy, no hepatosplenomegaly  Chest - clear to auscultation, no wheezes, rales or rhonchi, symmetric air entry, decreased air entry noted posteriorly  Heart - normal rate, regular rhythm, normal S1, S2, no murmurs, rubs, clicks or gallops  Abdomen - soft, nontender, nondistended, no masses or organomegaly  Breasts - not examined  Back exam - not examined  Neurological - alert, oriented, normal speech, no focal findings or movement disorder noted  Musculoskeletal - no joint tenderness, deformity or swelling  Extremities - pedal edema 1 +  Skin - normal coloration and turgor, no rashes, no suspicious skin lesions noted    Data:   Medications:   Current Facility-Administered Medications   Medication Dose Route Frequency Provider Last Rate Last Admin    furosemide (LASIX) tablet 80 mg  80 mg Oral Daily Franklyn Yonis Arnold MD        insulin glargine (LANTUS) injection vial 60 Units  60 Units SubCUTAneous BID Danette Foss MD   60 Units at 04/19/22 2106    polyethylene glycol (GLYCOLAX) packet 17 g  17 g Oral Daily PRN Raza Hurtado MD        aspirin EC tablet 81 mg  81 mg Oral Daily Nani Wayne MD   81 mg at 04/19/22 0856    insulin lispro (HUMALOG) injection vial 0-18 Units  0-18 Units SubCUTAneous TID WC Stacy Granado MD   3 Units at 04/19/22 1722    insulin lispro (HUMALOG) injection vial 0-9 Units  0-9 Units SubCUTAneous Nightly Stacy Granado MD   2 Units at 04/19/22 2107    sodium chloride flush 0.9 % injection 5-40 mL  5-40 mL IntraVENous 2 times per day Gio Redhead, DO   10 mL at 04/19/22 2100    sodium chloride flush 0.9 % injection 5-40 mL  5-40 mL IntraVENous PRN Gio Redhead, DO        0.9 % sodium chloride infusion   IntraVENous PRN Gio Redhead, DO        ondansetron (ZOFRAN-ODT) disintegrating tablet 4 mg  4 mg Oral Q8H PRN Gio Redhead, DO        Or    [facility]) injection 4 mg  4 mg IntraVENous Q6H PRN Gio Redhead, DO        ranolazine [facility]) extended release tablet 1,000 mg  1,000 mg Oral BID Danette Foss MD   1,000 mg at 04/19/22 2104    busPIRone (BUSPAR) tablet 7.5 mg  7.5 mg Oral TID Danette Foss MD   7.5 mg at 04/19/22 2101    pantoprazole (PROTONIX) tablet 40 mg  40 mg Oral QAM AC Danette Foss MD   40 mg at 04/19/22 0640    atorvastatin (LIPITOR) tablet 80 mg  80 mg Oral Daily Danette Foss MD   80 mg at 04/19/22 0855    febuxostat (ULORIC) tablet 40 mg  40 mg Oral Daily Danette Foss MD   40 mg at 04/19/22 5797    docusate sodium (COLACE) capsule 100 mg  100 mg Oral BID Orlene Hatchet, MD   100 mg at 04/19/22 2101    [Held by provider] tamsulosin (FLOMAX) capsule 0.4 mg  0.4 mg Oral Daily Orlene Hatchet, MD   0.4 mg at 04/18/22 0815    [Held by provider] insulin lispro (HUMALOG) injection vial 15 Units  15 Units SubCUTAneous TID AC Orlene Hatchet, MD   15 Units at 04/16/22 1713    sodium bicarbonate tablet 650 mg  650 mg Oral TID Orlene Hatchet, MD   650 mg at 04/19/22 2101    traZODone (DESYREL) tablet 50 mg  50 mg Oral Nightly Orlene Hatchet, MD   50 mg at 04/19/22 2246    gabapentin (NEURONTIN) capsule 300 mg  300 mg Oral BID Orlene Hatchet, MD   300 mg at 04/19/22 2101    melatonin tablet 9 mg  9 mg Oral Nightly Orlene Hatchet, MD   9 mg at 04/19/22 2246    lidocaine-prilocaine (EMLA) cream   Topical PRN Orlene Hatchet, MD        carvedilol (COREG) tablet 3.125 mg  3.125 mg Oral BID Orlene Hatchet, MD   3.125 mg at 04/19/22 2101    heparin (porcine) injection 5,000 Units  5,000 Units SubCUTAneous 3 times per day Orlene Hatchet, MD   5,000 Units at 04/19/22 2101    sodium phosphate 10 mmol in dextrose 5 % 250 mL IVPB  10 mmol IntraVENous Once Lazarus Larsson, MD        fluconazole (DIFLUCAN) in 0.9 % sodium chloride IVPB 200 mg  200 mg IntraVENous Q24H Christine Branham  mL/hr at 04/19/22 1706 200 mg at 04/19/22 1706    cefTRIAXone (ROCEPHIN) 2000 mg IVPB in D5W 50ml minibag  2,000 mg IntraVENous Q24H Christine Branham MD   Stopped at 04/19/22 2145    anticoagulant sodium citrate 4 % injection 1.6 mL  1.6 mL IntraCATHeter PRN Lazarus Larsson, MD   1.6 mL at 04/19/22 1600    anticoagulant sodium citrate 4 % injection 1.6 mL  1.6 mL IntraCATHeter PRN Lazarus Larsson, MD   1.6 mL at 04/19/22 1601    glucose (GLUTOSE) 40 % oral gel 15 g  15 g Oral PRN Orlene Hatchet, MD        dextrose 50 % IV solution  12.5 g IntraVENous PRN Orlene Hatchet, MD        glucagon (rDNA) injection 1 mg  1 mg IntraMUSCular PRN Saúl Anderson MD        miconazole (MICOTIN) 2 % powder   Topical BID Saúl Anderson MD   Given at 04/19/22 2103    acetaminophen (TYLENOL) tablet 650 mg  650 mg Oral Q4H PRN Marti TrinidadDO tita   650 mg at 04/16/22 0713       Intake/Output Summary (Last 24 hours) at 4/20/2022 0602  Last data filed at 4/19/2022 1831  Gross per 24 hour   Intake 1200 ml   Output --   Net 1200 ml     Recent Results (from the past 24 hour(s))   POC Glucose Fingerstick    Collection Time: 04/19/22  6:54 AM   Result Value Ref Range    POC Glucose 56 (L) 65 - 105 mg/dL   Basic Metabolic Panel    Collection Time: 04/19/22  7:11 AM   Result Value Ref Range    Glucose 78 70 - 99 mg/dL    BUN 35 (H) 8 - 23 mg/dL    CREATININE 4.61 (H) 0.50 - 0.90 mg/dL    Calcium 8.8 8.6 - 10.4 mg/dL    Sodium 136 135 - 144 mmol/L    Potassium 4.3 3.7 - 5.3 mmol/L    Chloride 98 98 - 107 mmol/L    CO2 23 20 - 31 mmol/L    Anion Gap 15 9 - 17 mmol/L    GFR Non-African American 10 (L) >60 mL/min    GFR  12 (L) >60 mL/min    GFR Comment         CBC with Auto Differential    Collection Time: 04/19/22  9:18 AM   Result Value Ref Range    WBC 6.0 3.5 - 11.0 k/uL    RBC 3.42 (L) 4.0 - 5.2 m/uL    Hemoglobin 10.8 (L) 12.0 - 16.0 g/dL    Hematocrit 32.3 (L) 36 - 46 %    MCV 94.4 80 - 100 fL    MCH 31.5 26 - 34 pg    MCHC 33.3 31 - 37 g/dL    RDW 15.7 (H) 11.5 - 14.9 %    Platelets 340 (L) 858 - 450 k/uL    MPV 10.1 6.0 - 12.0 fL    Seg Neutrophils 75 (H) 36 - 66 %    Lymphocytes 16 (L) 24 - 44 %    Monocytes 7 1 - 7 %    Eosinophils % 2 0 - 4 %    Basophils 0 0 - 2 %    Segs Absolute 4.50 1.3 - 9.1 k/uL    Absolute Lymph # 1.00 1.0 - 4.8 k/uL    Absolute Mono # 0.40 0.1 - 1.3 k/uL    Absolute Eos # 0.10 0.0 - 0.4 k/uL    Basophils Absolute 0.00 0.0 - 0.2 k/uL   POC Glucose Fingerstick    Collection Time: 04/19/22 11:35 AM   Result Value Ref Range    POC Glucose 185 (H) 65 - 105 mg/dL   POC Glucose Fingerstick    Collection Time: 04/19/22  4:55 PM   Result Value Ref Range    POC Glucose 155 (H) 65 - 105 mg/dL   POC Glucose Fingerstick    Collection Time: 04/19/22  8:32 PM   Result Value Ref Range    POC Glucose 163 (H) 65 - 105 mg/dL     -----------------------------------------------------------------  RAD:  EKG:  Micro:     Assessment & Plan:    Patient Active Problem List:     Atherosclerosis of coronary artery bypass graft of native heart without angina pectoris     Chronic diastolic heart failure (HCC)     Diabetic polyneuropathy associated with type 2 diabetes mellitus ([facility] [address]     History of coronary artery bypass graft     Iron deficiency anemia     Spinal stenosis of lumbar region with neurogenic claudication     Mixed hyperlipidemia     Type 2 diabetes mellitus with kidney complication, with long-term current use of insulin (HCC)     Thyroid nodule greater than or equal to 1 cm in diameter incidentally noted on imaging study     Essential hypertension     Chronic ischemic heart disease     Ischemic stroke of frontal lobe (HCC)     Morbid obesity with BMI of 40.0-44.9, adult (HCC)     Disequilibrium syndrome     Generalized weakness     Long-term memory loss     Muscle right arm weakness     Anxiety     Chronic midline low back pain with bilateral sciatica     Tremor     COVID     End-stage renal disease (HCC)     DKA, type 2, not at goal [facility])     Hypokalemia           Plan:  -DKA - resolved, hypoglycemic this am, regular Lantus dose decreased to 60 units, SS coverage and carb control diet continued.  -Chronic hypoxic respiratory failure - on continuous oxygen per NC, Pulmonology managing. -ESRD on dialysis - Nephrology managing.  -Acute on chronic CHF exacerbation - volume status being managed by Nephrology. -UTI - on Rocephin. -TIA like symptoms - MRI & MRA negative, Neurology following. -RLL infiltrate -repeat CXR shows mild congestion.  -Generalized weakness - PT/OT treating.   -D/C planning ongoing.  -Complete orders per chart.     See orders   Disposition:    Electronically signed by Diana Sánchez MD on 4/20/2022 at 6:02 AM

## 2022-05-03 ENCOUNTER — HOSPITAL ENCOUNTER (OUTPATIENT)
Facility: HOSPITAL | Age: 27
Discharge: HOME | End: 2022-05-04
Attending: EMERGENCY MEDICINE | Admitting: INTERNAL MEDICINE
Payer: MEDICAID

## 2022-05-03 DIAGNOSIS — R07.9 CHEST PAIN: ICD-10-CM

## 2022-05-03 DIAGNOSIS — J47.1 BRONCHIECTASIS WITH (ACUTE) EXACERBATION: Primary | ICD-10-CM

## 2022-05-03 DIAGNOSIS — R65.10 SIRS (SYSTEMIC INFLAMMATORY RESPONSE SYNDROME): ICD-10-CM

## 2022-05-03 DIAGNOSIS — R00.0 TACHYCARDIA: ICD-10-CM

## 2022-05-03 PROBLEM — J18.9 PNEUMONIA OF RIGHT LOWER LOBE DUE TO INFECTIOUS ORGANISM: Status: RESOLVED | Noted: 2020-05-28 | Resolved: 2022-05-03

## 2022-05-03 PROBLEM — J18.9 PNEUMONIA: Status: RESOLVED | Noted: 2020-05-28 | Resolved: 2022-05-03

## 2022-05-03 PROBLEM — J47.9 BRONCHIECTASIS: Status: ACTIVE | Noted: 2022-05-03

## 2022-05-03 PROBLEM — A41.9 SEPSIS: Status: RESOLVED | Noted: 2021-08-10 | Resolved: 2022-05-03

## 2022-05-03 PROBLEM — J45.51 SEVERE PERSISTENT ASTHMA WITH ACUTE EXACERBATION: Status: RESOLVED | Noted: 2019-05-14 | Resolved: 2022-05-03

## 2022-05-03 LAB
ALBUMIN SERPL BCP-MCNC: 4.3 G/DL (ref 3.5–5.2)
ALP SERPL-CCNC: 106 U/L (ref 55–135)
ALT SERPL W/O P-5'-P-CCNC: 101 U/L (ref 10–44)
ANION GAP SERPL CALC-SCNC: 12 MMOL/L (ref 8–16)
AST SERPL-CCNC: 96 U/L (ref 10–40)
BACTERIA #/AREA URNS HPF: NEGATIVE /HPF
BASOPHILS # BLD AUTO: 0.05 K/UL (ref 0–0.2)
BASOPHILS NFR BLD: 0.5 % (ref 0–1.9)
BILIRUB SERPL-MCNC: 1 MG/DL (ref 0.1–1)
BILIRUB UR QL STRIP: NEGATIVE
BUN SERPL-MCNC: 16 MG/DL (ref 6–20)
CALCIUM SERPL-MCNC: 8.9 MG/DL (ref 8.7–10.5)
CHLORIDE SERPL-SCNC: 102 MMOL/L (ref 95–110)
CK SERPL-CCNC: 86 U/L (ref 20–200)
CLARITY UR: CLEAR
CO2 SERPL-SCNC: 22 MMOL/L (ref 23–29)
COLOR UR: YELLOW
CREAT SERPL-MCNC: 0.9 MG/DL (ref 0.5–1.4)
CREAT SERPL-MCNC: 1.1 MG/DL (ref 0.5–1.4)
CRP SERPL-MCNC: 1.41 MG/DL
DIFFERENTIAL METHOD: ABNORMAL
EOSINOPHIL # BLD AUTO: 0 K/UL (ref 0–0.5)
EOSINOPHIL NFR BLD: 0.1 % (ref 0–8)
ERYTHROCYTE [DISTWIDTH] IN BLOOD BY AUTOMATED COUNT: 13.4 % (ref 11.5–14.5)
EST. GFR  (AFRICAN AMERICAN): >60 ML/MIN/1.73 M^2
EST. GFR  (NON AFRICAN AMERICAN): >60 ML/MIN/1.73 M^2
GLUCOSE SERPL-MCNC: 103 MG/DL (ref 70–110)
GLUCOSE UR QL STRIP: NEGATIVE
GROUP A STREP, MOLECULAR: NEGATIVE
HCT VFR BLD AUTO: 47.8 % (ref 40–54)
HGB BLD-MCNC: 15.3 G/DL (ref 14–18)
HGB UR QL STRIP: NEGATIVE
HYALINE CASTS #/AREA URNS LPF: 1 /LPF
IMM GRANULOCYTES # BLD AUTO: 0.03 K/UL (ref 0–0.04)
IMM GRANULOCYTES NFR BLD AUTO: 0.3 % (ref 0–0.5)
INFLUENZA A, MOLECULAR: NEGATIVE
INFLUENZA B, MOLECULAR: NEGATIVE
KETONES UR QL STRIP: ABNORMAL
LACTATE SERPL-SCNC: 2.3 MMOL/L (ref 0.5–1.9)
LACTATE SERPL-SCNC: 2.6 MMOL/L (ref 0.5–1.9)
LEUKOCYTE ESTERASE UR QL STRIP: NEGATIVE
LIPASE SERPL-CCNC: 30 U/L (ref 4–60)
LYMPHOCYTES # BLD AUTO: 0.4 K/UL (ref 1–4.8)
LYMPHOCYTES NFR BLD: 3.9 % (ref 18–48)
MAGNESIUM SERPL-MCNC: 1.6 MG/DL (ref 1.6–2.6)
MCH RBC QN AUTO: 26.3 PG (ref 27–31)
MCHC RBC AUTO-ENTMCNC: 32 G/DL (ref 32–36)
MCV RBC AUTO: 82 FL (ref 82–98)
MICROSCOPIC COMMENT: NORMAL
MONOCYTES # BLD AUTO: 0.6 K/UL (ref 0.3–1)
MONOCYTES NFR BLD: 5.4 % (ref 4–15)
NEUTROPHILS # BLD AUTO: 9.7 K/UL (ref 1.8–7.7)
NEUTROPHILS NFR BLD: 89.8 % (ref 38–73)
NITRITE UR QL STRIP: NEGATIVE
NRBC BLD-RTO: 0 /100 WBC
PH UR STRIP: 6 [PH] (ref 5–8)
PLATELET # BLD AUTO: 176 K/UL (ref 150–450)
PMV BLD AUTO: 11.2 FL (ref 9.2–12.9)
POTASSIUM SERPL-SCNC: 3.3 MMOL/L (ref 3.5–5.1)
PROCALCITONIN SERPL IA-MCNC: 0.07 NG/ML (ref 0–0.5)
PROT SERPL-MCNC: 7.8 G/DL (ref 6–8.4)
PROT UR QL STRIP: ABNORMAL
RBC # BLD AUTO: 5.81 M/UL (ref 4.6–6.2)
RBC #/AREA URNS HPF: 0 /HPF (ref 0–4)
SAMPLE: NORMAL
SARS-COV-2 RDRP RESP QL NAA+PROBE: NEGATIVE
SODIUM SERPL-SCNC: 136 MMOL/L (ref 136–145)
SP GR UR STRIP: >1.03 (ref 1–1.03)
SPECIMEN SOURCE: NORMAL
SQUAMOUS #/AREA URNS HPF: 1 /HPF
URN SPEC COLLECT METH UR: ABNORMAL
UROBILINOGEN UR STRIP-ACNC: ABNORMAL EU/DL
WBC # BLD AUTO: 10.83 K/UL (ref 3.9–12.7)
WBC #/AREA URNS HPF: 1 /HPF (ref 0–5)

## 2022-05-03 PROCEDURE — 87798 DETECT AGENT NOS DNA AMP: CPT | Performed by: NURSE PRACTITIONER

## 2022-05-03 PROCEDURE — 80053 COMPREHEN METABOLIC PANEL: CPT | Performed by: STUDENT IN AN ORGANIZED HEALTH CARE EDUCATION/TRAINING PROGRAM

## 2022-05-03 PROCEDURE — 87651 STREP A DNA AMP PROBE: CPT | Performed by: STUDENT IN AN ORGANIZED HEALTH CARE EDUCATION/TRAINING PROGRAM

## 2022-05-03 PROCEDURE — 82550 ASSAY OF CK (CPK): CPT | Performed by: EMERGENCY MEDICINE

## 2022-05-03 PROCEDURE — 84145 PROCALCITONIN (PCT): CPT | Performed by: STUDENT IN AN ORGANIZED HEALTH CARE EDUCATION/TRAINING PROGRAM

## 2022-05-03 PROCEDURE — 83690 ASSAY OF LIPASE: CPT | Performed by: STUDENT IN AN ORGANIZED HEALTH CARE EDUCATION/TRAINING PROGRAM

## 2022-05-03 PROCEDURE — 99285 EMERGENCY DEPT VISIT HI MDM: CPT | Mod: 25

## 2022-05-03 PROCEDURE — 99900035 HC TECH TIME PER 15 MIN (STAT)

## 2022-05-03 PROCEDURE — 93010 EKG 12-LEAD: ICD-10-PCS | Mod: ,,, | Performed by: INTERNAL MEDICINE

## 2022-05-03 PROCEDURE — 83735 ASSAY OF MAGNESIUM: CPT | Performed by: STUDENT IN AN ORGANIZED HEALTH CARE EDUCATION/TRAINING PROGRAM

## 2022-05-03 PROCEDURE — 85025 COMPLETE CBC W/AUTO DIFF WBC: CPT | Performed by: STUDENT IN AN ORGANIZED HEALTH CARE EDUCATION/TRAINING PROGRAM

## 2022-05-03 PROCEDURE — 36415 COLL VENOUS BLD VENIPUNCTURE: CPT | Performed by: STUDENT IN AN ORGANIZED HEALTH CARE EDUCATION/TRAINING PROGRAM

## 2022-05-03 PROCEDURE — 83605 ASSAY OF LACTIC ACID: CPT | Performed by: STUDENT IN AN ORGANIZED HEALTH CARE EDUCATION/TRAINING PROGRAM

## 2022-05-03 PROCEDURE — 86140 C-REACTIVE PROTEIN: CPT | Performed by: STUDENT IN AN ORGANIZED HEALTH CARE EDUCATION/TRAINING PROGRAM

## 2022-05-03 PROCEDURE — 25000242 PHARM REV CODE 250 ALT 637 W/ HCPCS: Performed by: NURSE PRACTITIONER

## 2022-05-03 PROCEDURE — 87040 BLOOD CULTURE FOR BACTERIA: CPT | Performed by: STUDENT IN AN ORGANIZED HEALTH CARE EDUCATION/TRAINING PROGRAM

## 2022-05-03 PROCEDURE — 94667 MNPJ CHEST WALL 1ST: CPT

## 2022-05-03 PROCEDURE — 81001 URINALYSIS AUTO W/SCOPE: CPT | Performed by: STUDENT IN AN ORGANIZED HEALTH CARE EDUCATION/TRAINING PROGRAM

## 2022-05-03 PROCEDURE — 25000003 PHARM REV CODE 250: Performed by: NURSE PRACTITIONER

## 2022-05-03 PROCEDURE — 63600175 PHARM REV CODE 636 W HCPCS: Performed by: NURSE PRACTITIONER

## 2022-05-03 PROCEDURE — 93010 ELECTROCARDIOGRAM REPORT: CPT | Mod: ,,, | Performed by: INTERNAL MEDICINE

## 2022-05-03 PROCEDURE — 94640 AIRWAY INHALATION TREATMENT: CPT

## 2022-05-03 PROCEDURE — 96365 THER/PROPH/DIAG IV INF INIT: CPT | Mod: 59

## 2022-05-03 PROCEDURE — 99900031 HC PATIENT EDUCATION (STAT)

## 2022-05-03 PROCEDURE — 63600175 PHARM REV CODE 636 W HCPCS: Performed by: STUDENT IN AN ORGANIZED HEALTH CARE EDUCATION/TRAINING PROGRAM

## 2022-05-03 PROCEDURE — 93005 ELECTROCARDIOGRAM TRACING: CPT | Performed by: INTERNAL MEDICINE

## 2022-05-03 PROCEDURE — U0002 COVID-19 LAB TEST NON-CDC: HCPCS | Performed by: STUDENT IN AN ORGANIZED HEALTH CARE EDUCATION/TRAINING PROGRAM

## 2022-05-03 PROCEDURE — 94761 N-INVAS EAR/PLS OXIMETRY MLT: CPT

## 2022-05-03 PROCEDURE — 25000003 PHARM REV CODE 250: Performed by: STUDENT IN AN ORGANIZED HEALTH CARE EDUCATION/TRAINING PROGRAM

## 2022-05-03 PROCEDURE — 96367 TX/PROPH/DG ADDL SEQ IV INF: CPT

## 2022-05-03 PROCEDURE — G0378 HOSPITAL OBSERVATION PER HR: HCPCS

## 2022-05-03 PROCEDURE — 25500020 PHARM REV CODE 255: Performed by: STUDENT IN AN ORGANIZED HEALTH CARE EDUCATION/TRAINING PROGRAM

## 2022-05-03 PROCEDURE — 87502 INFLUENZA DNA AMP PROBE: CPT | Mod: 59 | Performed by: STUDENT IN AN ORGANIZED HEALTH CARE EDUCATION/TRAINING PROGRAM

## 2022-05-03 PROCEDURE — 96361 HYDRATE IV INFUSION ADD-ON: CPT

## 2022-05-03 RX ORDER — MAGNESIUM SULFATE HEPTAHYDRATE 40 MG/ML
2 INJECTION, SOLUTION INTRAVENOUS ONCE
Status: COMPLETED | OUTPATIENT
Start: 2022-05-03 | End: 2022-05-04

## 2022-05-03 RX ORDER — MAGNESIUM SULFATE HEPTAHYDRATE 40 MG/ML
2 INJECTION, SOLUTION INTRAVENOUS
Status: DISCONTINUED | OUTPATIENT
Start: 2022-05-03 | End: 2022-05-04 | Stop reason: HOSPADM

## 2022-05-03 RX ORDER — MAGNESIUM SULFATE HEPTAHYDRATE 40 MG/ML
4 INJECTION, SOLUTION INTRAVENOUS
Status: DISCONTINUED | OUTPATIENT
Start: 2022-05-03 | End: 2022-05-04 | Stop reason: HOSPADM

## 2022-05-03 RX ORDER — LEVOFLOXACIN 5 MG/ML
750 INJECTION, SOLUTION INTRAVENOUS
Status: DISCONTINUED | OUTPATIENT
Start: 2022-05-03 | End: 2022-05-04 | Stop reason: HOSPADM

## 2022-05-03 RX ORDER — SODIUM CHLORIDE FOR INHALATION 3 %
4 VIAL, NEBULIZER (ML) INHALATION 2 TIMES DAILY
COMMUNITY
End: 2023-08-25

## 2022-05-03 RX ORDER — POTASSIUM CHLORIDE 20 MEQ/1
40 TABLET, EXTENDED RELEASE ORAL ONCE
Status: COMPLETED | OUTPATIENT
Start: 2022-05-03 | End: 2022-05-03

## 2022-05-03 RX ORDER — POTASSIUM CHLORIDE 20 MEQ/1
20 TABLET, EXTENDED RELEASE ORAL
Status: DISCONTINUED | OUTPATIENT
Start: 2022-05-03 | End: 2022-05-04 | Stop reason: HOSPADM

## 2022-05-03 RX ORDER — POLYETHYLENE GLYCOL 3350 17 G/17G
17 POWDER, FOR SOLUTION ORAL 2 TIMES DAILY PRN
Status: DISCONTINUED | OUTPATIENT
Start: 2022-05-03 | End: 2022-05-04 | Stop reason: HOSPADM

## 2022-05-03 RX ORDER — SODIUM CHLORIDE FOR INHALATION 3 %
4 VIAL, NEBULIZER (ML) INHALATION EVERY 12 HOURS
Status: DISCONTINUED | OUTPATIENT
Start: 2022-05-04 | End: 2022-05-04 | Stop reason: HOSPADM

## 2022-05-03 RX ORDER — SODIUM CHLORIDE 0.9 % (FLUSH) 0.9 %
10 SYRINGE (ML) INJECTION EVERY 12 HOURS PRN
Status: DISCONTINUED | OUTPATIENT
Start: 2022-05-03 | End: 2022-05-04 | Stop reason: HOSPADM

## 2022-05-03 RX ORDER — SODIUM CHLORIDE FOR INHALATION 3 %
4 VIAL, NEBULIZER (ML) INHALATION ONCE
Status: DISCONTINUED | OUTPATIENT
Start: 2022-05-03 | End: 2022-05-03

## 2022-05-03 RX ORDER — TALC
6 POWDER (GRAM) TOPICAL NIGHTLY PRN
Status: DISCONTINUED | OUTPATIENT
Start: 2022-05-03 | End: 2022-05-04 | Stop reason: HOSPADM

## 2022-05-03 RX ORDER — LANOLIN ALCOHOL/MO/W.PET/CERES
800 CREAM (GRAM) TOPICAL
Status: DISCONTINUED | OUTPATIENT
Start: 2022-05-03 | End: 2022-05-04 | Stop reason: HOSPADM

## 2022-05-03 RX ORDER — ACETAMINOPHEN 325 MG/1
650 TABLET ORAL EVERY 4 HOURS PRN
Status: DISCONTINUED | OUTPATIENT
Start: 2022-05-03 | End: 2022-05-04 | Stop reason: HOSPADM

## 2022-05-03 RX ORDER — ALBUTEROL SULFATE 0.83 MG/ML
2.5 SOLUTION RESPIRATORY (INHALATION) EVERY 6 HOURS
Status: DISCONTINUED | OUTPATIENT
Start: 2022-05-03 | End: 2022-05-04 | Stop reason: HOSPADM

## 2022-05-03 RX ORDER — ACETAMINOPHEN 325 MG/1
650 TABLET ORAL ONCE
Status: COMPLETED | OUTPATIENT
Start: 2022-05-03 | End: 2022-05-03

## 2022-05-03 RX ORDER — POTASSIUM CHLORIDE 20 MEQ/1
40 TABLET, EXTENDED RELEASE ORAL
Status: DISCONTINUED | OUTPATIENT
Start: 2022-05-03 | End: 2022-05-04 | Stop reason: HOSPADM

## 2022-05-03 RX ORDER — AMOXICILLIN 250 MG
1 CAPSULE ORAL 2 TIMES DAILY
Status: DISCONTINUED | OUTPATIENT
Start: 2022-05-03 | End: 2022-05-04 | Stop reason: HOSPADM

## 2022-05-03 RX ORDER — ONDANSETRON 2 MG/ML
4 INJECTION INTRAMUSCULAR; INTRAVENOUS EVERY 8 HOURS PRN
Status: DISCONTINUED | OUTPATIENT
Start: 2022-05-03 | End: 2022-05-04 | Stop reason: HOSPADM

## 2022-05-03 RX ORDER — MAGNESIUM SULFATE 1 G/100ML
1 INJECTION INTRAVENOUS
Status: DISCONTINUED | OUTPATIENT
Start: 2022-05-03 | End: 2022-05-04 | Stop reason: HOSPADM

## 2022-05-03 RX ORDER — NALOXONE HCL 0.4 MG/ML
0.02 VIAL (ML) INJECTION
Status: DISCONTINUED | OUTPATIENT
Start: 2022-05-03 | End: 2022-05-04 | Stop reason: HOSPADM

## 2022-05-03 RX ADMIN — ACETAMINOPHEN 650 MG: 325 TABLET, FILM COATED ORAL at 07:05

## 2022-05-03 RX ADMIN — LEVOFLOXACIN 750 MG: 750 INJECTION, SOLUTION INTRAVENOUS at 10:05

## 2022-05-03 RX ADMIN — SODIUM CHLORIDE, SODIUM LACTATE, POTASSIUM CHLORIDE, AND CALCIUM CHLORIDE 1000 ML: .6; .31; .03; .02 INJECTION, SOLUTION INTRAVENOUS at 10:05

## 2022-05-03 RX ADMIN — SODIUM CHLORIDE, SODIUM LACTATE, POTASSIUM CHLORIDE, AND CALCIUM CHLORIDE 1000 ML: .6; .31; .03; .02 INJECTION, SOLUTION INTRAVENOUS at 04:05

## 2022-05-03 RX ADMIN — ALBUTEROL SULFATE 2.5 MG: 2.5 SOLUTION RESPIRATORY (INHALATION) at 08:05

## 2022-05-03 RX ADMIN — MAGNESIUM SULFATE IN WATER 2 G: 40 INJECTION, SOLUTION INTRAVENOUS at 11:05

## 2022-05-03 RX ADMIN — POTASSIUM CHLORIDE 40 MEQ: 20 TABLET, EXTENDED RELEASE ORAL at 07:05

## 2022-05-03 RX ADMIN — POTASSIUM BICARBONATE 20 MEQ: 391 TABLET, EFFERVESCENT ORAL at 05:05

## 2022-05-03 RX ADMIN — SODIUM CHLORIDE 30 MG/ML INHALATION SOLUTION 4 ML: 30 SOLUTION INHALANT at 08:05

## 2022-05-03 RX ADMIN — MEROPENEM 2 G: 1 INJECTION, POWDER, FOR SOLUTION INTRAVENOUS at 07:05

## 2022-05-03 RX ADMIN — IOHEXOL 100 ML: 350 INJECTION, SOLUTION INTRAVENOUS at 04:05

## 2022-05-03 RX ADMIN — SODIUM CHLORIDE, SODIUM LACTATE, POTASSIUM CHLORIDE, AND CALCIUM CHLORIDE 1000 ML: .6; .31; .03; .02 INJECTION, SOLUTION INTRAVENOUS at 05:05

## 2022-05-03 NOTE — LETTER
May 4, 2022         [address]  [facility] [address]  Phone: [phone]  Fax: [phone]       Patient: Charly Mattson   YOB: 1995  Date of Visit: 05/04/2022    To Whom It May Concern:    Kj Mattson  Was admitted at [facility] till 05/04/2022. The patient may return to work on 05/09/2022 with no restrictions. If you have any questions or concerns, or if I can be of further assistance, please do not hesitate to contact me.    Sincerely,        Dereck Reyes MD

## 2022-05-04 VITALS
SYSTOLIC BLOOD PRESSURE: 109 MMHG | HEART RATE: 90 BPM | BODY MASS INDEX: 30.27 KG/M2 | RESPIRATION RATE: 18 BRPM | OXYGEN SATURATION: 95 % | DIASTOLIC BLOOD PRESSURE: 71 MMHG | TEMPERATURE: 99 F | HEIGHT: 71 IN | WEIGHT: 216.25 LBS

## 2022-05-04 PROBLEM — R65.10 SIRS (SYSTEMIC INFLAMMATORY RESPONSE SYNDROME): Status: RESOLVED | Noted: 2022-05-03 | Resolved: 2022-05-04

## 2022-05-04 LAB
ADENOVIRUS: NOT DETECTED
AMPHET+METHAMPHET UR QL: NEGATIVE
ANION GAP SERPL CALC-SCNC: 8 MMOL/L (ref 8–16)
BARBITURATES UR QL SCN>200 NG/ML: NEGATIVE
BASOPHILS # BLD AUTO: 0.02 K/UL (ref 0–0.2)
BASOPHILS NFR BLD: 0.3 % (ref 0–1.9)
BENZODIAZ UR QL SCN>200 NG/ML: NEGATIVE
BORDETELLA PARAPERTUSSIS (IS1001): NOT DETECTED
BORDETELLA PERTUSSIS (PTXP): NOT DETECTED
BUN SERPL-MCNC: 10 MG/DL (ref 6–20)
BZE UR QL SCN: NEGATIVE
CALCIUM SERPL-MCNC: 8.3 MG/DL (ref 8.7–10.5)
CANNABINOIDS UR QL SCN: NEGATIVE
CHLAMYDIA PNEUMONIAE: NOT DETECTED
CHLORIDE SERPL-SCNC: 104 MMOL/L (ref 95–110)
CO2 SERPL-SCNC: 22 MMOL/L (ref 23–29)
CORONAVIRUS 229E, COMMON COLD VIRUS: NOT DETECTED
CORONAVIRUS HKU1, COMMON COLD VIRUS: NOT DETECTED
CORONAVIRUS NL63, COMMON COLD VIRUS: NOT DETECTED
CORONAVIRUS OC43, COMMON COLD VIRUS: NOT DETECTED
CREAT SERPL-MCNC: 0.9 MG/DL (ref 0.5–1.4)
CREAT UR-MCNC: 78 MG/DL (ref 23–375)
DIFFERENTIAL METHOD: ABNORMAL
EOSINOPHIL # BLD AUTO: 0 K/UL (ref 0–0.5)
EOSINOPHIL NFR BLD: 0.2 % (ref 0–8)
ERYTHROCYTE [DISTWIDTH] IN BLOOD BY AUTOMATED COUNT: 13.6 % (ref 11.5–14.5)
EST. GFR  (AFRICAN AMERICAN): >60 ML/MIN/1.73 M^2
EST. GFR  (NON AFRICAN AMERICAN): >60 ML/MIN/1.73 M^2
FLUBV RNA NPH QL NAA+NON-PROBE: NOT DETECTED
GLUCOSE SERPL-MCNC: 113 MG/DL (ref 70–110)
HCT VFR BLD AUTO: 42.8 % (ref 40–54)
HGB BLD-MCNC: 14.2 G/DL (ref 14–18)
HPIV1 RNA NPH QL NAA+NON-PROBE: NOT DETECTED
HPIV2 RNA NPH QL NAA+NON-PROBE: NOT DETECTED
HPIV3 RNA NPH QL NAA+NON-PROBE: NOT DETECTED
HPIV4 RNA NPH QL NAA+NON-PROBE: NOT DETECTED
HUMAN METAPNEUMOVIRUS: NOT DETECTED
IMM GRANULOCYTES # BLD AUTO: 0.01 K/UL (ref 0–0.04)
IMM GRANULOCYTES NFR BLD AUTO: 0.2 % (ref 0–0.5)
INFLUENZA A (SUBTYPES H1,H1-2009,H3): NOT DETECTED
LACTATE SERPL-SCNC: 1.2 MMOL/L (ref 0.5–1.9)
LYMPHOCYTES # BLD AUTO: 0.5 K/UL (ref 1–4.8)
LYMPHOCYTES NFR BLD: 8.1 % (ref 18–48)
MAGNESIUM SERPL-MCNC: 1.9 MG/DL (ref 1.6–2.6)
MCH RBC QN AUTO: 26.6 PG (ref 27–31)
MCHC RBC AUTO-ENTMCNC: 33.2 G/DL (ref 32–36)
MCV RBC AUTO: 80 FL (ref 82–98)
MONOCYTES # BLD AUTO: 0.8 K/UL (ref 0.3–1)
MONOCYTES NFR BLD: 12.4 % (ref 4–15)
MYCOPLASMA PNEUMONIAE: NOT DETECTED
NEUTROPHILS # BLD AUTO: 5 K/UL (ref 1.8–7.7)
NEUTROPHILS NFR BLD: 78.8 % (ref 38–73)
NRBC BLD-RTO: 0 /100 WBC
OPIATES UR QL SCN: NEGATIVE
PCP UR QL SCN>25 NG/ML: NEGATIVE
PLATELET # BLD AUTO: 123 K/UL (ref 150–450)
PMV BLD AUTO: 11.6 FL (ref 9.2–12.9)
POTASSIUM SERPL-SCNC: 3.8 MMOL/L (ref 3.5–5.1)
RBC # BLD AUTO: 5.33 M/UL (ref 4.6–6.2)
RESPIRATORY INFECTION PANEL SOURCE: NORMAL
RSV RNA NPH QL NAA+NON-PROBE: NOT DETECTED
RV+EV RNA NPH QL NAA+NON-PROBE: NOT DETECTED
SARS-COV-2 RNA RESP QL NAA+PROBE: NOT DETECTED
SODIUM SERPL-SCNC: 134 MMOL/L (ref 136–145)
TOXICOLOGY INFORMATION: NORMAL
WBC # BLD AUTO: 6.31 K/UL (ref 3.9–12.7)

## 2022-05-04 PROCEDURE — 94640 AIRWAY INHALATION TREATMENT: CPT

## 2022-05-04 PROCEDURE — 99900031 HC PATIENT EDUCATION (STAT)

## 2022-05-04 PROCEDURE — 83735 ASSAY OF MAGNESIUM: CPT | Performed by: NURSE PRACTITIONER

## 2022-05-04 PROCEDURE — 94664 DEMO&/EVAL PT USE INHALER: CPT | Mod: 59

## 2022-05-04 PROCEDURE — 99900035 HC TECH TIME PER 15 MIN (STAT)

## 2022-05-04 PROCEDURE — 25000242 PHARM REV CODE 250 ALT 637 W/ HCPCS: Performed by: NURSE PRACTITIONER

## 2022-05-04 PROCEDURE — 83605 ASSAY OF LACTIC ACID: CPT | Performed by: NURSE PRACTITIONER

## 2022-05-04 PROCEDURE — 94761 N-INVAS EAR/PLS OXIMETRY MLT: CPT

## 2022-05-04 PROCEDURE — 80048 BASIC METABOLIC PNL TOTAL CA: CPT | Performed by: NURSE PRACTITIONER

## 2022-05-04 PROCEDURE — G0378 HOSPITAL OBSERVATION PER HR: HCPCS

## 2022-05-04 PROCEDURE — 85025 COMPLETE CBC W/AUTO DIFF WBC: CPT | Performed by: NURSE PRACTITIONER

## 2022-05-04 PROCEDURE — 80307 DRUG TEST PRSMV CHEM ANLYZR: CPT | Performed by: NURSE PRACTITIONER

## 2022-05-04 PROCEDURE — 96376 TX/PRO/DX INJ SAME DRUG ADON: CPT

## 2022-05-04 PROCEDURE — 94799 UNLISTED PULMONARY SVC/PX: CPT

## 2022-05-04 PROCEDURE — 96366 THER/PROPH/DIAG IV INF ADDON: CPT

## 2022-05-04 PROCEDURE — 36415 COLL VENOUS BLD VENIPUNCTURE: CPT | Performed by: NURSE PRACTITIONER

## 2022-05-04 PROCEDURE — 25000003 PHARM REV CODE 250: Performed by: NURSE PRACTITIONER

## 2022-05-04 PROCEDURE — 63600175 PHARM REV CODE 636 W HCPCS: Performed by: NURSE PRACTITIONER

## 2022-05-04 RX ORDER — LEVOFLOXACIN 750 MG/1
750 TABLET ORAL DAILY
Qty: 6 TABLET | Refills: 0 | Status: SHIPPED | OUTPATIENT
Start: 2022-05-04 | End: 2022-05-10

## 2022-05-04 RX ADMIN — ALBUTEROL SULFATE 2.5 MG: 2.5 SOLUTION RESPIRATORY (INHALATION) at 02:05

## 2022-05-04 RX ADMIN — ACETAMINOPHEN 650 MG: 325 TABLET ORAL at 09:05

## 2022-05-04 RX ADMIN — ALBUTEROL SULFATE 2.5 MG: 2.5 SOLUTION RESPIRATORY (INHALATION) at 07:05

## 2022-05-04 RX ADMIN — ACETAMINOPHEN 650 MG: 325 TABLET ORAL at 05:05

## 2022-05-04 RX ADMIN — SODIUM CHLORIDE 30 MG/ML INHALATION SOLUTION 4 ML: 30 SOLUTION INHALANT at 07:05

## 2022-05-04 RX ADMIN — ONDANSETRON 4 MG: 2 INJECTION INTRAMUSCULAR; INTRAVENOUS at 05:05

## 2022-05-04 NOTE — H&P
[facility]  History & Physical    DOS: 05/03/2022   7:08 PM    Patient Name: Charly Mattson  MRN: [id number]  Patient Class: OP- Observation  Admission Date: 5/3/2022  Attending Physician: Dr. Lynn  Primary Care Provider: Primary Doctor No         Patient information was obtained from patient, past medical records and ER records.     Subjective:     Principal Problem:SIRS (systemic inflammatory response syndrome)    Chief Complaint:   Chief Complaint   Patient presents with    Weakness     Feeling sick; lethargic, cough, congestion n/v    Nausea    Vomiting        HPI: Mr. Mattson is a 26 y.o. 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 It is severe. It is associated with fever Tmax 102, nausea, vomiting, fatigue, sinus congestion, headache, myalgias, and arthralgias. He denies chest pain, SOB, dizziness, or LOC. He has a chronic cough that is nonproductive, and he endorses that it may have gotten a bit worse. He does nebs q12h with his vest for bronchiectasis, but has been unable to do this today because he felt so bad. Symptoms began 2 days ago. He has no know sick contacts. He was negative for covid, flu, and strep in ED. CT abd/pelvis negative for acute abnormality and CXR with no acute consolidation. WBC is WNL. Lactate is 2.6 initially. UA with no acute signs of infection.      Past Medical History:   Diagnosis Date    Fatty liver     Immunoglobulin deficiency     Immunoglobulin deficiency     Pneumonia        Past Surgical History:   Procedure Laterality Date    ADENOIDECTOMY      BIOPSY N/A 9/23/2020    Procedure: BIOPSY;  Surgeon: Dos Diagnostic Provider;  Location: [facility] OR;  Service: Interventional Radiology;  Laterality: N/A;    MANDIBLE FRACTURE SURGERY      SINUS SURGERY      TONSILLECTOMY      TYMPANOSTOMY TUBE  PLACEMENT      TYMPANOSTOMY TUBE PLACEMENT      x 4       Review of patient's allergies indicates:   Allergen Reactions    Singulair [montelukast] Other (See Comments)     arrhythmias       No current facility-administered medications on file prior to encounter.     Current Outpatient Medications on File Prior to Encounter   Medication Sig    acetaminophen (TYLENOL) 325 MG tablet Take 650 mg by mouth as needed.    albuterol (PROVENTIL) 2.5 mg /3 mL (0.083 %) nebulizer solution Take 3 mLs (2.5 mg total) by nebulization 2 (two) times daily as needed for Wheezing. Use twice daily prior to 3% saline nebulized (Patient taking differently: Take 2.5 mg by nebulization 2 (two) times a day. Use twice daily prior to 3% saline nebulized)    immun glob G,IgG,-pro-IgA 0-50 1 gram/5 mL (20 %) Soln Inject 200 mg/kg into the skin every 7 days. Sunday    sodium chloride 3% 3 % nebulizer solution Take 4 mLs by nebulization 2 (two) times a day.    [DISCONTINUED] sodium chloride 3% 3 % nebulizer solution Take 4 mLs by nebulization 2 (two) times a day. Use 2 times daily prior to airway clearance maneuvers    levalbuterol (XOPENEX) 1.25 mg/3 mL nebulizer solution Take 3 mLs (1.25 mg total) by nebulization every 4 (four) hours as needed for Wheezing. Rescue (Patient not taking: No sig reported)    mometasone-formoterol (DULERA) 100-5 mcg/actuation HF Inhale 2 Inhalers into the lungs 2 (two) times a day. Controller    ondansetron (ZOFRAN-ODT) 4 MG TbDL Take 1 tablet (4 mg total) by mouth every 8 (eight) hours as needed (nausea, vomiting).    [DISCONTINUED] diclofenac (VOLTAREN) 50 MG EC tablet Take 1 tablet (50 mg total) by mouth every 8 (eight) hours as needed (pain). (Patient not taking: No sig reported)    [DISCONTINUED] mucus clearing device (ACAPELLA, FLUTTER) by Misc.(Non-Drug; Combo Route) route 3 (three) times daily. (Patient not taking: No sig reported)    [DISCONTINUED] sodium chloride (SALINE NASAL) 0.65 % nasal  spray 1 spray by Nasal route as needed for Congestion. Each side (Patient not taking: No sig reported)     Family History       Problem Relation (Age of Onset)    Cancer Mother    Liver disease Father          Tobacco Use    Smoking status: Never Smoker    Smokeless tobacco: Never Used   Substance and Sexual Activity    Alcohol use: Yes    Drug use: No    Sexual activity: Yes     Partners: Female     Review of Systems   Constitutional:  Positive for chills, diaphoresis, fatigue and fever.   HENT:  Positive for postnasal drip and sore throat. Negative for congestion, ear pain and trouble swallowing.    Eyes:  Positive for photophobia. Negative for pain, discharge and visual disturbance.   Respiratory:  Positive for cough. Negative for chest tightness and shortness of breath.    Cardiovascular:  Negative for chest pain, palpitations and leg swelling.   Gastrointestinal:  Positive for nausea and vomiting. Negative for abdominal distention, abdominal pain, blood in stool, constipation and diarrhea.   Endocrine: Negative for polydipsia, polyphagia and polyuria.   Genitourinary:  Negative for dysuria, flank pain, frequency and urgency.   Musculoskeletal:  Negative for back pain, joint swelling, neck pain and neck stiffness.   Skin:  Negative for rash and wound.   Allergic/Immunologic: Negative for immunocompromised state.   Neurological:  Positive for weakness and headaches. Negative for dizziness, syncope, speech difficulty, light-headedness and numbness.   Hematological:  Negative for adenopathy.   Psychiatric/Behavioral:  Negative for confusion and suicidal ideas. The patient is not nervous/anxious.    All other systems reviewed and are negative.  Objective:     Vital Signs (Most Recent):  Temp: (!) 102.6 °F (39.2 °C) (05/03/22 2013)  Pulse: (!) 115 (05/03/22 2056)  Resp: 19 (05/03/22 2056)  BP: 136/75 (05/03/22 2013)  SpO2: 98 % (05/03/22 2056)   Vital Signs (24h Range):  Temp:  [98.8 °F (37.1 °C)-102.6 °F (39.2  °C)] 102.6 °F (39.2 °C)  Pulse:  [115-125] 115  Resp:  [19-34] 19  SpO2:  [95 %-100 %] 98 %  BP: (104-136)/(51-76) 136/75     Weight: 98.1 kg (216 lb 4.3 oz)  Body mass index is 30.16 kg/m².    Physical Exam  Vitals and nursing note reviewed.   Constitutional:       Appearance: He is well-developed. He is obese. He is ill-appearing.   HENT:      Head: Normocephalic and atraumatic.   Eyes:      Conjunctiva/sclera: Conjunctivae normal.      Pupils: Pupils are equal, round, and reactive to light.   Cardiovascular:      Rate and Rhythm: Tachycardia present.   Pulmonary:      Effort: Pulmonary effort is normal.      Breath sounds: No wheezing, rhonchi or rales.   Abdominal:      General: Bowel sounds are normal. There is no distension.      Palpations: Abdomen is soft.      Tenderness: There is no abdominal tenderness.   Musculoskeletal:         General: Normal range of motion.      Cervical back: Normal range of motion and neck supple.   Skin:     General: Skin is warm and dry.      Capillary Refill: Capillary refill takes less than 2 seconds.   Neurological:      Mental Status: He is alert and oriented to person, place, and time.      Comments: Negative kernig's, negative brudzinski's signs   Psychiatric:         Behavior: Behavior normal.         Thought Content: Thought content normal.         Judgment: Judgment normal.         CRANIAL NERVES     CN III, IV, VI   Pupils are equal, round, and reactive to light.     Significant Labs: All pertinent labs within the past 24 hours have been reviewed.  CBC:   Recent Labs   Lab 05/03/22  1532   WBC 10.83   HGB 15.3   HCT 47.8        CMP:   Recent Labs   Lab 05/03/22  1532      K 3.3*      CO2 22*      BUN 16   CREATININE 1.1   CALCIUM 8.9   PROT 7.8   ALBUMIN 4.3   BILITOT 1.0   ALKPHOS 106   AST 96*   *   ANIONGAP 12   EGFRNONAA >60.0     Lactic Acid:   Recent Labs   Lab 05/03/22  1532 05/03/22  1939   LACTATE 2.6* 2.3*     Magnesium:    Recent Labs   Lab 05/03/22  1532   MG 1.6     Urine Studies:   Recent Labs   Lab 05/03/22  1709   COLORU Yellow   APPEARANCEUA Clear   PHUR 6.0   SPECGRAV >1.030*   PROTEINUA 1+*   GLUCUA Negative   KETONESU Trace*   BILIRUBINUA Negative   OCCULTUA Negative   NITRITE Negative   UROBILINOGEN 2.0-3.0*   LEUKOCYTESUR Negative   RBCUA 0   WBCUA 1   BACTERIA Negative   SQUAMEPITHEL 1   HYALINECASTS 1       Significant Imaging: I have reviewed all pertinent imaging results/findings within the past 24 hours.  EKG: I have reviewed all pertinent results/findings within the past 24 hours and my personal findings are: Sinus tachycardia, nonspecific T wave abnormality, no acute ischemic changes  CT Abdomen Pelvis With Contrast    Result Date: 5/3/2022   ADDENDUM #1 A normal appendix is present. Electronically signed by:  Wolf Suresh MD  5/3/2022 5:41 PM CDT Workstation: [id number]  ORIGINAL REPORT CMS MANDATED QUALITY DATA - CT RADIATION - 436 All CT scans at this facility utilize dose modulation, iterative reconstruction, and/or weight based dosing when appropriate to reduce radiation dose to as low as reasonably achievable. Reason: RLQ abdominal pain (Age >= 14y) TECHNIQUE: CT abdomen and pelvis with 100 mL Omnipaque 350. COMPARISON: None CT ABDOMEN: Multifocal bronchiectasis occurs inferiorly in right middle lobe, partially visualized. Visualized lung bases otherwise clear. Liver, gallbladder, pancreas, spleen, adrenals, and kidneys are normal. Aorta is of normal caliber. No intestinal abnormality identified. A normal is present. No free intraperitoneal gas. No acute osseous abnormality. CT PELVIS: Bladder is decompressed but otherwise unremarkable. No free pelvic fluid. No acute osseous abnormality. IMPRESSION: No acute findings throughout the pelvis. Electronically signed by:  Wolf Suresh MD  5/3/2022 5:01 PM CDT Workstation: [id number]    X-Ray Chest AP Portable    Result Date: 5/3/2022  Reason: Sepsis Weakness;  Nausea; Vomiting, Sepsis FINDINGS: Portable chest at 1503 compared with 3/20/2022 shows normal cardiomediastinal silhouette. Lungs are clear. Pulmonary vasculature is normal. No acute osseous abnormality. IMPRESSION: Negative chest. Electronically signed by:  Wolf Suresh MD  5/3/2022 3:30 PM CDT Workstation: [id number]       Assessment/Plan:     * SIRS (systemic inflammatory response syndrome)  Admit to med/tele  Unclear etiology, certainly high risk for pna with bronchiectasis although CXR and partially visualized lung fields on CT clear   procal is WNL  Will cover with levaquin for now   Get resp culture  resp infection panel - r/o viral URI given negative procal  30mL/kg fluid bolus   Trend lactic   Blood cultures drawn in ED  Negative meningeal signs on exam, but does report some photophobia, would consider LP if no other infectious source is uncovered in work up    Bronchiectasis  Continue home nebs with saline nebs  CPT       CVID (common variable immunodeficiency)  Currently on weekly treatment   High risk for infection        VTE Risk Mitigation (From admission, onward)         Ordered     IP VTE HIGH RISK PATIENT  Once         05/03/22 2016     Place sequential compression device  Until discontinued         05/03/22 2016                   Desire Smiley NP  Department of Hospital Medicine   [facility]

## 2022-05-04 NOTE — PLAN OF CARE
[facility]  Initial Discharge Assessment       Primary Care Provider: Primary Doctor No    Admission Diagnosis: SIRS (systemic inflammatory response syndrome) [R65.10]    Admission Date: 5/3/2022  Expected Discharge Date: 5/4/2022    Discharge Barriers Identified: None     Initial assessment completed at bedside with patient. Patient anticipates discharge home. Appointment made with [facility] for PCP. Patient denies discharge needs.    Payor: MEDICAID / Plan: [facility] CONNECT / Product Type: Managed Medicaid /     Extended Emergency Contact Information  Primary Emergency Contact: Marley Mattson  Address: [address], [place] [address] [place] [address]  Home Phone: [phone]  Mobile Phone: [phone]  Relation: Mother  Secondary Emergency Contact: Melissa Bedolla  Mobile Phone: [phone]  Relation: Significant other  Preferred language: English   needed? No    Discharge Plan A: Home  Discharge Plan B: Home      [id number] [address] AT [facility] & [address] [address]  Phone: [phone] Fax: [phone]      Initial Assessment (most recent)     Adult Discharge Assessment - 05/04/22 1155        Discharge Assessment    Assessment Type Discharge Planning Assessment     Confirmed/corrected address, phone number and insurance Yes     Confirmed Demographics Correct on Facesheet     Source of Information patient     Reason For Admission SIRS     Lives With parent(s)     Facility Arrived From: Marley (mother) [phone]     Do you expect to return to your current living situation? Yes     Do you have help at home or someone to help you manage your care at home? Yes     Who are your caregiver(s) and their phone number(s)? Marley (mother) [phone]     Prior to hospitilization cognitive status: Alert/Oriented     Current cognitive status: Alert/Oriented     Walking or Climbing Stairs  Difficulty none     Dressing/Bathing Difficulty none     Equipment Currently Used at Home nebulizer     Readmission within 30 days? No     Patient currently being followed by outpatient case management? No     Do you currently have service(s) that help you manage your care at home? No     Do you take prescription medications? Yes     Do you have prescription coverage? Yes     Coverage Medicaid     Do you have any problems affording any of your prescribed medications? TBD     Is the patient taking medications as prescribed? yes     Who is going to help you get home at discharge? Marley (mother) [phone]     How do you get to doctors appointments? car, drives self     Are you on dialysis? No     Do you take coumadin? No     Discharge Plan A Home     Discharge Plan B Home     DME Needed Upon Discharge  none     Discharge Plan discussed with: Patient     Discharge Barriers Identified None        Relationship/Environment    Name(s) of Who Lives With Patient Marley (mother) [phone]

## 2022-05-04 NOTE — CARE UPDATE
05/03/22 2056   Patient Assessment/Suction   Level of Consciousness (AVPU) alert   Respiratory Effort Normal   Expansion/Accessory Muscles/Retractions no use of accessory muscles   All Lung Fields Breath Sounds clear;diminished   Rhythm/Pattern, Respiratory unlabored   Cough Frequency no cough   PRE-TX-O2   O2 Device (Oxygen Therapy) room air   SpO2 98 %   Pulse Oximetry Type Intermittent   $ Pulse Oximetry - Multiple Charge Pulse Oximetry - Multiple   Pulse (!) 115   Resp 19   Aerosol Therapy   $ Aerosol Therapy Charges Aerosol Treatment   Daily Review of Necessity (SVN) completed   Respiratory Treatment Status (SVN) given   Treatment Route (SVN) mask   Patient Position (SVN) HOB elevated   Post Treatment Assessment (SVN) breath sounds unchanged;vital signs unchanged   Chest Physiotherapy   $ Chest Physiotherapy Charges Initial   Daily Review of Necessity (CPT) completed   Type (CPT) percussion   Method (CPT) mechanical percussor   Patient Position (CPT) semi-[place]reland's   Lung Fields (CPT) NAN (left upper lobe);LLL (left lower lobe);RUL (right upper lobe);RML (right middle lobe);RLL (right lower lobe)   Duration per Field (CPT) 2 mins   Duration Left Side (CPT) 5 mins   Duration Right Side (CPT) 5 mins   CPT Total Time (Min) 10   Post Treatment Assessment (CPT) breath sounds unchanged;vital signs unchanged   Signs of Intolerance (CPT) none   Education   $ Education Bronchodilator;30 min   Respiratory Evaluation   $ Care Plan Tech Time 15 min

## 2022-05-04 NOTE — HOSPITAL COURSE
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  So far chest x-ray, UA, respiratory panel, COVID test, influenza panel, strep throat came back negative.  Considering his risk of Gram-negative infection we gave him IV Levaquin.  Today morning patient improved remarkably, afebrile, breathing comfortably on room air.  He was discharged home in hemodynamically stable condition with 6 more days of p.o. Levaquin.  He was advised to continue his breathing treatments, 3% saline nebulized treatments and follow-up with his PCP.     Instructions provided to follow up with primary care physician as outpatient. Patient verbalized understanding and is aware to contact primary care physician or return to ED if new or worsening symptoms.    Physical exam on the day of discharge:  General: Patient resting comfortably in no acute distress.  Lungs: CTA. Good air entry.  Cor: Regular rate and rhythm. No murmurs. No pedal edema.  Abd: Soft. Nontender. Non-distended.  Neuro: A&O x3. Moving all 4 extremities equally  Ext: No clubbing. No cyanosis.      Vital signs reviewed.  Nursing notes reviewed

## 2022-05-04 NOTE — PLAN OF CARE
Patient cleared for discharge from case management standpoint.    Follow up appointments scheduled and added to AVS.    Chart and discharge orders reviewed.  Patient discharged home with no further case management needs.           05/04/22 1214   Final Note   Assessment Type Final Discharge Note   Anticipated Discharge Disposition Home   What phone number can be called within the next 1-3 days to see how you are doing after discharge? [phone]   Hospital Resources/Appts/Education Provided Provided patient/caregiver with written discharge plan information;Appointments scheduled and added to AVS   Post-Acute Status   Discharge Delays None known at this time

## 2022-05-04 NOTE — HPI
Mr. Mattson is a 26 y.o. 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 It is severe. It is associated with fever Tmax 102, nausea, vomiting, fatigue, sinus congestion, headache, myalgias, and arthralgias. He denies chest pain, SOB, dizziness, or LOC. He has a chronic cough that is nonproductive, and he endorses that it may have gotten a bit worse. He does nebs q12h with his vest for bronchiectasis, but has been unable to do this today because he felt so bad. Symptoms began 2 days ago. He has no know sick contacts. He was negative for covid, flu, and strep in ED. CT abd/pelvis negative for acute abnormality and CXR with no acute consolidation. WBC is WNL. Lactate is 2.6 initially. UA with no acute signs of infection.

## 2022-05-04 NOTE — SUBJECTIVE & OBJECTIVE
Past Medical History:   Diagnosis Date    Fatty liver     Immunoglobulin deficiency     Immunoglobulin deficiency     Pneumonia        Past Surgical History:   Procedure Laterality Date    ADENOIDECTOMY      BIOPSY N/A 9/23/2020    Procedure: BIOPSY;  Surgeon: Kaleb Diagnostic Provider;  Location: [facility] OR;  Service: Interventional Radiology;  Laterality: N/A;    MANDIBLE FRACTURE SURGERY      SINUS SURGERY      TONSILLECTOMY      TYMPANOSTOMY TUBE PLACEMENT      TYMPANOSTOMY TUBE PLACEMENT      x 4       Review of patient's allergies indicates:   Allergen Reactions    Singulair [montelukast] Other (See Comments)     arrhythmias       No current facility-administered medications on file prior to encounter.     Current Outpatient Medications on File Prior to Encounter   Medication Sig    acetaminophen (TYLENOL) 325 MG tablet Take 650 mg by mouth as needed.    albuterol (PROVENTIL) 2.5 mg /3 mL (0.083 %) nebulizer solution Take 3 mLs (2.5 mg total) by nebulization 2 (two) times daily as needed for Wheezing. Use twice daily prior to 3% saline nebulized (Patient taking differently: Take 2.5 mg by nebulization 2 (two) times a day. Use twice daily prior to 3% saline nebulized)    immun glob G,IgG,-pro-IgA 0-50 1 gram/5 mL (20 %) Soln Inject 200 mg/kg into the skin every 7 days. Sunday    sodium chloride 3% 3 % nebulizer solution Take 4 mLs by nebulization 2 (two) times a day.    [DISCONTINUED] sodium chloride 3% 3 % nebulizer solution Take 4 mLs by nebulization 2 (two) times a day. Use 2 times daily prior to airway clearance maneuvers    levalbuterol (XOPENEX) 1.25 mg/3 mL nebulizer solution Take 3 mLs (1.25 mg total) by nebulization every 4 (four) hours as needed for Wheezing. Rescue (Patient not taking: No sig reported)    mometasone-formoterol (DULERA) 100-5 mcg/actuation HFAA Inhale 2 Inhalers into the lungs 2 (two) times a day. Controller    ondansetron (ZOFRAN-ODT) 4 MG TbDL Take 1 tablet (4 mg total) by mouth every 8  (eight) hours as needed (nausea, vomiting).    [DISCONTINUED] diclofenac (VOLTAREN) 50 MG EC tablet Take 1 tablet (50 mg total) by mouth every 8 (eight) hours as needed (pain). (Patient not taking: No sig reported)    [DISCONTINUED] mucus clearing device (ACAPELLA, FLUTTER) by Misc.(Non-Drug; Combo Route) route 3 (three) times daily. (Patient not taking: No sig reported)    [DISCONTINUED] sodium chloride (SALINE NASAL) 0.65 % nasal spray 1 spray by Nasal route as needed for Congestion. Each side (Patient not taking: No sig reported)     Family History       Problem Relation (Age of Onset)    Cancer Mother    Liver disease Father          Tobacco Use    Smoking status: Never Smoker    Smokeless tobacco: Never Used   Substance and Sexual Activity    Alcohol use: Yes    Drug use: No    Sexual activity: Yes     Partners: Female     Review of Systems   Constitutional:  Positive for chills, diaphoresis, fatigue and fever.   HENT:  Positive for postnasal drip and sore throat. Negative for congestion, ear pain and trouble swallowing.    Eyes:  Positive for photophobia. Negative for pain, discharge and visual disturbance.   Respiratory:  Positive for cough. Negative for chest tightness and shortness of breath.    Cardiovascular:  Negative for chest pain, palpitations and leg swelling.   Gastrointestinal:  Positive for nausea and vomiting. Negative for abdominal distention, abdominal pain, blood in stool, constipation and diarrhea.   Endocrine: Negative for polydipsia, polyphagia and polyuria.   Genitourinary:  Negative for dysuria, flank pain, frequency and urgency.   Musculoskeletal:  Negative for back pain, joint swelling, neck pain and neck stiffness.   Skin:  Negative for rash and wound.   Allergic/Immunologic: Negative for immunocompromised state.   Neurological:  Positive for weakness and headaches. Negative for dizziness, syncope, speech difficulty, light-headedness and numbness.   Hematological:  Negative for  adenopathy.   Psychiatric/Behavioral:  Negative for confusion and suicidal ideas. The patient is not nervous/anxious.    All other systems reviewed and are negative.  Objective:     Vital Signs (Most Recent):  Temp: (!) 102.6 °F (39.2 °C) (05/03/22 2013)  Pulse: (!) 115 (05/03/22 2056)  Resp: 19 (05/03/22 2056)  BP: 136/75 (05/03/22 2013)  SpO2: 98 % (05/03/22 2056)   Vital Signs (24h Range):  Temp:  [98.8 °F (37.1 °C)-102.6 °F (39.2 °C)] 102.6 °F (39.2 °C)  Pulse:  [115-125] 115  Resp:  [19-34] 19  SpO2:  [95 %-100 %] 98 %  BP: (104-136)/(51-76) 136/75     Weight: 98.1 kg (216 lb 4.3 oz)  Body mass index is 30.16 kg/m².    Physical Exam  Vitals and nursing note reviewed.   Constitutional:       Appearance: He is well-developed. He is obese. He is ill-appearing.   HENT:      Head: Normocephalic and atraumatic.   Eyes:      Conjunctiva/sclera: Conjunctivae normal.      Pupils: Pupils are equal, round, and reactive to light.   Cardiovascular:      Rate and Rhythm: Tachycardia present.   Pulmonary:      Effort: Pulmonary effort is normal.      Breath sounds: No wheezing, rhonchi or rales.   Abdominal:      General: Bowel sounds are normal. There is no distension.      Palpations: Abdomen is soft.      Tenderness: There is no abdominal tenderness.   Musculoskeletal:         General: Normal range of motion.      Cervical back: Normal range of motion and neck supple.   Skin:     General: Skin is warm and dry.      Capillary Refill: Capillary refill takes less than 2 seconds.   Neurological:      Mental Status: He is alert and oriented to person, place, and time.      Comments: Negative kernig's, negative brudzinski's signs   Psychiatric:         Behavior: Behavior normal.         Thought Content: Thought content normal.         Judgment: Judgment normal.         CRANIAL NERVES     CN III, IV, VI   Pupils are equal, round, and reactive to light.     Significant Labs: All pertinent labs within the past 24 hours have been  reviewed.  CBC:   Recent Labs   Lab 05/03/22  1532   WBC 10.83   HGB 15.3   HCT 47.8        CMP:   Recent Labs   Lab 05/03/22  1532      K 3.3*      CO2 22*      BUN 16   CREATININE 1.1   CALCIUM 8.9   PROT 7.8   ALBUMIN 4.3   BILITOT 1.0   ALKPHOS 106   AST 96*   *   ANIONGAP 12   EGFRNONAA >60.0     Lactic Acid:   Recent Labs   Lab 05/03/22  1532 05/03/22  1939   LACTATE 2.6* 2.3*     Magnesium:   Recent Labs   Lab 05/03/22  1532   MG 1.6     Urine Studies:   Recent Labs   Lab 05/03/22  1709   COLORU Yellow   APPEARANCEUA Clear   PHUR 6.0   SPECGRAV >1.030*   PROTEINUA 1+*   GLUCUA Negative   KETONESU Trace*   BILIRUBINUA Negative   OCCULTUA Negative   NITRITE Negative   UROBILINOGEN 2.0-3.0*   LEUKOCYTESUR Negative   RBCUA 0   WBCUA 1   BACTERIA Negative   SQUAMEPITHEL 1   HYALINECASTS 1       Significant Imaging: I have reviewed all pertinent imaging results/findings within the past 24 hours.  EKG: I have reviewed all pertinent results/findings within the past 24 hours and my personal findings are: Sinus tachycardia, nonspecific T wave abnormality, no acute ischemic changes  CT Abdomen Pelvis With Contrast    Result Date: 5/3/2022   ADDENDUM #1 A normal appendix is present. Electronically signed by:  Wolf Suresh MD  5/3/2022 5:41 PM CDT Workstation: [id number]  ORIGINAL REPORT CMS MANDATED QUALITY DATA - CT RADIATION - 436 All CT scans at this facility utilize dose modulation, iterative reconstruction, and/or weight based dosing when appropriate to reduce radiation dose to as low as reasonably achievable. Reason: RLQ abdominal pain (Age >= 14y) TECHNIQUE: CT abdomen and pelvis with 100 mL Omnipaque 350. COMPARISON: None CT ABDOMEN: Multifocal bronchiectasis occurs inferiorly in right middle lobe, partially visualized. Visualized lung bases otherwise clear. Liver, gallbladder, pancreas, spleen, adrenals, and kidneys are normal. Aorta is of normal caliber. No intestinal  abnormality identified. A normal is present. No free intraperitoneal gas. No acute osseous abnormality. CT PELVIS: Bladder is decompressed but otherwise unremarkable. No free pelvic fluid. No acute osseous abnormality. IMPRESSION: No acute findings throughout the pelvis. Electronically signed by:  Wolf Suresh MD  5/3/2022 5:01 PM CDT Workstation: [id number]    X-Ray Chest AP Portable    Result Date: 5/3/2022  Reason: Sepsis Weakness; Nausea; Vomiting, Sepsis FINDINGS: Portable chest at 1503 compared with 3/20/2022 shows normal cardiomediastinal silhouette. Lungs are clear. Pulmonary vasculature is normal. No acute osseous abnormality. IMPRESSION: Negative chest. Electronically signed by:  Wolf Suresh MD  5/3/2022 3:30 PM CDT Workstation: [id number]

## 2022-05-04 NOTE — CARE UPDATE
05/04/22 0727   Patient Assessment/Suction   Level of Consciousness (AVPU) alert   Respiratory Effort Normal   Expansion/Accessory Muscles/Retractions no use of accessory muscles   All Lung Fields Breath Sounds clear   LLL Breath Sounds diminished   RLL Breath Sounds diminished   PRE-TX-O2   O2 Device (Oxygen Therapy) room air   SpO2 99 %   Pulse Oximetry Type Intermittent   $ Pulse Oximetry - Multiple Charge Pulse Oximetry - Multiple   Pulse 99   Resp 18   Aerosol Therapy   $ Aerosol Therapy Charges Aerosol Treatment  (with 3% Saline)   Daily Review of Necessity (SVN) completed   Respiratory Treatment Status (SVN) given   Treatment Route (SVN) mask   Patient Position (SVN) HOB elevated   Post Treatment Assessment (SVN) breath sounds unchanged   Signs of Intolerance (SVN) none   Education   $ Education Bronchodilator;15 min   Respiratory Evaluation   $ Care Plan Tech Time 15 min   $ Eval/Re-eval Charges Re-evaluation

## 2022-05-04 NOTE — ASSESSMENT & PLAN NOTE
Admit to med/tele  Unclear etiology, certainly high risk for pna with bronchiectasis although CXR and partially visualized lung fields on CT clear   procal is WNL  Will cover with levaquin for now   Get resp culture  resp infection panel - r/o viral URI given negative procal  30mL/kg fluid bolus   Trend lactic   Blood cultures drawn in ED  Negative meningeal signs on exam, but does report some photophobia, would consider LP if no other infectious source is uncovered in work up

## 2022-05-04 NOTE — ED PROVIDER NOTES
Encounter Date: 5/3/2022       History     Chief Complaint   Patient presents with    Weakness     Feeling sick; lethargic, cough, congestion n/v    Nausea    Vomiting     27-year-old male history significant for fatty liver, immunoglobulin deficiency presenting to the emergency room for approximately 24 hours of progressively worsening weakness, nausea, vomiting congestion, sinus pain, acute on chronic cough.  Patient states that he just feels sick.  He has had no sick contacts.  Reports at home temperature max of 102° F oral.  Patient also complaining of right lower quadrant abdominal pain.  Pain is nonradiating.        Review of patient's allergies indicates:   Allergen Reactions    Singulair [montelukast] Other (See Comments)     arrhythmias     Past Medical History:   Diagnosis Date    Fatty liver     Immunoglobulin deficiency     Immunoglobulin deficiency     Pneumonia      Past Surgical History:   Procedure Laterality Date    ADENOIDECTOMY      BIOPSY N/A 9/23/2020    Procedure: BIOPSY;  Surgeon: Kaleb Diagnostic Provider;  [facility];  Service: Interventional Radiology;  Laterality: N/A;    MANDIBLE FRACTURE SURGERY      SINUS SURGERY      TONSILLECTOMY      TYMPANOSTOMY TUBE PLACEMENT      TYMPANOSTOMY TUBE PLACEMENT      x 4     Family History   Problem Relation Age of Onset    Cancer Mother     Liver disease Father      Social History     Tobacco Use    Smoking status: Never Smoker    Smokeless tobacco: Never Used   Substance Use Topics    Alcohol use: Yes    Drug use: No     Review of Systems   Constitutional: Positive for chills, diaphoresis, fatigue and fever.   HENT: Positive for congestion and sinus pain. Negative for hearing loss, sore throat and trouble swallowing.    Eyes: Negative for visual disturbance.   Respiratory: Positive for cough. Negative for chest tightness and shortness of breath.    Cardiovascular: Negative for chest pain.   Gastrointestinal: Negative for  abdominal pain and nausea.   Endocrine: Negative for polyuria.   Genitourinary: Negative for difficulty urinating.   Musculoskeletal: Positive for arthralgias and myalgias.   Skin: Negative for rash.   Neurological: Negative for dizziness and headaches.   Psychiatric/Behavioral: The patient is not nervous/anxious.    All other systems reviewed and are negative.      Physical Exam     Initial Vitals [05/03/22 1431]   BP Pulse Resp Temp SpO2   121/76 (!) 123 20 99.1 °F (37.3 °C) 100 %      MAP       --         Physical Exam    Constitutional: He is diaphoretic.   Uncomfortable appearing, diaphoretic   HENT:   Head: Normocephalic and atraumatic.   Right Ear: External ear normal.   Left Ear: External ear normal.   Nose: Nose normal.   Mouth/Throat: Oropharynx is clear and moist.   Eyes: Conjunctivae and EOM are normal. Pupils are equal, round, and reactive to light.   Neck: Neck supple.   Normal range of motion.  Cardiovascular: Regular rhythm, normal heart sounds and intact distal pulses. Exam reveals no gallop and no friction rub.    No murmur heard.  Tachycardic   Pulmonary/Chest: Breath sounds normal. No respiratory distress. He has no wheezes. He has no rhonchi. He has no rales.   Abdominal:   Patient placed supine with flexed knees.     Mild tenderness to palpation of the right lower quadrant.    Abdomen is flat, soft and nontender in all other quadrants. No suprapubic pain. No CVAT. No guarding, no palpable masses.  No hepatosplenomegaly. No overlying skin changes. Normoactive bowel sounds. No bruits. Distal pulses 2+ b/l. No inguinal lymphadenopathy.  No shifting dullness. Negative Lawrences sign. Nontender at McBurneys point. No rebound tenderness, negative psoas sign. No pain with heel tap.     Musculoskeletal:         General: Normal range of motion.      Cervical back: Normal range of motion and neck supple.     Neurological: He is alert and oriented to person, place, and time. GCS score is 15. GCS eye  subscore is 4. GCS verbal subscore is 5. GCS motor subscore is 6.   Skin: Skin is warm. Capillary refill takes less than 2 seconds.   Psychiatric: He has a normal mood and affect. His behavior is normal. Judgment and thought content normal.         ED Course   Procedures  Labs Reviewed   CBC W/ AUTO DIFFERENTIAL - Abnormal; Notable for the following components:       Result Value    MCH 26.3 (*)     Gran # (ANC) 9.7 (*)     Lymph # 0.4 (*)     Gran % 89.8 (*)     Lymph % 3.9 (*)     All other components within normal limits   COMPREHENSIVE METABOLIC PANEL - Abnormal; Notable for the following components:    Potassium 3.3 (*)     CO2 22 (*)     AST 96 (*)      (*)     All other components within normal limits   LACTIC ACID, PLASMA - Abnormal; Notable for the following components:    Lactate (Lactic Acid) 2.6 (*)     All other components within normal limits    Narrative:     LA critical result(s) repeated. Called and verbal readback obtained   from Kailee Kim RN/ED by JBMariza 05/03/2022 16:35   URINALYSIS, REFLEX TO URINE CULTURE - Abnormal; Notable for the following components:    Specific Gravity, UA >1.030 (*)     Protein, UA 1+ (*)     Ketones, UA Trace (*)     Urobilinogen, UA 2.0-3.0 (*)     All other components within normal limits    Narrative:     Specimen Source->Urine   C-REACTIVE PROTEIN - Abnormal; Notable for the following components:    CRP 1.41 (*)     All other components within normal limits   GROUP A STREP, MOLECULAR   CULTURE, BLOOD   INFLUENZA A AND B ANTIGEN    Narrative:     Specimen Source->Nasopharyngeal Swab   SARS-COV-2 RNA AMPLIFICATION, QUAL   LIPASE   URINALYSIS MICROSCOPIC    Narrative:     Specimen Source->Urine   MAGNESIUM   C-REACTIVE PROTEIN   PROCALCITONIN   CK   MAGNESIUM   CK   DRUG SCREEN PANEL, URINE EMERGENCY   ISTAT CREATININE          Imaging Results          CT Abdomen Pelvis With Contrast (Edited Result - FINAL)  Result time 05/03/22 17:41:45    Edited Result - FINAL  by Wolf Suresh MD (05/03/22 17:41:45)                 Narrative:         ADDENDUM #1       A normal appendix is present.    Electronically signed by:  Wolf Suresh MD  5/3/2022 5:41 PM CDT Workstation: [id number]     ORIGINAL REPORT       CMS MANDATED QUALITY DATA - CT RADIATION - 436    All CT scans at this facility utilize dose modulation, iterative reconstruction, and/or weight based dosing when appropriate to reduce radiation dose to as low as reasonably achievable.        Reason: RLQ abdominal pain (Age >= 14y)    TECHNIQUE: CT abdomen and pelvis with 100 mL Omnipaque 350.    COMPARISON: None    CT ABDOMEN:  Multifocal bronchiectasis occurs inferiorly in right middle lobe, partially visualized. Visualized lung bases otherwise clear.    Liver, gallbladder, pancreas, spleen, adrenals, and kidneys are normal. Aorta is of normal caliber.    No intestinal abnormality identified. A normal is present. No free intraperitoneal gas.    No acute osseous abnormality.    CT PELVIS:  Bladder is decompressed but otherwise unremarkable. No free pelvic fluid. No acute osseous abnormality.    IMPRESSION:  No acute findings throughout the pelvis.    Electronically signed by:  Wolf Suresh MD  5/3/2022 5:01 PM CDT Workstation: [id number]                  Final result by Wolf Suresh MD (05/03/22 17:01:23)                 Narrative:    CMS MANDATED QUALITY DATA - CT RADIATION - 436    All CT scans at this facility utilize dose modulation, iterative reconstruction, and/or weight based dosing when appropriate to reduce radiation dose to as low as reasonably achievable.        Reason: RLQ abdominal pain (Age >= 14y)    TECHNIQUE: CT abdomen and pelvis with 100 mL Omnipaque 350.    COMPARISON: None    CT ABDOMEN:  Multifocal bronchiectasis occurs inferiorly in right middle lobe, partially visualized. Visualized lung bases otherwise clear.    Liver, gallbladder, pancreas, spleen, adrenals, and kidneys are normal. Aorta is of normal  caliber.    No intestinal abnormality identified. A normal is present. No free intraperitoneal gas.    No acute osseous abnormality.    CT PELVIS:  Bladder is decompressed but otherwise unremarkable. No free pelvic fluid. No acute osseous abnormality.    IMPRESSION:  No acute findings throughout the pelvis.    Electronically signed by:  Wolf Suresh MD  5/3/2022 5:01 PM CDT Workstation: [id number]                             X-Ray Chest AP Portable (Final result)  Result time 05/03/22 15:30:43    Final result by Wolf Suresh MD (05/03/22 15:30:43)                 Narrative:    Reason: Sepsis Weakness; Nausea; Vomiting, Sepsis    FINDINGS:  Portable chest at 1503 compared with 3/20/2022 shows normal cardiomediastinal silhouette.    Lungs are clear. Pulmonary vasculature is normal. No acute osseous abnormality.    IMPRESSION:  Negative chest.    Electronically signed by:  Wolf Suresh MD  5/3/2022 3:30 PM CDT Workstation: [id number]                               Medications   albuterol nebulizer solution 2.5 mg (2.5 mg Nebulization Given 5/3/22 2056)   sodium chloride 0.9% flush 10 mL (has no administration in time range)   naloxone 0.4 mg/mL injection 0.02 mg (has no administration in time range)   acetaminophen tablet 650 mg (has no administration in time range)   senna-docusate 8.6-50 mg per tablet 1 tablet (1 tablet Oral Not Given 5/3/22 2100)   polyethylene glycol packet 17 g (has no administration in time range)   ondansetron injection 4 mg (has no administration in time range)   melatonin tablet 6 mg (has no administration in time range)   potassium chloride SA CR tablet 20 mEq (has no administration in time range)   potassium chloride SA CR tablet 40 mEq (has no administration in time range)   potassium chloride SA CR tablet 20 mEq (has no administration in time range)   potassium chloride SA CR tablet 40 mEq (has no administration in time range)   magnesium oxide tablet 800 mg (has no administration in  time range)   magnesium sulfate 2g in water 50mL IVPB (premix) (has no administration in time range)   magnesium sulfate in dextrose IVPB (premix) 1 g (has no administration in time range)   magnesium sulfate 2g in water 50mL IVPB (premix) (has no administration in time range)   magnesium sulfate 2g in water 50mL IVPB (premix) (has no administration in time range)   calcium chloride 1 g in dextrose 5 % 100 mL IVPB (has no administration in time range)   calcium chloride 1 g in dextrose 5 % 100 mL IVPB (has no administration in time range)   calcium chloride 1 g in dextrose 5 % 100 mL IVPB (has no administration in time range)   levoFLOXacin 750 mg/150 mL IVPB 750 mg (750 mg Intravenous New Bag 5/3/22 2226)   sodium chloride 3% nebulizer solution 4 mL (has no administration in time range)   magnesium sulfate 2g in water 50mL IVPB (premix) (has no administration in time range)   lactated ringers bolus 1,000 mL (1,000 mLs Intravenous New Bag 5/3/22 2219)   lactated ringers bolus 1,000 mL (0 mLs Intravenous Stopped 5/3/22 1744)   iohexoL (OMNIPAQUE 350) injection 100 mL (100 mLs Intravenous Given 5/3/22 1649)   lactated ringers bolus 1,000 mL (1,000 mLs Intravenous New Bag 5/3/22 1746)   potassium bicarbonate disintegrating tablet 20 mEq (20 mEq Oral Given 5/3/22 1745)   meropenem (MERREM) 2 g in sodium chloride 0.9% 100 mL IVPB (0 g Intravenous Stopped 5/3/22 2019)   acetaminophen tablet 650 mg (650 mg Oral Given 5/3/22 1955)   potassium chloride SA CR tablet 40 mEq (40 mEq Oral Given 5/3/22 1955)     Medical Decision Making:   Initial Assessment:   27-year-old male history significant for fatty liver, immunoglobulin deficiency presenting to the emergency room for approximately 24 hours of progressively worsening weakness, nausea, vomiting congestion, sinus pain, acute on chronic cough.  Patient states that he just feels sick.  He has had no sick contacts.  Reports at home temperature max of 102° F oral.  Patient also  "complaining of right lower quadrant abdominal pain.  Pain is nonradiating.  Differential Diagnosis:   Respiratory infection versus appendicitis versus diverticulitis versus gastritis  Clinical Tests:   Sepsis Perfusion Assessment: "I attest a sepsis perfusion exam was performed within 6 hours of sepsis, severe sepsis, or septic shock presentation, following fluid resuscitation."  ED Management:  27-year-old male with immune deficiency disorder presenting for fever and tachycardia of unknown origin.    Nontoxic, VSS although persistently tachycardic, hemodynamically stable,  afebrile throughout stay.  Labs demonstrate elevated lactic acid 2.6.  No leukocytosis, no anemia, no metabolic derangements.  Negative for COVID, flu, strep.  Chest x-ray and CT abdomen pelvis not concerning for acute intra-abdominal cardiopulmonary process.  Of note mild bronchiectasis of right middle lobe noted on CT abdomen pelvis without complete CT view of the lungs.  EKG demonstrates sinus tachycardia at a rate of 120 beats per minute with normal intervals, no ST or T-wave abnormalities.  No ischemic changes.    Intervention during this visit includes 2 L of lactated Ringer's, 2 g of meropenem, 20 mEq of potassium p.o.    Specialty consult with Hospital Medicine with concern for fever and tachycardia without source in an immunocompromised patient.  Plan for hospital admission for empiric antibiotic therapy and serial exams secondary to SIRS.    Disposition:  Stable, admit    I discussed the findings and plan of care with this patient.  All questions were answered to the patient's satisfaction.  Disposition plan as above.  Verbal and written discharge instructions provided to the patient on discharge.  Return precautions discussed prior to discharge.     I discuss this patient case with the cosigning physician, who agrees with diagnosis and plan of care. This note was written using the assistance of a dictation program and may contain " grammatical errors.                ED Course as of 05/03/22 2338   Tue May 03, 2022   1813 Discussed patient case with Desire, nurse practitioner , [facility] who agrees to admit patient. [AN]      ED Course User Index  [AN] Vishal Ewing PA-C             Clinical Impression:   Final diagnoses:  [R00.0] Tachycardia  [R65.10] SIRS (systemic inflammatory response syndrome)          ED Disposition Condition    Observation               Vishal Ewing PA-C  05/03/22 2335

## 2022-05-04 NOTE — DISCHARGE SUMMARY
[facility]  Discharge Summary      Patient Name: Charly Mattson  MRN: [id number]  Patient Class: OP- Observation  Admission Date: 5/3/2022  Hospital Length of Stay: 0 days  Discharge Date and Time:  05/04/2022 4:55 PM  Attending Physician: Nicole att. providers found   Discharging Provider: Dereck Reyes MD  Primary Care Provider: Primary Doctor Nicole      HPI:   Mr. Mattson is a 26 y.o. 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 It is severe. It is associated with fever Tmax 102, nausea, vomiting, fatigue, sinus congestion, headache, myalgias, and arthralgias. He denies chest pain, SOB, dizziness, or LOC. He has a chronic cough that is nonproductive, and he endorses that it may have gotten a bit worse. He does nebs q12h with his vest for bronchiectasis, but has been unable to do this today because he felt so bad. Symptoms began 2 days ago. He has no know sick contacts. He was negative for covid, flu, and strep in ED. CT abd/pelvis negative for acute abnormality and CXR with no acute consolidation. WBC is WNL. Lactate is 2.6 initially. UA with no acute signs of infection.      * No surgery found *      Hospital Course:   26-year-old with prior history of CVID on immunoglobulin, chronic bronchiectasis came with fever, nausea and vomiting.  So far chest x-ray, UA, respiratory panel, COVID test, influenza panel, strep throat came back negative.  Considering his risk of Gram-negative infection we gave him IV Levaquin.  Today morning patient improved remarkably, afebrile, breathing comfortably on room air.  He was discharged home in hemodynamically stable condition with 6 more days of p.o. Levaquin.  He was advised to continue his breathing treatments, 3% saline nebulized treatments and follow-up with his PCP.     Instructions provided to follow up with  primary care physician as outpatient. Patient verbalized understanding and is aware to contact primary care physician or return to ED if new or worsening symptoms.    Physical exam on the day of discharge:  General: Patient resting comfortably in no acute distress.  Lungs: CTA. Good air entry.  Cor: Regular rate and rhythm. No murmurs. No pedal edema.  Abd: Soft. Nontender. Non-distended.  Neuro: A&O x3. Moving all 4 extremities equally  Ext: No clubbing. No cyanosis.      Vital signs reviewed.  Nursing notes reviewed       Goals of Care Treatment Preferences:  Code Status: Full Code      Consults:     No new Assessment & Plan notes have been filed under this hospital service since the last note was generated.  Service: Hospital Medicine    Final Active Diagnoses:    Diagnosis Date Noted POA    Bronchiectasis [J47.9] 05/03/2022 Yes    CVID (common variable immunodeficiency) [D83.9] 05/14/2019 Yes      Problems Resolved During this Admission:    Diagnosis Date Noted Date Resolved POA    PRINCIPAL PROBLEM:  SIRS (systemic inflammatory response syndrome) [R65.10] 05/03/2022 05/04/2022 Yes       Discharged Condition: good    Disposition: Home or Self Care    Follow Up:   Follow-up Information     [facility]. Go on 5/11/2022.    Why: 2 pm  Contact information:  [address] [address]  [phone]                       Patient Instructions:      Diet Adult Regular     Notify your health care provider if you experience any of the following:  temperature >100.4     Notify your health care provider if you experience any of the following:  difficulty breathing or increased cough     Activity as tolerated       Significant Diagnostic Studies: Labs: All labs within the past 24 hours have been reviewed    Pending Diagnostic Studies:     None         Medications:  Reconciled Home Medications:      Medication List      START taking these medications    levoFLOXacin 750 MG  tablet  Commonly known as: LEVAQUIN  Take 1 tablet (750 mg total) by mouth once daily. for 6 days        CHANGE how you take these medications    albuterol 2.5 mg /3 mL (0.083 %) nebulizer solution  Commonly known as: PROVENTIL  Take 3 mLs (2.5 mg total) by nebulization 2 (two) times daily as needed for Wheezing. Use twice daily prior to 3% saline nebulized  What changed: when to take this     sodium chloride 3% 3 % nebulizer solution  Take 4 mLs by nebulization 2 (two) times a day.  What changed: Another medication with the same name was removed. Continue taking this medication, and follow the directions you see here.        CONTINUE taking these medications    acetaminophen 325 MG tablet  Commonly known as: TYLENOL  Take 650 mg by mouth as needed.     DULERA 100-5 mcg/actuation [facility]  Generic drug: mometasone-formoterol  Inhale 2 Inhalers into the lungs 2 (two) times a day. Controller     immun glob G(IgG)-pro-IgA 0-50 1 gram/5 mL (20 %) Soln  Inject 200 mg/kg into the skin every 7 days. Sunday     levalbuterol 1.25 mg/3 mL nebulizer solution  Commonly known as: XOPENEX  Take 3 mLs (1.25 mg total) by nebulization every 4 (four) hours as needed for Wheezing. Rescue     ondansetron 4 MG Tbdl  Commonly known as: ZOFRAN-ODT  Take 1 tablet (4 mg total) by mouth every 8 (eight) hours as needed (nausea, vomiting).        STOP taking these medications    diclofenac 50 MG EC tablet  Commonly known as: VOLTAREN     mucus clearing device  Commonly known as: ACAPELLA FLUTTER     sodium chloride 0.65 % nasal spray  Commonly known as: Saline NasaL            Indwelling Lines/Drains at time of discharge:   Lines/Drains/Airways     None                 Time spent on the discharge of patient: 32 minutes         Dereck Reyes MD  Department of Hospital Medicine  [facility]

## 2022-05-08 LAB
BACTERIA BLD CULT: NORMAL
BACTERIA BLD CULT: NORMAL

## 2022-08-01 ENCOUNTER — OFFICE VISIT (OUTPATIENT)
Dept: URGENT CARE | Facility: CLINIC | Age: 27
End: 2022-08-01
Payer: MEDICAID

## 2022-08-01 VITALS
SYSTOLIC BLOOD PRESSURE: 103 MMHG | DIASTOLIC BLOOD PRESSURE: 65 MMHG | HEART RATE: 97 BPM | WEIGHT: 220.63 LBS | BODY MASS INDEX: 30.89 KG/M2 | OXYGEN SATURATION: 98 % | HEIGHT: 71 IN | TEMPERATURE: 99 F | RESPIRATION RATE: 18 BRPM

## 2022-08-01 DIAGNOSIS — M79.672 LEFT FOOT PAIN: ICD-10-CM

## 2022-08-01 DIAGNOSIS — H92.02 LEFT EAR PAIN: Primary | ICD-10-CM

## 2022-08-01 DIAGNOSIS — H66.92 LEFT OTITIS MEDIA, UNSPECIFIED OTITIS MEDIA TYPE: ICD-10-CM

## 2022-08-01 PROCEDURE — 3078F DIAST BP <80 MM HG: CPT | Mod: CPTII,S$GLB,,

## 2022-08-01 PROCEDURE — 99214 PR OFFICE/OUTPT VISIT, EST, LEVL IV, 30-39 MIN: ICD-10-PCS | Mod: S$GLB,,,

## 2022-08-01 PROCEDURE — 3074F SYST BP LT 130 MM HG: CPT | Mod: CPTII,S$GLB,,

## 2022-08-01 PROCEDURE — 3008F BODY MASS INDEX DOCD: CPT | Mod: CPTII,S$GLB,,

## 2022-08-01 PROCEDURE — 99214 OFFICE O/P EST MOD 30 MIN: CPT | Mod: S$GLB,,,

## 2022-08-01 PROCEDURE — 1159F PR MEDICATION LIST DOCUMENTED IN MEDICAL RECORD: ICD-10-PCS | Mod: CPTII,S$GLB,,

## 2022-08-01 PROCEDURE — 1160F PR REVIEW ALL MEDS BY PRESCRIBER/CLIN PHARMACIST DOCUMENTED: ICD-10-PCS | Mod: CPTII,S$GLB,,

## 2022-08-01 PROCEDURE — 1159F MED LIST DOCD IN RCRD: CPT | Mod: CPTII,S$GLB,,

## 2022-08-01 PROCEDURE — 1160F RVW MEDS BY RX/DR IN RCRD: CPT | Mod: CPTII,S$GLB,,

## 2022-08-01 PROCEDURE — 3078F PR MOST RECENT DIASTOLIC BLOOD PRESSURE < 80 MM HG: ICD-10-PCS | Mod: CPTII,S$GLB,,

## 2022-08-01 PROCEDURE — 3074F PR MOST RECENT SYSTOLIC BLOOD PRESSURE < 130 MM HG: ICD-10-PCS | Mod: CPTII,S$GLB,,

## 2022-08-01 PROCEDURE — 3008F PR BODY MASS INDEX (BMI) DOCUMENTED: ICD-10-PCS | Mod: CPTII,S$GLB,,

## 2022-08-01 RX ORDER — AMOXICILLIN AND CLAVULANATE POTASSIUM 875; 125 MG/1; MG/1
1 TABLET, FILM COATED ORAL EVERY 12 HOURS
Qty: 20 TABLET | Refills: 0 | Status: SHIPPED | OUTPATIENT
Start: 2022-08-01 | End: 2022-08-11

## 2022-08-01 RX ORDER — MELOXICAM 7.5 MG/1
7.5 TABLET ORAL DAILY
Qty: 7 TABLET | Refills: 0 | Status: SHIPPED | OUTPATIENT
Start: 2022-08-01 | End: 2022-08-08

## 2022-08-01 RX ORDER — FLUCONAZOLE 150 MG/1
150 TABLET ORAL DAILY
Qty: 1 TABLET | Refills: 0 | Status: SHIPPED | OUTPATIENT
Start: 2022-08-01 | End: 2022-08-01

## 2022-08-01 RX ORDER — NYSTATIN 100000 [USP'U]/ML
4 SUSPENSION ORAL 4 TIMES DAILY
Qty: 160 ML | Refills: 0 | Status: SHIPPED | OUTPATIENT
Start: 2022-08-01 | End: 2022-08-11

## 2022-08-01 NOTE — PROGRESS NOTES
"Subjective:       Patient ID: Charly Mattson is a 27 y.o. male.    Vitals:  height is 5' 11" (1.803 m) and weight is 100.1 kg (220 lb 9.6 oz). His oral temperature is 99 °F (37.2 °C). His blood pressure is 103/65 and his pulse is 97. His respiration is 18 and oxygen saturation is 98%.     Chief Complaint: Foot Pain and Otalgia    1st symptom: pain in left foot, stated he had xrays done x2 weeks ago at [facility] he states came back negative he was diagnosed at other urgent care with heel bruise. He denies any trauma to foot . Has hard time walking and bearing weight on foot.  2nd symptom: pt states he had an infusion Friday and shortly after he gets sinus infection, green mucus and ear pain. Today he is having ear pain and would like his ears to be examined. He states his left ear is more painful.    Foot Pain  This is a new problem. The current episode started 1 to 4 weeks ago. Pertinent negatives include no abdominal pain, chest pain, chills, coughing, diaphoresis, fever, sore throat or vertigo.   Otalgia   Pertinent negatives include no abdominal pain, coughing or sore throat.       Constitution: Negative for activity change, appetite change, chills, sweating, fever and unexpected weight change.   HENT: Positive for ear pain. Negative for postnasal drip, sinus pain, sinus pressure and sore throat.    Cardiovascular: Negative for chest pain.   Eyes: Negative for blurred vision.   Respiratory: Negative for chest tightness, cough and shortness of breath.    Gastrointestinal: Negative for abdominal pain.   Musculoskeletal: Positive for pain (left sided foot pain, worse with walking).   Neurological: Negative for dizziness, history of vertigo and altered mental status.   Psychiatric/Behavioral: Negative for altered mental status.       Objective:      Physical Exam   Constitutional: He is oriented to person, place, and time.  Non-toxic appearance. He does not appear ill. No distress.   HENT:   Ears:   Right Ear: " Ear canal normal. No drainage, swelling or tenderness. Tympanic membrane is erythematous. Tympanic membrane is not perforated.   Left Ear: Ear canal normal. No drainage, swelling or tenderness. Tympanic membrane is erythematous. Tympanic membrane is not perforated.   Eyes: Conjunctivae are normal. Extraocular movement intact   Cardiovascular: Normal rate, normal heart sounds and normal pulses.   Pulmonary/Chest: Effort normal and breath sounds normal.   Neurological: no focal deficit. He is alert and oriented to person, place, and time.   Skin: Skin is not diaphoretic. Capillary refill takes 2 to 3 seconds.   Psychiatric: His behavior is normal. Mood normal.   Pain at the posterior base of his left foot near his heel. Pedal and tibial pulse wnl. Patient has full ROM of foot. No pain on lateral/medial malleolus.       Assessment:       1. Left ear pain    2. Left otitis media, unspecified otitis media type    3. Left foot pain          Plan:         Left ear pain    Left otitis media, unspecified otitis media type  -     amoxicillin-clavulanate 875-125mg (AUGMENTIN) 875-125 mg per tablet; Take 1 tablet by mouth every 12 (twelve) hours. for 10 days  Dispense: 20 tablet; Refill: 0  -     Discontinue: fluconazole (DIFLUCAN) 150 MG Tab; Take 1 tablet (150 mg total) by mouth once daily. for 1 day  Dispense: 1 tablet; Refill: 0  -     nystatin (MYCOSTATIN) 100,000 unit/mL suspension; Take 4 mLs (400,000 Units total) by mouth 4 (four) times daily. for 10 days  Dispense: 160 mL; Refill: 0    Left foot pain  -     meloxicam (MOBIC) 7.5 MG tablet; Take 1 tablet (7.5 mg total) by mouth once daily. for 7 days  Dispense: 7 tablet; Refill: 0         Patient presents with ear pain, has pmhx of CVID, reports history of recurrent ear infections issues with chronic bronchiectasis. Ear exam revealed Ertyhmeic TM, will treat with Augmentin. Patient is planning to see his pulmonologist sometime this week. Reports yeast infection with  Augmentin when he was little, unsure if it was thrush. Nystantin given PRN.     Patient also has left foot pain that is worse with ambulation. He reports staying on his feet for long periods of time at work. He denies injury, fall or trauma. He was seen by a different UC 2 weeks ago for this same complaint. He has already had xray which he stated the results were normal. Will order podiatry referral.

## 2022-08-01 NOTE — PATIENT INSTRUCTIONS
Take antibiotics with food. Take daily probiotic.     Only start Nystantin swish and spit if you develop any symptoms of oral thrush while taking antibiotics.     Follow up with your pulmonologist as directed.     Rest, Ice, elevate and compression of the affected extremity.     Follow up with podiatry as directed. If you cannot get an appointment with [facility], call the number on the back of your insurance card and schedule an appointment with podiatry.     [facility]: [phone]    Take mobic with food.     If you develop fever not relieved by otc medicaiton, productive cough, shortness of breath or chest pain go to the emergency room as directed.

## 2022-08-12 ENCOUNTER — HOSPITAL ENCOUNTER (EMERGENCY)
Facility: HOSPITAL | Age: 27
Discharge: HOME | End: 2022-08-12
Attending: EMERGENCY MEDICINE
Payer: MEDICAID

## 2022-08-12 VITALS
SYSTOLIC BLOOD PRESSURE: 104 MMHG | DIASTOLIC BLOOD PRESSURE: 56 MMHG | BODY MASS INDEX: 30.68 KG/M2 | TEMPERATURE: 99 F | OXYGEN SATURATION: 94 % | WEIGHT: 220 LBS | RESPIRATION RATE: 19 BRPM | HEART RATE: 93 BPM

## 2022-08-12 DIAGNOSIS — R53.1 WEAKNESS: ICD-10-CM

## 2022-08-12 DIAGNOSIS — R53.1 GENERALIZED WEAKNESS: Primary | ICD-10-CM

## 2022-08-12 LAB
ALBUMIN SERPL BCP-MCNC: 4.1 G/DL (ref 3.5–5.2)
ALP SERPL-CCNC: 104 U/L (ref 55–135)
ALT SERPL W/O P-5'-P-CCNC: 50 U/L (ref 10–44)
ANION GAP SERPL CALC-SCNC: 12 MMOL/L (ref 8–16)
AST SERPL-CCNC: 62 U/L (ref 10–40)
BASOPHILS # BLD AUTO: 0.04 K/UL (ref 0–0.2)
BASOPHILS NFR BLD: 0.7 % (ref 0–1.9)
BILIRUB SERPL-MCNC: 1.5 MG/DL (ref 0.1–1)
BILIRUB UR QL STRIP: NEGATIVE
BUN SERPL-MCNC: 13 MG/DL (ref 6–20)
CALCIUM SERPL-MCNC: 8.9 MG/DL (ref 8.7–10.5)
CHLORIDE SERPL-SCNC: 101 MMOL/L (ref 95–110)
CLARITY UR: CLEAR
CO2 SERPL-SCNC: 22 MMOL/L (ref 23–29)
COLOR UR: YELLOW
CREAT SERPL-MCNC: 1.2 MG/DL (ref 0.5–1.4)
DIFFERENTIAL METHOD: ABNORMAL
EOSINOPHIL # BLD AUTO: 0 K/UL (ref 0–0.5)
EOSINOPHIL NFR BLD: 0.3 % (ref 0–8)
ERYTHROCYTE [DISTWIDTH] IN BLOOD BY AUTOMATED COUNT: 13.9 % (ref 11.5–14.5)
EST. GFR  (NO RACE VARIABLE): >60 ML/MIN/1.73 M^2
GLUCOSE SERPL-MCNC: 103 MG/DL (ref 70–110)
GLUCOSE UR QL STRIP: NEGATIVE
HCT VFR BLD AUTO: 42 % (ref 40–54)
HGB BLD-MCNC: 14.1 G/DL (ref 14–18)
HGB UR QL STRIP: NEGATIVE
IMM GRANULOCYTES # BLD AUTO: 0.02 K/UL (ref 0–0.04)
IMM GRANULOCYTES NFR BLD AUTO: 0.3 % (ref 0–0.5)
KETONES UR QL STRIP: NEGATIVE
LEUKOCYTE ESTERASE UR QL STRIP: NEGATIVE
LYMPHOCYTES # BLD AUTO: 2.2 K/UL (ref 1–4.8)
LYMPHOCYTES NFR BLD: 37.3 % (ref 18–48)
MAGNESIUM SERPL-MCNC: 2.1 MG/DL (ref 1.6–2.6)
MCH RBC QN AUTO: 26.5 PG (ref 27–31)
MCHC RBC AUTO-ENTMCNC: 33.6 G/DL (ref 32–36)
MCV RBC AUTO: 79 FL (ref 82–98)
MONOCYTES # BLD AUTO: 0.8 K/UL (ref 0.3–1)
MONOCYTES NFR BLD: 14.1 % (ref 4–15)
NEUTROPHILS # BLD AUTO: 2.8 K/UL (ref 1.8–7.7)
NEUTROPHILS NFR BLD: 47.3 % (ref 38–73)
NITRITE UR QL STRIP: NEGATIVE
NRBC BLD-RTO: 0 /100 WBC
PH UR STRIP: 6 [PH] (ref 5–8)
PLATELET # BLD AUTO: 141 K/UL (ref 150–450)
PLATELET BLD QL SMEAR: ABNORMAL
PMV BLD AUTO: 11.1 FL (ref 9.2–12.9)
POTASSIUM SERPL-SCNC: 3.5 MMOL/L (ref 3.5–5.1)
PROT SERPL-MCNC: 7.8 G/DL (ref 6–8.4)
PROT UR QL STRIP: NEGATIVE
RBC # BLD AUTO: 5.33 M/UL (ref 4.6–6.2)
SODIUM SERPL-SCNC: 135 MMOL/L (ref 136–145)
SP GR UR STRIP: 1.01 (ref 1–1.03)
URN SPEC COLLECT METH UR: NORMAL
UROBILINOGEN UR STRIP-ACNC: NEGATIVE EU/DL
WBC # BLD AUTO: 5.82 K/UL (ref 3.9–12.7)

## 2022-08-12 PROCEDURE — 83735 ASSAY OF MAGNESIUM: CPT | Performed by: EMERGENCY MEDICINE

## 2022-08-12 PROCEDURE — 63600175 PHARM REV CODE 636 W HCPCS: Performed by: EMERGENCY MEDICINE

## 2022-08-12 PROCEDURE — 93010 EKG 12-LEAD: ICD-10-PCS | Mod: ,,, | Performed by: INTERNAL MEDICINE

## 2022-08-12 PROCEDURE — 85025 COMPLETE CBC W/AUTO DIFF WBC: CPT | Performed by: EMERGENCY MEDICINE

## 2022-08-12 PROCEDURE — 96361 HYDRATE IV INFUSION ADD-ON: CPT

## 2022-08-12 PROCEDURE — 81003 URINALYSIS AUTO W/O SCOPE: CPT | Performed by: EMERGENCY MEDICINE

## 2022-08-12 PROCEDURE — 80053 COMPREHEN METABOLIC PANEL: CPT | Performed by: EMERGENCY MEDICINE

## 2022-08-12 PROCEDURE — 96360 HYDRATION IV INFUSION INIT: CPT

## 2022-08-12 PROCEDURE — 99284 EMERGENCY DEPT VISIT MOD MDM: CPT | Mod: 25

## 2022-08-12 PROCEDURE — 93010 ELECTROCARDIOGRAM REPORT: CPT | Mod: ,,, | Performed by: INTERNAL MEDICINE

## 2022-08-12 PROCEDURE — 93005 ELECTROCARDIOGRAM TRACING: CPT | Performed by: INTERNAL MEDICINE

## 2022-08-12 RX ORDER — ALBUTEROL SULFATE 0.63 MG/3ML
0.63 SOLUTION RESPIRATORY (INHALATION) EVERY 6 HOURS PRN
COMMUNITY
End: 2024-02-01 | Stop reason: DRUGHIGH

## 2022-08-12 RX ADMIN — SODIUM CHLORIDE, SODIUM LACTATE, POTASSIUM CHLORIDE, AND CALCIUM CHLORIDE 1000 ML: .6; .31; .03; .02 INJECTION, SOLUTION INTRAVENOUS at 08:08

## 2022-08-12 NOTE — ED PROVIDER NOTES
Encounter Date: 2022       History     Chief Complaint   Patient presents with    Weakness     States he hasnt eaten or drank anything in over a week.      27-year-old male who has a history of bronchiectasis, hypo gamma globulinemia, immunodeficiency, presents with complaints of generalized weakness for approximately last 4 days.  Patient states he finds that he has been increasingly sleepy.  No focal complaints.  He has had some questionable weight loss to which she attributes to poor intake recently.  He also admits some diminished urinary output.  No history of any known anemia. No fever.  No history of any GI bleeding.  His last stool was approximately 2 days ago.  The patient has had an occasional cough but no shortness of breath or chest pain.  No history any thyroid disease or diabetes.  He does admit to some headache and dizziness.  He denies any alcohol tobacco use.  Allergy to Singulair.  Family history mother is alive with colon cancer and diabetes father  of cirrhosis.        Review of patient's allergies indicates:   Allergen Reactions    Singulair [montelukast] Other (See Comments)     arrhythmias     Past Medical History:   Diagnosis Date    Fatty liver     Immunoglobulin deficiency     Immunoglobulin deficiency     CVID    Pneumonia      Past Surgical History:   Procedure Laterality Date    ADENOIDECTOMY      BIOPSY N/A 2020    Procedure: BIOPSY;  Surgeon: Kaleb Diagnostic Provider;  Location: [facility] OR;  Service: Interventional Radiology;  Laterality: N/A;    MANDIBLE FRACTURE SURGERY      SINUS SURGERY      TONSILLECTOMY      TYMPANOSTOMY TUBE PLACEMENT      TYMPANOSTOMY TUBE PLACEMENT      x 4     Family History   Problem Relation Age of Onset    Cancer Mother     Liver disease Father      Social History     Tobacco Use    Smoking status: Never Smoker    Smokeless tobacco: Never Used   Substance Use Topics    Alcohol use: Yes    Drug use: No     Review of Systems    Constitutional: Positive for activity change, appetite change and fatigue. Negative for chills, diaphoresis and fever.   HENT: Negative for congestion, ear pain, rhinorrhea, sinus pain, sore throat and trouble swallowing.    Eyes: Negative for pain.   Respiratory: Positive for cough. Negative for shortness of breath, wheezing and stridor.    Cardiovascular: Negative for chest pain, palpitations and leg swelling.   Gastrointestinal: Negative for abdominal pain, constipation, diarrhea, nausea and vomiting.   Genitourinary: Negative for difficulty urinating, dysuria, flank pain, frequency and hematuria.   Musculoskeletal: Negative for myalgias.   Skin: Negative.  Negative for pallor, rash and wound.   Allergic/Immunologic: Positive for immunocompromised state.   Neurological: Positive for dizziness, weakness and headaches. Negative for syncope.   All other systems reviewed and are negative.      Physical Exam     Initial Vitals [08/12/22 0614]   BP Pulse Resp Temp SpO2   117/76 110 20 99.5 °F (37.5 °C) 95 %      MAP       --         Physical Exam    Vitals reviewed.  Constitutional: He appears well-developed and well-nourished. He is not diaphoretic. No distress.   HENT:   Head: Normocephalic and atraumatic.   Right Ear: External ear normal.   Left Ear: External ear normal.   Nose: Nose normal.   Mouth/Throat: Oropharynx is clear and moist.   Eyes: Conjunctivae and EOM are normal. Pupils are equal, round, and reactive to light.   Neck: Neck supple. No JVD present.   Normal range of motion.  Cardiovascular: Normal rate, regular rhythm, normal heart sounds and intact distal pulses. Exam reveals no gallop and no friction rub.    No murmur heard.  Pulmonary/Chest: Breath sounds normal. No respiratory distress. He has no wheezes. He has no rhonchi. He has no rales. He exhibits no tenderness.   Abdominal: Abdomen is soft. Bowel sounds are normal. He exhibits no distension. There is no abdominal tenderness. There is no  rebound and no guarding.   Musculoskeletal:         General: No tenderness or edema. Normal range of motion.      Cervical back: Normal range of motion and neck supple.     Lymphadenopathy:     He has no cervical adenopathy.   Neurological: He is alert and oriented to person, place, and time. He has normal strength. GCS score is 15. GCS eye subscore is 4. GCS verbal subscore is 5. GCS motor subscore is 6.   Skin: Skin is warm and dry. Capillary refill takes less than 2 seconds. No rash noted. No erythema. No pallor.   Psychiatric: He has a normal mood and affect. His behavior is normal. Judgment and thought content normal.         ED Course   Procedures  Labs Reviewed   CBC W/ AUTO DIFFERENTIAL - Abnormal; Notable for the following components:       Result Value    MCV 79 (*)     MCH 26.5 (*)     Platelets 141 (*)     All other components within normal limits   COMPREHENSIVE METABOLIC PANEL - Abnormal; Notable for the following components:    Sodium 135 (*)     CO2 22 (*)     Total Bilirubin 1.5 (*)     AST 62 (*)     ALT 50 (*)     All other components within normal limits   MAGNESIUM   URINALYSIS, REFLEX TO URINE CULTURE    Narrative:     Specimen Source->Urine        ECG Results          EKG 12-lead (In process)  Result time 08/12/22 08:13:48    In process by Interface, Lab In [facility] (08/12/22 08:13:48)                 Narrative:    Test Reason : R53.1,    Vent. Rate : 094 BPM     Atrial Rate : 094 BPM     P-R Int : 164 ms          QRS Dur : 082 ms      QT Int : 354 ms       P-R-T Axes : 040 060 048 degrees     QTc Int : 442 ms    Normal sinus rhythm  Normal ECG  When compared with ECG of 03-MAY-2022 17:10,  No significant change was found    Referred By: [email or web address]   SELF           Confirmed By:                             Imaging Results    None          Medications   lactated ringers bolus 1,000 mL (0 mLs Intravenous Stopped 8/12/22 1024)                Attending Attestation:             Attending ED Notes:    This 27-year-old male who presented with complaints of generalized weakness for several days duration, is nonfocal neurologically.  There is no changes in orthostatic vital signs.  Oxygenation is normal.  The H&H is normal.  No elevated white blood cell count.  Electrolytes are normal.  The patient does have some transaminitis but this is been a chronic problem.  He will require follow-up with her primary care physician.  Advised to return to the ER as needed.  Urinalysis was normal.               Clinical Impression:   Final diagnoses:  [R53.1] Weakness  [R53.1] Generalized weakness (Primary)          ED Disposition Condition    Discharge Stable        ED Prescriptions     None        Follow-up Information     Follow up With Specialties Details Why Contact Info    [facility]   As needed [address] [address]  [phone]             Dayo Ashton Jr., MD  08/12/22 8983

## 2022-08-12 NOTE — ED NOTES
@.Orthostatic Vital Signs for Charly FAULKNER [place]:       BP  Pulse    Lyin/64  90  Sitting   115/64  103  Standing  109/68  105    Pulse Ox:  100%    Respirations:  20

## 2022-08-12 NOTE — ED TRIAGE NOTES
Not eating x 2 days.  No appetite.  Denies chest pain, sob, abd pain.  Denies n/v/d.   + fever, highest 101 x 2 days ago.  Tylenol last pm, no OTC meds today.

## 2022-08-14 ENCOUNTER — HOSPITAL ENCOUNTER (EMERGENCY)
Facility: HOSPITAL | Age: 27
Discharge: HOME | End: 2022-08-14
Attending: EMERGENCY MEDICINE
Payer: MEDICAID

## 2022-08-14 VITALS
OXYGEN SATURATION: 97 % | DIASTOLIC BLOOD PRESSURE: 67 MMHG | TEMPERATURE: 98 F | WEIGHT: 210.88 LBS | SYSTOLIC BLOOD PRESSURE: 126 MMHG | RESPIRATION RATE: 19 BRPM | HEART RATE: 103 BPM | BODY MASS INDEX: 29.41 KG/M2

## 2022-08-14 DIAGNOSIS — J01.00 ACUTE NON-RECURRENT MAXILLARY SINUSITIS: Primary | ICD-10-CM

## 2022-08-14 LAB
ANION GAP SERPL CALC-SCNC: 12 MMOL/L (ref 8–16)
BASOPHILS # BLD AUTO: 0.04 K/UL (ref 0–0.2)
BASOPHILS NFR BLD: 0.8 % (ref 0–1.9)
BUN SERPL-MCNC: 10 MG/DL (ref 6–20)
CALCIUM SERPL-MCNC: 9.4 MG/DL (ref 8.7–10.5)
CHLORIDE SERPL-SCNC: 104 MMOL/L (ref 95–110)
CO2 SERPL-SCNC: 23 MMOL/L (ref 23–29)
CREAT SERPL-MCNC: 1 MG/DL (ref 0.5–1.4)
DIFFERENTIAL METHOD: ABNORMAL
EOSINOPHIL # BLD AUTO: 0 K/UL (ref 0–0.5)
EOSINOPHIL NFR BLD: 0.8 % (ref 0–8)
ERYTHROCYTE [DISTWIDTH] IN BLOOD BY AUTOMATED COUNT: 13.6 % (ref 11.5–14.5)
EST. GFR  (NO RACE VARIABLE): >60 ML/MIN/1.73 M^2
GLUCOSE SERPL-MCNC: 95 MG/DL (ref 70–110)
HCT VFR BLD AUTO: 39.4 % (ref 40–54)
HGB BLD-MCNC: 13.5 G/DL (ref 14–18)
IMM GRANULOCYTES # BLD AUTO: 0.01 K/UL (ref 0–0.04)
IMM GRANULOCYTES NFR BLD AUTO: 0.2 % (ref 0–0.5)
LYMPHOCYTES # BLD AUTO: 1.8 K/UL (ref 1–4.8)
LYMPHOCYTES NFR BLD: 36.6 % (ref 18–48)
MCH RBC QN AUTO: 26.6 PG (ref 27–31)
MCHC RBC AUTO-ENTMCNC: 34.3 G/DL (ref 32–36)
MCV RBC AUTO: 78 FL (ref 82–98)
MONOCYTES # BLD AUTO: 0.6 K/UL (ref 0.3–1)
MONOCYTES NFR BLD: 12.6 % (ref 4–15)
NEUTROPHILS # BLD AUTO: 2.4 K/UL (ref 1.8–7.7)
NEUTROPHILS NFR BLD: 49 % (ref 38–73)
NRBC BLD-RTO: 0 /100 WBC
PLATELET # BLD AUTO: 139 K/UL (ref 150–450)
PMV BLD AUTO: 10.5 FL (ref 9.2–12.9)
POTASSIUM SERPL-SCNC: 3.9 MMOL/L (ref 3.5–5.1)
RBC # BLD AUTO: 5.07 M/UL (ref 4.6–6.2)
SARS-COV-2 RDRP RESP QL NAA+PROBE: NEGATIVE
SODIUM SERPL-SCNC: 139 MMOL/L (ref 136–145)
WBC # BLD AUTO: 4.84 K/UL (ref 3.9–12.7)

## 2022-08-14 PROCEDURE — 25000003 PHARM REV CODE 250: Performed by: EMERGENCY MEDICINE

## 2022-08-14 PROCEDURE — 99285 EMERGENCY DEPT VISIT HI MDM: CPT | Mod: 25

## 2022-08-14 PROCEDURE — 85025 COMPLETE CBC W/AUTO DIFF WBC: CPT | Performed by: EMERGENCY MEDICINE

## 2022-08-14 PROCEDURE — U0002 COVID-19 LAB TEST NON-CDC: HCPCS | Performed by: EMERGENCY MEDICINE

## 2022-08-14 PROCEDURE — 36415 COLL VENOUS BLD VENIPUNCTURE: CPT | Performed by: EMERGENCY MEDICINE

## 2022-08-14 PROCEDURE — 63600175 PHARM REV CODE 636 W HCPCS: Performed by: EMERGENCY MEDICINE

## 2022-08-14 PROCEDURE — 80048 BASIC METABOLIC PNL TOTAL CA: CPT | Performed by: EMERGENCY MEDICINE

## 2022-08-14 PROCEDURE — 96374 THER/PROPH/DIAG INJ IV PUSH: CPT

## 2022-08-14 RX ORDER — AMOXICILLIN AND CLAVULANATE POTASSIUM 875; 125 MG/1; MG/1
1 TABLET, FILM COATED ORAL 2 TIMES DAILY
Qty: 14 TABLET | Refills: 0 | Status: ON HOLD | OUTPATIENT
Start: 2022-08-14 | End: 2022-09-03 | Stop reason: HOSPADM

## 2022-08-14 RX ORDER — AMOXICILLIN AND CLAVULANATE POTASSIUM 875; 125 MG/1; MG/1
1 TABLET, FILM COATED ORAL
Status: COMPLETED | OUTPATIENT
Start: 2022-08-14 | End: 2022-08-14

## 2022-08-14 RX ORDER — DICLOFENAC SODIUM 50 MG/1
50 TABLET, DELAYED RELEASE ORAL 3 TIMES DAILY
Qty: 15 TABLET | Refills: 0 | Status: SHIPPED | OUTPATIENT
Start: 2022-08-14 | End: 2023-01-24

## 2022-08-14 RX ORDER — KETOROLAC TROMETHAMINE 30 MG/ML
30 INJECTION, SOLUTION INTRAMUSCULAR; INTRAVENOUS
Status: COMPLETED | OUTPATIENT
Start: 2022-08-14 | End: 2022-08-14

## 2022-08-14 RX ADMIN — AMOXICILLIN AND CLAVULANATE POTASSIUM 1 TABLET: 875; 125 TABLET, FILM COATED ORAL at 09:08

## 2022-08-14 RX ADMIN — KETOROLAC TROMETHAMINE 30 MG: 30 INJECTION, SOLUTION INTRAMUSCULAR; INTRAVENOUS at 08:08

## 2022-08-14 RX ADMIN — SODIUM CHLORIDE, SODIUM LACTATE, POTASSIUM CHLORIDE, AND CALCIUM CHLORIDE 1000 ML: .6; .31; .03; .02 INJECTION, SOLUTION INTRAVENOUS at 08:08

## 2022-08-15 ENCOUNTER — PATIENT OUTREACH (OUTPATIENT)
Dept: EMERGENCY MEDICINE | Facility: HOSPITAL | Age: 27
End: 2022-08-15
Payer: MEDICAID

## 2022-08-15 NOTE — PROGRESS NOTES
Sena Pruitt  ED Navigator  Emergency Department    Project: [facility] ED Navigator  Role: Community Health Worker    Date: 08/15/2022  Patient Name: Charly Mattson  MRN: [id number]  PCP: Primary Doctor No    Assessment:     Charly Mattson is a 27 y.o. male who has presented to ED for jaw pain. Patient has visited the ED 2 times in the past 3 months. Patient did not contact PCP.     ED Navigator Initial Assessment    ED Navigator Enrollment Documentation  Consent to Services  Does patient consent to completing the assessment?: Yes  Contact  Method of Initial Contact: Phone  Transportation  Does the patient have issues with Transportation?: No  Does the patient have transportation to and from healthcare appointments?: Yes  Insurance Coverage  Do you have coverage/adequate coverage?: Yes  Type/kind of coverage: Medicaid/[facility]  Is patient able to afford co-pays/deductibles?: Yes  Is patient able to afford HME or supplies?: Yes  Does patient have an established [facility] PCP?: No  Does patient need assistance finding a PCP?: Yes  Does the patient have a lack of adequate coverage?: No  Specialist Appointment  Did the patient come to the ED to see a specialist?: No  Does the patient have a pending specialist referral?: No  Does the patient have a specialist appointment made?: No  PCP Follow Up Appointment  Has the patient had an appointment with a primary care provider in the past year?: No  Does the patient have a follow up appontment with a PCP?: No  Why does the patient not have a follow up scheduled?: No established [facility]/outside PCP  Would you like an [facility] PCP?: Yes  Medications  Is patient able to afford medication?: Yes  Is patient unable to get medication due to lack of transportation?: No  Psychological  Does the patient have psycho-social concerns?: No  Food  Does the patient have concerns about food?: No  Communication/Education  Does the patient have limited English proficiency/English not primary  language?: No  Does patient have low literacy and/or low health literacy?: Yes  Does patient have concerns with care?: No  Does patient have dissatisfaction with care?: No  Other Financial Concerns  Does the patient have immediate financial distress?: No  Does the patient have general financial concerns?: No  Other Social Barriers/Concerns  Does the patient have any additional barriers or concerns?: None  Primary Barrier  Barriers identified: Patient identified no barriers to care  Root Cause of ED Utilization: Patient Knowledge/Low Health Literacy  Plan to address Patient Knowledge/Low Health Literacy: Provided information for [facility] 24/7 Nurse triage line [phone] or 1-[facility] ([phone])  Next steps: Provided Education  Was education/educational materials provided surrounding PCP services/creating a medical home?: Yes Was education verbal or written?: Written   Was education/educational materials provided surrounding low cost, healthy foods?: Yes Was education verbal or written?: Written   Was education/educational materials provided surrounding other items? If so, use comment to explain.: No    Plan: Provided information for [facility] 24/7 Nurse triage line, [phone] or 1-[facility] ([phone])  Expected Date of Follow Up 1: 9/12/22         Social History     Socioeconomic History    Marital status: Single   Tobacco Use    Smoking status: Never Smoker    Smokeless tobacco: Never Used   Substance and Sexual Activity    Alcohol use: Yes    Drug use: No    Sexual activity: Yes     Partners: Female   Social History Narrative    ** Merged History Encounter **          Social Determinants of Health     Financial Resource Strain: Low Risk     Difficulty of Paying Living Expenses: Not very hard   Food Insecurity: No Food Insecurity    Worried About Running Out of Food in the Last Year: Never true    Ran Out of Food in the Last Year: Never true   Transportation Needs: No  Transportation Needs    Lack of Transportation (Medical): No    Lack of Transportation (Non-Medical): No   Stress: No Stress Concern Present    Feeling of Stress : Not at all   Social Connections: Unknown    Frequency of Communication with Friends and Family: Three times a week    Frequency of Social Gatherings with Friends and Family: Three times a week    Marital Status: Never    Housing Stability: Unknown    Unable to Pay for Housing in the Last Year: No    Unstable Housing in the Last Year: No       Plan:   ED Navigator spoke with patient on the telephone and completed the first follow up.  Patient denied any concerns with food, housing, utilities, or transportation.  Patient does not have a PCP and would like assistance establishing one.  Patient has a dentist and denied needing help establishing other care providers.  Patient is not a smoker.  Patient was given education on (The Right Care at the Right Level information, [facility] Virtual [facility] information, and Heart healthy diet tips), [facility] Nurse Triage line, and list of family medical doctors from the Medicaid/[facility] Connect web site.    Sena Pruitt  ED Navigator

## 2022-08-15 NOTE — ED PROVIDER NOTES
Encounter Date: 8/14/2022       History     Chief Complaint   Patient presents with    Jaw Pain     Started 1 week ago. Pt have a hx jaw surgery 2018.     Chief complaint:  Facial pain    HPI:  27-year-old male presents with a one-week history of progressively worsening left maxillary pain.  He was seen at [facility] 2 days ago for fever, nausea, vomiting and malaise.  He denies any dental pain.  He has a history of combined variable immunodeficiency and received immunoglobulin 5 days ago.  He has no headache or neck stiffness.  He denies any cough shortness of breath.  He has no abdominal pain or back pain.        Review of patient's allergies indicates:   Allergen Reactions    Singulair [montelukast] Other (See Comments)     arrhythmias     Past Medical History:   Diagnosis Date    Fatty liver     Immunoglobulin deficiency     Immunoglobulin deficiency     CVID    Pneumonia      Past Surgical History:   Procedure Laterality Date    ADENOIDECTOMY      BIOPSY N/A 9/23/2020    Procedure: BIOPSY;  Surgeon: Kaleb Diagnostic Provider;  Location: [facility] OR;  Service: Interventional Radiology;  Laterality: N/A;    MANDIBLE FRACTURE SURGERY      SINUS SURGERY      TONSILLECTOMY      TYMPANOSTOMY TUBE PLACEMENT      TYMPANOSTOMY TUBE PLACEMENT      x 4     Family History   Problem Relation Age of Onset    Cancer Mother     Liver disease Father      Social History     Tobacco Use    Smoking status: Never Smoker    Smokeless tobacco: Never Used   Substance Use Topics    Alcohol use: Yes    Drug use: No     Review of Systems   Constitutional: Negative for activity change, appetite change, chills, fatigue and fever.   HENT: Positive for facial swelling and sinus pain.    Eyes: Negative for visual disturbance.   Respiratory: Negative for apnea and shortness of breath.    Cardiovascular: Negative for chest pain and palpitations.   Gastrointestinal: Negative for abdominal distention and abdominal pain.    Genitourinary: Negative for difficulty urinating.   Musculoskeletal: Negative for neck pain.   Skin: Negative for pallor and rash.   Neurological: Negative for headaches.   Hematological: Does not bruise/bleed easily.   Psychiatric/Behavioral: Negative for agitation.       Physical Exam     Initial Vitals [08/14/22 1955]   BP Pulse Resp Temp SpO2   126/67 103 19 98.4 °F (36.9 °C) 97 %      MAP       --         Physical Exam    Nursing note and vitals reviewed.  Constitutional: He appears well-developed and well-nourished.   HENT:   Head: Normocephalic and atraumatic.   Eyes: Conjunctivae are normal.   Neck: Neck supple.   Normal range of motion.  Cardiovascular: Normal rate, regular rhythm and normal heart sounds. Exam reveals no gallop and no friction rub.    No murmur heard.  Pulmonary/Chest: Breath sounds normal. No respiratory distress. He has no wheezes. He has no rhonchi. He has no rales.   Abdominal: Abdomen is soft. He exhibits no distension. There is no abdominal tenderness.   Musculoskeletal:         General: Normal range of motion.      Cervical back: Normal range of motion and neck supple.     Neurological: He is alert and oriented to person, place, and time.   Skin: Skin is warm and dry.   Psychiatric: He has a normal mood and affect.         ED Course   Procedures  Labs Reviewed   CBC W/ AUTO DIFFERENTIAL - Abnormal; Notable for the following components:       Result Value    Hemoglobin 13.5 (*)     Hematocrit 39.4 (*)     MCV 78 (*)     MCH 26.6 (*)     Platelets 139 (*)     All other components within normal limits   BASIC METABOLIC PANEL   SARS-COV-2 RNA AMPLIFICATION, QUAL          Imaging Results          CT Sinuses without Contrast (Final result)  Result time 08/14/22 20:58:21    Final result by Humberto Westbrook DO (08/14/22 20:58:21)                 Impression:      1. Sinus mucosal thickening of the bilateral maxillary, ethmoid, and sphenoid sinuses as above.  Findings can be seen with acute  sinusitis in the appropriate clinical setting.  2. Remote bilateral LeFort fractures with maxillary fixation hardware in place.  3. Prior endonasal sinus surgery.      Electronically signed by: Humberto Westbrook  Date:    08/14/2022  Time:    20:58             Narrative:    EXAMINATION:  CT SINUSES WITHOUT CONTRAST    CLINICAL HISTORY:  Sinusitis, chronic or recurrent;    TECHNIQUE:  Axial low-dose images, coronal and sagittal reformations were performed through the paranasal sinuses.  Intravenous contrast was not administered.    COMPARISON:  CT head from 09/16/2013.    FINDINGS:  There are remote bilateral L4 fractures, with continued fracture lines of the bilateral pterygoid plates extending into the bilateral maxillary sinus walls.  There is fixation hardware along the bilateral maxilla.  Hardware is intact.    There are postop changes of prior endonasal sinus surgery including bilateral ethmoidectomies, maxillary antrostomies, and turbinectomies.    There is sinus mucosal thickening of the bilateral maxillary, ethmoid, and sphenoid sinuses.  Small mucous retention cysts noted in the left sphenoid sinus.    Partially imaged hardware noted along the bilateral aspects of the mandible.  The TMJs appear unremarkable.  There is scattered dental amalgam, resulting in streak artifact.    Partially imaged portions of the brain parenchyma are unremarkable.    The soft tissues of the face are unremarkable.    The bilateral globes are symmetric and intact.  The orbits are unremarkable.                                 Medications   lactated ringers bolus 1,000 mL (1,000 mLs Intravenous New Bag 8/14/22 2015)   amoxicillin-clavulanate 875-125mg per tablet 1 tablet (has no administration in time range)   ketorolac injection 30 mg (30 mg Intravenous Given 8/14/22 2033)     Medical Decision Making:   ED Management:  27-year-old male with combined variable immunodeficiency did presents with left maxillary pain and swelling.  CT  reveals diffuse sinusitis.  He will be treated with Augmentin.                      Clinical Impression:   Final diagnoses:  [J01.00] Acute non-recurrent maxillary sinusitis (Primary)          ED Disposition Condition    Discharge Stable        ED Prescriptions     Medication Sig Dispense Start Date End Date Auth. Provider    amoxicillin-clavulanate 875-125mg (AUGMENTIN) 875-125 mg per tablet Take 1 tablet by mouth 2 (two) times daily. 14 tablet 8/14/2022  Stephen Mckeon III, MD    diclofenac (VOLTAREN) 50 MG EC tablet Take 1 tablet (50 mg total) by mouth 3 (three) times daily. 15 tablet 8/14/2022 8/14/2023 Stephen Mckeon III, MD        Follow-up Information     Follow up With Specialties Details Why Contact Info    Waqas Ortiz MD Family Medicine In 3 days  [address] [facility] [address]  [phone]             Stephen Mckeon III, MD  08/14/22 1554

## 2022-08-30 ENCOUNTER — HOSPITAL ENCOUNTER (INPATIENT)
Facility: HOSPITAL | Age: 27
LOS: 4 days | Discharge: HOME | DRG: 871 | End: 2022-09-03
Attending: EMERGENCY MEDICINE | Admitting: STUDENT IN AN ORGANIZED HEALTH CARE EDUCATION/TRAINING PROGRAM
Payer: MEDICAID

## 2022-08-30 DIAGNOSIS — R50.9 FEVER: ICD-10-CM

## 2022-08-30 DIAGNOSIS — A41.9 SEPSIS: ICD-10-CM

## 2022-08-30 DIAGNOSIS — R04.2 HEMOPTYSIS: ICD-10-CM

## 2022-08-30 DIAGNOSIS — R09.89 CHRONIC SINUS COMPLAINTS: ICD-10-CM

## 2022-08-30 DIAGNOSIS — R07.9 CHEST PAIN: ICD-10-CM

## 2022-08-30 DIAGNOSIS — J47.1 BRONCHIECTASIS WITH ACUTE EXACERBATION: ICD-10-CM

## 2022-08-30 DIAGNOSIS — J18.9 PNEUMONIA OF LEFT LOWER LOBE DUE TO INFECTIOUS ORGANISM: Primary | ICD-10-CM

## 2022-08-30 LAB
ABO + RH BLD: NORMAL
ALBUMIN SERPL BCP-MCNC: 4 G/DL (ref 3.5–5.2)
ALP SERPL-CCNC: 157 U/L (ref 55–135)
ALT SERPL W/O P-5'-P-CCNC: 109 U/L (ref 10–44)
ANION GAP SERPL CALC-SCNC: 10 MMOL/L (ref 8–16)
AST SERPL-CCNC: 120 U/L (ref 10–40)
BASOPHILS # BLD AUTO: 0.04 K/UL (ref 0–0.2)
BASOPHILS NFR BLD: 0.4 % (ref 0–1.9)
BILIRUB SERPL-MCNC: 0.9 MG/DL (ref 0.1–1)
BILIRUB UR QL STRIP: NEGATIVE
BLD GP AB SCN CELLS X3 SERPL QL: NORMAL
BUN SERPL-MCNC: 13 MG/DL (ref 6–20)
CALCIUM SERPL-MCNC: 9.8 MG/DL (ref 8.7–10.5)
CHLORIDE SERPL-SCNC: 107 MMOL/L (ref 95–110)
CLARITY UR: CLEAR
CO2 SERPL-SCNC: 23 MMOL/L (ref 23–29)
COLOR UR: YELLOW
CREAT SERPL-MCNC: 1.1 MG/DL (ref 0.5–1.4)
DIFFERENTIAL METHOD: ABNORMAL
EOSINOPHIL # BLD AUTO: 0 K/UL (ref 0–0.5)
EOSINOPHIL NFR BLD: 0.3 % (ref 0–8)
ERYTHROCYTE [DISTWIDTH] IN BLOOD BY AUTOMATED COUNT: 14.5 % (ref 11.5–14.5)
EST. GFR  (NO RACE VARIABLE): >60 ML/MIN/1.73 M^2
GGT SERPL-CCNC: 309 U/L (ref 8–55)
GLUCOSE SERPL-MCNC: 116 MG/DL (ref 70–110)
GLUCOSE UR QL STRIP: NEGATIVE
HCT VFR BLD AUTO: 46 % (ref 40–54)
HGB BLD-MCNC: 15.2 G/DL (ref 14–18)
HGB UR QL STRIP: NEGATIVE
IMM GRANULOCYTES # BLD AUTO: 0.02 K/UL (ref 0–0.04)
IMM GRANULOCYTES NFR BLD AUTO: 0.2 % (ref 0–0.5)
INFLUENZA A, MOLECULAR: NEGATIVE
INFLUENZA B, MOLECULAR: NEGATIVE
KETONES UR QL STRIP: NEGATIVE
LACTATE SERPL-SCNC: 1.5 MMOL/L (ref 0.5–2.2)
LEUKOCYTE ESTERASE UR QL STRIP: NEGATIVE
LYMPHOCYTES # BLD AUTO: 1.8 K/UL (ref 1–4.8)
LYMPHOCYTES NFR BLD: 16.2 % (ref 18–48)
MCH RBC QN AUTO: 26.9 PG (ref 27–31)
MCHC RBC AUTO-ENTMCNC: 33 G/DL (ref 32–36)
MCV RBC AUTO: 81 FL (ref 82–98)
MONOCYTES # BLD AUTO: 0.7 K/UL (ref 0.3–1)
MONOCYTES NFR BLD: 6.2 % (ref 4–15)
NEUTROPHILS # BLD AUTO: 8.3 K/UL (ref 1.8–7.7)
NEUTROPHILS NFR BLD: 76.7 % (ref 38–73)
NITRITE UR QL STRIP: NEGATIVE
NRBC BLD-RTO: 0 /100 WBC
PH UR STRIP: 8 [PH] (ref 5–8)
PLATELET # BLD AUTO: 144 K/UL (ref 150–450)
PMV BLD AUTO: 10.5 FL (ref 9.2–12.9)
POTASSIUM SERPL-SCNC: 3.9 MMOL/L (ref 3.5–5.1)
PROCALCITONIN SERPL IA-MCNC: 0.1 NG/ML
PROT SERPL-MCNC: 7.8 G/DL (ref 6–8.4)
PROT UR QL STRIP: NEGATIVE
RBC # BLD AUTO: 5.66 M/UL (ref 4.6–6.2)
SARS-COV-2 RDRP RESP QL NAA+PROBE: NEGATIVE
SODIUM SERPL-SCNC: 140 MMOL/L (ref 136–145)
SP GR UR STRIP: 1.02 (ref 1–1.03)
SPECIMEN SOURCE: NORMAL
URN SPEC COLLECT METH UR: NORMAL
UROBILINOGEN UR STRIP-ACNC: 1 EU/DL
WBC # BLD AUTO: 10.81 K/UL (ref 3.9–12.7)

## 2022-08-30 PROCEDURE — 63600175 PHARM REV CODE 636 W HCPCS: Performed by: EMERGENCY MEDICINE

## 2022-08-30 PROCEDURE — 63600175 PHARM REV CODE 636 W HCPCS: Performed by: NURSE PRACTITIONER

## 2022-08-30 PROCEDURE — 25000003 PHARM REV CODE 250: Performed by: EMERGENCY MEDICINE

## 2022-08-30 PROCEDURE — U0002 COVID-19 LAB TEST NON-CDC: HCPCS | Performed by: NURSE PRACTITIONER

## 2022-08-30 PROCEDURE — 82977 ASSAY OF GGT: CPT | Performed by: NURSE PRACTITIONER

## 2022-08-30 PROCEDURE — 96375 TX/PRO/DX INJ NEW DRUG ADDON: CPT

## 2022-08-30 PROCEDURE — G0378 HOSPITAL OBSERVATION PER HR: HCPCS

## 2022-08-30 PROCEDURE — 25000003 PHARM REV CODE 250: Performed by: NURSE PRACTITIONER

## 2022-08-30 PROCEDURE — 25000242 PHARM REV CODE 250 ALT 637 W/ HCPCS: Performed by: EMERGENCY MEDICINE

## 2022-08-30 PROCEDURE — 96365 THER/PROPH/DIAG IV INF INIT: CPT

## 2022-08-30 PROCEDURE — 87040 BLOOD CULTURE FOR BACTERIA: CPT | Performed by: EMERGENCY MEDICINE

## 2022-08-30 PROCEDURE — 84145 PROCALCITONIN (PCT): CPT | Performed by: EMERGENCY MEDICINE

## 2022-08-30 PROCEDURE — 85025 COMPLETE CBC W/AUTO DIFF WBC: CPT | Performed by: NURSE PRACTITIONER

## 2022-08-30 PROCEDURE — 12000002 HC ACUTE/MED SURGE SEMI-PRIVATE ROOM

## 2022-08-30 PROCEDURE — 83605 ASSAY OF LACTIC ACID: CPT | Performed by: EMERGENCY MEDICINE

## 2022-08-30 PROCEDURE — 99285 EMERGENCY DEPT VISIT HI MDM: CPT | Mod: 25

## 2022-08-30 PROCEDURE — 36415 COLL VENOUS BLD VENIPUNCTURE: CPT | Performed by: NURSE PRACTITIONER

## 2022-08-30 PROCEDURE — 87502 INFLUENZA DNA AMP PROBE: CPT | Performed by: EMERGENCY MEDICINE

## 2022-08-30 PROCEDURE — 80053 COMPREHEN METABOLIC PANEL: CPT | Performed by: NURSE PRACTITIONER

## 2022-08-30 PROCEDURE — 94640 AIRWAY INHALATION TREATMENT: CPT

## 2022-08-30 PROCEDURE — 86901 BLOOD TYPING SEROLOGIC RH(D): CPT | Performed by: NURSE PRACTITIONER

## 2022-08-30 PROCEDURE — 81003 URINALYSIS AUTO W/O SCOPE: CPT | Performed by: EMERGENCY MEDICINE

## 2022-08-30 PROCEDURE — 96361 HYDRATE IV INFUSION ADD-ON: CPT

## 2022-08-30 RX ORDER — ACETAMINOPHEN 325 MG/1
650 TABLET ORAL EVERY 8 HOURS PRN
Status: DISCONTINUED | OUTPATIENT
Start: 2022-08-30 | End: 2022-09-03 | Stop reason: HOSPADM

## 2022-08-30 RX ORDER — SODIUM CHLORIDE 9 MG/ML
INJECTION, SOLUTION INTRAVENOUS
Status: DISCONTINUED | OUTPATIENT
Start: 2022-08-30 | End: 2022-08-30

## 2022-08-30 RX ORDER — IBUPROFEN 200 MG
24 TABLET ORAL
Status: DISCONTINUED | OUTPATIENT
Start: 2022-08-30 | End: 2022-09-03 | Stop reason: HOSPADM

## 2022-08-30 RX ORDER — MAG HYDROX/ALUMINUM HYD/SIMETH 200-200-20
30 SUSPENSION, ORAL (FINAL DOSE FORM) ORAL 4 TIMES DAILY PRN
Status: DISCONTINUED | OUTPATIENT
Start: 2022-08-30 | End: 2022-09-03 | Stop reason: HOSPADM

## 2022-08-30 RX ORDER — IPRATROPIUM BROMIDE AND ALBUTEROL SULFATE 2.5; .5 MG/3ML; MG/3ML
3 SOLUTION RESPIRATORY (INHALATION)
Status: COMPLETED | OUTPATIENT
Start: 2022-08-30 | End: 2022-08-30

## 2022-08-30 RX ORDER — SODIUM CHLORIDE FOR INHALATION 3 %
4 VIAL, NEBULIZER (ML) INHALATION EVERY 8 HOURS
Status: DISCONTINUED | OUTPATIENT
Start: 2022-08-30 | End: 2022-08-30

## 2022-08-30 RX ORDER — DROPERIDOL 2.5 MG/ML
2.5 INJECTION, SOLUTION INTRAMUSCULAR; INTRAVENOUS
Status: COMPLETED | OUTPATIENT
Start: 2022-08-30 | End: 2022-08-30

## 2022-08-30 RX ORDER — LANOLIN ALCOHOL/MO/W.PET/CERES
800 CREAM (GRAM) TOPICAL
Status: DISCONTINUED | OUTPATIENT
Start: 2022-08-30 | End: 2022-09-03 | Stop reason: HOSPADM

## 2022-08-30 RX ORDER — SODIUM CHLORIDE FOR INHALATION 3 %
4 VIAL, NEBULIZER (ML) INHALATION 2 TIMES DAILY
Status: DISCONTINUED | OUTPATIENT
Start: 2022-08-30 | End: 2022-08-31

## 2022-08-30 RX ORDER — ONDANSETRON 2 MG/ML
4 INJECTION INTRAMUSCULAR; INTRAVENOUS
Status: COMPLETED | OUTPATIENT
Start: 2022-08-30 | End: 2022-08-30

## 2022-08-30 RX ORDER — ALBUTEROL SULFATE 2.5 MG/.5ML
2.5 SOLUTION RESPIRATORY (INHALATION) EVERY 6 HOURS
Status: DISCONTINUED | OUTPATIENT
Start: 2022-08-31 | End: 2022-09-03 | Stop reason: HOSPADM

## 2022-08-30 RX ORDER — TALC
6 POWDER (GRAM) TOPICAL NIGHTLY PRN
Status: DISCONTINUED | OUTPATIENT
Start: 2022-08-30 | End: 2022-09-03 | Stop reason: HOSPADM

## 2022-08-30 RX ORDER — ONDANSETRON 2 MG/ML
4 INJECTION INTRAMUSCULAR; INTRAVENOUS EVERY 6 HOURS PRN
Status: DISCONTINUED | OUTPATIENT
Start: 2022-08-30 | End: 2022-09-03 | Stop reason: HOSPADM

## 2022-08-30 RX ORDER — POLYETHYLENE GLYCOL 3350 17 G/17G
17 POWDER, FOR SOLUTION ORAL 2 TIMES DAILY PRN
Status: DISCONTINUED | OUTPATIENT
Start: 2022-08-30 | End: 2022-09-03 | Stop reason: HOSPADM

## 2022-08-30 RX ORDER — AMOXICILLIN 250 MG
1 CAPSULE ORAL 2 TIMES DAILY
Status: DISCONTINUED | OUTPATIENT
Start: 2022-08-30 | End: 2022-09-02

## 2022-08-30 RX ORDER — GLUCAGON 1 MG
1 KIT INJECTION
Status: DISCONTINUED | OUTPATIENT
Start: 2022-08-30 | End: 2022-09-03 | Stop reason: HOSPADM

## 2022-08-30 RX ORDER — MEROPENEM AND SODIUM CHLORIDE 1 G/50ML
1 INJECTION, SOLUTION INTRAVENOUS
Status: DISCONTINUED | OUTPATIENT
Start: 2022-08-31 | End: 2022-09-03 | Stop reason: HOSPADM

## 2022-08-30 RX ORDER — BUTALBITAL, ACETAMINOPHEN AND CAFFEINE 50; 325; 40 MG/1; MG/1; MG/1
1 TABLET ORAL
Status: COMPLETED | OUTPATIENT
Start: 2022-08-30 | End: 2022-08-30

## 2022-08-30 RX ORDER — HYDROCODONE BITARTRATE AND ACETAMINOPHEN 5; 325 MG/1; MG/1
1 TABLET ORAL EVERY 6 HOURS PRN
Status: DISCONTINUED | OUTPATIENT
Start: 2022-08-30 | End: 2022-09-03 | Stop reason: HOSPADM

## 2022-08-30 RX ORDER — MEROPENEM AND SODIUM CHLORIDE 1 G/50ML
1 INJECTION, SOLUTION INTRAVENOUS
Status: COMPLETED | OUTPATIENT
Start: 2022-08-30 | End: 2022-08-30

## 2022-08-30 RX ORDER — IBUPROFEN 200 MG
16 TABLET ORAL
Status: DISCONTINUED | OUTPATIENT
Start: 2022-08-30 | End: 2022-09-03 | Stop reason: HOSPADM

## 2022-08-30 RX ORDER — ACETAMINOPHEN 325 MG/1
325 TABLET ORAL
Status: COMPLETED | OUTPATIENT
Start: 2022-08-30 | End: 2022-08-30

## 2022-08-30 RX ORDER — NALOXONE HCL 0.4 MG/ML
0.02 VIAL (ML) INJECTION
Status: DISCONTINUED | OUTPATIENT
Start: 2022-08-30 | End: 2022-09-03 | Stop reason: HOSPADM

## 2022-08-30 RX ORDER — SODIUM CHLORIDE 0.9 % (FLUSH) 0.9 %
10 SYRINGE (ML) INJECTION EVERY 12 HOURS PRN
Status: DISCONTINUED | OUTPATIENT
Start: 2022-08-30 | End: 2022-09-03 | Stop reason: HOSPADM

## 2022-08-30 RX ORDER — FLUTICASONE FUROATE AND VILANTEROL 100; 25 UG/1; UG/1
1 POWDER RESPIRATORY (INHALATION) DAILY
Refills: 1 | Status: DISCONTINUED | OUTPATIENT
Start: 2022-08-31 | End: 2022-09-03 | Stop reason: HOSPADM

## 2022-08-30 RX ADMIN — BUTALBITAL, ACETAMINOPHEN, AND CAFFEINE 1 TABLET: 50; 325; 40 TABLET ORAL at 08:08

## 2022-08-30 RX ADMIN — ONDANSETRON 4 MG: 2 INJECTION INTRAMUSCULAR; INTRAVENOUS at 07:08

## 2022-08-30 RX ADMIN — ACETAMINOPHEN 325 MG: 325 TABLET ORAL at 08:08

## 2022-08-30 RX ADMIN — MEROPENEM AND SODIUM CHLORIDE 1 G: 1 INJECTION, SOLUTION INTRAVENOUS at 10:08

## 2022-08-30 RX ADMIN — DROPERIDOL 2.5 MG: 2.5 INJECTION, SOLUTION INTRAMUSCULAR; INTRAVENOUS at 08:08

## 2022-08-30 RX ADMIN — SODIUM CHLORIDE 1000 ML: 0.9 INJECTION, SOLUTION INTRAVENOUS at 07:08

## 2022-08-30 RX ADMIN — IPRATROPIUM BROMIDE AND ALBUTEROL SULFATE 3 ML: 2.5; .5 SOLUTION RESPIRATORY (INHALATION) at 09:08

## 2022-08-30 NOTE — LETTER
September 3, 2022         [address] [place] [id number]  Phone: [phone]       Patient: Charly Mattson   YOB: 1995  Date of Visit: 09/03/2022    To Whom It May Concern:    Kj Mattson  was at [facility] on 09/03/2022. The patient may return to work/school on 09/07/2022   with no   restrictions. If you have any questions or concerns, or if I can be of further assistance, please do not hesitate to contact me.    Sincerely,    Fabiola Lugo NP  [phone]

## 2022-08-31 LAB
ALBUMIN SERPL BCP-MCNC: 3.3 G/DL (ref 3.5–5.2)
ALP SERPL-CCNC: 130 U/L (ref 55–135)
ALT SERPL W/O P-5'-P-CCNC: 95 U/L (ref 10–44)
ANION GAP SERPL CALC-SCNC: 8 MMOL/L (ref 8–16)
AST SERPL-CCNC: 104 U/L (ref 10–40)
BASOPHILS # BLD AUTO: 0.03 K/UL (ref 0–0.2)
BASOPHILS NFR BLD: 0.3 % (ref 0–1.9)
BILIRUB SERPL-MCNC: 0.9 MG/DL (ref 0.1–1)
BUN SERPL-MCNC: 11 MG/DL (ref 6–20)
CALCIUM SERPL-MCNC: 8.9 MG/DL (ref 8.7–10.5)
CHLORIDE SERPL-SCNC: 109 MMOL/L (ref 95–110)
CO2 SERPL-SCNC: 21 MMOL/L (ref 23–29)
CREAT SERPL-MCNC: 0.9 MG/DL (ref 0.5–1.4)
DIFFERENTIAL METHOD: ABNORMAL
EOSINOPHIL # BLD AUTO: 0 K/UL (ref 0–0.5)
EOSINOPHIL NFR BLD: 0.2 % (ref 0–8)
ERYTHROCYTE [DISTWIDTH] IN BLOOD BY AUTOMATED COUNT: 14.1 % (ref 11.5–14.5)
EST. GFR  (NO RACE VARIABLE): >60 ML/MIN/1.73 M^2
GLUCOSE SERPL-MCNC: 104 MG/DL (ref 70–110)
HCT VFR BLD AUTO: 38.9 % (ref 40–54)
HGB BLD-MCNC: 12.9 G/DL (ref 14–18)
IMM GRANULOCYTES # BLD AUTO: 0.03 K/UL (ref 0–0.04)
IMM GRANULOCYTES NFR BLD AUTO: 0.3 % (ref 0–0.5)
LACTATE SERPL-SCNC: 2.3 MMOL/L (ref 0.5–2.2)
LACTATE SERPL-SCNC: 2.5 MMOL/L (ref 0.5–2.2)
LYMPHOCYTES # BLD AUTO: 1.6 K/UL (ref 1–4.8)
LYMPHOCYTES NFR BLD: 15.3 % (ref 18–48)
MAGNESIUM SERPL-MCNC: 1.9 MG/DL (ref 1.6–2.6)
MCH RBC QN AUTO: 26.8 PG (ref 27–31)
MCHC RBC AUTO-ENTMCNC: 33.2 G/DL (ref 32–36)
MCV RBC AUTO: 81 FL (ref 82–98)
MONOCYTES # BLD AUTO: 0.9 K/UL (ref 0.3–1)
MONOCYTES NFR BLD: 9 % (ref 4–15)
NEUTROPHILS # BLD AUTO: 7.8 K/UL (ref 1.8–7.7)
NEUTROPHILS NFR BLD: 74.9 % (ref 38–73)
NRBC BLD-RTO: 0 /100 WBC
PLATELET # BLD AUTO: 116 K/UL (ref 150–450)
PMV BLD AUTO: 11.5 FL (ref 9.2–12.9)
POTASSIUM SERPL-SCNC: 3.7 MMOL/L (ref 3.5–5.1)
PROT SERPL-MCNC: 6.3 G/DL (ref 6–8.4)
RBC # BLD AUTO: 4.81 M/UL (ref 4.6–6.2)
SODIUM SERPL-SCNC: 138 MMOL/L (ref 136–145)
WBC # BLD AUTO: 10.39 K/UL (ref 3.9–12.7)

## 2022-08-31 PROCEDURE — 36415 COLL VENOUS BLD VENIPUNCTURE: CPT | Performed by: STUDENT IN AN ORGANIZED HEALTH CARE EDUCATION/TRAINING PROGRAM

## 2022-08-31 PROCEDURE — 63600175 PHARM REV CODE 636 W HCPCS: Performed by: NURSE PRACTITIONER

## 2022-08-31 PROCEDURE — 25000242 PHARM REV CODE 250 ALT 637 W/ HCPCS: Performed by: NURSE PRACTITIONER

## 2022-08-31 PROCEDURE — 25000003 PHARM REV CODE 250: Performed by: SURGERY

## 2022-08-31 PROCEDURE — 85025 COMPLETE CBC W/AUTO DIFF WBC: CPT | Performed by: NURSE PRACTITIONER

## 2022-08-31 PROCEDURE — 25000003 PHARM REV CODE 250: Performed by: NURSE PRACTITIONER

## 2022-08-31 PROCEDURE — 94761 N-INVAS EAR/PLS OXIMETRY MLT: CPT

## 2022-08-31 PROCEDURE — 80053 COMPREHEN METABOLIC PANEL: CPT | Performed by: NURSE PRACTITIONER

## 2022-08-31 PROCEDURE — 94640 AIRWAY INHALATION TREATMENT: CPT | Mod: XB

## 2022-08-31 PROCEDURE — 83735 ASSAY OF MAGNESIUM: CPT | Performed by: NURSE PRACTITIONER

## 2022-08-31 PROCEDURE — 27100233

## 2022-08-31 PROCEDURE — 99215 PR OFFICE/OUTPT VISIT, EST, LEVL V, 40-54 MIN: ICD-10-PCS | Mod: ,,, | Performed by: INTERNAL MEDICINE

## 2022-08-31 PROCEDURE — 25000003 PHARM REV CODE 250: Performed by: INTERNAL MEDICINE

## 2022-08-31 PROCEDURE — 63600175 PHARM REV CODE 636 W HCPCS: Performed by: SURGERY

## 2022-08-31 PROCEDURE — 94669 MECHANICAL CHEST WALL OSCILL: CPT

## 2022-08-31 PROCEDURE — 83605 ASSAY OF LACTIC ACID: CPT | Mod: 91 | Performed by: STUDENT IN AN ORGANIZED HEALTH CARE EDUCATION/TRAINING PROGRAM

## 2022-08-31 PROCEDURE — 94664 DEMO&/EVAL PT USE INHALER: CPT | Mod: XB

## 2022-08-31 PROCEDURE — 99900035 HC TECH TIME PER 15 MIN (STAT)

## 2022-08-31 PROCEDURE — 99215 OFFICE O/P EST HI 40 MIN: CPT | Mod: ,,, | Performed by: INTERNAL MEDICINE

## 2022-08-31 PROCEDURE — 36415 COLL VENOUS BLD VENIPUNCTURE: CPT | Performed by: NURSE PRACTITIONER

## 2022-08-31 PROCEDURE — 63700000 PHARM REV CODE 250 ALT 637 W/O HCPCS: Performed by: INTERNAL MEDICINE

## 2022-08-31 PROCEDURE — G0378 HOSPITAL OBSERVATION PER HR: HCPCS

## 2022-08-31 PROCEDURE — 27000646 HC AEROBIKA DEVICE

## 2022-08-31 PROCEDURE — 12000002 HC ACUTE/MED SURGE SEMI-PRIVATE ROOM

## 2022-08-31 RX ORDER — SODIUM CHLORIDE FOR INHALATION 3 %
4 VIAL, NEBULIZER (ML) INHALATION
Status: DISCONTINUED | OUTPATIENT
Start: 2022-08-31 | End: 2022-09-01

## 2022-08-31 RX ORDER — IBUPROFEN 600 MG/1
600 TABLET ORAL EVERY 6 HOURS PRN
Status: DISCONTINUED | OUTPATIENT
Start: 2022-08-31 | End: 2022-09-03 | Stop reason: HOSPADM

## 2022-08-31 RX ORDER — GUAIFENESIN 600 MG/1
1200 TABLET, EXTENDED RELEASE ORAL 2 TIMES DAILY
Status: DISCONTINUED | OUTPATIENT
Start: 2022-08-31 | End: 2022-09-03 | Stop reason: HOSPADM

## 2022-08-31 RX ORDER — AZITHROMYCIN 250 MG/1
500 TABLET, FILM COATED ORAL DAILY
Status: DISCONTINUED | OUTPATIENT
Start: 2022-08-31 | End: 2022-09-03 | Stop reason: HOSPADM

## 2022-08-31 RX ADMIN — ACETAMINOPHEN 650 MG: 325 TABLET ORAL at 05:08

## 2022-08-31 RX ADMIN — ALBUTEROL SULFATE 2.5 MG: 2.5 SOLUTION RESPIRATORY (INHALATION) at 07:08

## 2022-08-31 RX ADMIN — ALBUTEROL SULFATE 2.5 MG: 2.5 SOLUTION RESPIRATORY (INHALATION) at 12:08

## 2022-08-31 RX ADMIN — VANCOMYCIN HYDROCHLORIDE 1500 MG: 1.5 INJECTION, POWDER, LYOPHILIZED, FOR SOLUTION INTRAVENOUS at 11:08

## 2022-08-31 RX ADMIN — SODIUM CHLORIDE, SODIUM LACTATE, POTASSIUM CHLORIDE, AND CALCIUM CHLORIDE 1000 ML: .6; .31; .03; .02 INJECTION, SOLUTION INTRAVENOUS at 02:08

## 2022-08-31 RX ADMIN — ACETAMINOPHEN 650 MG: 325 TABLET ORAL at 09:08

## 2022-08-31 RX ADMIN — MEROPENEM AND SODIUM CHLORIDE 1 G: 1 INJECTION, SOLUTION INTRAVENOUS at 06:08

## 2022-08-31 RX ADMIN — FLUTICASONE FUROATE AND VILANTEROL TRIFENATATE 1 PUFF: 100; 25 POWDER RESPIRATORY (INHALATION) at 11:08

## 2022-08-31 RX ADMIN — MEROPENEM AND SODIUM CHLORIDE 1 G: 1 INJECTION, SOLUTION INTRAVENOUS at 09:08

## 2022-08-31 RX ADMIN — DOCUSATE SODIUM AND SENNOSIDES 1 TABLET: 8.6; 5 TABLET, FILM COATED ORAL at 09:08

## 2022-08-31 RX ADMIN — AZITHROMYCIN MONOHYDRATE 500 MG: 250 TABLET ORAL at 05:08

## 2022-08-31 RX ADMIN — IBUPROFEN 600 MG: 600 TABLET ORAL at 06:08

## 2022-08-31 RX ADMIN — ALBUTEROL SULFATE 2.5 MG: 2.5 SOLUTION RESPIRATORY (INHALATION) at 01:08

## 2022-08-31 RX ADMIN — SODIUM CHLORIDE SOLN NEBU 3% 4 ML: 3 NEBU SOLN at 07:08

## 2022-08-31 RX ADMIN — MEROPENEM AND SODIUM CHLORIDE 1 G: 1 INJECTION, SOLUTION INTRAVENOUS at 02:08

## 2022-08-31 RX ADMIN — VANCOMYCIN HYDROCHLORIDE 1500 MG: 1.5 INJECTION, POWDER, LYOPHILIZED, FOR SOLUTION INTRAVENOUS at 02:08

## 2022-08-31 RX ADMIN — GUAIFENESIN 1200 MG: 600 TABLET, EXTENDED RELEASE ORAL at 05:08

## 2022-08-31 NOTE — PLAN OF CARE
[facility]  Initial Discharge Assessment       Primary Care Provider: Primary Doctor No    Admission Diagnosis: Fever [R50.9]  Hemoptysis [R04.2]  Chest pain [R07.9]  Sepsis [A41.9]  Pneumonia of left lower lobe due to infectious organism [J18.9]    Admission Date: 8/30/2022  Expected Discharge Date:  Pt confirmed home address and lives with s/o S/O Melissa Bedolla [phone]. Pt arrived from [place] after a procedure yesterday causing fever. Pt stated that he has a home nebulizer and cough assist vest. Pt actively coughing and SOB. Pt denied HH and DME. Pharmacy of choice is [facility] and he does not have a PCP. Pt has Medicaid insurance.  Pts discharge plan is home with S/O and she will provide transport. CM following.     Discharge Barriers Identified: None    Payor: MEDICAID / Plan: [facility] CONNECT / Product Type: Managed Medicaid /     Extended Emergency Contact Information  Primary Emergency Contact: Shakila Mattsoninda  Address: [address]  Home Phone: [phone]  Mobile Phone: [phone]  Relation: Mother  Secondary Emergency Contact: Melissa Bedolla  Mobile Phone: [phone]  Relation: Significant other  Preferred language: English   needed? No    Discharge Plan A: Home with family  Discharge Plan B: Home with family, Home Health      [id number] [address] & [address] [address]  Phone: [phone] Fax: [phone]      Initial Assessment (most recent)       Adult Discharge Assessment - 08/31/22 1030          Discharge Assessment    Assessment Type Discharge Planning Assessment     Confirmed/corrected address, phone number and insurance Yes     Confirmed Demographics Correct on Facesheet     Source of Information patient     Does patient/caregiver understand observation status Yes     Communicated LAILA with  patient/caregiver Yes     Reason For Admission fever     Lives With significant other     Facility Arrived From: [facility]     Do you expect to return to your current living situation? Yes     Do you have help at home or someone to help you manage your care at home? Yes     Who are your caregiver(s) and their phone number(s)? S/O Melissa Bedolla [phone]     Prior to hospitilization cognitive status: Alert/Oriented     Current cognitive status: Alert/Oriented     Walking or Climbing Stairs Difficulty none     Dressing/Bathing Difficulty none     Home Layout Able to live on 1st floor     Equipment Currently Used at Home other (see comments);nebulizer     Readmission within 30 days? No     Patient currently being followed by outpatient case management? No     Do you currently have service(s) that help you manage your care at home? No     Do you take prescription medications? Yes     Do you have prescription coverage? Yes     Do you have any problems affording any of your prescribed medications? No     Is the patient taking medications as prescribed? yes     Who is going to help you get home at discharge? S/O Melissa Bedolla [phone]     How do you get to doctors appointments? car, drives self;family or friend will provide     Are you on dialysis? No     Do you take coumadin? No     Discharge Plan A Home with family     Discharge Plan B Home with family;Home Health     DME Needed Upon Discharge  none     Discharge Plan discussed with: Patient     Discharge Barriers Identified None        Physical Activity    On average, how many days per week do you engage in moderate to strenuous exercise (like a brisk walk)? Patient refused     On average, how many minutes do you engage in exercise at this level? Patient refused        Financial Resource Strain    How hard is it for you to pay for the very basics like food, housing, medical care, and heating? Patient refused        Housing Stability    In the last  12 months, was there a time when you were not able to pay the mortgage or rent on time? Patient refused     In the last 12 months, was there a time when you did not have a steady place to sleep or slept in a shelter (including now)? Patient refused        Transportation Needs    In the past 12 months, has lack of transportation kept you from medical appointments or from getting medications? Patient refused     In the past 12 months, has lack of transportation kept you from meetings, work, or from getting things needed for daily living? Patient refused        Food Insecurity    Within the past 12 months, you worried that your food would run out before you got the money to buy more. Patient refused        Stress    Do you feel stress - tense, restless, nervous, or anxious, or unable to sleep at night because your mind is troubled all the time - these days? Patient refused        Social Connections    In a typical week, how many times do you talk on the phone with family, friends, or neighbors? Patient refused     How often do you get together with friends or relatives? Patient refused     How often do you attend Restoration or Denominational services? Patient refused     Do you belong to any clubs or organizations such as Restoration groups, unions, fraternal or athletic groups, or school groups? Patient refused     How often do you attend meetings of the clubs or organizations you belong to? Patient refused     Are you , , , , never , or living with a partner? Patient refused        Alcohol Use    Q1: How often do you have a drink containing alcohol? Patient refused     Q2: How many drinks containing alcohol do you have on a typical day when you are drinking? Patient refused     Q3: How often do you have six or more drinks on one occasion? Patient refused        Relationship/Environment    Name(s) of Who Lives With Patient S/O Melissa Bedolla [phone]

## 2022-08-31 NOTE — HPI
Mr. 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 It is severe. It is associated with cough, mild hemoptysis, headache, nausea, and vomiting. He denies chest pain, SOB, dizziness, or LOC. He was recently on 10 days of augmentin for an abscessed tooth s/p complicated root canal that finished on 22. Spoke with Dr. Ricco Alvares who performed the bronchoscopy and noted blood, and he suspects he is having a bronchiectasis flare with concern for developing pna. In the ED: CXR with concern for infiltrated on the right lung base, , T 99.2, RR 22, and Lactate WNL.       PFTs (2022):  o FVC: 88%  o FEV1: 77%  o FEV1/FVC: 73  o T%  o DLCO: 80%    Sputum Cx  Date: 22  Heavy Growth Haemophilus influenzae

## 2022-08-31 NOTE — CONSULTS
08/31/2022      Admit Date: 8/30/2022  [facility]  New Patient Consult    Chief Complaint   Patient presents with    Post-op Problem     Endoscopy done at [facility] today; c/o H/A, subjective fever, hempoytsis, hematemesis, and other multiple complaints; was told by Sx to come to ER       History of Present Illness:  Pt is a 26 yo male with CVID on hizentra, chronic bronchiectasis who presented to the ED with hemoptysis, fever s/p bronchoscopy at [facility] per Dr. Ricco Alvares. Pt gets recurrent respiratory infections, worsening in severity and frequency with q 2 month bronchitis or pneumonia for the past year. Most recently 2 mos ago sputum culture grew H flu. Pt complains of fever, nausea and emesis, cough w/ thick green/pink phlegm after having bronch yesterday. Symptoms were present after scope but worsened about 5pm at home so he presented to ED. Pt has been on amoxicillin and flagyl for the past week due to tooth abscess w/ plan for root canal on 9/6. His home regimen for bronchiectasis is albuterol and 3% NaCl bid w/ vest once daily. He denies MUÑOZ with regular activity and is able to work with no breathing limitation. He works currently selling cars. He denies doing regular exercise. Pt's mother is present at bedside- reports he had frequent pneumonias as a baby, spent time in NICU. His father had immune deficiency and poor healing with wounds so suspects he may have genetic immune deficiency.      PFSH:  Past Medical History:   Diagnosis Date    Fatty liver     Immunoglobulin deficiency     Immunoglobulin deficiency     CVID    Pneumonia      Past Surgical History:   Procedure Laterality Date    ADENOIDECTOMY      BIOPSY N/A 9/23/2020    Procedure: BIOPSY;  Surgeon: Kaleb Diagnostic Provider;  Location: [facility] OR;  Service: Interventional Radiology;  Laterality: N/A;    MANDIBLE FRACTURE SURGERY      SINUS SURGERY      TONSILLECTOMY      TYMPANOSTOMY TUBE PLACEMENT      TYMPANOSTOMY TUBE PLACEMENT      x 4     Social  "History     Tobacco Use    Smoking status: Never    Smokeless tobacco: Never   Substance Use Topics    Alcohol use: Yes    Drug use: No     Family History   Problem Relation Age of Onset    Cancer Mother     Liver disease Father      Review of patient's allergies indicates:   Allergen Reactions    Singulair [montelukast] Other (See Comments)     arrhythmias       Performance Status:Performance Status:The patient's activity level is no limits with regular activity.    Review of Systems:  a review of eleven systems covering constitutional, Psych, Eye, HEENT, Respiratory, Cardiac, GI, , Musculoskeletal, Endocrine, Dermatologicwas negative except the above mentioned abnormalities and for any pertinent findings as listed below:  Sinuses feel full      Exam:Comprehensive exam done. BP (!) 102/52   Pulse 87   Temp 98.2 °F (36.8 °C)   Resp 18   Ht 5' 11" (1.803 m)   Wt 93.2 kg (205 lb 7.5 oz)   SpO2 99%   BMI 28.66 kg/m²   Exam included Vitals as listed, and patient's appearance and affect and alertness and mood, oral exam for yeast and hygiene and pharynx lesions and Mallapatti (M) score, neck with inspection for jvd and masses and thyroid abnormalities and lymph nodes (supraclavicular and infraclavicular nodes also examined and noted if abn), chest exam included symmetry and effort and fremitus and percussion and auscultation, cardiac exam included rhythm and gallops and murmur and rubs and jvd and edema, abdominal exam for mass and hepatosplenomegaly and tenderness and hernias and bowel sounds, Musculoskeletal exam with muscle tone and posture and mobility/gait and  strenght, and skin for rashes and cyanosis and pallor and turgor, extremity for clubbing.  Findings were normal except as listed below:  L upper molar with erythema, caries  Tongue and throat normal  HR regular  Bilat scattered rales, equal and good breath sounds   Abd soft nontender, obese  No edema/clubbing    Radiographs reviewed: view by " direct vision   CT chest 2020- scattered patchy infiltrates and bronchial thickening, most notable area of bronchiectasis in RML      Labs     Recent Labs   Lab 08/31/22 0452   WBC 10.39   HGB 12.9*   HCT 38.9*   *     Recent Labs   Lab 08/30/22 2049 08/31/22 0452   NA  --  138   K  --  3.7   CL  --  109   CO2  --  21*   BUN  --  11   CREATININE  --  0.9   GLU  --  104   CALCIUM  --  8.9   MG  --  1.9   AST  --  104*   ALT  --  95*   ALKPHOS  --  130   BILITOT  --  0.9   PROT  --  6.3   ALBUMIN  --  3.3*   PROCAL 0.10  --    LACTATE 1.5  --    No results for input(s): PH, PCO2, PO2, HCO3 in the last 24 hours.  Microbiology Results (last 7 days)       Procedure Component Value Units Date/Time    Blood culture [[id number]] Collected: 08/30/22 2049    Order Status: Sent Specimen: Blood from Antecubital, Right Updated: 08/31/22 0143    Blood culture [[id number]] Collected: 08/30/22 2049    Order Status: Sent Specimen: Blood from Antecubital, Left Updated: 08/31/22 0143    Culture, Respiratory with Gram Stain [[id number]]     Order Status: No result Specimen: Respiratory from Sputum, Expectorated     Influenza A & B by Molecular [[id number]] Collected: 08/30/22 2033    Order Status: Completed Specimen: Nasopharyngeal Swab Updated: 08/30/22 2113     Influenza A, Molecular Negative     Influenza B, Molecular Negative     Flu A & B Source Nasal swab    Influenza A & B by Molecular [[id number]] Collected: 08/30/22 2025    Order Status: Sent Specimen: Nasal Swab Updated: 08/30/22 2026          Bronch BAL (lingula) 8/30/22-   Body Fluid Polymorphonuclear Percent % 39.5    Body Fluid Mononuclear Percent % 60.5      Sputum 6/23/22-   Culture, Respiratory with Smear  Heavy Growth Haemophilus influenzae Abnormal       Impression:  Active Hospital Problems    Diagnosis  POA    *Sepsis [A41.9]  Yes    Bronchiectasis with acute exacerbation [J47.1]  Yes    Pneumonia/ Right Lower lobe [J18.9]  Yes    Transaminitis [R74.01]  Yes     CVID (common variable immunodeficiency) [D83.9]  Yes      Resolved Hospital Problems   No resolved problems to display.               Plan:     - continue broad spectrum abx  - azithromycin added for atypical coverage  - f/u culture from bronch  - discussed over phone with his pulmonologist Dr. Alvares  - albuterol q6h  - 3% NaCl bid  - add guaifenesin 1200 bid  - add vest bid, flutter valve bid  - hold off on Hizentra injection while exacerbating    Chrissy Mckeon MD  Pulmonary & Critical Care Medicine

## 2022-08-31 NOTE — ASSESSMENT & PLAN NOTE
Admit to med/tele  Probable RLL pna in the setting of CVID and bronchoscopy today     This patient does have evidence of infective focus  My overall impression is sepsis. Vital signs were reviewed and noted in progress note.  Antibiotics given-   Antibiotics (From admission, onward)    Start     Stop Route Frequency Ordered    08/31/22 0630  meropenem-0.9% sodium chloride 1 g/50 mL IVPB         -- IV Every 8 hours (non-standard times) 08/30/22 2216    08/30/22 2300  vancomycin 1.5 g in dextrose 5 % 250 mL IVPB (ready to mix)         -- IV Every 12 hours (non-standard times) 08/30/22 2236 08/30/22 2235  vancomycin - pharmacy to dose  (vancomycin IVPB)        See Hyperspace for full Linked Orders Report.    -- IV pharmacy to manage frequency 08/30/22 2135 08/30/22 2115  meropenem-0.9% sodium chloride 1 g/50 mL IVPB         08/31 0914 IV ED 1 Time 08/30/22 2114        Cultures were taken-   Microbiology Results (last 7 days)     Procedure Component Value Units Date/Time    Culture, Respiratory with Gram Stain [[id number]]     Order Status: No result Specimen: Respiratory from Sputum, Expectorated     Influenza A & B by Molecular [[id number]] Collected: 08/30/22 2033    Order Status: Completed Specimen: Nasopharyngeal Swab Updated: 08/30/22 2113     Influenza A, Molecular Negative     Influenza B, Molecular Negative     Flu A & B Source Nasal swab    Blood culture [[id number]] Collected: 08/30/22 2049    Order Status: Sent Specimen: Blood from Antecubital, Right Updated: 08/30/22 2049    Blood culture [[id number]] Collected: 08/30/22 2049    Order Status: Sent Specimen: Blood from Antecubital, Left Updated: 08/30/22 2049    Influenza A & B by Molecular [[id number]] Collected: 08/30/22 2025    Order Status: Sent Specimen: Nasal Swab Updated: 08/30/22 2026        Latest lactate reviewed, they are-  Recent Labs   Lab 08/30/22 2049   LACTATE 1.5         Source- pneumonia    Source control Achieved by- abx

## 2022-08-31 NOTE — NURSING
Pt temp up to 103.8F on recheck after Tylenol administration. Pt red, tachycardic and with chills. ABRAHAM Schuster NP notified. Orders to be placed. Ice packs applied to pt and room temp decreased. Will cont to monitor.

## 2022-08-31 NOTE — PLAN OF CARE
POC reviewed with pt-verbalized understanding. Tmax 100.8F-Tylenol given. Sinus tach on tele, all other VS WNL. Voids w/o difficulty-via urinal. CPT and resp treatments orders. ABX coverage. Poor appetite today. Safety checks done. Will cont to monitor and report off to oncoming nurse to assume care.     Problem: Adult Inpatient Plan of Care  Goal: Plan of Care Review  Outcome: Ongoing, Progressing     Problem: Adult Inpatient Plan of Care  Goal: Patient-Specific Goal (Individualized)  Outcome: Ongoing, Progressing     Problem: Adult Inpatient Plan of Care  Goal: Absence of Hospital-Acquired Illness or Injury  Outcome: Ongoing, Progressing

## 2022-08-31 NOTE — SUBJECTIVE & OBJECTIVE
Past Medical History:   Diagnosis Date    Fatty liver     Immunoglobulin deficiency     Immunoglobulin deficiency     CVID    Pneumonia        Past Surgical History:   Procedure Laterality Date    ADENOIDECTOMY      BIOPSY N/A 9/23/2020    Procedure: BIOPSY;  Surgeon: Kaleb Diagnostic Provider;  Location: [facility] OR;  Service: Interventional Radiology;  Laterality: N/A;    MANDIBLE FRACTURE SURGERY      SINUS SURGERY      TONSILLECTOMY      TYMPANOSTOMY TUBE PLACEMENT      TYMPANOSTOMY TUBE PLACEMENT      x 4       Review of patient's allergies indicates:   Allergen Reactions    Singulair [montelukast] Other (See Comments)     arrhythmias       No current facility-administered medications on file prior to encounter.     Current Outpatient Medications on File Prior to Encounter   Medication Sig    acetaminophen (TYLENOL) 325 MG tablet Take 650 mg by mouth as needed.    albuterol (ACCUNEB) 0.63 mg/3 mL Nebu Take 0.63 mg by nebulization every 6 (six) hours as needed. Rescue    amoxicillin-clavulanate 875-125mg (AUGMENTIN) 875-125 mg per tablet Take 1 tablet by mouth 2 (two) times daily.    diclofenac (VOLTAREN) 50 MG EC tablet Take 1 tablet (50 mg total) by mouth 3 (three) times daily.    immun glob G,IgG,-pro-IgA 0-50 1 gram/5 mL (20 %) Soln Inject 200 mg/kg into the skin every 7 days. Sunday    mometasone-formoterol (DULERA) 100-5 mcg/actuation HFAA Inhale 2 Inhalers into the lungs 2 (two) times a day. Controller    sodium chloride 3% 3 % nebulizer solution Take 4 mLs by nebulization 2 (two) times a day.     Family History       Problem Relation (Age of Onset)    Cancer Mother    Liver disease Father          Tobacco Use    Smoking status: Never    Smokeless tobacco: Never   Substance and Sexual Activity    Alcohol use: Yes    Drug use: No    Sexual activity: Yes     Partners: Female     Review of Systems   Constitutional:  Positive for chills, diaphoresis, fatigue and fever.   HENT:  Negative for congestion, ear pain,  sore throat and trouble swallowing.    Eyes:  Negative for pain, discharge and visual disturbance.   Respiratory:  Positive for cough. Negative for chest tightness, shortness of breath and wheezing.    Cardiovascular:  Negative for chest pain, palpitations and leg swelling.   Gastrointestinal:  Positive for nausea and vomiting. Negative for abdominal distention, abdominal pain, blood in stool, constipation and diarrhea.   Endocrine: Negative for polydipsia, polyphagia and polyuria.   Genitourinary:  Negative for dysuria, flank pain, frequency and urgency.   Musculoskeletal:  Negative for back pain, joint swelling, neck pain and neck stiffness.   Skin:  Negative for rash and wound.   Allergic/Immunologic: Negative for immunocompromised state.   Neurological:  Positive for weakness and headaches. Negative for dizziness, syncope, speech difficulty, light-headedness and numbness.   Hematological:  Negative for adenopathy.   Psychiatric/Behavioral:  Negative for confusion and suicidal ideas. The patient is not nervous/anxious.    All other systems reviewed and are negative.  Objective:     Vital Signs (Most Recent):  Temp: 99.2 °F (37.3 °C) (08/30/22 1943)  Pulse: (!) 122 (08/30/22 2103)  Resp: (!) 22 (08/30/22 2103)  BP: 139/86 (08/30/22 1943)  SpO2: (!) 93 % (08/30/22 2103)   Vital Signs (24h Range):  Temp:  [99.2 °F (37.3 °C)] 99.2 °F (37.3 °C)  Pulse:  [115-122] 122  Resp:  [18-22] 22  SpO2:  [93 %-97 %] 93 %  BP: (139)/(86) 139/86     Weight: 93 kg (205 lb)  Body mass index is 28.59 kg/m².    Physical Exam  Vitals and nursing note reviewed.   Constitutional:       Appearance: He is well-developed. He is ill-appearing.      Comments: flushed   HENT:      Head: Normocephalic and atraumatic.   Eyes:      Conjunctiva/sclera: Conjunctivae normal.      Pupils: Pupils are equal, round, and reactive to light.   Cardiovascular:      Rate and Rhythm: Regular rhythm. Tachycardia present.   Pulmonary:      Effort: Pulmonary  effort is normal.      Comments: Diminished RLL  Abdominal:      General: Bowel sounds are normal.      Palpations: Abdomen is soft.   Musculoskeletal:         General: Normal range of motion.      Cervical back: Normal range of motion and neck supple.   Skin:     General: Skin is warm and dry.      Capillary Refill: Capillary refill takes less than 2 seconds.   Neurological:      Mental Status: He is alert and oriented to person, place, and time.   Psychiatric:         Behavior: Behavior normal.         Thought Content: Thought content normal.         Judgment: Judgment normal.         CRANIAL NERVES     CN III, IV, VI   Pupils are equal, round, and reactive to light.     Significant Labs: All pertinent labs within the past 24 hours have been reviewed.  CBC:   Recent Labs   Lab 08/30/22 1959   WBC 10.81   HGB 15.2   HCT 46.0   *     CMP:   Recent Labs   Lab 08/30/22 1959      K 3.9      CO2 23   *   BUN 13   CREATININE 1.1   CALCIUM 9.8   PROT 7.8   ALBUMIN 4.0   BILITOT 0.9   ALKPHOS 157*   *   *   ANIONGAP 10     Lactic Acid:   Recent Labs   Lab 08/30/22 2049   LACTATE 1.5       Significant Imaging: I have reviewed all pertinent imaging results/findings within the past 24 hours.    X-Ray Chest PA And Lateral    Result Date: 8/30/2022  EXAMINATION: XR CHEST PA AND LATERAL CLINICAL HISTORY: Hemoptysis TECHNIQUE: PA and lateral views of the chest were performed. COMPARISON: 05/03/2022, 03/20/2022, 12/10/2021 chest x-ray FINDINGS: Cardiac silhouette is stable and not significantly enlarged.  There is decreased expansion of the lungs and mild linear infrahilar opacification on the right is attributed to atelectasis though underlying mild pneumonitis is considered.  There is no pneumothorax, pneumomediastinum, pleural effusion or visualized lung nodule.  Osseous structures grossly appear intact.     Linear basilar opacities on the right are attributed to atelectasis with a  possibility of focal pneumonitis without significant consolidation considered. Electronically signed by: Sade Barajas Date:    08/30/2022 Time:    21:06

## 2022-08-31 NOTE — ASSESSMENT & PLAN NOTE
S/p bronchoscopy today with CVID/immunocompromised state  Check procal  Cover broadly with meropenem/vanc   Consult pulm

## 2022-08-31 NOTE — ED PROVIDER NOTES
Encounter Date: 8/30/2022       History     Chief Complaint   Patient presents with    Post-op Problem     Endoscopy done at [facility] today; c/o H/A, subjective fever, hempoytsis, hematemesis, and other multiple complaints; was told by Sx to come to ER     Chief complaint: Fever    HPI: 27-year-old male with a history of combined variable immunodeficiency presents with fever to 102.  Today he underwent a bronchoscopy with bronchial lavage.  He developed hemoptysis with associated hematemesis.  He also reports cough with shortness of breath.  He received weekly immunoglobulin and was due for a dose today but did not take immunoglobulin due to the pending bronchoscopy.  He has a history of recurrent pneumonia.  A reports a global headache but denies any neck stiffness, confusion visual or speech impairment.    Review of patient's allergies indicates:   Allergen Reactions    Singulair [montelukast] Other (See Comments)     arrhythmias     Past Medical History:   Diagnosis Date    Fatty liver     Immunoglobulin deficiency     Immunoglobulin deficiency     CVID    Pneumonia      Past Surgical History:   Procedure Laterality Date    ADENOIDECTOMY      BIOPSY N/A 9/23/2020    Procedure: BIOPSY;  Surgeon: [facility] Diagnostic Provider;  Location: [facility] OR;  Service: Interventional Radiology;  Laterality: N/A;    MANDIBLE FRACTURE SURGERY      SINUS SURGERY      TONSILLECTOMY      TYMPANOSTOMY TUBE PLACEMENT      TYMPANOSTOMY TUBE PLACEMENT      x 4     Family History   Problem Relation Age of Onset    Cancer Mother     Liver disease Father      Social History     Tobacco Use    Smoking status: Never    Smokeless tobacco: Never   Substance Use Topics    Alcohol use: Yes    Drug use: No     Review of Systems   Constitutional:  Positive for activity change, appetite change and fever.   Eyes:  Negative for visual disturbance.   Respiratory:  Positive for cough (hemoptysis) and shortness of breath. Negative for apnea.    Cardiovascular:   Negative for chest pain and palpitations.   Gastrointestinal:  Positive for nausea and vomiting (hematemesis). Negative for abdominal distention, abdominal pain and blood in stool.   Genitourinary:  Negative for difficulty urinating.   Musculoskeletal:  Negative for neck pain and neck stiffness.   Skin:  Negative for pallor, rash and wound.   Neurological:  Positive for headaches.   Hematological:  Does not bruise/bleed easily.   Psychiatric/Behavioral:  Negative for agitation.      Physical Exam     Initial Vitals [08/30/22 1943]   BP Pulse Resp Temp SpO2   139/86 (!) 115 18 99.2 °F (37.3 °C) 97 %      MAP       --         Physical Exam    Nursing note and vitals reviewed.  Constitutional: He appears well-developed and well-nourished.   HENT:   Head: Normocephalic and atraumatic.   Eyes: Conjunctivae are normal.   Neck: Neck supple.   Normal range of motion.  Cardiovascular:  Regular rhythm and normal heart sounds.     Exam reveals no gallop and no friction rub.       No murmur heard.  Tachycardic rate   Pulmonary/Chest: No respiratory distress. He has no wheezes. He has rhonchi (scattered bilateral rhonchi). He has no rales.   Abdominal: Abdomen is soft. He exhibits no distension. There is no abdominal tenderness.   Musculoskeletal:         General: Normal range of motion.      Cervical back: Normal range of motion and neck supple.     Neurological: He is alert and oriented to person, place, and time.   Skin: Skin is warm and dry.   Psychiatric: He has a normal mood and affect.       ED Course   Procedures  Labs Reviewed   CBC W/ AUTO DIFFERENTIAL - Abnormal; Notable for the following components:       Result Value    MCV 81 (*)     MCH 26.9 (*)     Platelets 144 (*)     Gran # (ANC) 8.3 (*)     Gran % 76.7 (*)     Lymph % 16.2 (*)     All other components within normal limits   INFLUENZA A & B BY MOLECULAR   CULTURE, BLOOD   CULTURE, BLOOD   INFLUENZA A & B BY MOLECULAR   COMPREHENSIVE METABOLIC PANEL    URINALYSIS, REFLEX TO URINE CULTURE   SARS-COV-2 RNA AMPLIFICATION, QUAL   OCCULT BLOOD X 1, STOOL   PROCALCITONIN   SARS-COV-2 RNA AMPLIFICATION, QUAL   LACTIC ACID, PLASMA   URINALYSIS, REFLEX TO URINE CULTURE   TYPE & SCREEN          Imaging Results              X-Ray Chest PA And Lateral (In process)                      Medications   sodium chloride 0.9% bolus 1,000 mL (1,000 mLs Intravenous New Bag 8/30/22 1959)   butalbital-acetaminophen-caffeine -40 mg per tablet 1 tablet (has no administration in time range)   acetaminophen tablet 325 mg (has no administration in time range)   droperidoL injection 2.5 mg (has no administration in time range)   0.9%  NaCl infusion (has no administration in time range)   ondansetron injection 4 mg (4 mg Intravenous Given 8/30/22 1959)     Medical Decision Making:   ED Management:  27-year-old male with a history of combined variable immunodeficiency presents with cough, shortness of breath and hemoptysis status post bronchoscopy.  Chest x-ray independently interpreted by me demonstrates a left lower lobe infiltrate consistent with pneumonia.  Due to his low oxygen saturations and history of recurrent pneumonia with multi drug-resistant organisms he will be admitted for IV antibiotics.  Other:   I have discussed this case with another health care provider.                    Clinical Impression:   Final diagnoses:  [R04.2] Hemoptysis  [R50.9] Fever               Stephen Mckeon III, MD  08/30/22 2120

## 2022-08-31 NOTE — FIRST PROVIDER EVALUATION
Emergency Department TeleTriage Encounter Note      CHIEF COMPLAINT    Chief Complaint   Patient presents with    Post-op Problem     Endoscopy done at [facility] today; c/o H/A, subjective fever, hempoytsis, hematemesis, and other multiple complaints; was told by Sx to come to ER       VITAL SIGNS   Initial Vitals [08/30/22 1943]   BP Pulse Resp Temp SpO2   139/86 (!) 115 18 99.2 °F (37.3 °C) 97 %      MAP       --            ALLERGIES    Review of patient's allergies indicates:   Allergen Reactions    Singulair [montelukast] Other (See Comments)     arrhythmias       PROVIDER TRIAGE NOTE  Fever 101F, hematemesis, n/v, weakness. Hemoptysis.  Dr. Alvares at [facility] did the procedure.        ORDERS  Labs Reviewed   INFLUENZA A & B BY MOLECULAR   CBC W/ AUTO DIFFERENTIAL   COMPREHENSIVE METABOLIC PANEL   URINALYSIS, REFLEX TO URINE CULTURE   SARS-COV-2 RNA AMPLIFICATION, QUAL   TYPE & SCREEN       ED Orders (720h ago, onward)      Start Ordered     Status Ordering Provider    08/30/22 2000 08/30/22 1946  sodium chloride 0.9% bolus 1,000 mL  ED 1 Time         Ordered LYDIA SANCHEZ.    08/30/22 2000 08/30/22 1947  ondansetron injection 4 mg  ED 1 Time         Ordered LYDIA SANCHEZ A.    08/30/22 1948 08/30/22 1947  COVID-19 Rapid Screening  STAT         Ordered LYDIA SANCHEZ.    08/30/22 1948 08/30/22 1947  Influenza A & B by Molecular  STAT         Ordered LYDIA SANCHEZ A.    08/30/22 1947 08/30/22 1946  Complete Blood Count (CBC)  STAT         Pending Collection LYDIA SANCHEZ A.    08/30/22 1947 08/30/22 1946  Comprehensive Metabolic Panel (CMP)  STAT         Pending Collection ELLENEDAVINNE A.    08/30/22 1947 08/30/22 1946  Urinalysis, Reflex to Urine Culture Urine, Clean Catch  STAT         Ordered LYDIA SANCHEZ A.    08/30/22 1947 08/30/22 1946  Type & Screen  STAT         Pending Collection DAVIN SANCHEZNE A.    08/30/22 1947 08/30/22 1946  Insert Saline lock IV  Once         Ordered CHAMPAGNE,  LYDIA MORTENSEN              Virtual Visit Note: The provider triage portion of this emergency department evaluation and documentation was performed via [employer], a HIPAA-compliant telemedicine application, in concert with a tele-presenter in the room. A face to face patient evaluation with one of my colleagues will occur once the patient is placed in an emergency department room.      DISCLAIMER: This note was prepared with [employer] voice recognition transcription software. Garbled syntax, mangled pronouns, and other bizarre constructions may be attributed to that software system.

## 2022-08-31 NOTE — SUBJECTIVE & OBJECTIVE
Interval History:  Notes reviewed, no acute events overnight.  Patient resting comfortably in bed.  He reports feeling a little better since admission but still overall feels weak and ill.  He reports productive sputum that is blood tinged and green in color.  He denies shortness of breath or chest pain.  Awaiting Pulmonary eval and further recs.  Will continue to monitor closely and continue current treatment plan.    Review of Systems   Constitutional:  Positive for activity change, appetite change, chills, fatigue and fever.   HENT:  Positive for congestion, postnasal drip, rhinorrhea, sinus pressure, sinus pain and sore throat. Negative for ear pain and trouble swallowing.    Respiratory:  Positive for cough. Negative for choking, chest tightness and shortness of breath.    Cardiovascular:  Negative for chest pain, palpitations and leg swelling.   Gastrointestinal:  Negative for abdominal pain, diarrhea, nausea and vomiting.   Genitourinary:  Negative for difficulty urinating, dysuria and urgency.   Musculoskeletal:  Negative for arthralgias, back pain and gait problem.   Skin:  Negative for color change, pallor, rash and wound.   Neurological:  Negative for dizziness, weakness and light-headedness.   Psychiatric/Behavioral:  Negative for agitation, behavioral problems and confusion.    All other systems reviewed and are negative.  Objective:     Vital Signs (Most Recent):  Temp: 98.2 °F (36.8 °C) (08/31/22 0719)  Pulse: 98 (08/31/22 1352)  Resp: 16 (08/31/22 1352)  BP: (!) 102/52 (08/31/22 0719)  SpO2: 97 % (08/31/22 1352)   Vital Signs (24h Range):  Temp:  [98.2 °F (36.8 °C)-99.7 °F (37.6 °C)] 98.2 °F (36.8 °C)  Pulse:  [] 98  Resp:  [16-22] 16  SpO2:  [92 %-99 %] 97 %  BP: ()/(51-86) 102/52     Weight: 93.2 kg (205 lb 7.5 oz)  Body mass index is 28.66 kg/m².    Intake/Output Summary (Last 24 hours) at 8/31/2022 1417  Last data filed at 8/31/2022 1300  Gross per 24 hour   Intake --   Output 850 ml    Net -850 ml      Physical Exam  Vitals and nursing note reviewed.   Constitutional:       General: He is not in acute distress.     Appearance: Normal appearance. He is well-developed. He is not ill-appearing or diaphoretic.   HENT:      Head: Normocephalic and atraumatic.      Right Ear: External ear normal.      Left Ear: External ear normal.      Nose: Congestion and rhinorrhea present.      Mouth/Throat:      Mouth: Mucous membranes are moist.      Pharynx: Oropharynx is clear. No oropharyngeal exudate or posterior oropharyngeal erythema.   Eyes:      General: No scleral icterus.     Conjunctiva/sclera: Conjunctivae normal.      Pupils: Pupils are equal, round, and reactive to light.   Neck:      Vascular: No JVD.   Cardiovascular:      Rate and Rhythm: Normal rate and regular rhythm.      Pulses: Normal pulses.      Heart sounds: Normal heart sounds. No murmur heard.  Pulmonary:      Effort: Pulmonary effort is normal. No respiratory distress.      Breath sounds: Normal breath sounds. No stridor. No wheezing, rhonchi or rales.      Comments: Decreased to RLL  Abdominal:      General: Bowel sounds are normal. There is no distension.      Palpations: Abdomen is soft.      Tenderness: There is no abdominal tenderness.   Musculoskeletal:         General: No swelling or tenderness. Normal range of motion.      Cervical back: Normal range of motion and neck supple.   Skin:     General: Skin is warm and dry.      Capillary Refill: Capillary refill takes 2 to 3 seconds.      Coloration: Skin is not jaundiced or pale.      Findings: No erythema.   Neurological:      General: No focal deficit present.      Mental Status: He is alert and oriented to person, place, and time.      Cranial Nerves: No cranial nerve deficit.      Sensory: No sensory deficit.      Motor: No weakness.   Psychiatric:         Mood and Affect: Mood normal.         Behavior: Behavior normal.         Thought Content: Thought content normal.        Significant Labs: All pertinent labs within the past 24 hours have been reviewed.  CBC:   Recent Labs   Lab 08/30/22 1959 08/31/22 0452   WBC 10.81 10.39   HGB 15.2 12.9*   HCT 46.0 38.9*   * 116*     CMP:   Recent Labs   Lab 08/30/22 1959 08/31/22 0452    138   K 3.9 3.7    109   CO2 23 21*   * 104   BUN 13 11   CREATININE 1.1 0.9   CALCIUM 9.8 8.9   PROT 7.8 6.3   ALBUMIN 4.0 3.3*   BILITOT 0.9 0.9   ALKPHOS 157* 130   * 104*   * 95*   ANIONGAP 10 8       Significant Imaging: I have reviewed all pertinent imaging results/findings within the past 24 hours.

## 2022-08-31 NOTE — PROGRESS NOTES
Pharmacokinetic Initial Assessment: IV Vancomycin    Assessment/Plan:    Initiate intravenous vancomycin with loading dose of 1500 mg once followed by a maintenance dose of vancomycin 1500mg IV every 12 hours  Desired empiric serum trough concentration is 15 to 20 mcg/mL  Draw vancomycin trough level 60 min prior to fourth dose on 9/1 at approximately 1000  Pharmacy will continue to follow and monitor vancomycin.      Please contact pharmacy at extension [phone] with any questions regarding this assessment.     Thank you for the consult,   Rommel Sands       Patient brief summary:  Charly Mattson is a 27 y.o. male initiated on antimicrobial therapy with IV Vancomycin for treatment of suspected sepsis    Drug Allergies:   Review of patient's allergies indicates:   Allergen Reactions    Singulair [montelukast] Other (See Comments)     arrhythmias       Actual Body Weight:   93 kg    Renal Function:   Estimated Creatinine Clearance: 117.6 mL/min (based on SCr of 1.1 mg/dL).,     Dialysis Method (if applicable):  N/A    CBC (last 72 hours):  Recent Labs   Lab Result Units 08/30/22 1959   WBC K/uL 10.81   Hemoglobin g/dL 15.2   Hematocrit % 46.0   Platelets K/uL 144*   Gran % % 76.7*   Lymph % % 16.2*   Mono % % 6.2   Eosinophil % % 0.3   Basophil % % 0.4   Differential Method  Automated       Metabolic Panel (last 72 hours):  Recent Labs   Lab Result Units 08/30/22 1959 08/30/22  2214   Sodium mmol/L 140  --    Potassium mmol/L 3.9  --    Chloride mmol/L 107  --    CO2 mmol/L 23  --    Glucose mg/dL 116*  --    Glucose, UA   --  Negative   BUN mg/dL 13  --    Creatinine mg/dL 1.1  --    Albumin g/dL 4.0  --    Total Bilirubin mg/dL 0.9  --    Alkaline Phosphatase U/L 157*  --    AST U/L 120*  --    ALT U/L 109*  --        Drug levels (last 3 results):  No results for input(s): VANCOMYCINRA, VANCORANDOM, VANCOMYCINPE, VANCOPEAK, VANCOMYCINTR, VANCOTROUGH in the last 72 hours.    Microbiologic Results:  Microbiology Results  (last 7 days)       Procedure Component Value Units Date/Time    Culture, Respiratory with Gram Stain [[id number]]     Order Status: No result Specimen: Respiratory from Sputum, Expectorated     Influenza A & B by Molecular [[id number]] Collected: 08/30/22 2033    Order Status: Completed Specimen: Nasopharyngeal Swab Updated: 08/30/22 2113     Influenza A, Molecular Negative     Influenza B, Molecular Negative     Flu A & B Source Nasal swab    Blood culture [[id number]] Collected: 08/30/22 2049    Order Status: Sent Specimen: Blood from Antecubital, Right Updated: 08/30/22 2049    Blood culture [[id number]] Collected: 08/30/22 2049    Order Status: Sent Specimen: Blood from Antecubital, Left Updated: 08/30/22 2049    Influenza A & B by Molecular [[id number]] Collected: 08/30/22 2025    Order Status: Sent Specimen: Nasal Swab Updated: 08/30/22 2026

## 2022-08-31 NOTE — NURSING
Nurses Note -- 4 Eyes      8/31/2022   3:27 AM      Skin assessed during: Admit      [x] No Pressure Injuries Present    [x]Prevention Measures Documented      [] Yes- Altered Skin Integrity Present or Discovered   [] LDA Added if Not in Epic (Describe Wound)   [] New Altered Skin Integrity was Present on Admit and Documented in LDA   [] Wound Image Taken    Wound Care Consulted? No    Attending Nurse:  Kyra Garza RN     Second RN/Staff Member:  Emmy Leavitt RN

## 2022-08-31 NOTE — H&P
[facility] Medicine  History & Physical    Patient Name: Charly Mattson  MRN: [id number]  Patient Class: OP- Observation  Admission Date: 2022  Attending Physician: Dr. Alvares  Primary Care Provider: Primary Doctor No         Patient information was obtained from patient, past medical records, ER records and primary team.     Subjective:     Principal Problem:Sepsis    Chief Complaint:   Chief Complaint   Patient presents with    Post-op Problem     Endoscopy done at [facility] today; c/o H/A, subjective fever, hempoytsis, hematemesis, and other multiple complaints; was told by Sx to come to ER        HPI: Mr. Mattson is a 27 year old male with a history of hypogammaglobulinemia currently receiving HIZENTRA weekly, bronchiectasis, recurrent sinopulmonary infections on azithromycin 3x/week, elevated liver enzymes, and splenomegaly who presents today with complaints of fever up to 101 following a bronchoscopy today at [facility]. It is severe. It is associated with cough, mild hemoptysis, headache, nausea, and vomiting. He denies chest pain, SOB, dizziness, or LOC. He was recently on 10 days of augmentin for an abscessed tooth s/p complicated root canal that finished on 22. Spoke with Dr. Ricco Alvares who performed the bronchoscopy and noted blood, and he suspects he is having a bronchiectasis flare with concern for developing pna. In the ED: CXR with concern for infiltrated on the right lung base, , T 99.2, RR 22, and Lactate WNL.       PFTs (2022):  o FVC: 88%  o FEV1: 77%  o FEV1/FVC: 73  o T%  o DLCO: 80%    Sputum Cx  Date: 22  Heavy Growth Haemophilus influenzae       Past Medical History:   Diagnosis Date    Fatty liver     Immunoglobulin deficiency     Immunoglobulin deficiency     CVID    Pneumonia        Past Surgical History:   Procedure Laterality Date    ADENOIDECTOMY      BIOPSY N/A 2020    Procedure: BIOPSY;  Surgeon: Kaleb Diagnostic Provider;  Location: [facility] OR;   Service: Interventional Radiology;  Laterality: N/A;    MANDIBLE FRACTURE SURGERY      SINUS SURGERY      TONSILLECTOMY      TYMPANOSTOMY TUBE PLACEMENT      TYMPANOSTOMY TUBE PLACEMENT      x 4       Review of patient's allergies indicates:   Allergen Reactions    Singulair [montelukast] Other (See Comments)     arrhythmias       No current facility-administered medications on file prior to encounter.     Current Outpatient Medications on File Prior to Encounter   Medication Sig    acetaminophen (TYLENOL) 325 MG tablet Take 650 mg by mouth as needed.    albuterol (ACCUNEB) 0.63 mg/3 mL Nebu Take 0.63 mg by nebulization every 6 (six) hours as needed. Rescue    amoxicillin-clavulanate 875-125mg (AUGMENTIN) 875-125 mg per tablet Take 1 tablet by mouth 2 (two) times daily.    diclofenac (VOLTAREN) 50 MG EC tablet Take 1 tablet (50 mg total) by mouth 3 (three) times daily.    immun glob G,IgG,-pro-IgA 0-50 1 gram/5 mL (20 %) Soln Inject 200 mg/kg into the skin every 7 days. Sunday    mometasone-formoterol (DULERA) 100-5 mcg/actuation HFAA Inhale 2 Inhalers into the lungs 2 (two) times a day. Controller    sodium chloride 3% 3 % nebulizer solution Take 4 mLs by nebulization 2 (two) times a day.     Family History       Problem Relation (Age of Onset)    Cancer Mother    Liver disease Father          Tobacco Use    Smoking status: Never    Smokeless tobacco: Never   Substance and Sexual Activity    Alcohol use: Yes    Drug use: No    Sexual activity: Yes     Partners: Female     Review of Systems   Constitutional:  Positive for chills, diaphoresis, fatigue and fever.   HENT:  Negative for congestion, ear pain, sore throat and trouble swallowing.    Eyes:  Negative for pain, discharge and visual disturbance.   Respiratory:  Positive for cough. Negative for chest tightness, shortness of breath and wheezing.    Cardiovascular:  Negative for chest pain, palpitations and leg swelling.   Gastrointestinal:   Positive for nausea and vomiting. Negative for abdominal distention, abdominal pain, blood in stool, constipation and diarrhea.   Endocrine: Negative for polydipsia, polyphagia and polyuria.   Genitourinary:  Negative for dysuria, flank pain, frequency and urgency.   Musculoskeletal:  Negative for back pain, joint swelling, neck pain and neck stiffness.   Skin:  Negative for rash and wound.   Allergic/Immunologic: Negative for immunocompromised state.   Neurological:  Positive for weakness and headaches. Negative for dizziness, syncope, speech difficulty, light-headedness and numbness.   Hematological:  Negative for adenopathy.   Psychiatric/Behavioral:  Negative for confusion and suicidal ideas. The patient is not nervous/anxious.    All other systems reviewed and are negative.  Objective:     Vital Signs (Most Recent):  Temp: 99.2 °F (37.3 °C) (08/30/22 1943)  Pulse: (!) 122 (08/30/22 2103)  Resp: (!) 22 (08/30/22 2103)  BP: 139/86 (08/30/22 1943)  SpO2: (!) 93 % (08/30/22 2103)   Vital Signs (24h Range):  Temp:  [99.2 °F (37.3 °C)] 99.2 °F (37.3 °C)  Pulse:  [115-122] 122  Resp:  [18-22] 22  SpO2:  [93 %-97 %] 93 %  BP: (139)/(86) 139/86     Weight: 93 kg (205 lb)  Body mass index is 28.59 kg/m².    Physical Exam  Vitals and nursing note reviewed.   Constitutional:       Appearance: He is well-developed. He is ill-appearing.      Comments: flushed   HENT:      Head: Normocephalic and atraumatic.   Eyes:      Conjunctiva/sclera: Conjunctivae normal.      Pupils: Pupils are equal, round, and reactive to light.   Cardiovascular:      Rate and Rhythm: Regular rhythm. Tachycardia present.   Pulmonary:      Effort: Pulmonary effort is normal.      Comments: Diminished RLL  Abdominal:      General: Bowel sounds are normal.      Palpations: Abdomen is soft.   Musculoskeletal:         General: Normal range of motion.      Cervical back: Normal range of motion and neck supple.   Skin:     General: Skin is warm and dry.       Capillary Refill: Capillary refill takes less than 2 seconds.   Neurological:      Mental Status: He is alert and oriented to person, place, and time.   Psychiatric:         Behavior: Behavior normal.         Thought Content: Thought content normal.         Judgment: Judgment normal.         CRANIAL NERVES     CN III, IV, VI   Pupils are equal, round, and reactive to light.     Significant Labs: All pertinent labs within the past 24 hours have been reviewed.  CBC:   Recent Labs   Lab 08/30/22 1959   WBC 10.81   HGB 15.2   HCT 46.0   *     CMP:   Recent Labs   Lab 08/30/22 1959      K 3.9      CO2 23   *   BUN 13   CREATININE 1.1   CALCIUM 9.8   PROT 7.8   ALBUMIN 4.0   BILITOT 0.9   ALKPHOS 157*   *   *   ANIONGAP 10     Lactic Acid:   Recent Labs   Lab 08/30/22 2049   LACTATE 1.5       Significant Imaging: I have reviewed all pertinent imaging results/findings within the past 24 hours.    X-Ray Chest PA And Lateral    Result Date: 8/30/2022  EXAMINATION: XR CHEST PA AND LATERAL CLINICAL HISTORY: Hemoptysis TECHNIQUE: PA and lateral views of the chest were performed. COMPARISON: 05/03/2022, 03/20/2022, 12/10/2021 chest x-ray FINDINGS: Cardiac silhouette is stable and not significantly enlarged.  There is decreased expansion of the lungs and mild linear infrahilar opacification on the right is attributed to atelectasis though underlying mild pneumonitis is considered.  There is no pneumothorax, pneumomediastinum, pleural effusion or visualized lung nodule.  Osseous structures grossly appear intact.     Linear basilar opacities on the right are attributed to atelectasis with a possibility of focal pneumonitis without significant consolidation considered. Electronically signed by: Sade Barajas Date:    08/30/2022 Time:    21:06      Assessment/Plan:     * Sepsis  Admit to med/tele  Probable RLL pna in the setting of CVID and bronchoscopy today     This patient does have  evidence of infective focus  My overall impression is sepsis. Vital signs were reviewed and noted in progress note.  Antibiotics given-   Antibiotics (From admission, onward)    Start     Stop Route Frequency Ordered    08/31/22 0630  meropenem-0.9% sodium chloride 1 g/50 mL IVPB         -- IV Every 8 hours (non-standard times) 08/30/22 2216    08/30/22 2300  vancomycin 1.5 g in dextrose 5 % 250 mL IVPB (ready to mix)         -- IV Every 12 hours (non-standard times) 08/30/22 2236 08/30/22 2235  vancomycin - pharmacy to dose  (vancomycin IVPB)        See Hyperspace for full Linked Orders Report.    -- IV pharmacy to manage frequency 08/30/22 2135 08/30/22 2115  meropenem-0.9% sodium chloride 1 g/50 mL IVPB         08/31 0914 IV ED 1 Time 08/30/22 2114        Cultures were taken-   Microbiology Results (last 7 days)     Procedure Component Value Units Date/Time    Culture, Respiratory with Gram Stain [[id number]]     Order Status: No result Specimen: Respiratory from Sputum, Expectorated     Influenza A & B by Molecular [[id number]] Collected: 08/30/22 2033    Order Status: Completed Specimen: Nasopharyngeal Swab Updated: 08/30/22 2113     Influenza A, Molecular Negative     Influenza B, Molecular Negative     Flu A & B Source Nasal swab    Blood culture [[id number]] Collected: 08/30/22 2049    Order Status: Sent Specimen: Blood from Antecubital, Right Updated: 08/30/22 2049    Blood culture [[id number]] Collected: 08/30/22 2049    Order Status: Sent Specimen: Blood from Antecubital, Left Updated: 08/30/22 2049    Influenza A & B by Molecular [[id number]] Collected: 08/30/22 2025    Order Status: Sent Specimen: Nasal Swab Updated: 08/30/22 2026        Latest lactate reviewed, they are-  Recent Labs   Lab 08/30/22 2049   LACTATE 1.5         Source- pneumonia    Source control Achieved by- abx        Bronchiectasis with acute exacerbation  Continue NS nebs with CPT  Consult pulm         Transaminitis  Appears  slightly worse than baseline with now slightly elevated alk phos  Check GGT   Trend CMP      Pneumonia/ Right Lower lobe  S/p bronchoscopy today with CVID/immunocompromised state  Check procal  Cover broadly with meropenem/vanc   Consult pulm       CVID (common variable immunodeficiency)  Due for IVIG infusion   Dr. Martinez at [place] managing        VTE Risk Mitigation (From admission, onward)         Ordered     IP VTE LOW RISK PATIENT  Once         08/30/22 2216     Place sequential compression device  Until discontinued         08/30/22 2216                   Desire Smiley NP  Department of Hospital Medicine   [facility]

## 2022-09-01 LAB
ADENOVIRUS: NOT DETECTED
ALBUMIN SERPL BCP-MCNC: 3.3 G/DL (ref 3.5–5.2)
ALP SERPL-CCNC: 141 U/L (ref 55–135)
ALT SERPL W/O P-5'-P-CCNC: 101 U/L (ref 10–44)
ANION GAP SERPL CALC-SCNC: 10 MMOL/L (ref 8–16)
AST SERPL-CCNC: 91 U/L (ref 10–40)
BASOPHILS # BLD AUTO: 0.02 K/UL (ref 0–0.2)
BASOPHILS NFR BLD: 0.3 % (ref 0–1.9)
BILIRUB SERPL-MCNC: 0.9 MG/DL (ref 0.1–1)
BORDETELLA PARAPERTUSSIS (IS1001): NOT DETECTED
BORDETELLA PERTUSSIS (PTXP): NOT DETECTED
BUN SERPL-MCNC: 6 MG/DL (ref 6–20)
CALCIUM SERPL-MCNC: 9 MG/DL (ref 8.7–10.5)
CHLAMYDIA PNEUMONIAE: NOT DETECTED
CHLORIDE SERPL-SCNC: 107 MMOL/L (ref 95–110)
CO2 SERPL-SCNC: 24 MMOL/L (ref 23–29)
CORONAVIRUS 229E, COMMON COLD VIRUS: NOT DETECTED
CORONAVIRUS HKU1, COMMON COLD VIRUS: NOT DETECTED
CORONAVIRUS NL63, COMMON COLD VIRUS: NOT DETECTED
CORONAVIRUS OC43, COMMON COLD VIRUS: NOT DETECTED
CREAT SERPL-MCNC: 0.9 MG/DL (ref 0.5–1.4)
DIFFERENTIAL METHOD: ABNORMAL
EOSINOPHIL # BLD AUTO: 0 K/UL (ref 0–0.5)
EOSINOPHIL NFR BLD: 0.3 % (ref 0–8)
ERYTHROCYTE [DISTWIDTH] IN BLOOD BY AUTOMATED COUNT: 14.3 % (ref 11.5–14.5)
EST. GFR  (NO RACE VARIABLE): >60 ML/MIN/1.73 M^2
FLUBV RNA NPH QL NAA+NON-PROBE: NOT DETECTED
GLUCOSE SERPL-MCNC: 99 MG/DL (ref 70–110)
HCT VFR BLD AUTO: 38.9 % (ref 40–54)
HGB BLD-MCNC: 12.8 G/DL (ref 14–18)
HPIV1 RNA NPH QL NAA+NON-PROBE: NOT DETECTED
HPIV2 RNA NPH QL NAA+NON-PROBE: NOT DETECTED
HPIV3 RNA NPH QL NAA+NON-PROBE: NOT DETECTED
HPIV4 RNA NPH QL NAA+NON-PROBE: NOT DETECTED
HUMAN METAPNEUMOVIRUS: NOT DETECTED
IMM GRANULOCYTES # BLD AUTO: 0.01 K/UL (ref 0–0.04)
IMM GRANULOCYTES NFR BLD AUTO: 0.2 % (ref 0–0.5)
INFLUENZA A (SUBTYPES H1,H1-2009,H3): NOT DETECTED
LACTATE SERPL-SCNC: 1.1 MMOL/L (ref 0.5–2.2)
LYMPHOCYTES # BLD AUTO: 1 K/UL (ref 1–4.8)
LYMPHOCYTES NFR BLD: 15.6 % (ref 18–48)
MAGNESIUM SERPL-MCNC: 2 MG/DL (ref 1.6–2.6)
MCH RBC QN AUTO: 26.7 PG (ref 27–31)
MCHC RBC AUTO-ENTMCNC: 32.9 G/DL (ref 32–36)
MCV RBC AUTO: 81 FL (ref 82–98)
MONOCYTES # BLD AUTO: 0.8 K/UL (ref 0.3–1)
MONOCYTES NFR BLD: 12.7 % (ref 4–15)
MYCOPLASMA PNEUMONIAE: NOT DETECTED
NEUTROPHILS # BLD AUTO: 4.5 K/UL (ref 1.8–7.7)
NEUTROPHILS NFR BLD: 70.9 % (ref 38–73)
NRBC BLD-RTO: 0 /100 WBC
PLATELET # BLD AUTO: 105 K/UL (ref 150–450)
PMV BLD AUTO: 11.3 FL (ref 9.2–12.9)
POTASSIUM SERPL-SCNC: 4 MMOL/L (ref 3.5–5.1)
PROT SERPL-MCNC: 6.7 G/DL (ref 6–8.4)
RBC # BLD AUTO: 4.8 M/UL (ref 4.6–6.2)
RESPIRATORY INFECTION PANEL SOURCE: NORMAL
RSV RNA NPH QL NAA+NON-PROBE: NOT DETECTED
RV+EV RNA NPH QL NAA+NON-PROBE: NOT DETECTED
SARS-COV-2 RNA RESP QL NAA+PROBE: NOT DETECTED
SODIUM SERPL-SCNC: 141 MMOL/L (ref 136–145)
VANCOMYCIN TROUGH SERPL-MCNC: 10.2 UG/ML (ref 10–22)
WBC # BLD AUTO: 6.39 K/UL (ref 3.9–12.7)

## 2022-09-01 PROCEDURE — 83735 ASSAY OF MAGNESIUM: CPT | Performed by: NURSE PRACTITIONER

## 2022-09-01 PROCEDURE — 25000242 PHARM REV CODE 250 ALT 637 W/ HCPCS: Performed by: INTERNAL MEDICINE

## 2022-09-01 PROCEDURE — 63600175 PHARM REV CODE 636 W HCPCS: Performed by: NURSE PRACTITIONER

## 2022-09-01 PROCEDURE — 94640 AIRWAY INHALATION TREATMENT: CPT

## 2022-09-01 PROCEDURE — 85025 COMPLETE CBC W/AUTO DIFF WBC: CPT | Performed by: NURSE PRACTITIONER

## 2022-09-01 PROCEDURE — 94669 MECHANICAL CHEST WALL OSCILL: CPT

## 2022-09-01 PROCEDURE — 63600175 PHARM REV CODE 636 W HCPCS: Performed by: SURGERY

## 2022-09-01 PROCEDURE — 27000221 HC OXYGEN, UP TO 24 HOURS

## 2022-09-01 PROCEDURE — 25000003 PHARM REV CODE 250: Performed by: SURGERY

## 2022-09-01 PROCEDURE — 87070 CULTURE OTHR SPECIMN AEROBIC: CPT | Performed by: NURSE PRACTITIONER

## 2022-09-01 PROCEDURE — 87633 RESP VIRUS 12-25 TARGETS: CPT | Performed by: INTERNAL MEDICINE

## 2022-09-01 PROCEDURE — 80053 COMPREHEN METABOLIC PANEL: CPT | Performed by: NURSE PRACTITIONER

## 2022-09-01 PROCEDURE — 94640 AIRWAY INHALATION TREATMENT: CPT | Mod: XB

## 2022-09-01 PROCEDURE — 99900035 HC TECH TIME PER 15 MIN (STAT)

## 2022-09-01 PROCEDURE — 25000003 PHARM REV CODE 250: Performed by: INTERNAL MEDICINE

## 2022-09-01 PROCEDURE — 36415 COLL VENOUS BLD VENIPUNCTURE: CPT | Performed by: NURSE PRACTITIONER

## 2022-09-01 PROCEDURE — 25000003 PHARM REV CODE 250: Performed by: NURSE PRACTITIONER

## 2022-09-01 PROCEDURE — 36415 COLL VENOUS BLD VENIPUNCTURE: CPT | Performed by: EMERGENCY MEDICINE

## 2022-09-01 PROCEDURE — 25000242 PHARM REV CODE 250 ALT 637 W/ HCPCS: Performed by: NURSE PRACTITIONER

## 2022-09-01 PROCEDURE — 12000002 HC ACUTE/MED SURGE SEMI-PRIVATE ROOM

## 2022-09-01 PROCEDURE — 94664 DEMO&/EVAL PT USE INHALER: CPT | Mod: XB

## 2022-09-01 PROCEDURE — 83605 ASSAY OF LACTIC ACID: CPT | Performed by: NURSE PRACTITIONER

## 2022-09-01 PROCEDURE — 87798 DETECT AGENT NOS DNA AMP: CPT | Mod: 59 | Performed by: INTERNAL MEDICINE

## 2022-09-01 PROCEDURE — 99214 OFFICE O/P EST MOD 30 MIN: CPT | Mod: ,,, | Performed by: INTERNAL MEDICINE

## 2022-09-01 PROCEDURE — 87205 SMEAR GRAM STAIN: CPT | Performed by: NURSE PRACTITIONER

## 2022-09-01 PROCEDURE — 94761 N-INVAS EAR/PLS OXIMETRY MLT: CPT

## 2022-09-01 PROCEDURE — 63700000 PHARM REV CODE 250 ALT 637 W/O HCPCS: Performed by: INTERNAL MEDICINE

## 2022-09-01 PROCEDURE — 80202 ASSAY OF VANCOMYCIN: CPT | Performed by: EMERGENCY MEDICINE

## 2022-09-01 PROCEDURE — 99214 PR OFFICE/OUTPT VISIT, EST, LEVL IV, 30-39 MIN: ICD-10-PCS | Mod: ,,, | Performed by: INTERNAL MEDICINE

## 2022-09-01 RX ORDER — SODIUM CHLORIDE FOR INHALATION 3 %
4 VIAL, NEBULIZER (ML) INHALATION 2 TIMES DAILY
Status: DISCONTINUED | OUTPATIENT
Start: 2022-09-01 | End: 2022-09-03 | Stop reason: HOSPADM

## 2022-09-01 RX ADMIN — FLUTICASONE FUROATE AND VILANTEROL TRIFENATATE 1 PUFF: 100; 25 POWDER RESPIRATORY (INHALATION) at 07:09

## 2022-09-01 RX ADMIN — MEROPENEM AND SODIUM CHLORIDE 1 G: 1 INJECTION, SOLUTION INTRAVENOUS at 02:09

## 2022-09-01 RX ADMIN — ALBUTEROL SULFATE 2.5 MG: 2.5 SOLUTION RESPIRATORY (INHALATION) at 12:09

## 2022-09-01 RX ADMIN — ALBUTEROL SULFATE 2.5 MG: 2.5 SOLUTION RESPIRATORY (INHALATION) at 07:09

## 2022-09-01 RX ADMIN — SODIUM CHLORIDE SOLN NEBU 3% 4 ML: 3 NEBU SOLN at 08:09

## 2022-09-01 RX ADMIN — MEROPENEM AND SODIUM CHLORIDE 1 G: 1 INJECTION, SOLUTION INTRAVENOUS at 09:09

## 2022-09-01 RX ADMIN — ALBUTEROL SULFATE 2.5 MG: 2.5 SOLUTION RESPIRATORY (INHALATION) at 01:09

## 2022-09-01 RX ADMIN — SODIUM CHLORIDE SOLN NEBU 3% 4 ML: 3 NEBU SOLN at 07:09

## 2022-09-01 RX ADMIN — AZITHROMYCIN MONOHYDRATE 500 MG: 250 TABLET ORAL at 08:09

## 2022-09-01 RX ADMIN — GUAIFENESIN 1200 MG: 600 TABLET, EXTENDED RELEASE ORAL at 09:09

## 2022-09-01 RX ADMIN — MEROPENEM AND SODIUM CHLORIDE 1 G: 1 INJECTION, SOLUTION INTRAVENOUS at 05:09

## 2022-09-01 RX ADMIN — GUAIFENESIN 1200 MG: 600 TABLET, EXTENDED RELEASE ORAL at 08:09

## 2022-09-01 RX ADMIN — ACETAMINOPHEN 650 MG: 325 TABLET ORAL at 11:09

## 2022-09-01 RX ADMIN — VANCOMYCIN HYDROCHLORIDE 1750 MG: 1 INJECTION, POWDER, LYOPHILIZED, FOR SOLUTION INTRAVENOUS at 03:09

## 2022-09-01 NOTE — PLAN OF CARE
Plan of care reviewed with pt. Pt verbalized understanding. Patient is alert and oriented x 4, able to make needs known. Continuous cardiac monitoring, TELE #[id number]. Fever treated with PRN medication, full relief obtained. ABX administered as scheduled. Meds given per MAR. Pt on 2L NC. Repositions self independently. No complaints of pain or discomfort. Purposeful hourly/q2hr rounding done during shift to promote patient safety. NAD noted. Safety maintained with side rails up x3, bed wheels locked, bed in lowest positioned, call light in reach. Patient educated to call for assistance with ambulation if needed, verbalized understanding. Pt remains free of falls. Will continue to monitor.

## 2022-09-01 NOTE — PROGRESS NOTES
Pharmacokinetic Assessment Follow Up: IV Vancomycin    Vancomycin serum concentration assessment(s):    The trough level was drawn correctly and can be used to guide therapy at this time. The measurement is below the desired definitive target range of 15 to 20 mcg/mL.    Vancomycin Regimen Plan:    Change regimen to Vancomycin 1750 mg IV every 12 hours with next serum trough concentration measured at 1230 prior to 3rd dose on 9/2    Drug levels (last 3 results):  Recent Labs   Lab Result Units 09/01/22  0938   Vancomycin-Trough ug/mL 10.2       Pharmacy will continue to follow and monitor vancomycin.    Please contact pharmacy at extension [phone] for questions regarding this assessment.    Thank you for the consult,   Aly Orr       Patient brief summary:  Charly Mattson is a 27 y.o. male initiated on antimicrobial therapy with IV Vancomycin for treatment of sepsis        Drug Allergies:   Review of patient's allergies indicates:   Allergen Reactions    Singulair [montelukast] Other (See Comments)     arrhythmias       Actual Body Weight:   93.2 kg    Renal Function:   Estimated Creatinine Clearance: 143.9 mL/min (based on SCr of 0.9 mg/dL).,     Dialysis Method (if applicable):  N/A    CBC (last 72 hours):  Recent Labs   Lab Result Units 08/30/22 1959 08/31/22 0452 09/01/22  0512   WBC K/uL 10.81 10.39 6.39   Hemoglobin g/dL 15.2 12.9* 12.8*   Hematocrit % 46.0 38.9* 38.9*   Platelets K/uL 144* 116* 105*   Gran % % 76.7* 74.9* 70.9   Lymph % % 16.2* 15.3* 15.6*   Mono % % 6.2 9.0 12.7   Eosinophil % % 0.3 0.2 0.3   Basophil % % 0.4 0.3 0.3   Differential Method  Automated Automated Automated       Metabolic Panel (last 72 hours):  Recent Labs   Lab Result Units 08/30/22 1959 08/30/22 2214 08/31/22 0452 09/01/22  0512   Sodium mmol/L 140  --  138 141   Potassium mmol/L 3.9  --  3.7 4.0   Chloride mmol/L 107  --  109 107   CO2 mmol/L 23  --  21* 24   Glucose mg/dL 116*  --  104 99   Glucose, UA   --  Negative  --    --    BUN mg/dL 13  --  11 6   Creatinine mg/dL 1.1  --  0.9 0.9   Albumin g/dL 4.0  --  3.3* 3.3*   Total Bilirubin mg/dL 0.9  --  0.9 0.9   Alkaline Phosphatase U/L 157*  --  130 141*   AST U/L 120*  --  104* 91*   ALT U/L 109*  --  95* 101*   Magnesium mg/dL  --   --  1.9 2.0       Vancomycin Administrations:  vancomycin given in the last 96 hours                     vancomycin 1.5 g in dextrose 5 % 250 mL IVPB (ready to mix) (mg) 1,500 mg New Bag 08/31/22 2306     1,500 mg New Bag  1122     1,500 mg New Bag  0256                    Microbiologic Results:  Microbiology Results (last 7 days)       Procedure Component Value Units Date/Time    Blood culture [[id number]] Collected: 08/30/22 2049    Order Status: Completed Specimen: Blood from Antecubital, Left Updated: 09/01/22 0613     Blood Culture, Routine No Growth to date      No Growth to date    Blood culture [[id number]] Collected: 08/30/22 2049    Order Status: Completed Specimen: Blood from Antecubital, Right Updated: 09/01/22 0613     Blood Culture, Routine No Growth to date      No Growth to date    Culture, Respiratory with Gram Stain [[id number]]     Order Status: No result Specimen: Respiratory from Sputum, Expectorated     Influenza A & B by Molecular [[id number]] Collected: 08/30/22 2033    Order Status: Completed Specimen: Nasopharyngeal Swab Updated: 08/30/22 2113     Influenza A, Molecular Negative     Influenza B, Molecular Negative     Flu A & B Source Nasal swab    Influenza A & B by Molecular [[id number]] Collected: 08/30/22 2025    Order Status: Sent Specimen: Nasal Swab Updated: 08/30/22 2026

## 2022-09-01 NOTE — PROGRESS NOTES
Progress Note  PULMONARY    Admit Date: 8/30/2022 09/01/2022    History of Present Illness:  Pt is a 28 yo male with CVID on hizentra, chronic bronchiectasis who presented to the ED with hemoptysis, fever s/p bronchoscopy at [facility] per Dr. Ricco Alvares. Pt gets recurrent respiratory infections, worsening in severity and frequency with q 2 month bronchitis or pneumonia for the past year. Most recently 2 mos ago sputum culture grew H flu. Pt complains of fever, nausea and emesis, cough w/ thick green/pink phlegm after having bronch yesterday. Symptoms were present after scope but worsened about 5pm at home so he presented to ED. Pt has been on amoxicillin and flagyl for the past week due to tooth abscess w/ plan for root canal on 9/6. His home regimen for bronchiectasis is albuterol and 3% NaCl bid w/ vest once daily. He denies MUÑOZ with regular activity and is able to work with no breathing limitation. He works currently selling cars. He denies doing regular exercise. Pt's mother is present at bedside- reports he had frequent pneumonias as a baby, spent time in NICU. His father had immune deficiency and poor healing with wounds so suspects he may have genetic immune deficiency.    SUBJECTIVE:     9/1- tmax 103.8 yesterday. Afebrile this am, lactate normalized. Pt with no new complaints, on room air. Reports toddler at home sick w/ URI symptoms      OBJECTIVE:     Vitals (Most recent):  Vitals:    09/01/22 0737   BP: (!) 109/59   Pulse: (!) 116   Resp: 17   Temp: 99.3 °F (37.4 °C)       Vitals (24 hour range):  Temp:  [97.9 °F (36.6 °C)-103.8 °F (39.9 °C)]   Pulse:  []   Resp:  [16-20]   BP: (103-116)/(59-70)   SpO2:  [95 %-99 %]       Intake/Output Summary (Last 24 hours) at 9/1/2022 0810  Last data filed at 9/1/2022 0614  Gross per 24 hour   Intake 1552.89 ml   Output 1100 ml   Net 452.89 ml          Physical Exam:  The patient's neuro status (alertness,orientation,cognitive function,motor skills,), pharyngeal  exam (oral lesions, hygiene, abn dentition,), Neck (jvd,mass,thyroid,nodes in neck and above/below clavicle),RESPIRATORY(symmetry,effort,fremitus,percussion,auscultation),  Cor(rhythm,heart tones including gallops,perfusion,edema)ABD(distention,hepatic&splenomegaly,tenderness,masses), Skin(rash,cyanosis),Psyc(affect,judgement,).  Exam negative except for these pertinent findings:    L upper molar with erythema, caries  Tongue and throat normal  HR tachycardic, regular  Bibasilar scattered rales  Abd soft nontender, obese  No edema/clubbing     Radiographs reviewed: view by direct vision   CT chest 2020- scattered patchy infiltrates and bronchial thickening, most notable area of bronchiectasis in RML    Labs     Recent Labs   Lab 09/01/22  0512   WBC 6.39   HGB 12.8*   HCT 38.9*   *     Recent Labs   Lab 09/01/22  0054 09/01/22 0512   NA  --  141   K  --  4.0   CL  --  107   CO2  --  24   BUN  --  6   CREATININE  --  0.9   GLU  --  99   CALCIUM  --  9.0   MG  --  2.0   AST  --  91*   ALT  --  101*   ALKPHOS  --  141*   BILITOT  --  0.9   PROT  --  6.7   ALBUMIN  --  3.3*   LACTATE 1.1  --    No results for input(s): PH, PCO2, PO2, HCO3 in the last 24 hours.  Microbiology Results (last 7 days)       Procedure Component Value Units Date/Time    Blood culture [[id number]] Collected: 08/30/22 2049    Order Status: Completed Specimen: Blood from Antecubital, Left Updated: 09/01/22 0613     Blood Culture, Routine No Growth to date      No Growth to date    Blood culture [[id number]] Collected: 08/30/22 2049    Order Status: Completed Specimen: Blood from Antecubital, Right Updated: 09/01/22 0613     Blood Culture, Routine No Growth to date      No Growth to date    Culture, Respiratory with Gram Stain [[id number]]     Order Status: No result Specimen: Respiratory from Sputum, Expectorated     Influenza A & B by Molecular [[id number]] Collected: 08/30/22 2033    Order Status: Completed Specimen: Nasopharyngeal Swab  Updated: 08/30/22 2113     Influenza A, Molecular Negative     Influenza B, Molecular Negative     Flu A & B Source Nasal swab    Influenza A & B by Molecular [[id number]] Collected: 08/30/22 2025    Order Status: Sent Specimen: Nasal Swab Updated: 08/30/22 2026            Impression:  Active Hospital Problems    Diagnosis  POA    *Sepsis [A41.9]  Yes    Bronchiectasis with acute exacerbation [J47.1]  Yes    Pneumonia/ Right Lower lobe [J18.9]  Yes    Transaminitis [R74.01]  Yes    CVID (common variable immunodeficiency) [D83.9]  Yes      Resolved Hospital Problems   No resolved problems to display.               Plan:     - continue broad spectrum abx- meropenem, vanco, azithro  - f/u culture from bronch-- nothing new today per Dr. Alvares  - send sputum culture today  - resp viral panel  - continue albuterol q6h, LABA/ICS inhaler  - 3% NaCl nebs bid  - guaifenesin 1200 bid  - vest bid, flutter valve bid  - hold off on Hizentra injection while exacerbating-- per his immunologist he will take it when he goes home    Chrissy Mckeon MD  Pulmonary & Critical Care Medicine

## 2022-09-01 NOTE — ASSESSMENT & PLAN NOTE
S/p bronchoscopy 8/30 with CVID/immunocompromised state  Cover broadly with meropenem/vanc and zithromax   Pulmonary following

## 2022-09-01 NOTE — ASSESSMENT & PLAN NOTE
Admit to med/tele  Probable RLL pna in the setting of CVID and bronchoscopy today     This patient does have evidence of infective focus  My overall impression is sepsis. Vital signs were reviewed and noted in progress note.  Antibiotics given-   Antibiotics (From admission, onward)    Start     Stop Route Frequency Ordered    09/01/22 1330  vancomycin (VANCOCIN) 1,750 mg in dextrose 5 % 500 mL IVPB         -- IV Every 12 hours (non-standard times) 09/01/22 1115    08/31/22 1545  azithromycin tablet 500 mg         -- Oral Daily 08/31/22 1543    08/31/22 0630  meropenem-0.9% sodium chloride 1 g/50 mL IVPB         -- IV Every 8 hours (non-standard times) 08/30/22 2216    08/30/22 2235  vancomycin - pharmacy to dose  (vancomycin IVPB)        See Hyperspace for full Linked Orders Report.    -- IV pharmacy to manage frequency 08/30/22 2135        Cultures were taken-   Microbiology Results (last 7 days)     Procedure Component Value Units Date/Time    Blood culture [[id number]] Collected: 08/30/22 2049    Order Status: Completed Specimen: Blood from Antecubital, Left Updated: 09/01/22 0613     Blood Culture, Routine No Growth to date      No Growth to date    Blood culture [[id number]] Collected: 08/30/22 2049    Order Status: Completed Specimen: Blood from Antecubital, Right Updated: 09/01/22 0613     Blood Culture, Routine No Growth to date      No Growth to date    Culture, Respiratory with Gram Stain [[id number]]     Order Status: No result Specimen: Respiratory from Sputum, Expectorated     Influenza A & B by Molecular [[id number]] Collected: 08/30/22 2033    Order Status: Completed Specimen: Nasopharyngeal Swab Updated: 08/30/22 2113     Influenza A, Molecular Negative     Influenza B, Molecular Negative     Flu A & B Source Nasal swab    Influenza A & B by Molecular [[id number]] Collected: 08/30/22 2025    Order Status: Sent Specimen: Nasal Swab Updated: 08/30/22 2026        Latest lactate reviewed, they are-  Recent  Labs   Lab 08/31/22  1832 08/31/22  1950 09/01/22  0054   LACTATE 2.3* 2.5* 1.1         Source- pneumonia    Source control Achieved by- abx    9/1 - pt spiked fever 103 yesterday evening, lactic acid drawn and elevated. Lactic acid trended and improved today. Will cont IV abx therapy and monitor closely. Pt high risk for decompensation secondary to immunocompromised state and current pneumonia

## 2022-09-01 NOTE — ASSESSMENT & PLAN NOTE
Due for IVIG infusion  - holding infusion currently secondary to acute pneumonia and bronchitis flare  Dr. Martinez at [place] managing

## 2022-09-01 NOTE — PROGRESS NOTES
[facility] Hospital Medicine  Progress Note    Patient Name: Charly Mattson  MRN: [id number]  Patient Class: IP- Inpatient   Admission Date: 2022  Length of Stay: 0 days  Attending Physician: Devin Solis MD  Primary Care Provider: Primary Doctor No        Subjective:     Principal Problem:Sepsis        HPI:  Mr. 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 It is severe. It is associated with cough, mild hemoptysis, headache, nausea, and vomiting. He denies chest pain, SOB, dizziness, or LOC. He was recently on 10 days of augmentin for an abscessed tooth s/p complicated root canal that finished on 22. Spoke with Dr. Ricco Alvares who performed the bronchoscopy and noted blood, and he suspects he is having a bronchiectasis flare with concern for developing pna. In the ED: CXR with concern for infiltrated on the right lung base, , T 99.2, RR 22, and Lactate WNL.       PFTs (2022):  o FVC: 88%  o FEV1: 77%  o FEV1/FVC: 73  o T%  o DLCO: 80%    Sputum Cx  Date: 22  Heavy Growth Haemophilus influenzae       Overview/Hospital Course:  No notes on file    Interval History:  Notes reviewed, patient experienced temperature spike up to 103 last night that responded well to Motrin.  Lactic acid drawn at that time and was elevated.  Lactic acid trended and returned to normal.  Patient reports he feels about the same today but does report that his cough is slightly better.  He still experiencing generalized malaise and fatigue and appetite is very poor.  I encouraged patient on p.o. intake and will start boost supplements to help with nutrition.  We will continue monitor closely and continue IV antibiotic  therapy.  Cultures are pending. Pt not medically cleared at this time for discharge and high risk for decompensation given immunocompromised state.       Review of Systems   Constitutional:  Positive for activity change, appetite change, chills, fatigue and fever.   HENT:  Positive for congestion, postnasal drip, rhinorrhea, sinus pressure, sinus pain and sore throat. Negative for ear pain and trouble swallowing.    Respiratory:  Positive for cough. Negative for choking, chest tightness and shortness of breath.    Cardiovascular:  Negative for chest pain, palpitations and leg swelling.   Gastrointestinal:  Negative for abdominal pain, diarrhea, nausea and vomiting.   Genitourinary:  Negative for difficulty urinating, dysuria and urgency.   Musculoskeletal:  Negative for arthralgias, back pain and gait problem.   Skin:  Negative for color change, pallor, rash and wound.   Neurological:  Negative for dizziness, weakness and light-headedness.   Psychiatric/Behavioral:  Negative for agitation, behavioral problems and confusion.    All other systems reviewed and are negative.  Objective:     Vital Signs (Most Recent):  Temp: 99.3 °F (37.4 °C) (09/01/22 0737)  Pulse: (!) 116 (09/01/22 0737)  Resp: 17 (09/01/22 0737)  BP: (!) 109/59 (09/01/22 0737)  SpO2: 96 % (09/01/22 0737)   Vital Signs (24h Range):  Temp:  [97.9 °F (36.6 °C)-103.8 °F (39.9 °C)] 99.3 °F (37.4 °C)  Pulse:  [] 116  Resp:  [16-20] 17  SpO2:  [95 %-99 %] 96 %  BP: (103-116)/(59-70) 109/59     Weight: 93.2 kg (205 lb 7.5 oz)  Body mass index is 28.66 kg/m².    Intake/Output Summary (Last 24 hours) at 9/1/2022 1114  Last data filed at 9/1/2022 0800  Gross per 24 hour   Intake 1552.89 ml   Output 1650 ml   Net -97.11 ml        Physical Exam  Vitals and nursing note reviewed.   Constitutional:       General: He is not in acute distress.     Appearance: Normal appearance. He is well-developed. He is not ill-appearing or diaphoretic.   HENT:      Head:  Normocephalic and atraumatic.      Right Ear: External ear normal.      Left Ear: External ear normal.      Nose: Congestion and rhinorrhea present.      Mouth/Throat:      Mouth: Mucous membranes are moist.      Pharynx: Oropharynx is clear. No oropharyngeal exudate or posterior oropharyngeal erythema.   Eyes:      General: No scleral icterus.     Conjunctiva/sclera: Conjunctivae normal.      Pupils: Pupils are equal, round, and reactive to light.   Neck:      Vascular: No JVD.   Cardiovascular:      Rate and Rhythm: Normal rate and regular rhythm.      Pulses: Normal pulses.      Heart sounds: Normal heart sounds. No murmur heard.  Pulmonary:      Effort: Pulmonary effort is normal. No respiratory distress.      Breath sounds: Normal breath sounds. No stridor. No wheezing, rhonchi or rales.      Comments: Decreased to bilat bases  Abdominal:      General: Bowel sounds are normal. There is no distension.      Palpations: Abdomen is soft.      Tenderness: There is no abdominal tenderness.   Musculoskeletal:         General: No swelling or tenderness. Normal range of motion.      Cervical back: Normal range of motion and neck supple.   Skin:     General: Skin is warm and dry.      Capillary Refill: Capillary refill takes 2 to 3 seconds.      Coloration: Skin is not jaundiced or pale.      Findings: No erythema.   Neurological:      General: No focal deficit present.      Mental Status: He is alert and oriented to person, place, and time.      Cranial Nerves: No cranial nerve deficit.      Sensory: No sensory deficit.      Motor: No weakness.   Psychiatric:         Mood and Affect: Mood normal.         Behavior: Behavior normal.         Thought Content: Thought content normal.       Significant Labs: All pertinent labs within the past 24 hours have been reviewed.  CBC:   Recent Labs   Lab 08/30/22  1959 08/31/22  0452 09/01/22  0512   WBC 10.81 10.39 6.39   HGB 15.2 12.9* 12.8*   HCT 46.0 38.9* 38.9*   * 116*  105*       CMP:   Recent Labs   Lab 08/30/22  1959 08/31/22  0452 09/01/22  0512    138 141   K 3.9 3.7 4.0    109 107   CO2 23 21* 24   * 104 99   BUN 13 11 6   CREATININE 1.1 0.9 0.9   CALCIUM 9.8 8.9 9.0   PROT 7.8 6.3 6.7   ALBUMIN 4.0 3.3* 3.3*   BILITOT 0.9 0.9 0.9   ALKPHOS 157* 130 141*   * 104* 91*   * 95* 101*   ANIONGAP 10 8 10         Significant Imaging: I have reviewed all pertinent imaging results/findings within the past 24 hours.      Assessment/Plan:      * Sepsis  Admit to med/tele  Probable RLL pna in the setting of CVID and bronchoscopy today     This patient does have evidence of infective focus  My overall impression is sepsis. Vital signs were reviewed and noted in progress note.  Antibiotics given-   Antibiotics (From admission, onward)    Start     Stop Route Frequency Ordered    09/01/22 1330  vancomycin (VANCOCIN) 1,750 mg in dextrose 5 % 500 mL IVPB         -- IV Every 12 hours (non-standard times) 09/01/22 1115    08/31/22 1545  azithromycin tablet 500 mg         -- Oral Daily 08/31/22 1543    08/31/22 0630  meropenem-0.9% sodium chloride 1 g/50 mL IVPB         -- IV Every 8 hours (non-standard times) 08/30/22 2216    08/30/22 2235  vancomycin - pharmacy to dose  (vancomycin IVPB)        See Hyperspace for full Linked Orders Report.    -- IV pharmacy to manage frequency 08/30/22 2135        Cultures were taken-   Microbiology Results (last 7 days)     Procedure Component Value Units Date/Time    Blood culture [[id number]] Collected: 08/30/22 2049    Order Status: Completed Specimen: Blood from Antecubital, Left Updated: 09/01/22 0613     Blood Culture, Routine No Growth to date      No Growth to date    Blood culture [[id number]] Collected: 08/30/22 2049    Order Status: Completed Specimen: Blood from Antecubital, Right Updated: 09/01/22 0613     Blood Culture, Routine No Growth to date      No Growth to date    Culture, Respiratory with Gram Stain  [[id number]]     Order Status: No result Specimen: Respiratory from Sputum, Expectorated     Influenza A & B by Molecular [[id number]] Collected: 08/30/22 2033    Order Status: Completed Specimen: Nasopharyngeal Swab Updated: 08/30/22 2113     Influenza A, Molecular Negative     Influenza B, Molecular Negative     Flu A & B Source Nasal swab    Influenza A & B by Molecular [[id number]] Collected: 08/30/22 2025    Order Status: Sent Specimen: Nasal Swab Updated: 08/30/22 2026        Latest lactate reviewed, they are-  Recent Labs   Lab 08/31/22  1832 08/31/22  1950 09/01/22  0054   LACTATE 2.3* 2.5* 1.1         Source- pneumonia    Source control Achieved by- abx    9/1 - pt spiked fever 103 yesterday evening, lactic acid drawn and elevated. Lactic acid trended and improved today. Will cont IV abx therapy and monitor closely. Pt high risk for decompensation secondary to immunocompromised state and current pneumonia    Bronchiectasis with acute exacerbation  Continue NS nebs with CPT  Pulmonary following        Transaminitis  conts to improve  Trend CMP      Pneumonia/ Right Lower lobe  S/p bronchoscopy 8/30 with CVID/immunocompromised state  Cover broadly with meropenem/vanc and zithromax   Pulmonary following      CVID (common variable immunodeficiency)  Due for IVIG infusion  - holding infusion currently secondary to acute pneumonia and bronchitis flare  Dr. Martinez at [place] managing        VTE Risk Mitigation (From admission, onward)         Ordered     IP VTE LOW RISK PATIENT  Once         08/30/22 2216     Place sequential compression device  Until discontinued         08/30/22 2216                Discharge Planning   LAILA: 9/2/2022     Code Status: Full Code   Is the patient medically ready for discharge?:     Reason for patient still in hospital (select all that apply): Patient trending condition, Treatment and Consult recommendations  Discharge Plan A: Home with family                  Chrissy S  SHAHID Winslow  Department of Hospital Medicine   [facility]

## 2022-09-01 NOTE — RESPIRATORY THERAPY
Patient remains on aerosol tx via 2.5mg ventolin and nacl now and q6hr, 4cc 3% nacl now and bid, CPT via vest bid, Aerobikia now and bid w/a and Breo mdi x 1 puff now and qday.  Hr 101 and 02 saturation 95% on ROOM AIR.  Patient had o2 off at this time.  02 at nc at 2lpm

## 2022-09-01 NOTE — SUBJECTIVE & OBJECTIVE
Interval History:  Notes reviewed, patient experienced temperature spike up to 103 last night that responded well to Motrin.  Lactic acid drawn at that time and was elevated.  Lactic acid trended and returned to normal.  Patient reports he feels about the same today but does report that his cough is slightly better.  He still experiencing generalized malaise and fatigue and appetite is very poor.  I encouraged patient on p.o. intake and will start boost supplements to help with nutrition.  We will continue monitor closely and continue IV antibiotic therapy.  Cultures are pending. Pt not medically cleared at this time for discharge and high risk for decompensation given immunocompromised state.       Review of Systems   Constitutional:  Positive for activity change, appetite change, chills, fatigue and fever.   HENT:  Positive for congestion, postnasal drip, rhinorrhea, sinus pressure, sinus pain and sore throat. Negative for ear pain and trouble swallowing.    Respiratory:  Positive for cough. Negative for choking, chest tightness and shortness of breath.    Cardiovascular:  Negative for chest pain, palpitations and leg swelling.   Gastrointestinal:  Negative for abdominal pain, diarrhea, nausea and vomiting.   Genitourinary:  Negative for difficulty urinating, dysuria and urgency.   Musculoskeletal:  Negative for arthralgias, back pain and gait problem.   Skin:  Negative for color change, pallor, rash and wound.   Neurological:  Negative for dizziness, weakness and light-headedness.   Psychiatric/Behavioral:  Negative for agitation, behavioral problems and confusion.    All other systems reviewed and are negative.  Objective:     Vital Signs (Most Recent):  Temp: 99.3 °F (37.4 °C) (09/01/22 0737)  Pulse: (!) 116 (09/01/22 0737)  Resp: 17 (09/01/22 0737)  BP: (!) 109/59 (09/01/22 0737)  SpO2: 96 % (09/01/22 0737)   Vital Signs (24h Range):  Temp:  [97.9 °F (36.6 °C)-103.8 °F (39.9 °C)] 99.3 °F (37.4 °C)  Pulse:   [] 116  Resp:  [16-20] 17  SpO2:  [95 %-99 %] 96 %  BP: (103-116)/(59-70) 109/59     Weight: 93.2 kg (205 lb 7.5 oz)  Body mass index is 28.66 kg/m².    Intake/Output Summary (Last 24 hours) at 9/1/2022 1114  Last data filed at 9/1/2022 0800  Gross per 24 hour   Intake 1552.89 ml   Output 1650 ml   Net -97.11 ml        Physical Exam  Vitals and nursing note reviewed.   Constitutional:       General: He is not in acute distress.     Appearance: Normal appearance. He is well-developed. He is not ill-appearing or diaphoretic.   HENT:      Head: Normocephalic and atraumatic.      Right Ear: External ear normal.      Left Ear: External ear normal.      Nose: Congestion and rhinorrhea present.      Mouth/Throat:      Mouth: Mucous membranes are moist.      Pharynx: Oropharynx is clear. No oropharyngeal exudate or posterior oropharyngeal erythema.   Eyes:      General: No scleral icterus.     Conjunctiva/sclera: Conjunctivae normal.      Pupils: Pupils are equal, round, and reactive to light.   Neck:      Vascular: No JVD.   Cardiovascular:      Rate and Rhythm: Normal rate and regular rhythm.      Pulses: Normal pulses.      Heart sounds: Normal heart sounds. No murmur heard.  Pulmonary:      Effort: Pulmonary effort is normal. No respiratory distress.      Breath sounds: Normal breath sounds. No stridor. No wheezing, rhonchi or rales.      Comments: Decreased to bilat bases  Abdominal:      General: Bowel sounds are normal. There is no distension.      Palpations: Abdomen is soft.      Tenderness: There is no abdominal tenderness.   Musculoskeletal:         General: No swelling or tenderness. Normal range of motion.      Cervical back: Normal range of motion and neck supple.   Skin:     General: Skin is warm and dry.      Capillary Refill: Capillary refill takes 2 to 3 seconds.      Coloration: Skin is not jaundiced or pale.      Findings: No erythema.   Neurological:      General: No focal deficit present.       Mental Status: He is alert and oriented to person, place, and time.      Cranial Nerves: No cranial nerve deficit.      Sensory: No sensory deficit.      Motor: No weakness.   Psychiatric:         Mood and Affect: Mood normal.         Behavior: Behavior normal.         Thought Content: Thought content normal.       Significant Labs: All pertinent labs within the past 24 hours have been reviewed.  CBC:   Recent Labs   Lab 08/30/22 1959 08/31/22 0452 09/01/22  0512   WBC 10.81 10.39 6.39   HGB 15.2 12.9* 12.8*   HCT 46.0 38.9* 38.9*   * 116* 105*       CMP:   Recent Labs   Lab 08/30/22 1959 08/31/22 0452 09/01/22  0512    138 141   K 3.9 3.7 4.0    109 107   CO2 23 21* 24   * 104 99   BUN 13 11 6   CREATININE 1.1 0.9 0.9   CALCIUM 9.8 8.9 9.0   PROT 7.8 6.3 6.7   ALBUMIN 4.0 3.3* 3.3*   BILITOT 0.9 0.9 0.9   ALKPHOS 157* 130 141*   * 104* 91*   * 95* 101*   ANIONGAP 10 8 10         Significant Imaging: I have reviewed all pertinent imaging results/findings within the past 24 hours.

## 2022-09-01 NOTE — HOSPITAL COURSE
The patient was monitored closely during his hospital stay.  He was initiated on IV broad-spectrum antibiotics.  Pulmonary consulted and followed in the hospital.  Patient developed fever of 103 evening of 8/31 that responded well to ibuprofen.  Lactic acid was drawn and elevated and this was trended and returned to normal.  Blood cultures on admission remain negative.  Patient was noted to improve and expressed readiness for discharge.  Oral antibiotic regimen was recommended by pulmonology and prescription was sent to his pharmacy prior to discharge.  He was cleared from pulmonology perspective for discharge.  Discharge instructions as well as return precautions were discussed with patient with good understanding.    Physical exam:  Awake alert oriented x4, no acute distress  Cardiac:  RRR  Pulmonary:  Clear to auscultation  Abdomen:  Soft, nontender  Extremities: normal range of motion

## 2022-09-02 LAB
ALBUMIN SERPL BCP-MCNC: 3.2 G/DL (ref 3.5–5.2)
ALP SERPL-CCNC: 147 U/L (ref 55–135)
ALT SERPL W/O P-5'-P-CCNC: 91 U/L (ref 10–44)
ANION GAP SERPL CALC-SCNC: 10 MMOL/L (ref 8–16)
AST SERPL-CCNC: 83 U/L (ref 10–40)
BASOPHILS # BLD AUTO: 0.04 K/UL (ref 0–0.2)
BASOPHILS NFR BLD: 0.8 % (ref 0–1.9)
BILIRUB SERPL-MCNC: 0.6 MG/DL (ref 0.1–1)
BUN SERPL-MCNC: 4 MG/DL (ref 6–20)
CALCIUM SERPL-MCNC: 9.1 MG/DL (ref 8.7–10.5)
CHLORIDE SERPL-SCNC: 106 MMOL/L (ref 95–110)
CO2 SERPL-SCNC: 22 MMOL/L (ref 23–29)
CREAT SERPL-MCNC: 0.8 MG/DL (ref 0.5–1.4)
DIFFERENTIAL METHOD: ABNORMAL
EOSINOPHIL # BLD AUTO: 0.1 K/UL (ref 0–0.5)
EOSINOPHIL NFR BLD: 1.8 % (ref 0–8)
ERYTHROCYTE [DISTWIDTH] IN BLOOD BY AUTOMATED COUNT: 14.2 % (ref 11.5–14.5)
EST. GFR  (NO RACE VARIABLE): >60 ML/MIN/1.73 M^2
GLUCOSE SERPL-MCNC: 100 MG/DL (ref 70–110)
HCT VFR BLD AUTO: 40.2 % (ref 40–54)
HGB BLD-MCNC: 13.2 G/DL (ref 14–18)
IMM GRANULOCYTES # BLD AUTO: 0.02 K/UL (ref 0–0.04)
IMM GRANULOCYTES NFR BLD AUTO: 0.4 % (ref 0–0.5)
LYMPHOCYTES # BLD AUTO: 1.4 K/UL (ref 1–4.8)
LYMPHOCYTES NFR BLD: 28.5 % (ref 18–48)
MAGNESIUM SERPL-MCNC: 2.2 MG/DL (ref 1.6–2.6)
MCH RBC QN AUTO: 26.5 PG (ref 27–31)
MCHC RBC AUTO-ENTMCNC: 32.8 G/DL (ref 32–36)
MCV RBC AUTO: 81 FL (ref 82–98)
MONOCYTES # BLD AUTO: 0.8 K/UL (ref 0.3–1)
MONOCYTES NFR BLD: 17.1 % (ref 4–15)
NEUTROPHILS # BLD AUTO: 2.5 K/UL (ref 1.8–7.7)
NEUTROPHILS NFR BLD: 51.4 % (ref 38–73)
NRBC BLD-RTO: 0 /100 WBC
PLATELET # BLD AUTO: 123 K/UL (ref 150–450)
PMV BLD AUTO: 11.6 FL (ref 9.2–12.9)
POTASSIUM SERPL-SCNC: 3.7 MMOL/L (ref 3.5–5.1)
PROT SERPL-MCNC: 6.7 G/DL (ref 6–8.4)
RBC # BLD AUTO: 4.98 M/UL (ref 4.6–6.2)
SODIUM SERPL-SCNC: 138 MMOL/L (ref 136–145)
VANCOMYCIN TROUGH SERPL-MCNC: 11.3 UG/ML (ref 10–22)
WBC # BLD AUTO: 4.91 K/UL (ref 3.9–12.7)

## 2022-09-02 PROCEDURE — 12000002 HC ACUTE/MED SURGE SEMI-PRIVATE ROOM

## 2022-09-02 PROCEDURE — 63600175 PHARM REV CODE 636 W HCPCS: Performed by: STUDENT IN AN ORGANIZED HEALTH CARE EDUCATION/TRAINING PROGRAM

## 2022-09-02 PROCEDURE — 94664 DEMO&/EVAL PT USE INHALER: CPT

## 2022-09-02 PROCEDURE — 63700000 PHARM REV CODE 250 ALT 637 W/O HCPCS: Performed by: INTERNAL MEDICINE

## 2022-09-02 PROCEDURE — 63600175 PHARM REV CODE 636 W HCPCS: Performed by: SURGERY

## 2022-09-02 PROCEDURE — 80202 ASSAY OF VANCOMYCIN: CPT | Performed by: STUDENT IN AN ORGANIZED HEALTH CARE EDUCATION/TRAINING PROGRAM

## 2022-09-02 PROCEDURE — 25000003 PHARM REV CODE 250: Performed by: INTERNAL MEDICINE

## 2022-09-02 PROCEDURE — 94640 AIRWAY INHALATION TREATMENT: CPT

## 2022-09-02 PROCEDURE — 99232 SBSQ HOSP IP/OBS MODERATE 35: CPT | Mod: ,,, | Performed by: INTERNAL MEDICINE

## 2022-09-02 PROCEDURE — 85025 COMPLETE CBC W/AUTO DIFF WBC: CPT | Performed by: NURSE PRACTITIONER

## 2022-09-02 PROCEDURE — 25000003 PHARM REV CODE 250: Performed by: SURGERY

## 2022-09-02 PROCEDURE — 25000242 PHARM REV CODE 250 ALT 637 W/ HCPCS: Performed by: NURSE PRACTITIONER

## 2022-09-02 PROCEDURE — 99900035 HC TECH TIME PER 15 MIN (STAT)

## 2022-09-02 PROCEDURE — 94669 MECHANICAL CHEST WALL OSCILL: CPT

## 2022-09-02 PROCEDURE — 80053 COMPREHEN METABOLIC PANEL: CPT | Performed by: NURSE PRACTITIONER

## 2022-09-02 PROCEDURE — 25000003 PHARM REV CODE 250: Performed by: STUDENT IN AN ORGANIZED HEALTH CARE EDUCATION/TRAINING PROGRAM

## 2022-09-02 PROCEDURE — 36415 COLL VENOUS BLD VENIPUNCTURE: CPT | Performed by: STUDENT IN AN ORGANIZED HEALTH CARE EDUCATION/TRAINING PROGRAM

## 2022-09-02 PROCEDURE — 94761 N-INVAS EAR/PLS OXIMETRY MLT: CPT

## 2022-09-02 PROCEDURE — 83735 ASSAY OF MAGNESIUM: CPT | Performed by: NURSE PRACTITIONER

## 2022-09-02 PROCEDURE — 25000242 PHARM REV CODE 250 ALT 637 W/ HCPCS: Performed by: INTERNAL MEDICINE

## 2022-09-02 PROCEDURE — 36415 COLL VENOUS BLD VENIPUNCTURE: CPT | Performed by: NURSE PRACTITIONER

## 2022-09-02 PROCEDURE — 63600175 PHARM REV CODE 636 W HCPCS: Performed by: NURSE PRACTITIONER

## 2022-09-02 PROCEDURE — 99232 PR SUBSEQUENT HOSPITAL CARE,LEVL II: ICD-10-PCS | Mod: ,,, | Performed by: INTERNAL MEDICINE

## 2022-09-02 RX ADMIN — MEROPENEM AND SODIUM CHLORIDE 1 G: 1 INJECTION, SOLUTION INTRAVENOUS at 05:09

## 2022-09-02 RX ADMIN — MEROPENEM AND SODIUM CHLORIDE 1 G: 1 INJECTION, SOLUTION INTRAVENOUS at 02:09

## 2022-09-02 RX ADMIN — ALBUTEROL SULFATE 2.5 MG: 2.5 SOLUTION RESPIRATORY (INHALATION) at 12:09

## 2022-09-02 RX ADMIN — ALBUTEROL SULFATE 2.5 MG: 2.5 SOLUTION RESPIRATORY (INHALATION) at 07:09

## 2022-09-02 RX ADMIN — MEROPENEM AND SODIUM CHLORIDE 1 G: 1 INJECTION, SOLUTION INTRAVENOUS at 10:09

## 2022-09-02 RX ADMIN — FLUTICASONE FUROATE AND VILANTEROL TRIFENATATE 1 PUFF: 100; 25 POWDER RESPIRATORY (INHALATION) at 07:09

## 2022-09-02 RX ADMIN — GUAIFENESIN 1200 MG: 600 TABLET, EXTENDED RELEASE ORAL at 09:09

## 2022-09-02 RX ADMIN — VANCOMYCIN HYDROCHLORIDE 1750 MG: 1 INJECTION, POWDER, LYOPHILIZED, FOR SOLUTION INTRAVENOUS at 01:09

## 2022-09-02 RX ADMIN — SODIUM CHLORIDE SOLN NEBU 3% 4 ML: 3 NEBU SOLN at 07:09

## 2022-09-02 RX ADMIN — VANCOMYCIN HYDROCHLORIDE 2000 MG: 1 INJECTION, POWDER, LYOPHILIZED, FOR SOLUTION INTRAVENOUS at 05:09

## 2022-09-02 RX ADMIN — AZITHROMYCIN MONOHYDRATE 500 MG: 250 TABLET ORAL at 09:09

## 2022-09-02 NOTE — ASSESSMENT & PLAN NOTE
Admit to med/tele  Probable RLL pna in the setting of CVID and bronchoscopy today     This patient does have evidence of infective focus  My overall impression is sepsis. Vital signs were reviewed and noted in progress note.  Antibiotics given-   Antibiotics (From admission, onward)    Start     Stop Route Frequency Ordered    09/01/22 1330  vancomycin (VANCOCIN) 1,750 mg in dextrose 5 % 500 mL IVPB         -- IV Every 12 hours (non-standard times) 09/01/22 1115    08/31/22 1545  azithromycin tablet 500 mg         -- Oral Daily 08/31/22 1543    08/31/22 0630  meropenem-0.9% sodium chloride 1 g/50 mL IVPB         -- IV Every 8 hours (non-standard times) 08/30/22 2216    08/30/22 2235  vancomycin - pharmacy to dose  (vancomycin IVPB)        See Hyperspace for full Linked Orders Report.    -- IV pharmacy to manage frequency 08/30/22 2135        Cultures were taken-   Microbiology Results (last 7 days)     Procedure Component Value Units Date/Time    Blood culture [[id number]] Collected: 08/30/22 2049    Order Status: Completed Specimen: Blood from Antecubital, Right Updated: 09/02/22 0613     Blood Culture, Routine No Growth to date      No Growth to date      No Growth to date    Blood culture [[id number]] Collected: 08/30/22 2049    Order Status: Completed Specimen: Blood from Antecubital, Left Updated: 09/02/22 0613     Blood Culture, Routine No Growth to date      No Growth to date      No Growth to date    Culture, Respiratory with Gram Stain [[id number]] Collected: 09/01/22 1515    Order Status: Completed Specimen: Sputum, Expectorated Updated: 09/02/22 0222     Gram Stain (Respiratory) <10 epithelial cells per low power field.     Gram Stain (Respiratory) Moderate WBC's     Gram Stain (Respiratory) Rare Gram positive cocci    Respiratory Infection Panel (PCR), Nasopharyngeal [[id number]] Collected: 09/01/22 1515    Order Status: Completed Specimen: Nasopharyngeal Swab Updated: 09/01/22 1652     Respiratory Infection  Panel Source NP Swab     Adenovirus Not Detected     Coronavirus 229E, Common Cold Virus Not Detected     Coronavirus HKU1, Common Cold Virus Not Detected     Coronavirus NL63, Common Cold Virus Not Detected     Coronavirus OC43, Common Cold Virus Not Detected     Comment: The Coronavirus strains detected in this test cause the common cold.  These strains are not the COVID-19 (novel Coronavirus)strain   associated with the respiratory disease outbreak.          SARS-CoV2 (COVID-19) Qualitative PCR Not Detected     Human Metapneumovirus Not Detected     Human Rhinovirus/Enterovirus Not Detected     Influenza A (subtypes H1, H1-2009,H3) Not Detected     Influenza B Not Detected     Parainfluenza Virus 1 Not Detected     Parainfluenza Virus 2 Not Detected     Parainfluenza Virus 3 Not Detected     Parainfluenza Virus 4 Not Detected     Respiratory Syncytial Virus Not Detected     Bordetella Parapertussis (LA3527) Not Detected     Bordetella pertussis (ptxP) Not Detected     Chlamydia pneumoniae Not Detected     Mycoplasma pneumoniae Not Detected     Comment: Respiratory Infection Panel testing performed by Multiplex PCR.       Narrative:      For all other respiratory sources, order [id number] -  Respiratory Viral Panel by PCR    Influenza A & B by Molecular [[id number]] Collected: 08/30/22 2033    Order Status: Completed Specimen: Nasopharyngeal Swab Updated: 08/30/22 2113     Influenza A, Molecular Negative     Influenza B, Molecular Negative     Flu A & B Source Nasal swab    Influenza A & B by Molecular [[id number]] Collected: 08/30/22 2025    Order Status: Sent Specimen: Nasal Swab Updated: 08/30/22 2026        Latest lactate reviewed, they are-  Recent Labs   Lab 08/31/22  1832 08/31/22  1950 09/01/22  0054   LACTATE 2.3* 2.5* 1.1         Source- pneumonia    Source control Achieved by- abx    9/1 - pt spiked fever 103 yesterday evening, lactic acid drawn and elevated. Lactic acid trended and improved today. Will cont  IV abx therapy and monitor closely. Pt high risk for decompensation secondary to immunocompromised state and current pneumonia

## 2022-09-02 NOTE — PROGRESS NOTES
Progress Note  PULMONARY    Admit Date: 8/30/2022 09/02/2022    History of Present Illness:  Pt is a 26 yo male with CVID on hizentra, chronic bronchiectasis who presented to the ED with hemoptysis, fever s/p bronchoscopy at [facility] per Dr. Ricco Alvares. Pt gets recurrent respiratory infections, worsening in severity and frequency with q 2 month bronchitis or pneumonia for the past year. Most recently 2 mos ago sputum culture grew H flu. Pt complains of fever, nausea and emesis, cough w/ thick green/pink phlegm after having bronch yesterday. Symptoms were present after scope but worsened about 5pm at home so he presented to ED. Pt has been on amoxicillin and flagyl for the past week due to tooth abscess w/ plan for root canal on 9/6. His home regimen for bronchiectasis is albuterol and 3% NaCl bid w/ vest once daily. He denies MUÑOZ with regular activity and is able to work with no breathing limitation. He works currently selling cars. He denies doing regular exercise. Pt's mother is present at bedside- reports he had frequent pneumonias as a baby, spent time in NICU. His father had immune deficiency and poor healing with wounds so suspects he may have genetic immune deficiency.    SUBJECTIVE:     9/1- tmax 103.8 yesterday. Afebrile this am, lactate normalized. Pt with no new complaints, on room air. Reports toddler at home sick w/ URI symptoms  9/2- tmax 100, resp viral panel negative, sputum moderate GPC. Pt feels slightly improved, still cough productive thick mucous      OBJECTIVE:     Vitals (Most recent):  Vitals:    09/02/22 1109   BP: (!) 114/55   Pulse: 93   Resp: 16   Temp: 99.4 °F (37.4 °C)       Vitals (24 hour range):  Temp:  [97.9 °F (36.6 °C)-100 °F (37.8 °C)]   Pulse:  []   Resp:  [16-20]   BP: (110-125)/(54-62)   SpO2:  [94 %-98 %]       Intake/Output Summary (Last 24 hours) at 9/2/2022 1118  Last data filed at 9/2/2022 0750  Gross per 24 hour   Intake 2216.24 ml   Output 1875 ml   Net 341.24 ml             Physical Exam:  The patient's neuro status (alertness,orientation,cognitive function,motor skills,), pharyngeal exam (oral lesions, hygiene, abn dentition,), Neck (jvd,mass,thyroid,nodes in neck and above/below clavicle),RESPIRATORY(symmetry,effort,fremitus,percussion,auscultation),  Cor(rhythm,heart tones including gallops,perfusion,edema)ABD(distention,hepatic&splenomegaly,tenderness,masses), Skin(rash,cyanosis),Psyc(affect,judgement,).  Exam negative except for these pertinent findings:    L upper molar with erythema, caries  Tongue and throat normal  HR regular  Improved breath sounds w/ clear bilaterally  Abd soft nontender, obese  No edema/clubbing     Radiographs reviewed: view by direct vision   CT chest 2020- scattered patchy infiltrates and bronchial thickening, most notable area of bronchiectasis in RML    Labs     Recent Labs   Lab 09/02/22 0417   WBC 4.91   HGB 13.2*   HCT 40.2   *       Recent Labs   Lab 09/02/22 0417      K 3.7      CO2 22*   BUN 4*   CREATININE 0.8      CALCIUM 9.1   MG 2.2   AST 83*   ALT 91*   ALKPHOS 147*   BILITOT 0.6   PROT 6.7   ALBUMIN 3.2*     No results for input(s): PH, PCO2, PO2, HCO3 in the last 24 hours.  Microbiology Results (last 7 days)       Procedure Component Value Units Date/Time    Blood culture [[id number]] Collected: 08/30/22 2049    Order Status: Completed Specimen: Blood from Antecubital, Right Updated: 09/02/22 0613     Blood Culture, Routine No Growth to date      No Growth to date      No Growth to date    Blood culture [[id number]] Collected: 08/30/22 2049    Order Status: Completed Specimen: Blood from Antecubital, Left Updated: 09/02/22 0613     Blood Culture, Routine No Growth to date      No Growth to date      No Growth to date    Culture, Respiratory with Gram Stain [[id number]] Collected: 09/01/22 1515    Order Status: Completed Specimen: Sputum, Expectorated Updated: 09/02/22 0222     Gram Stain (Respiratory)  <10 epithelial cells per low power field.     Gram Stain (Respiratory) Moderate WBC's     Gram Stain (Respiratory) Rare Gram positive cocci    Respiratory Infection Panel (PCR), Nasopharyngeal [[id number]] Collected: 09/01/22 1515    Order Status: Completed Specimen: Nasopharyngeal Swab Updated: 09/01/22 1652     Respiratory Infection Panel Source NP Swab     Adenovirus Not Detected     Coronavirus 229E, Common Cold Virus Not Detected     Coronavirus HKU1, Common Cold Virus Not Detected     Coronavirus NL63, Common Cold Virus Not Detected     Coronavirus OC43, Common Cold Virus Not Detected     Comment: The Coronavirus strains detected in this test cause the common cold.  These strains are not the COVID-19 (novel Coronavirus)strain   associated with the respiratory disease outbreak.          SARS-CoV2 (COVID-19) Qualitative PCR Not Detected     Human Metapneumovirus Not Detected     Human Rhinovirus/Enterovirus Not Detected     Influenza A (subtypes H1, H1-2009,H3) Not Detected     Influenza B Not Detected     Parainfluenza Virus 1 Not Detected     Parainfluenza Virus 2 Not Detected     Parainfluenza Virus 3 Not Detected     Parainfluenza Virus 4 Not Detected     Respiratory Syncytial Virus Not Detected     Bordetella Parapertussis (IR4963) Not Detected     Bordetella pertussis (ptxP) Not Detected     Chlamydia pneumoniae Not Detected     Mycoplasma pneumoniae Not Detected     Comment: Respiratory Infection Panel testing performed by Multiplex PCR.       Narrative:      For all other respiratory sources, order [id number] -  Respiratory Viral Panel by PCR    Influenza A & B by Molecular [[id number]] Collected: 08/30/22 2033    Order Status: Completed Specimen: Nasopharyngeal Swab Updated: 08/30/22 2113     Influenza A, Molecular Negative     Influenza B, Molecular Negative     Flu A & B Source Nasal swab    Influenza A & B by Molecular [[id number]] Collected: 08/30/22 2025    Order Status: Sent Specimen: Nasal Swab  Updated: 08/30/22 2026            Impression:  Active Hospital Problems    Diagnosis  POA    *Sepsis [A41.9]  Yes    Bronchiectasis with acute exacerbation [J47.1]  Yes    Pneumonia/ Right Lower lobe [J18.9]  Yes    Transaminitis [R74.01]  Yes    CVID (common variable immunodeficiency) [D83.9]  Yes      Resolved Hospital Problems   No resolved problems to display.               Plan:     - continue broad spectrum abx- meropenem, vanco, azithro  - f/u culture sputum  - f/u culture from bronch  - resp viral panel-- neg  - continue albuterol q6h, LABA/ICS inhaler  - 3% NaCl nebs bid  - guaifenesin 1200 bid  - vest bid, flutter valve bid  - hold off on Hizentra injection while exacerbating-- per his immunologist he will take it when he goes home  - consider dc home tomorrow if stable- would give him moxifloxacin or levaquin to complete 2wk course (if cultures not revealing) and have f/u with Dr. Giorgio Mckeon MD  Pulmonary & Critical Care Medicine

## 2022-09-02 NOTE — PLAN OF CARE
POC reviewed with pt. Pt verbalized understanding. Patient is alert and oriented x 4, able to make needs known. Continuous cardiac monitoring, TELE #[id number]. Afebrile. ABX administered as scheduled. Meds given per MAR. Pt on 2L NC. Repositions self independently. No complaints of pain or discomfort. Purposeful hourly/q2hr rounding done during shift to promote patient safety. NAD noted. Safety maintained with side rails up x3, bed wheels locked, bed in lowest positioned, call light in reach. Patient educated to call for assistance with ambulation if needed, verbalized understanding. Pt remains free of falls. Will continue to monitor.

## 2022-09-02 NOTE — PROGRESS NOTES
[facility] Medicine  Progress Note    Patient Name: Charly Mattson  MRN: [id number]  Patient Class: IP- Inpatient   Admission Date: 2022  Length of Stay: 1 days  Attending Physician: Devin Solis MD  Primary Care Provider: Primary Doctor No        Subjective:     Principal Problem:Sepsis        HPI:  Mr. Mattson is a 27 year old male with a history of hypogammaglobulinemia currently receiving HIZENTRA weekly, bronchiectasis, recurrent sinopulmonary infections on azithromycin 3x/week, elevated liver enzymes, and splenomegaly who presents today with complaints of fever up to 101 following a bronchoscopy today at [facility]. It is severe. It is associated with cough, mild hemoptysis, headache, nausea, and vomiting. He denies chest pain, SOB, dizziness, or LOC. He was recently on 10 days of augmentin for an abscessed tooth s/p complicated root canal that finished on 22. Spoke with Dr. Ricco Alvares who performed the bronchoscopy and noted blood, and he suspects he is having a bronchiectasis flare with concern for developing pna. In the ED: CXR with concern for infiltrated on the right lung base, , T 99.2, RR 22, and Lactate WNL.       PFTs (2022):  o FVC: 88%  o FEV1: 77%  o FEV1/FVC: 73  o T%  o DLCO: 80%    Sputum Cx  Date: 22  Heavy Growth Haemophilus influenzae       Overview/Hospital Course:  The patient was monitored closely during his hospital stay.  He was initiated on IV broad-spectrum antibiotics.  Pulmonary consulted and followed in the hospital.  Patient developed fever of 103 evening of  that responded well to ibuprofen.  Lactic acid was drawn and elevated and this was trended and returned to normal.  Blood cultures on admission remain negative.      Interval History: PT was seen and examined. Labs and diagnostics reviewed. Pt reported he is feeling better. Denies fevers, cont to have intermittent chills. Cont to have prod cough and dec appetite.  Denies CP or SOB.     Review of Systems   Constitutional:  Positive for activity change, appetite change and chills. Negative for diaphoresis, fatigue and fever.   HENT:  Negative for congestion, postnasal drip and sore throat.    Respiratory:  Positive for cough. Negative for shortness of breath.    Cardiovascular:  Negative for chest pain and palpitations.   Gastrointestinal:  Negative for abdominal pain, constipation, diarrhea, nausea and vomiting.        Loose stools   Genitourinary:  Negative for dysuria.   Musculoskeletal:  Negative for arthralgias, back pain, gait problem and joint swelling.   Neurological:  Negative for dizziness and weakness.   Psychiatric/Behavioral:  Negative for agitation, behavioral problems, confusion and decreased concentration.    Objective:     Vital Signs (Most Recent):  Temp: 98.3 °F (36.8 °C) (09/02/22 0720)  Pulse: 92 (09/02/22 0720)  Resp: 17 (09/02/22 0726)  BP: (!) 125/58 (09/02/22 0720)  SpO2: 97 % (09/02/22 0720)   Vital Signs (24h Range):  Temp:  [97.9 °F (36.6 °C)-100 °F (37.8 °C)] 98.3 °F (36.8 °C)  Pulse:  [] 92  Resp:  [17-20] 17  SpO2:  [94 %-98 %] 97 %  BP: (110-125)/(54-62) 125/58     Weight: 93.2 kg (205 lb 7.5 oz)  Body mass index is 28.66 kg/m².    Intake/Output Summary (Last 24 hours) at 9/2/2022 1027  Last data filed at 9/2/2022 0750  Gross per 24 hour   Intake 2216.24 ml   Output 2225 ml   Net -8.76 ml      Physical Exam  Vitals and nursing note reviewed.   Constitutional:       Appearance: Normal appearance. He is ill-appearing.   HENT:      Head: Normocephalic and atraumatic.      Nose: Nose normal.      Mouth/Throat:      Pharynx: Oropharynx is clear.   Eyes:      Conjunctiva/sclera: Conjunctivae normal.   Cardiovascular:      Rate and Rhythm: Normal rate and regular rhythm.      Pulses: Normal pulses.      Heart sounds: Normal heart sounds.   Pulmonary:      Breath sounds: Normal breath sounds.   Abdominal:      Palpations: Abdomen is soft.       Tenderness: There is no abdominal tenderness. There is no guarding or rebound.   Musculoskeletal:         General: Normal range of motion.      Cervical back: Normal range of motion.   Skin:     General: Skin is warm.      Capillary Refill: Capillary refill takes less than 2 seconds.      Comments: Abd DDI   Neurological:      Mental Status: He is alert and oriented to person, place, and time.   Psychiatric:         Mood and Affect: Mood normal.         Behavior: Behavior normal.       Significant Labs: All pertinent labs within the past 24 hours have been reviewed.  CBC:   Recent Labs   Lab 09/01/22 0512 09/02/22 0417   WBC 6.39 4.91   HGB 12.8* 13.2*   HCT 38.9* 40.2   * 123*     CMP:   Recent Labs   Lab 09/01/22 0512 09/02/22 0417    138   K 4.0 3.7    106   CO2 24 22*   GLU 99 100   BUN 6 4*   CREATININE 0.9 0.8   CALCIUM 9.0 9.1   PROT 6.7 6.7   ALBUMIN 3.3* 3.2*   BILITOT 0.9 0.6   ALKPHOS 141* 147*   AST 91* 83*   * 91*   ANIONGAP 10 10     Magnesium:   Recent Labs   Lab 09/01/22 0512 09/02/22 0417   MG 2.0 2.2       Significant Imaging: I have reviewed all pertinent imaging results/findings within the past 24 hours.      Assessment/Plan:      * Sepsis  Admit to med/tele  Probable RLL pna in the setting of CVID and bronchoscopy today     This patient does have evidence of infective focus  My overall impression is sepsis. Vital signs were reviewed and noted in progress note.  Antibiotics given-   Antibiotics (From admission, onward)    Start     Stop Route Frequency Ordered    09/01/22 1330  vancomycin (VANCOCIN) 1,750 mg in dextrose 5 % 500 mL IVPB         -- IV Every 12 hours (non-standard times) 09/01/22 1115    08/31/22 1545  azithromycin tablet 500 mg         -- Oral Daily 08/31/22 1543    08/31/22 0630  meropenem-0.9% sodium chloride 1 g/50 mL IVPB         -- IV Every 8 hours (non-standard times) 08/30/22 2216    08/30/22 2235  vancomycin - pharmacy to dose  (vancomycin  IVPB)        See Hyperspace for full Linked Orders Report.    -- IV pharmacy to manage frequency 08/30/22 2135        Cultures were taken-   Microbiology Results (last 7 days)     Procedure Component Value Units Date/Time    Blood culture [[id number]] Collected: 08/30/22 2049    Order Status: Completed Specimen: Blood from Antecubital, Right Updated: 09/02/22 0613     Blood Culture, Routine No Growth to date      No Growth to date      No Growth to date    Blood culture [[id number]] Collected: 08/30/22 2049    Order Status: Completed Specimen: Blood from Antecubital, Left Updated: 09/02/22 0613     Blood Culture, Routine No Growth to date      No Growth to date      No Growth to date    Culture, Respiratory with Gram Stain [[id number]] Collected: 09/01/22 1515    Order Status: Completed Specimen: Sputum, Expectorated Updated: 09/02/22 0222     Gram Stain (Respiratory) <10 epithelial cells per low power field.     Gram Stain (Respiratory) Moderate WBC's     Gram Stain (Respiratory) Rare Gram positive cocci    Respiratory Infection Panel (PCR), Nasopharyngeal [[id number]] Collected: 09/01/22 1515    Order Status: Completed Specimen: Nasopharyngeal Swab Updated: 09/01/22 1652     Respiratory Infection Panel Source NP Swab     Adenovirus Not Detected     Coronavirus 229E, Common Cold Virus Not Detected     Coronavirus HKU1, Common Cold Virus Not Detected     Coronavirus NL63, Common Cold Virus Not Detected     Coronavirus OC43, Common Cold Virus Not Detected     Comment: The Coronavirus strains detected in this test cause the common cold.  These strains are not the COVID-19 (novel Coronavirus)strain   associated with the respiratory disease outbreak.          SARS-CoV2 (COVID-19) Qualitative PCR Not Detected     Human Metapneumovirus Not Detected     Human Rhinovirus/Enterovirus Not Detected     Influenza A (subtypes H1, H1-2009,H3) Not Detected     Influenza B Not Detected     Parainfluenza Virus 1 Not Detected      Parainfluenza Virus 2 Not Detected     Parainfluenza Virus 3 Not Detected     Parainfluenza Virus 4 Not Detected     Respiratory Syncytial Virus Not Detected     Bordetella Parapertussis (IZ5036) Not Detected     Bordetella pertussis (ptxP) Not Detected     Chlamydia pneumoniae Not Detected     Mycoplasma pneumoniae Not Detected     Comment: Respiratory Infection Panel testing performed by Multiplex PCR.       Narrative:      For all other respiratory sources, order [id number] -  Respiratory Viral Panel by PCR    Influenza A & B by Molecular [[id number]] Collected: 08/30/22 2033    Order Status: Completed Specimen: Nasopharyngeal Swab Updated: 08/30/22 2113     Influenza A, Molecular Negative     Influenza B, Molecular Negative     Flu A & B Source Nasal swab    Influenza A & B by Molecular [[id number]] Collected: 08/30/22 2025    Order Status: Sent Specimen: Nasal Swab Updated: 08/30/22 2026        Latest lactate reviewed, they are-  Recent Labs   Lab 08/31/22  1832 08/31/22  1950 09/01/22  0054   LACTATE 2.3* 2.5* 1.1         Source- pneumonia    Source control Achieved by- abx    9/1 - pt spiked fever 103 yesterday evening, lactic acid drawn and elevated. Lactic acid trended and improved today. Will cont IV abx therapy and monitor closely. Pt high risk for decompensation secondary to immunocompromised state and current pneumonia    Bronchiectasis with acute exacerbation  Continue NS nebs with CPT  Pulmonary following        Transaminitis  conts to improve  Trend CMP      Pneumonia/ Right Lower lobe  S/p bronchoscopy 8/30 with CVID/immunocompromised state  Cover broadly with meropenem/vanc and zithromax   Pulmonary following      CVID (common variable immunodeficiency)  Due for IVIG infusion  - holding infusion currently secondary to acute pneumonia and bronchitis flare  Dr. Martinez at [place] managing        VTE Risk Mitigation (From admission, onward)         Ordered     IP VTE LOW RISK PATIENT  Once          08/30/22 2216     Place sequential compression device  Until discontinued         08/30/22 2216                Discharge Planning   LAILA: 9/4/2022     Code Status: Full Code   Is the patient medically ready for discharge?:     Reason for patient still in hospital (select all that apply): Patient trending condition, Laboratory test, Treatment and Consult recommendations  Discharge Plan A: Home with family                  Lisa Cooper NP  Department of Hospital Medicine   [facility]

## 2022-09-02 NOTE — CARE UPDATE
09/01/22 1927   Patient Assessment/Suction   Level of Consciousness (AVPU) alert   Respiratory Effort Normal;Unlabored   Expansion/Accessory Muscles/Retractions no retractions;no use of accessory muscles   All Lung Fields Breath Sounds Anterior:;Lateral:   NAN Breath Sounds clear   LLL Breath Sounds diminished   RUL Breath Sounds clear   RML Breath Sounds diminished   RLL Breath Sounds diminished   Rhythm/Pattern, Respiratory pattern regular;unlabored   Cough Frequency infrequent   Cough Type productive   PRE-TX-O2   O2 Device (Oxygen Therapy) room air   $ Is the patient on Low Flow Oxygen? Yes   SpO2 98 %   Pulse Oximetry Type Intermittent   $ Pulse Oximetry - Multiple Charge Pulse Oximetry - Multiple   Pulse 104   Resp 18   Positioning HOB elevated 30 degrees   Aerosol Therapy   $ Aerosol Therapy Charges Aerosol Treatment   Respiratory Treatment Status (SVN) given   Treatment Route (SVN) mask   Patient Position (SVN) HOB elevated   Post Treatment Assessment (SVN) breath sounds unchanged   Signs of Intolerance (SVN) none   Breath Sounds Post-Respiratory Treatment   Throughout All Fields Post-Treatment All Fields   Throughout All Fields Post-Treatment no change   Post-treatment Heart Rate (beats/min) 106   Post-treatment Resp Rate (breaths/min) 20   Vibratory PEP Therapy   $ Vibratory PEP Charges Aerobika Therapy   $ Vibratory PEP Tech Time Charges 15 min   Type (PEP Therapy) vibratory/oscillatory   Device (PEP Therapy) flutter   Route (PEP Therapy) mouthpiece   Breaths per Cycle (PEP Therapy) 10   Settings (PEP Therapy) PEP 5   Patient Position (PEP Therapy) HOB elevated   Post Treatment Assessment (PEP) breath sounds unchanged   Signs of Intolerance (PEP Therapy) none   High Frequency Chest Compression Vest   $ Chest Compression Charges Therapy   Vest Type (HFCCV) chest wrap vest   Frequency Type (HFCCV) single frequency   Single Frequency (Hz) 11   Duration (HFCCV) 10 mins   Patient Position (HFCCV) HOB  elevated   Post Treatment Assessment (HFCCV) breath sounds unchanged   Signs of Intolerance (HFCCV) none

## 2022-09-02 NOTE — SUBJECTIVE & OBJECTIVE
Interval History: PT was seen and examined. Labs and diagnostics reviewed. Pt reported he is feeling better. Denies fevers, cont to have intermittent chills. Cont to have prod cough and dec appetite. Denies CP or SOB.     Review of Systems   Constitutional:  Positive for activity change, appetite change and chills. Negative for diaphoresis, fatigue and fever.   HENT:  Negative for congestion, postnasal drip and sore throat.    Respiratory:  Positive for cough. Negative for shortness of breath.    Cardiovascular:  Negative for chest pain and palpitations.   Gastrointestinal:  Negative for abdominal pain, constipation, diarrhea, nausea and vomiting.        Loose stools   Genitourinary:  Negative for dysuria.   Musculoskeletal:  Negative for arthralgias, back pain, gait problem and joint swelling.   Neurological:  Negative for dizziness and weakness.   Psychiatric/Behavioral:  Negative for agitation, behavioral problems, confusion and decreased concentration.    Objective:     Vital Signs (Most Recent):  Temp: 98.3 °F (36.8 °C) (09/02/22 0720)  Pulse: 92 (09/02/22 0720)  Resp: 17 (09/02/22 0726)  BP: (!) 125/58 (09/02/22 0720)  SpO2: 97 % (09/02/22 0720)   Vital Signs (24h Range):  Temp:  [97.9 °F (36.6 °C)-100 °F (37.8 °C)] 98.3 °F (36.8 °C)  Pulse:  [] 92  Resp:  [17-20] 17  SpO2:  [94 %-98 %] 97 %  BP: (110-125)/(54-62) 125/58     Weight: 93.2 kg (205 lb 7.5 oz)  Body mass index is 28.66 kg/m².    Intake/Output Summary (Last 24 hours) at 9/2/2022 1027  Last data filed at 9/2/2022 0750  Gross per 24 hour   Intake 2216.24 ml   Output 2225 ml   Net -8.76 ml      Physical Exam  Vitals and nursing note reviewed.   Constitutional:       Appearance: Normal appearance. He is ill-appearing.   HENT:      Head: Normocephalic and atraumatic.      Nose: Nose normal.      Mouth/Throat:      Pharynx: Oropharynx is clear.   Eyes:      Conjunctiva/sclera: Conjunctivae normal.   Cardiovascular:      Rate and Rhythm: Normal rate  and regular rhythm.      Pulses: Normal pulses.      Heart sounds: Normal heart sounds.   Pulmonary:      Breath sounds: Normal breath sounds.   Abdominal:      Palpations: Abdomen is soft.      Tenderness: There is no abdominal tenderness. There is no guarding or rebound.   Musculoskeletal:         General: Normal range of motion.      Cervical back: Normal range of motion.   Skin:     General: Skin is warm.      Capillary Refill: Capillary refill takes less than 2 seconds.      Comments: Abd DDI   Neurological:      Mental Status: He is alert and oriented to person, place, and time.   Psychiatric:         Mood and Affect: Mood normal.         Behavior: Behavior normal.       Significant Labs: All pertinent labs within the past 24 hours have been reviewed.  CBC:   Recent Labs   Lab 09/01/22  0512 09/02/22 0417   WBC 6.39 4.91   HGB 12.8* 13.2*   HCT 38.9* 40.2   * 123*     CMP:   Recent Labs   Lab 09/01/22  0512 09/02/22 0417    138   K 4.0 3.7    106   CO2 24 22*   GLU 99 100   BUN 6 4*   CREATININE 0.9 0.8   CALCIUM 9.0 9.1   PROT 6.7 6.7   ALBUMIN 3.3* 3.2*   BILITOT 0.9 0.6   ALKPHOS 141* 147*   AST 91* 83*   * 91*   ANIONGAP 10 10     Magnesium:   Recent Labs   Lab 09/01/22  0512 09/02/22 0417   MG 2.0 2.2       Significant Imaging: I have reviewed all pertinent imaging results/findings within the past 24 hours.

## 2022-09-02 NOTE — ASSESSMENT & PLAN NOTE
Due for IVIG infusion  - holding infusion currently secondary to acute pneumonia and bronchitis flare  Dr. Martinez at [place] managing

## 2022-09-02 NOTE — PROGRESS NOTES
Pharmacokinetic Assessment Follow Up: IV Vancomycin    Vancomycin serum concentration assessment(s):    The trough level was drawn correctly and can be used to guide therapy at this time. The measurement is below the desired definitive target range of 15 to 20 mcg/mL.    Vancomycin Regimen Plan:    Change regimen to Vancomycin 2000 mg IV every 12 hours with next serum trough concentration measured at 0100 prior to fourth dose on 9/4/22    Drug levels (last 3 results):  Recent Labs   Lab Result Units 09/01/22  0938 09/02/22  1208   Vancomycin-Trough ug/mL 10.2 11.3       Pharmacy will continue to follow and monitor vancomycin.    Please contact pharmacy at extension [phone] for questions regarding this assessment.    Thank you for the consult,   Joyce Herring       Patient brief summary:  Charly Mattson is a 27 y.o. male initiated on antimicrobial therapy with IV Vancomycin for treatment of sepsis      Drug Allergies:   Review of patient's allergies indicates:   Allergen Reactions    Singulair [montelukast] Other (See Comments)     arrhythmias       Actual Body Weight:   93.2 kg    Renal Function:   Estimated Creatinine Clearance: 161.8 mL/min (based on SCr of 0.8 mg/dL).,     Dialysis Method (if applicable):  N/A    CBC (last 72 hours):  Recent Labs   Lab Result Units 08/30/22 1959 08/31/22 0452 09/01/22 0512 09/02/22 0417   WBC K/uL 10.81 10.39 6.39 4.91   Hemoglobin g/dL 15.2 12.9* 12.8* 13.2*   Hematocrit % 46.0 38.9* 38.9* 40.2   Platelets K/uL 144* 116* 105* 123*   Gran % % 76.7* 74.9* 70.9 51.4   Lymph % % 16.2* 15.3* 15.6* 28.5   Mono % % 6.2 9.0 12.7 17.1*   Eosinophil % % 0.3 0.2 0.3 1.8   Basophil % % 0.4 0.3 0.3 0.8   Differential Method  Automated Automated Automated Automated       Metabolic Panel (last 72 hours):  Recent Labs   Lab Result Units 08/30/22 1959 08/30/22 2214 08/31/22 0452 09/01/22 0512 09/02/22  0417   Sodium mmol/L 140  --  138 141 138   Potassium mmol/L 3.9  --  3.7 4.0 3.7   Chloride  mmol/L 107  --  109 107 106   CO2 mmol/L 23  --  21* 24 22*   Glucose mg/dL 116*  --  104 99 100   Glucose, UA   --  Negative  --   --   --    BUN mg/dL 13  --  11 6 4*   Creatinine mg/dL 1.1  --  0.9 0.9 0.8   Albumin g/dL 4.0  --  3.3* 3.3* 3.2*   Total Bilirubin mg/dL 0.9  --  0.9 0.9 0.6   Alkaline Phosphatase U/L 157*  --  130 141* 147*   AST U/L 120*  --  104* 91* 83*   ALT U/L 109*  --  95* 101* 91*   Magnesium mg/dL  --   --  1.9 2.0 2.2       Vancomycin Administrations:  vancomycin given in the last 96 hours                     vancomycin (VANCOCIN) 1,750 mg in dextrose 5 % 500 mL IVPB (mg) 1,750 mg New Bag 09/02/22 0123     1,750 mg New Bag 09/01/22 1541    vancomycin 1.5 g in dextrose 5 % 250 mL IVPB (ready to mix) (mg) 1,500 mg New Bag 08/31/22 2306     1,500 mg New Bag  1122     1,500 mg New Bag  0256                    Microbiologic Results:  Microbiology Results (last 7 days)       Procedure Component Value Units Date/Time    Blood culture [[id number]] Collected: 08/30/22 2049    Order Status: Completed Specimen: Blood from Antecubital, Right Updated: 09/02/22 0613     Blood Culture, Routine No Growth to date      No Growth to date      No Growth to date    Blood culture [[id number]] Collected: 08/30/22 2049    Order Status: Completed Specimen: Blood from Antecubital, Left Updated: 09/02/22 0613     Blood Culture, Routine No Growth to date      No Growth to date      No Growth to date    Culture, Respiratory with Gram Stain [[id number]] Collected: 09/01/22 1515    Order Status: Completed Specimen: Sputum, Expectorated Updated: 09/02/22 0222     Gram Stain (Respiratory) <10 epithelial cells per low power field.     Gram Stain (Respiratory) Moderate WBC's     Gram Stain (Respiratory) Rare Gram positive cocci    Respiratory Infection Panel (PCR), Nasopharyngeal [[id number]] Collected: 09/01/22 1515    Order Status: Completed Specimen: Nasopharyngeal Swab Updated: 09/01/22 1650     Respiratory Infection Panel  Source NP Swab     Adenovirus Not Detected     Coronavirus 229E, Common Cold Virus Not Detected     Coronavirus HKU1, Common Cold Virus Not Detected     Coronavirus NL63, Common Cold Virus Not Detected     Coronavirus OC43, Common Cold Virus Not Detected     Comment: The Coronavirus strains detected in this test cause the common cold.  These strains are not the COVID-19 (novel Coronavirus)strain   associated with the respiratory disease outbreak.          SARS-CoV2 (COVID-19) Qualitative PCR Not Detected     Human Metapneumovirus Not Detected     Human Rhinovirus/Enterovirus Not Detected     Influenza A (subtypes H1, H1-2009,H3) Not Detected     Influenza B Not Detected     Parainfluenza Virus 1 Not Detected     Parainfluenza Virus 2 Not Detected     Parainfluenza Virus 3 Not Detected     Parainfluenza Virus 4 Not Detected     Respiratory Syncytial Virus Not Detected     Bordetella Parapertussis (YY4271) Not Detected     Bordetella pertussis (ptxP) Not Detected     Chlamydia pneumoniae Not Detected     Mycoplasma pneumoniae Not Detected     Comment: Respiratory Infection Panel testing performed by Multiplex PCR.       Narrative:      For all other respiratory sources, order [id number] -  Respiratory Viral Panel by PCR    Influenza A & B by Molecular [[id number]] Collected: 08/30/22 2033    Order Status: Completed Specimen: Nasopharyngeal Swab Updated: 08/30/22 2113     Influenza A, Molecular Negative     Influenza B, Molecular Negative     Flu A & B Source Nasal swab    Influenza A & B by Molecular [[id number]] Collected: 08/30/22 2025    Order Status: Sent Specimen: Nasal Swab Updated: 08/30/22 2026

## 2022-09-02 NOTE — CARE UPDATE
09/02/22 0726 09/02/22 0735   Patient Assessment/Suction   Level of Consciousness (AVPU) alert  --    Respiratory Effort Unlabored  --    Expansion/Accessory Muscles/Retractions no use of accessory muscles  --    All Lung Fields Breath Sounds clear  --    Rhythm/Pattern, Respiratory unlabored  --    Cough Frequency infrequent  --    PRE-TX-O2   O2 Device (Oxygen Therapy) room air  --    SpO2 97 %  --    Pulse Oximetry Type Intermittent  --    $ Pulse Oximetry - Multiple Charge Pulse Oximetry - Multiple  --    Pulse 92  --    Resp 17 17   Aerosol Therapy   $ Aerosol Therapy Charges Aerosol Treatment  --    Respiratory Treatment Status (SVN) given  --    Treatment Route (SVN) mask;oxygen  --    Patient Position (SVN) HOB elevated  --    Post Treatment Assessment (SVN) breath sounds unchanged  --    Signs of Intolerance (SVN) none  --    Breath Sounds Post-Respiratory Treatment   Post-treatment Heart Rate (beats/min) 95  --    Post-treatment Resp Rate (breaths/min) 16  --    Vibratory PEP Therapy   $ Vibratory PEP Charges Aerobika Therapy  --    $ Vibratory PEP Tech Time Charges 15 min  --    Type (PEP Therapy) vibratory/oscillatory  --    Device (PEP Therapy) flutter  --    Route (PEP Therapy) mouthpiece  --    Breaths per Cycle (PEP Therapy) 10  --    Settings (PEP Therapy) PEP 5  --    Patient Position (PEP Therapy) HOB elevated  --    Post Treatment Assessment (PEP) breath sounds unchanged  --    Signs of Intolerance (PEP Therapy) none  --    High Frequency Chest Compression Vest   $ Chest Compression Charges Therapy  --    Daily Review of Necessity (HFCCV) completed  --    Vest Type (HFCCV) chest wrap vest  --    Frequency Type (HFCCV) single frequency  --    Single Frequency (Hz) 10  --    Duration (HFCCV) 10 mins  --    Patient Position (HFCCV) HOB elevated  --    Post Treatment Assessment (HFCCV) breath sounds unchanged  --    Signs of Intolerance (HFCCV) none  --

## 2022-09-03 ENCOUNTER — OUTSIDE PLACE OF SERVICE (OUTPATIENT)
Dept: PULMONOLOGY | Facility: CLINIC | Age: 27
End: 2022-09-03
Payer: MEDICAID

## 2022-09-03 VITALS
OXYGEN SATURATION: 95 % | DIASTOLIC BLOOD PRESSURE: 67 MMHG | RESPIRATION RATE: 18 BRPM | TEMPERATURE: 98 F | BODY MASS INDEX: 28.77 KG/M2 | SYSTOLIC BLOOD PRESSURE: 119 MMHG | HEIGHT: 71 IN | WEIGHT: 205.5 LBS | HEART RATE: 97 BPM

## 2022-09-03 DIAGNOSIS — J47.1 BRONCHIECTASIS WITH ACUTE EXACERBATION: ICD-10-CM

## 2022-09-03 DIAGNOSIS — A41.9 SEPSIS: Primary | ICD-10-CM

## 2022-09-03 DIAGNOSIS — J18.9 PNEUMONIA: ICD-10-CM

## 2022-09-03 PROCEDURE — 25000242 PHARM REV CODE 250 ALT 637 W/ HCPCS: Performed by: INTERNAL MEDICINE

## 2022-09-03 PROCEDURE — 63700000 PHARM REV CODE 250 ALT 637 W/O HCPCS: Performed by: INTERNAL MEDICINE

## 2022-09-03 PROCEDURE — 25000003 PHARM REV CODE 250: Performed by: STUDENT IN AN ORGANIZED HEALTH CARE EDUCATION/TRAINING PROGRAM

## 2022-09-03 PROCEDURE — 94640 AIRWAY INHALATION TREATMENT: CPT

## 2022-09-03 PROCEDURE — 94669 MECHANICAL CHEST WALL OSCILL: CPT

## 2022-09-03 PROCEDURE — 94761 N-INVAS EAR/PLS OXIMETRY MLT: CPT

## 2022-09-03 PROCEDURE — 94664 DEMO&/EVAL PT USE INHALER: CPT

## 2022-09-03 PROCEDURE — 99232 SBSQ HOSP IP/OBS MODERATE 35: CPT | Mod: S$GLB,,, | Performed by: INTERNAL MEDICINE

## 2022-09-03 PROCEDURE — 63600175 PHARM REV CODE 636 W HCPCS: Performed by: STUDENT IN AN ORGANIZED HEALTH CARE EDUCATION/TRAINING PROGRAM

## 2022-09-03 PROCEDURE — 99232 PR SUBSEQUENT HOSPITAL CARE,LEVL II: ICD-10-PCS | Mod: S$GLB,,, | Performed by: INTERNAL MEDICINE

## 2022-09-03 PROCEDURE — 99900035 HC TECH TIME PER 15 MIN (STAT)

## 2022-09-03 PROCEDURE — 63600175 PHARM REV CODE 636 W HCPCS: Performed by: NURSE PRACTITIONER

## 2022-09-03 PROCEDURE — 25000242 PHARM REV CODE 250 ALT 637 W/ HCPCS: Performed by: NURSE PRACTITIONER

## 2022-09-03 PROCEDURE — 25000003 PHARM REV CODE 250: Performed by: INTERNAL MEDICINE

## 2022-09-03 RX ORDER — LEVOFLOXACIN 750 MG/1
750 TABLET ORAL DAILY
Qty: 14 TABLET | Refills: 0 | Status: SHIPPED | OUTPATIENT
Start: 2022-09-03 | End: 2022-09-17

## 2022-09-03 RX ADMIN — MEROPENEM AND SODIUM CHLORIDE 1 G: 1 INJECTION, SOLUTION INTRAVENOUS at 07:09

## 2022-09-03 RX ADMIN — FLUTICASONE FUROATE AND VILANTEROL TRIFENATATE 1 PUFF: 100; 25 POWDER RESPIRATORY (INHALATION) at 07:09

## 2022-09-03 RX ADMIN — ALBUTEROL SULFATE 2.5 MG: 2.5 SOLUTION RESPIRATORY (INHALATION) at 06:09

## 2022-09-03 RX ADMIN — ALBUTEROL SULFATE 2.5 MG: 2.5 SOLUTION RESPIRATORY (INHALATION) at 12:09

## 2022-09-03 RX ADMIN — VANCOMYCIN HYDROCHLORIDE 2000 MG: 1 INJECTION, POWDER, LYOPHILIZED, FOR SOLUTION INTRAVENOUS at 05:09

## 2022-09-03 RX ADMIN — GUAIFENESIN 1200 MG: 600 TABLET, EXTENDED RELEASE ORAL at 09:09

## 2022-09-03 RX ADMIN — SODIUM CHLORIDE SOLN NEBU 3% 4 ML: 3 NEBU SOLN at 07:09

## 2022-09-03 RX ADMIN — AZITHROMYCIN MONOHYDRATE 500 MG: 250 TABLET ORAL at 09:09

## 2022-09-03 RX ADMIN — ALBUTEROL SULFATE 2.5 MG: 2.5 SOLUTION RESPIRATORY (INHALATION) at 01:09

## 2022-09-03 NOTE — CARE UPDATE
09/02/22 1912 09/02/22 1919   Patient Assessment/Suction   Level of Consciousness (AVPU) alert alert   Respiratory Effort Unlabored Unlabored   All Lung Fields Breath Sounds clear clear   LLL Breath Sounds crackles;diminished crackles;diminished   PRE-TX-O2   O2 Device (Oxygen Therapy) room air room air   SpO2 97 % 97 %   Pulse Oximetry Type Intermittent  --    $ Pulse Oximetry - Multiple Charge Pulse Oximetry - Multiple  --    Pulse 98 99   Resp 18 17   Aerosol Therapy   $ Aerosol Therapy Charges Aerosol Treatment  (albuterol) Aerosol Treatment  (3% NACL)   Respiratory Treatment Status (SVN) given given   Treatment Route (SVN) mask mask   Patient Position (SVN) HOB elevated HOB elevated   Post Treatment Assessment (SVN) increased aeration increased aeration   Signs of Intolerance (SVN) none none   Breath Sounds Post-Respiratory Treatment   Throughout All Fields Post-Treatment All Fields All Fields   Post-treatment Heart Rate (beats/min) 98 99   Post-treatment Resp Rate (breaths/min) 18 17   Vibratory PEP Therapy   $ Vibratory PEP Charges Aerobika Therapy  --    $ Vibratory PEP Tech Time Charges 15 min  --    Type (PEP Therapy) vibratory/oscillatory  --    Device (PEP Therapy) flutter  --    Route (PEP Therapy) mouthpiece  --    Breaths per Cycle (PEP Therapy) 8  --    Settings (PEP Therapy) PEP 5  --    Patient Position (PEP Therapy) HOB elevated  --    Post Treatment Assessment (PEP) breath sounds unchanged  --    Signs of Intolerance (PEP Therapy) none  --    High Frequency Chest Compression Vest   $ Chest Compression Charges Therapy  --    Vest Type (HFCCV) chest wrap vest  --    Frequency Type (HFCCV) single frequency  --    Single Frequency (Hz) 11  --    Pressure (cm H20) 6  --    Duration (HFCCV) 10 mins  --    Patient Position (HFCCV) HOB elevated  --    Post Treatment Assessment (HFCCV) breath sounds unchanged  --    Signs of Intolerance (HFCCV) none  --

## 2022-09-03 NOTE — PROGRESS NOTES
Charly [place] [id number] is a 27 y.o. male who has been consulted for vancomycin dosing.    Scheduled vancomycin trough lab has been changed to 9/4/22 at 0430.      Patient will be followed by pharmacy for changes in renal function, toxicity, and efficacy.    Thank you for allowing us to participate in this patient's care.     Sofía Valverde MaiD

## 2022-09-03 NOTE — DISCHARGE SUMMARY
[facility] Medicine  Discharge Summary      Patient Name: Charly Mattson  MRN: [id number]  Patient Class: IP- Inpatient  Admission Date: 2022  Hospital Length of Stay: 2 days  Discharge Date and Time: 9/3/2022  3:56 PM  Attending Physician: Dvein Solis MD   Discharging Provider: Fabiola Rendon NP  Primary Care Provider: Eva Mayo NP      HPI:   Mr. 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 It is severe. It is associated with cough, mild hemoptysis, headache, nausea, and vomiting. He denies chest pain, SOB, dizziness, or LOC. He was recently on 10 days of augmentin for an abscessed tooth s/p complicated root canal that finished on 22. Spoke with Dr. Ricco Alvares who performed the bronchoscopy and noted blood, and he suspects he is having a bronchiectasis flare with concern for developing pna. In the ED: CXR with concern for infiltrated on the right lung base, , T 99.2, RR 22, and Lactate WNL.       PFTs (2022):  o FVC: 88%  o FEV1: 77%  o FEV1/FVC: 73  o T%  o DLCO: 80%    Sputum Cx  Date: 22  Heavy Growth Haemophilus influenzae       * No surgery found *      Hospital Course:   The patient was monitored closely during his hospital stay.  He was initiated on IV broad-spectrum antibiotics.  Pulmonary consulted and followed in the hospital.  Patient developed fever of 103 evening of  that responded well to ibuprofen.  Lactic acid was drawn and elevated and this was trended and returned to normal.  Blood cultures on admission remain negative.  Patient was noted to improve and expressed readiness for discharge.  Oral antibiotic regimen was recommended by pulmonology and  prescription was sent to his pharmacy prior to discharge.  He was cleared from pulmonology perspective for discharge.  Discharge instructions as well as return precautions were discussed with patient with good understanding.    Physical exam:  Awake alert oriented x4, no acute distress  Cardiac:  RRR  Pulmonary:  Clear to auscultation  Abdomen:  Soft, nontender  Extremities: normal range of motion         Goals of Care Treatment Preferences:  Code Status: Full Code      Consults:   Consults (From admission, onward)          Status Ordering Provider     Inpatient consult to Pulmonology  Once        Provider:  Chrissy Mckeon MD    Completed WALLY KLEIN     Pharmacy to dose Vancomycin consult  Once        Provider:  (Not yet assigned)   See [facility] for full Linked Orders Report.    Acknowledged WALLY KLEIN            No new Assessment & Plan notes have been filed under this hospital service since the last note was generated.  Service: Hospital Medicine    Final Active Diagnoses:    Diagnosis Date Noted POA    PRINCIPAL PROBLEM:  Sepsis [A41.9] 08/10/2021 Yes    Bronchiectasis with acute exacerbation [J47.1] 08/10/2021 Yes    Pneumonia/ Right Lower lobe [J18.9] 05/28/2020 Yes    Transaminitis [R74.01] 05/28/2020 Yes    CVID (common variable immunodeficiency) [D83.9] 05/14/2019 Yes      Problems Resolved During this Admission:       Discharged Condition: good    Disposition: Home or Self Care    Follow Up:   Follow-up Information       Eva Mayo NP Follow up on 9/23/2022.    Specialty: Family Medicine  Why: 11:00 am  Contact information:  [address] [facility] [place] [address]  [phone]               Ricco Alvares MD Follow up in 2 week(s).    Specialties: Hospitalist, Pulmonary Disease  Why: call to schedule after DC  Contact information:  [address] [place] [address]  [address]  [place] [address]  [phone]                           Patient Instructions:      Diet Adult Regular     Notify your  health care provider if you experience any of the following:  temperature >100.4     Notify your health care provider if you experience any of the following:  difficulty breathing or increased cough     Activity as tolerated       Significant Diagnostic Studies: Labs:   CMP   Recent Labs   Lab 09/02/22 0417      K 3.7      CO2 22*      BUN 4*   CREATININE 0.8   CALCIUM 9.1   PROT 6.7   ALBUMIN 3.2*   BILITOT 0.6   ALKPHOS 147*   AST 83*   ALT 91*   ANIONGAP 10   , CBC   Recent Labs   Lab 09/02/22 0417   WBC 4.91   HGB 13.2*   HCT 40.2   *    and All labs within the past 24 hours have been reviewed  Microbiology:     Microbiology Results (last 7 days)       Procedure Component Value Units Date/Time    Culture, Respiratory with Gram Stain [[id number]] Collected: 09/01/22 1515    Order Status: Completed Specimen: Sputum, Expectorated Updated: 09/03/22 1004     Respiratory Culture Normal respiratory ashlee     Gram Stain (Respiratory) <10 epithelial cells per low power field.     Gram Stain (Respiratory) Moderate WBC's     Gram Stain (Respiratory) Rare Gram positive cocci    Blood culture [[id number]] Collected: 08/30/22 2049    Order Status: Completed Specimen: Blood from Antecubital, Right Updated: 09/03/22 0613     Blood Culture, Routine No Growth to date      No Growth to date      No Growth to date      No Growth to date    Blood culture [[id number]] Collected: 08/30/22 2049    Order Status: Completed Specimen: Blood from Antecubital, Left Updated: 09/03/22 0613     Blood Culture, Routine No Growth to date      No Growth to date      No Growth to date      No Growth to date    Respiratory Infection Panel (PCR), Nasopharyngeal [[id number]] Collected: 09/01/22 1515    Order Status: Completed Specimen: Nasopharyngeal Swab Updated: 09/01/22 1652     Respiratory Infection Panel Source NP Swab     Adenovirus Not Detected     Coronavirus 229E, Common Cold Virus Not Detected     Coronavirus HKU1,  Common Cold Virus Not Detected     Coronavirus NL63, Common Cold Virus Not Detected     Coronavirus OC43, Common Cold Virus Not Detected     Comment: The Coronavirus strains detected in this test cause the common cold.  These strains are not the COVID-19 (novel Coronavirus)strain   associated with the respiratory disease outbreak.          SARS-CoV2 (COVID-19) Qualitative PCR Not Detected     Human Metapneumovirus Not Detected     Human Rhinovirus/Enterovirus Not Detected     Influenza A (subtypes H1, H1-2009,H3) Not Detected     Influenza B Not Detected     Parainfluenza Virus 1 Not Detected     Parainfluenza Virus 2 Not Detected     Parainfluenza Virus 3 Not Detected     Parainfluenza Virus 4 Not Detected     Respiratory Syncytial Virus Not Detected     Bordetella Parapertussis (AP0255) Not Detected     Bordetella pertussis (ptxP) Not Detected     Chlamydia pneumoniae Not Detected     Mycoplasma pneumoniae Not Detected     Comment: Respiratory Infection Panel testing performed by Multiplex PCR.       Narrative:      For all other respiratory sources, order [id number] -  Respiratory Viral Panel by PCR    Influenza A & B by Molecular [[id number]] Collected: 08/30/22 2033    Order Status: Completed Specimen: Nasopharyngeal Swab Updated: 08/30/22 2113     Influenza A, Molecular Negative     Influenza B, Molecular Negative     Flu A & B Source Nasal swab    Influenza A & B by Molecular [[id number]] Collected: 08/30/22 2025    Order Status: Sent Specimen: Nasal Swab Updated: 08/30/22 2026            Radiology:     XR CHEST PA AND LATERAL     CLINICAL HISTORY:   Hemoptysis     TECHNIQUE:   PA and lateral views of the chest were performed.     COMPARISON:   05/03/2022, 03/20/2022, 12/10/2021 chest x-ray     FINDINGS:   Cardiac silhouette is stable and not significantly enlarged.  There is decreased expansion of the lungs and mild linear infrahilar opacification on the right is attributed to atelectasis though underlying mild  pneumonitis is considered.  There is no pneumothorax, pneumomediastinum, pleural effusion or visualized lung nodule.  Osseous structures grossly appear intact.   Impression:    Linear basilar opacities on the right are attributed to atelectasis with a possibility of focal pneumonitis without significant consolidation considered.       Electronically signed by: Sade Barajas   Date: 08/30/2022   Time: 21:06       Pending Diagnostic Studies:       None           Medications:  Reconciled Home Medications:      Medication List        START taking these medications      levoFLOXacin 750 MG tablet  Commonly known as: LEVAQUIN  Take 1 tablet (750 mg total) by mouth once daily. for 14 days            CONTINUE taking these medications      acetaminophen 325 MG tablet  Commonly known as: TYLENOL  Take 650 mg by mouth as needed.     albuterol 0.63 mg/3 mL Nebu  Commonly known as: ACCUNEB  Take 0.63 mg by nebulization every 6 (six) hours as needed. Rescue     diclofenac 50 MG EC tablet  Commonly known as: VOLTAREN  Take 1 tablet (50 mg total) by mouth 3 (three) times daily.     DULERA 100-5 mcg/actuation aa  Generic drug: mometasone-formoterol  Inhale 2 Inhalers into the lungs 2 (two) times a day. Controller     immun glob G(IgG)-pro-IgA 0-50 1 gram/5 mL (20 %) Soln  Inject 200 mg/kg into the skin every 7 days. Sunday     sodium chloride 3% 3 % nebulizer solution  Take 4 mLs by nebulization 2 (two) times a day.            STOP taking these medications      amoxicillin-clavulanate 875-125mg 875-125 mg per tablet  Commonly known as: AUGMENTIN              Indwelling Lines/Drains at time of discharge:   Lines/Drains/Airways       None                   Time spent on the discharge of patient: 39 minutes         Fabiola Rendon NP  Department of Hospital Medicine  [facility]

## 2022-09-03 NOTE — PLAN OF CARE
POC reviewed with pt. Pt verbalized understanding. Patient is alert and oriented x 4, able to make needs known. Continuous cardiac monitoring, TELE #[id number]. Afebrile. ABX administered as scheduled. Meds given per MAR. Pt on 2L NC. Repositions self independently. No complaints of pain or discomfort. Purposeful hourly/q2hr rounding done during shift to promote patient safety. NAD noted. Safety maintained with side rails up x3, bed wheels locked, bed in lowest positioned, call light in reach. Patient educated to call for assistance with ambulation if needed, verbalized understanding. Pt remains free of falls. Will continue to monitor.

## 2022-09-03 NOTE — PLAN OF CARE
Pt clear for DC from case management standpoint. Discharging to home       09/03/22 1031   Final Note   Assessment Type Final Discharge Note   Anticipated Discharge Disposition Home

## 2022-09-03 NOTE — PLAN OF CARE
POC reviewed with pt. Pt verbalized understanding. Patient is alert and oriented x 4, able to make needs known. Continuous cardiac monitoring, TELE #[id number]. Afebrile. ABX administered as scheduled. Meds given per MAR. Pt on 2L NC. Repositions self independently. No complaints of pain or discomfort. Purposeful hourly/q2hr rounding done during shift to promote patient safety. NAD noted. Safety maintained with side rails up x3, bed wheels locked, bed in lowest positioned, call light in reach. Patient educated to call for assistance with ambulation if needed, verbalized understanding. Pt remains free of falls. Will continue to monitor.

## 2022-09-03 NOTE — RESPIRATORY THERAPY
Patient receiving aerosol tc via 2.5mg ventolin now and q6hr, 4ml 3% nacl now and bid, CPT via vest now and bid, aeribikia now and bid and Breo mdi now and qday.  Hr 87 and 02 saturation 94% on room air.

## 2022-09-03 NOTE — PLAN OF CARE
Not able to schedule hospital follow up within 7 days due to weekend and pt's payor source. Next available apt scheduled and on AVS. Placed instructions on AVS for pt to call to schedule follow up with pulm

## 2022-09-03 NOTE — DISCHARGE INSTRUCTIONS
Please hold Hizentra until acute flare resolved.    Follow up with your PCP in one week and your Pulmonologist, Dr. Ricco Alvares, within 2 weeks.     If you have respiratory distress Call 911 or get to nearest emergency room

## 2022-09-03 NOTE — PROGRESS NOTES
Progress Note  PULMONARY    Admit Date: 8/30/2022 09/03/2022    History of Present Illness:  Pt is a 28 yo male with CVID on hizentra, chronic bronchiectasis who presented to the ED with hemoptysis, fever s/p bronchoscopy at [facility] per Dr. Ricco Alvares. Pt gets recurrent respiratory infections, worsening in severity and frequency with q 2 month bronchitis or pneumonia for the past year. Most recently 2 mos ago sputum culture grew H flu. Pt complains of fever, nausea and emesis, cough w/ thick green/pink phlegm after having bronch yesterday. Symptoms were present after scope but worsened about 5pm at home so he presented to ED. Pt has been on amoxicillin and flagyl for the past week due to tooth abscess w/ plan for root canal on 9/6. His home regimen for bronchiectasis is albuterol and 3% NaCl bid w/ vest once daily. He denies MUÑOZ with regular activity and is able to work with no breathing limitation. He works currently selling cars. He denies doing regular exercise. Pt's mother is present at bedside- reports he had frequent pneumonias as a baby, spent time in NICU. His father had immune deficiency and poor healing with wounds so suspects he may have genetic immune deficiency.    SUBJECTIVE:     9/1- tmax 103.8 yesterday. Afebrile this am, lactate normalized. Pt with no new complaints, on room air. Reports toddler at home sick w/ URI symptoms  9/2- tmax 100, resp viral panel negative, sputum moderate GPC. Pt feels slightly improved, still cough productive thick mucous    9/3/2022 - Stable overnight and no new issues reported.  He feels good ebnough to go home and has follow up appointment with Dr Alvares in about 10 days.      OBJECTIVE:     Vitals (Most recent):  Vitals:    09/03/22 1322   BP:    Pulse: 97   Resp: 18   Temp:        Vitals (24 hour range):  Temp:  [97.5 °F (36.4 °C)-99.1 °F (37.3 °C)]   Pulse:  []   Resp:  [16-18]   BP: (112-129)/(55-72)   SpO2:  [92 %-97 %]       Intake/Output Summary (Last 24  hours) at 9/3/2022 1355  Last data filed at 9/3/2022 1147  Gross per 24 hour   Intake 1404.05 ml   Output 1800 ml   Net -395.95 ml          Physical Exam:  The patient's neuro status (alertness,orientation,cognitive function,motor skills,), pharyngeal exam (oral lesions, hygiene, abn dentition,), Neck (jvd,mass,thyroid,nodes in neck and above/below clavicle),RESPIRATORY(symmetry,effort,fremitus,percussion,auscultation),  Cor(rhythm,heart tones including gallops,perfusion,edema)ABD(distention,hepatic&splenomegaly,tenderness,masses), Skin(rash,cyanosis),Psyc(affect,judgement,).  Exam negative except for these pertinent findings:        Tongue and throat normal  HR regular  Improved breath sounds w/ clear bilaterally. Occ cough  Abd soft nontender, obese  No edema/clubbing     Radiographs reviewed: view by direct vision   CT chest 2020- scattered patchy infiltrates and bronchial thickening, most notable area of bronchiectasis in RML    Labs     No results for input(s): WBC, HGB, HCT, PLT, BAND, METAMYELOCYT, MYELOPCT, HGBA1C in the last 24 hours.  No results for input(s): NA, K, CL, CO2, BUN, CREATININE, GLU, CALCIUM, CAION, MG, PHOS, AST, ALT, ALKPHOS, BILITOT, BILIDIR, PROT, ALBUMIN, PREALBUMIN, AMYLASE, LIPASE, CRP, HSCRP, SEDRATE, PROCAL, INR, PTT, LABHEPA, LACTATE, TROPONINI, CPK, CPKMB, MB, BNP in the last 24 hours.No results for input(s): PH, PCO2, PO2, HCO3 in the last 24 hours.  Microbiology Results (last 7 days)       Procedure Component Value Units Date/Time    Culture, Respiratory with Gram Stain [[id number]] Collected: 09/01/22 1515    Order Status: Completed Specimen: Sputum, Expectorated Updated: 09/03/22 1004     Respiratory Culture Normal respiratory ashlee     Gram Stain (Respiratory) <10 epithelial cells per low power field.     Gram Stain (Respiratory) Moderate WBC's     Gram Stain (Respiratory) Rare Gram positive cocci    Blood culture [[id number]] Collected: 08/30/22 2049    Order Status: Completed  Specimen: Blood from Antecubital, Right Updated: 09/03/22 0613     Blood Culture, Routine No Growth to date      No Growth to date      No Growth to date      No Growth to date    Blood culture [[id number]] Collected: 08/30/22 2049    Order Status: Completed Specimen: Blood from Antecubital, Left Updated: 09/03/22 0613     Blood Culture, Routine No Growth to date      No Growth to date      No Growth to date      No Growth to date    Respiratory Infection Panel (PCR), Nasopharyngeal [[id number]] Collected: 09/01/22 1515    Order Status: Completed Specimen: Nasopharyngeal Swab Updated: 09/01/22 1652     Respiratory Infection Panel Source NP Swab     Adenovirus Not Detected     Coronavirus 229E, Common Cold Virus Not Detected     Coronavirus HKU1, Common Cold Virus Not Detected     Coronavirus NL63, Common Cold Virus Not Detected     Coronavirus OC43, Common Cold Virus Not Detected     Comment: The Coronavirus strains detected in this test cause the common cold.  These strains are not the COVID-19 (novel Coronavirus)strain   associated with the respiratory disease outbreak.          SARS-CoV2 (COVID-19) Qualitative PCR Not Detected     Human Metapneumovirus Not Detected     Human Rhinovirus/Enterovirus Not Detected     Influenza A (subtypes H1, H1-2009,H3) Not Detected     Influenza B Not Detected     Parainfluenza Virus 1 Not Detected     Parainfluenza Virus 2 Not Detected     Parainfluenza Virus 3 Not Detected     Parainfluenza Virus 4 Not Detected     Respiratory Syncytial Virus Not Detected     Bordetella Parapertussis (QU0954) Not Detected     Bordetella pertussis (ptxP) Not Detected     Chlamydia pneumoniae Not Detected     Mycoplasma pneumoniae Not Detected     Comment: Respiratory Infection Panel testing performed by Multiplex PCR.       Narrative:      For all other respiratory sources, order [id number] -  Respiratory Viral Panel by PCR    Influenza A & B by Molecular [[id number]] Collected: 08/30/22 2033    Order  Status: Completed Specimen: Nasopharyngeal Swab Updated: 08/30/22 2113     Influenza A, Molecular Negative     Influenza B, Molecular Negative     Flu A & B Source Nasal swab    Influenza A & B by Molecular [[id number]] Collected: 08/30/22 2025    Order Status: Sent Specimen: Nasal Swab Updated: 08/30/22 2026            Impression:  Active Hospital Problems    Diagnosis  POA    *Sepsis [A41.9]  Yes    Bronchiectasis with acute exacerbation [J47.1]  Yes    Pneumonia/ Right Lower lobe [J18.9]  Yes    Transaminitis [R74.01]  Yes    CVID (common variable immunodeficiency) [D83.9]  Yes      Resolved Hospital Problems   No resolved problems to display.               Plan:     - OK  dc home today if stable- would give him moxifloxacin or levaquin to complete 2wk course (if cultures not revealing) and have f/u with Dr. Alvares  - he is to resume his usual home tammy Saleh MD  [facility]  09/03/2022

## 2022-09-05 LAB
BACTERIA BLD CULT: NORMAL
BACTERIA BLD CULT: NORMAL
BACTERIA SPEC AEROBE CULT: NORMAL
BACTERIA SPEC AEROBE CULT: NORMAL
GRAM STN SPEC: NORMAL

## 2022-09-06 ENCOUNTER — TELEPHONE (OUTPATIENT)
Dept: MEDSURG UNIT | Facility: HOSPITAL | Age: 27
End: 2022-09-06
Payer: MEDICAID

## 2022-11-14 DIAGNOSIS — M76.62 ACHILLES TENDINITIS OF LEFT LOWER EXTREMITY: Primary | ICD-10-CM

## 2022-12-01 PROBLEM — M76.62 ACHILLES TENDINITIS OF LEFT LOWER EXTREMITY: Status: ACTIVE | Noted: 2022-12-01

## 2022-12-05 PROBLEM — J18.9 PNEUMONIA: Status: RESOLVED | Noted: 2020-05-28 | Resolved: 2022-12-05

## 2022-12-05 PROBLEM — A41.9 SEPSIS: Status: RESOLVED | Noted: 2021-08-10 | Resolved: 2022-12-05

## 2022-12-20 ENCOUNTER — CLINICAL SUPPORT (OUTPATIENT)
Dept: REHABILITATION | Facility: HOSPITAL | Age: 27
End: 2022-12-20
Payer: MEDICAID

## 2022-12-20 DIAGNOSIS — M76.62 ACHILLES TENDINITIS OF LEFT LOWER EXTREMITY: Primary | ICD-10-CM

## 2022-12-20 PROCEDURE — 97110 THERAPEUTIC EXERCISES: CPT | Mod: PN | Performed by: PHYSICAL THERAPIST

## 2022-12-20 PROCEDURE — 97161 PT EVAL LOW COMPLEX 20 MIN: CPT | Mod: PN | Performed by: PHYSICAL THERAPIST

## 2022-12-20 NOTE — PLAN OF CARE
[facility]   Physical Therapy Initial Evaluation     Date: 12/20/2022   Name: Charly [facility] Number: [id number]    Therapy Diagnosis:   Encounter Diagnosis   Name Primary?    Achilles tendinitis of left lower extremity Yes     Physician: Casey Man MD    Physician Orders: PT Eval and Treat   Medical Diagnosis from Referral: Achilles tendinitis of left lower extremity  Evaluation Date: 12/20/2022  Authorization Period Expiration: 12/31/2022  Plan of Care Expiration: 2/10/2023  Visit # / Visits authorized: 1/ 1   (POC 1/12)   FOTO Due visit 5  FOTO Due visit 10    Precautions: Standard  Insurance: Payor: MEDICAID / Plan: [place] [employer] CONNECT / Product Type: Managed Medicaid /     Time In: 800  Time Out: 900  Total Appointment Time (timed & untimed codes): 60 minutes      SUBJECTIVE   Date of onset: 6 months    History of current condition - Charly reports: the onset of left foot pain approximately 60 months ago. He also began taking Levaquin about that same time. He was later told by his primary care physician that this was a side effect of Levaquin. He reported difficulty with prolonged standing and walking. He received an injection on 11/11/2022 which decreased his symptoms. He presents to physical therapy with complaints of pain along the plantar surface of the left foot with point tenderness around the calcaneous. He reports pain with prolonged standing and walking. He notes increased pain in the mornings.    Falls: 0    Imaging, none: in Epic    Prior Therapy: none reported  Social History:  lives with their family  Occupation:   Prior Level of Function: Independent  Current Level of Function: Decreased ability to perform ADL and limited tolerance to standing and walking.      Pain:  Current 6/10, worst 6/10, best 4/10   Location: left foot/ankle    Description: Aching and Dull  Aggravating Factors: Standing and Walking  Easing Factors: pain medication and  rest    Patients goals: Decreased pain with walking and standing     Medical History:   Past Medical History:   Diagnosis Date    Fatty liver     Immunoglobulin deficiency     Immunoglobulin deficiency     CVID    Pneumonia        Surgical History:   Charly Mattson  has a past surgical history that includes Tympanostomy tube placement; Mandible fracture surgery; Adenoidectomy; Tonsillectomy; Tympanostomy tube placement; Sinus surgery; and Biopsy (N/A, 9/23/2020).    Medications:   Charly has a current medication list which includes the following prescription(s): acetaminophen, albuterol, diclofenac, immun glob g(igg)-pro-iga 0-50, dulera, and sodium chloride 3%.    Allergies:   Review of patient's allergies indicates:   Allergen Reactions    Singulair [montelukast] Other (See Comments)     arrhythmias          OBJECTIVE     Posture: Decreased lumbar lordosis and forward head in standing  Palpation: Moderate point tenderness noted with palpation of the left calcaneous  Sensation: Intact    Range of Motion/Strength:      ROM: Left Right   Ankle Dorsiflexion 2°  10°   Ankle Plantarflexion 40° 40°     Ankle Inversion 12° 26°   Ankle Eversion 5° 10°       MMT: Left Right   Ankle Dorsiflexion 4+/5 5/5   Ankle Plantarflexion 5/5 5/5   Ankle Inversion 4/5 5/5   Ankle Eversion 4+/5 5/5       Girth Measurements Left Right   Figure 8 56.7 cm 56.0 cm      Special Tests Left Right   Subtalar neutral Flexible forefoot varus Flexible forefoot varus       Gait Without AD   Analysis The patient ambulates with bilateral LE externally rotated in stance phase         Limitation/Restriction for FOTO  Survey    Therapist reviewed FOTO scores for Charly Mattson on 12/20/2022.   FOTO documents entered into EPIC - see Media section.    Limitation Score: 63%         TREATMENT     Total Treatment time (time-based codes) separate from Evaluation: 30 minutes      Charly received the treatments listed below:      THERAPEUTIC EXERCISES to develop ROM  and flexibility for 20 minutes including :     Long sitting Gastroc-soleus stretch 3 x 30 sec  Long sitting soleus stretch 3 x 30 sec  Arch lifts 3  x 10  Toe yoga 3 x 10  Seated heel slides    MANUAL THERAPY TECHNIQUES including Joint mobilizations were applied to left ankle for 10 minutes.     A/P talar glides on wedge grade II/III  M/L Calcaneal glides grade II/III      PATIENT EDUCATION AND HOME EXERCISES     Education provided:   - Gait mechanics    Written Home Exercises Provided: yes. Exercises were reviewed and Charly was able to demonstrate them prior to the end of the session.  Charly demonstrated good  understanding of the education provided. See EMR under Patient Instructions for exercises provided during therapy sessions.    ASSESSMENT     Charly is a 27 y.o. male referred to outpatient Physical Therapy with a medical diagnosis of Achilles tendinitis of left lower extremity. Patient presents with     Left foot pain  Decreased left ankle ROM  Decreased left ankle strength  Decreased ability to perform ADL and limited tolerance to standing and walking.    Patient prognosis is Good.   Patientt will benefit from skilled outpatient Physical Therapy to address the deficits stated above and in the chart below, provide patient /family education, and to maximize patientt's level of independence.     Plan of care discussed with patient: Yes  Patient's spiritual, cultural and educational needs considered and patient is agreeable to the plan of care and goals as stated below:     Anticipated Barriers for therapy: none    Medical Necessity is demonstrated by the following  History  Co-morbidities and personal factors that may impact the plan of care Co-morbidities:   high BMI    Personal Factors:   no deficits     low   Examination  Body Structures and Functions, activity limitations and participation restrictions that may impact the plan of care Body Regions:   lower extremities    Body Systems:     ROM  strength  gait    Participation Restrictions:   none    Activity limitations:   Learning and applying knowledge  no deficits    General Tasks and Commands  no deficits    Communication  no deficits    Mobility  walking    Self care  no deficits    Domestic Life  no deficits    Interactions/Relationships  no deficits    Life Areas  no deficits    Community and Social Life  no deficits         moderate   Clinical Presentation stable and uncomplicated low   Decision Making/ Complexity Score: low     Goals:  Short Term Goals (STG) # weeks Goal Review Date Reviewed Date Met   1. The patient will begin a written HEP 1 Initial 12/20/2022    2. Increase ankle dorsiflexion to 5 degrees 3 Initial 12/20/2022    3. Decrease soft tissue tenderness to mild 3 Initial 12/20/2022      Long Term Goals (LTG) # weeks Goal Review Date Reviewed Date Met   1. The patient will be independent with his HEP for maintenance 6 Initial 12/20/2022    2. Increase ankle dorsiflexion to 10 degrees 6 Initial 12/20/2022    3. Decrease FOTO score to 36 % 6 Initial 12/20/2022    4. The patient will be able to ascend/descend 20 step/stairs without onset of symptoms.   6 Initial 12/20/2022        PLAN   Plan of care Certification: 12/20/2022 to 2/10/2023.    Outpatient Physical Therapy 2 times weekly for 6 weeks to include the following interventions: Manual Therapy, Neuromuscular Re-ed, Patient Education, Therapeutic Activities, and Therapeutic Exercise.     Tyrone Tam, PT      I CERTIFY THE NEED FOR THESE SERVICES FURNISHED UNDER THIS PLAN OF TREATMENT AND WHILE UNDER MY CARE   Physician's comments:     Physician's Signature: ___________________________________________________

## 2022-12-27 ENCOUNTER — CLINICAL SUPPORT (OUTPATIENT)
Dept: REHABILITATION | Facility: HOSPITAL | Age: 27
End: 2022-12-27
Payer: MEDICAID

## 2022-12-27 DIAGNOSIS — R29.898 IMPAIRED FLEXIBILITY OF LOWER EXTREMITY: ICD-10-CM

## 2022-12-27 DIAGNOSIS — M25.675 DECREASED ROM OF LEFT FOOT: ICD-10-CM

## 2022-12-27 DIAGNOSIS — M76.62 ACHILLES TENDINITIS OF LEFT LOWER EXTREMITY: Primary | ICD-10-CM

## 2022-12-27 PROCEDURE — 97110 THERAPEUTIC EXERCISES: CPT | Mod: PN | Performed by: PHYSICAL THERAPIST

## 2022-12-27 NOTE — PROGRESS NOTES
[facility]   Physical Therapy Treatment Note     Name: Charly [facility] Number: [id number]    Therapy Diagnosis:   Encounter Diagnoses   Name Primary?    Achilles tendinitis of left lower extremity Yes    Decreased ROM of left foot     Impaired flexibility of lower extremity      Physician: Casey Man MD    Visit Date: 12/27/2022    Physician Orders: PT Eval and Treat   Medical Diagnosis from Referral: Achilles tendinitis of left lower extremity  Evaluation Date: 12/20/2022  Authorization Period Expiration: 3/27/2023  Plan of Care Expiration: 2/10/2023  Visit # / Visits authorized: 1/ 1   (POC 1/12)   FOTO Due visit 5  FOTO Due visit 10      Time In: 1300  Time Out: 1400  Total Billable Time: 60 minutes    PTA Visit #: 0/5     Precautions: Standard    Insurance: Payor: MEDICAID / Plan: [employer] CONNECT / Product Type: Managed Medicaid /     SUBJECTIVE     Pt reports: continued tightness in the middle of his foot.  He was compliant with home exercise program.  Response to previous treatment: no complaints  Functional change: First visit since evaluation    Pain: 6/10  Location: left foot      OBJECTIVE     Objective Measures updated at progress report unless specified.     Treatment       Charly received the treatments listed below:      THERAPEUTIC EXERCISES to develop ROM and flexibility for 45 minutes including :      Long sitting Gastroc-soleus stretch 3 x 30 sec  Long sitting soleus stretch 3 x 30 sec  Arch lifts 3  x 10  Toe yoga 3 x 10  Seated heel slides  Ankle DF/IN/eversion/PF with RTB 3 x 10   Wobble board A/P, M/L, cw/ccw x 30  Dorsiflexion/plantarflexion on 1/2 roll  Eversion/Inversion on 1/2 roll     MANUAL THERAPY TECHNIQUES including Joint mobilizations were applied to left ankle for 15 minutes.      A/P talar glides on wedge grade II/III  M/L Calcaneal glides grade II/III      Patient Education and Home Exercises     Home Exercises Provided and Patient  Education Provided     Education provided:   - Importance of improving lower extremity flexibility    Written Home Exercises Provided: Patient instructed to cont prior HEP. Exercises were reviewed and Charly was able to demonstrate them prior to the end of the session.  Charly demonstrated good  understanding of the education provided. See EMR under Patient Instructions for exercises provided during therapy sessions    ASSESSMENT     The patient presents with left midfoot pain. He displays limitation with medial/lateral calcaneal glides and A/P talar mobs. Patient tolerated treatment well without report of adverse effects.    Charly Is progressing well towards his goals.   Pt prognosis is Good.     Pt will continue to benefit from skilled outpatient physical therapy to address the deficits listed in the problem list box on initial evaluation, provide pt/family education and to maximize pt's level of independence in the home and community environment.     Pt's spiritual, cultural and educational needs considered and pt agreeable to plan of care and goals.     Anticipated barriers to physical therapy: none    Goals:   Short Term Goals (STG) # weeks Goal Review Date Reviewed Date Met   1. The patient will begin a written HEP 1 progressing 12/27/2022       2. Increase ankle dorsiflexion to 5 degrees 3 progressing 12/27/2022       3. Decrease soft tissue tenderness to mild 3 progressing 12/27/2022          Long Term Goals (LTG) # weeks Goal Review Date Reviewed Date Met   1. The patient will be independent with his HEP for maintenance 6 progressing 12/27/2022       2. Increase ankle dorsiflexion to 10 degrees 6 progressing 12/27/2022       3. Decrease FOTO score to 36 % 6 progressing 12/27/2022       4. The patient will be able to ascend/descend 20 step/stairs without onset of symptoms.    6 progressing 12/27/2022          PLAN     Continue with physical therapy as per plan of care      Tyrone Tam, PT

## 2023-01-03 ENCOUNTER — CLINICAL SUPPORT (OUTPATIENT)
Dept: REHABILITATION | Facility: HOSPITAL | Age: 28
End: 2023-01-03
Payer: MEDICAID

## 2023-01-03 ENCOUNTER — DOCUMENTATION ONLY (OUTPATIENT)
Dept: REHABILITATION | Facility: HOSPITAL | Age: 28
End: 2023-01-03

## 2023-01-03 DIAGNOSIS — R29.898 IMPAIRED FLEXIBILITY OF LOWER EXTREMITY: ICD-10-CM

## 2023-01-03 DIAGNOSIS — M76.62 ACHILLES TENDINITIS OF LEFT LOWER EXTREMITY: ICD-10-CM

## 2023-01-03 DIAGNOSIS — M25.675 DECREASED ROM OF LEFT FOOT: Primary | ICD-10-CM

## 2023-01-03 PROCEDURE — 97110 THERAPEUTIC EXERCISES: CPT | Mod: PN,CQ

## 2023-01-03 NOTE — PROGRESS NOTES
[facility]   Physical Therapy Treatment Note     Name: Charly [facility] Number: [id number]    Therapy Diagnosis:   Encounter Diagnoses   Name Primary?    Decreased ROM of left foot Yes    Achilles tendinitis of left lower extremity     Impaired flexibility of lower extremity      Physician: Casey Man MD    Visit Date: 1/3/2023    Physician Orders: PT Eval and Treat   Medical Diagnosis from Referral: Achilles tendinitis of left lower extremity  Evaluation Date: 12/20/2022  Authorization Period Expiration: 3/27/2023  Plan of Care Expiration: 2/10/2023  Visit # / Visits authorized: 3 / 20   (POC 2/12)   FOTO Due visit 5  FOTO Due visit 10      Time In: 1102  Time Out: 1159  Total Billable Time: 57 minutes      Precautions: Standard    Insurance: Payor: MEDICAID / Plan: [employer] / Product Type: Managed Medicaid /     SUBJECTIVE     Pt reports: pain in the plantar surface of foot  He was compliant with home exercise program.  Response to previous treatment: positive  Functional change: decreased pain    Pain: 4/10  Location: left foot      OBJECTIVE     Objective Measures updated at progress report unless specified.     Treatment       Charly received the treatments listed below:      THERAPEUTIC EXERCISES to develop ROM and flexibility for 49 minutes including :    During 49 minutes of therapeutic exercise, Charly, was supervised by a rehabilitation technician under the direction of the treating therapist.    Long sitting Gastroc-soleus stretch 3 x 30 sec  Long sitting soleus stretch 3 x 30 sec  Plantar Flexion stretch with towel and manual overpressure 3 x 30 sec  Arch lifts 3  x 10  Toe yoga 3 x 10  Seated heel slides x 30  Ankle DF/IN/eversion/PF with RTB 3 x 10   Wobble board A/P, M/L, cw/ccw x 30  Dorsiflexion/plantarflexion on 1/2 roll  Eversion/Inversion on 1/2 roll    Shuttle:  50# Bilateral x 30  50# heel raises x 20    Plyotoss on airex, green ball x 20        MANUAL THERAPY TECHNIQUES including Joint mobilizations were applied to left ankle for 8 minutes.   Myofacial Release with therapy bar to L Gastroc and planter surface  A/P talar glides on wedge grade II/III  NOT PERFORMED   M/L Calcaneal glides grade II/III  NOT PERFORMED       Patient Education and Home Exercises     Home Exercises Provided and Patient Education Provided     Education provided:   - Importance of improving lower extremity flexibility    Written Home Exercises Provided: Patient instructed to cont prior HEP. Exercises were reviewed and Charly was able to demonstrate them prior to the end of the session.  Charly demonstrated good  understanding of the education provided. See EMR under Patient Instructions for exercises provided during therapy sessions    ASSESSMENT     Charly presented with pain in L plantar surface, decreased arch noted on L foot.  Decreased pain after Manual Therapy was performed.  Continued LE and foot flexibility to improve job duties, hobbies.     Charly Is progressing well towards his goals.   Pt prognosis is Good.     Pt will continue to benefit from skilled outpatient physical therapy to address the deficits listed in the problem list box on initial evaluation, provide pt/family education and to maximize pt's level of independence in the home and community environment.     Pt's spiritual, cultural and educational needs considered and pt agreeable to plan of care and goals.     Anticipated barriers to physical therapy: none    Goals:   Short Term Goals (STG) # weeks Goal Review Date Reviewed Date Met   1. The patient will begin a written HEP 1 progressing 1/3/2023       2. Increase ankle dorsiflexion to 5 degrees 3 progressing 1/3/2023       3. Decrease soft tissue tenderness to mild 3 progressing 1/3/2023          Long Term Goals (LTG) # weeks Goal Review Date Reviewed Date Met   1. The patient will be independent with his HEP for maintenance 6 progressing 1/3/2023       2. Increase  ankle dorsiflexion to 10 degrees 6 progressing 1/3/2023       3. Decrease FOTO score to 36 % 6 progressing 1/3/2023       4. The patient will be able to ascend/descend 20 step/stairs without onset of symptoms.    6 progressing 1/3/2023          PLAN     Continue with physical therapy as per plan of care      Kalli Cintron, PTA

## 2023-01-03 NOTE — PROGRESS NOTES
30 day visit PT-PTA face-face discussion with MALICK Tam re: patient status, POC, and plan for progression done 1/3/23.  Kalli Cintron, PTA

## 2023-01-05 ENCOUNTER — CLINICAL SUPPORT (OUTPATIENT)
Dept: REHABILITATION | Facility: HOSPITAL | Age: 28
End: 2023-01-05
Payer: MEDICAID

## 2023-01-05 ENCOUNTER — OFFICE VISIT (OUTPATIENT)
Dept: URGENT CARE | Facility: CLINIC | Age: 28
End: 2023-01-05
Attending: NURSE PRACTITIONER
Payer: MEDICAID

## 2023-01-05 VITALS
OXYGEN SATURATION: 100 % | RESPIRATION RATE: 18 BRPM | BODY MASS INDEX: 29.26 KG/M2 | TEMPERATURE: 98 F | WEIGHT: 209 LBS | HEIGHT: 71 IN | HEART RATE: 90 BPM | SYSTOLIC BLOOD PRESSURE: 119 MMHG | DIASTOLIC BLOOD PRESSURE: 79 MMHG

## 2023-01-05 DIAGNOSIS — R04.0 BLEEDING FROM THE NOSE: ICD-10-CM

## 2023-01-05 DIAGNOSIS — M76.62 ACHILLES TENDINITIS OF LEFT LOWER EXTREMITY: ICD-10-CM

## 2023-01-05 DIAGNOSIS — R29.898 IMPAIRED FLEXIBILITY OF LOWER EXTREMITY: ICD-10-CM

## 2023-01-05 DIAGNOSIS — D80.1 HYPOGAMMAGLOBULINEMIA: Primary | ICD-10-CM

## 2023-01-05 DIAGNOSIS — H66.93 BILATERAL OTITIS MEDIA, UNSPECIFIED OTITIS MEDIA TYPE: ICD-10-CM

## 2023-01-05 DIAGNOSIS — M25.675 DECREASED ROM OF LEFT FOOT: Primary | ICD-10-CM

## 2023-01-05 PROCEDURE — 3078F PR MOST RECENT DIASTOLIC BLOOD PRESSURE < 80 MM HG: ICD-10-PCS | Mod: CPTII,S$GLB,, | Performed by: NURSE PRACTITIONER

## 2023-01-05 PROCEDURE — 97110 THERAPEUTIC EXERCISES: CPT | Mod: PN,CQ

## 2023-01-05 PROCEDURE — 3074F PR MOST RECENT SYSTOLIC BLOOD PRESSURE < 130 MM HG: ICD-10-PCS | Mod: CPTII,S$GLB,, | Performed by: NURSE PRACTITIONER

## 2023-01-05 PROCEDURE — 3008F BODY MASS INDEX DOCD: CPT | Mod: CPTII,S$GLB,, | Performed by: NURSE PRACTITIONER

## 2023-01-05 PROCEDURE — 1159F MED LIST DOCD IN RCRD: CPT | Mod: CPTII,S$GLB,, | Performed by: NURSE PRACTITIONER

## 2023-01-05 PROCEDURE — 1160F PR REVIEW ALL MEDS BY PRESCRIBER/CLIN PHARMACIST DOCUMENTED: ICD-10-PCS | Mod: CPTII,S$GLB,, | Performed by: NURSE PRACTITIONER

## 2023-01-05 PROCEDURE — 3074F SYST BP LT 130 MM HG: CPT | Mod: CPTII,S$GLB,, | Performed by: NURSE PRACTITIONER

## 2023-01-05 PROCEDURE — 3008F PR BODY MASS INDEX (BMI) DOCUMENTED: ICD-10-PCS | Mod: CPTII,S$GLB,, | Performed by: NURSE PRACTITIONER

## 2023-01-05 PROCEDURE — 99204 OFFICE O/P NEW MOD 45 MIN: CPT | Mod: S$GLB,,, | Performed by: NURSE PRACTITIONER

## 2023-01-05 PROCEDURE — 1160F RVW MEDS BY RX/DR IN RCRD: CPT | Mod: CPTII,S$GLB,, | Performed by: NURSE PRACTITIONER

## 2023-01-05 PROCEDURE — 1159F PR MEDICATION LIST DOCUMENTED IN MEDICAL RECORD: ICD-10-PCS | Mod: CPTII,S$GLB,, | Performed by: NURSE PRACTITIONER

## 2023-01-05 PROCEDURE — 99204 PR OFFICE/OUTPT VISIT, NEW, LEVL IV, 45-59 MIN: ICD-10-PCS | Mod: S$GLB,,, | Performed by: NURSE PRACTITIONER

## 2023-01-05 PROCEDURE — 3078F DIAST BP <80 MM HG: CPT | Mod: CPTII,S$GLB,, | Performed by: NURSE PRACTITIONER

## 2023-01-05 RX ORDER — DEXAMETHASONE SODIUM PHOSPHATE 4 MG/ML
8 INJECTION, SOLUTION INTRA-ARTICULAR; INTRALESIONAL; INTRAMUSCULAR; INTRAVENOUS; SOFT TISSUE
Status: COMPLETED | OUTPATIENT
Start: 2023-01-05 | End: 2023-01-05

## 2023-01-05 RX ORDER — DOXYCYCLINE 100 MG/1
100 CAPSULE ORAL EVERY 12 HOURS
Qty: 14 CAPSULE | Refills: 0 | Status: SHIPPED | OUTPATIENT
Start: 2023-01-05 | End: 2023-01-12

## 2023-01-05 RX ADMIN — DEXAMETHASONE SODIUM PHOSPHATE 8 MG: 4 INJECTION, SOLUTION INTRA-ARTICULAR; INTRALESIONAL; INTRAMUSCULAR; INTRAVENOUS; SOFT TISSUE at 02:01

## 2023-01-05 NOTE — PATIENT INSTRUCTIONS
Discontinue amoxicillin  Start doxycycline 100mg twice daily with food x 7 days  Saline nasal spray to both nostrils twice daily  Follow up in this clinic or with your PCP if your symptoms do not improve

## 2023-01-05 NOTE — PROGRESS NOTES
[facility]   Physical Therapy Treatment Note     Name: Charly [facility] Number: [id number]    Therapy Diagnosis:   Encounter Diagnoses   Name Primary?    Decreased ROM of left foot Yes    Achilles tendinitis of left lower extremity     Impaired flexibility of lower extremity      Physician: Casey Man MD    Visit Date: 1/5/2023    Physician Orders: PT Eval and Treat   Medical Diagnosis from Referral: Achilles tendinitis of left lower extremity  Evaluation Date: 12/20/2022  Authorization Period Expiration: 3/27/2023  Plan of Care Expiration: 2/10/2023  Visit # / Visits authorized: 4 / 20   (POC 3/12)   FOTO Due visit 5  FOTO Due visit 10      Time In: 102p  Time Out: 200p  Total Billable Time: 58 minutes      Precautions: Standard    Insurance: Payor: MEDICAID / Plan: [employer] / Product Type: Managed Medicaid /     SUBJECTIVE     Pt reports: doing well today  He was compliant with home exercise program.  Response to previous treatment: good  Functional change: decreased pain    Pain: 4/10  Location: left foot      OBJECTIVE     Objective Measures updated at progress report unless specified.     Treatment       Charly received the treatments listed below:      THERAPEUTIC EXERCISES to develop ROM and flexibility for 30 minutes including :    During 20 minutes of therapeutic exercise, Charly, was supervised by a rehabilitation technician under the direction of the treating therapist.    Long sitting Gastroc-soleus stretch 3 x 30 sec  Long sitting soleus stretch 3 x 30 sec  Plantar Flexion stretch with towel and manual overpressure 3 x 30 sec  Arch lifts 3  x 10  Toe yoga 3 x 10  Seated heel slides x 30  Ankle DF/IN/eversion/PF with RTB 3 x 10   Wobble board A/P, M/L, cw/ccw x 30  Dorsiflexion/plantarflexion on 1/2 roll  Eversion/Inversion on 1/2 roll    Shuttle:  50# Bilateral x 30  50# heel raises x 20    Plyotoss on green disc, green ball x 30  Single Leg Stance 3 x 30  sec   HR/TR on airex x 30       MANUAL THERAPY TECHNIQUES including Joint mobilizations were applied to left ankle for 8 minutes.   Myofacial Release with therapy bar to L Gastroc and planter surface  A/P talar glides on wedge grade II/III  NOT PERFORMED   M/L Calcaneal glides grade II/III  NOT PERFORMED       Patient Education and Home Exercises     Home Exercises Provided and Patient Education Provided     Education provided:   - Importance of improving lower extremity flexibility    Written Home Exercises Provided: Patient instructed to cont prior HEP. Exercises were reviewed and Charly was able to demonstrate them prior to the end of the session.  Charly demonstrated good  understanding of the education provided. See EMR under Patient Instructions for exercises provided during therapy sessions    ASSESSMENT     Charly continues with pain in L foot, but has decreased overall.  Added additional balance added to decrease fall risk, improve ADL's.    Charly Is progressing well towards his goals.   Pt prognosis is Good.     Pt will continue to benefit from skilled outpatient physical therapy to address the deficits listed in the problem list box on initial evaluation, provide pt/family education and to maximize pt's level of independence in the home and community environment.     Pt's spiritual, cultural and educational needs considered and pt agreeable to plan of care and goals.     Anticipated barriers to physical therapy: none    Goals:   Short Term Goals (STG) # weeks Goal Review Date Reviewed Date Met   1. The patient will begin a written HEP 1 progressing 1/5/2023       2. Increase ankle dorsiflexion to 5 degrees 3 progressing 1/5/2023       3. Decrease soft tissue tenderness to mild 3 progressing 1/5/2023          Long Term Goals (LTG) # weeks Goal Review Date Reviewed Date Met   1. The patient will be independent with his HEP for maintenance 6 progressing 1/5/2023       2. Increase ankle dorsiflexion to 10 degrees 6  progressing 1/5/2023       3. Decrease FOTO score to 36 % 6 progressing 1/5/2023       4. The patient will be able to ascend/descend 20 step/stairs without onset of symptoms.    6 progressing 1/5/2023          PLAN     Continue with physical therapy as per plan of care      Kalli Cintron, PTA

## 2023-01-05 NOTE — PROGRESS NOTES
"Subjective:       Patient ID: Charly Mattson is a 27 y.o. male.    Vitals:  height is 5' 11" (1.803 m) and weight is 94.8 kg (209 lb). His oral temperature is 98.1 °F (36.7 °C). His blood pressure is 119/79 and his pulse is 90. His respiration is 18 and oxygen saturation is 100%.     Chief Complaint: Otalgia    Pt states "he was seen a week ago at another urgent care. He was placed on amoxicillin for a double ear infection. He is now nose bleeds and the ear pain has not gone away. He has 2 days left on the medication."    Otalgia     HENT:  Positive for ear pain and nosebleeds.      Objective:      Physical Exam   Constitutional: He is oriented to person, place, and time.   HENT:   Head: Normocephalic and atraumatic.   Ears:   Right Ear: There is swelling and tenderness. Tympanic membrane is injected.   Left Ear: There is swelling and tenderness. Tympanic membrane is injected.   Nose: No mucosal edema, rhinorrhea or sinus tenderness.   Mouth/Throat: Mucous membranes are moist.   Eyes: Conjunctivae are normal. Extraocular movement intact   Neck: Neck supple.   Cardiovascular: Normal rate, regular rhythm, normal heart sounds and normal pulses.   Pulmonary/Chest: Effort normal and breath sounds normal.   Abdominal: Normal appearance. Soft.   Musculoskeletal: Normal range of motion.         General: Normal range of motion.   Neurological: He is alert and oriented to person, place, and time.   Skin: Skin is warm and dry. Capillary refill takes 2 to 3 seconds.   Psychiatric: His behavior is normal. Mood normal.   Nursing note and vitals reviewed.      Assessment:       1. Hypogammaglobulinemia    2. Bilateral otitis media, unspecified otitis media type    3. Bleeding from the nose          Plan:       Patient on day 5 of 7 of amoxicillin. Continues with bilateral ear pain. He has developed intermittent nose bleeds. His nose has not bled today. Bilateral TM's injected with swelling and tenderness. He takes azithromycin " 250mg on Monday, Wednesday, Friday for immunodeficiency. Will discontinue amoxicillin and start doxycycline.   Hypogammaglobulinemia    Bilateral otitis media, unspecified otitis media type  -     dexAMETHasone injection 8 mg  -     doxycycline (VIBRAMYCIN) 100 MG Cap; Take 1 capsule (100 mg total) by mouth every 12 (twelve) hours. for 7 days  Dispense: 14 capsule; Refill: 0    Bleeding from the nose    Discontinue amoxicillin  Start doxycycline 100mg twice daily with food x 7 days  Saline nasal spray to both nostrils twice daily  Follow up in this clinic or with your PCP if your symptoms do not improve

## 2023-01-10 ENCOUNTER — CLINICAL SUPPORT (OUTPATIENT)
Dept: REHABILITATION | Facility: HOSPITAL | Age: 28
End: 2023-01-10
Payer: MEDICAID

## 2023-01-10 DIAGNOSIS — M25.675 DECREASED ROM OF LEFT FOOT: Primary | ICD-10-CM

## 2023-01-10 DIAGNOSIS — M76.62 ACHILLES TENDINITIS OF LEFT LOWER EXTREMITY: ICD-10-CM

## 2023-01-10 DIAGNOSIS — R29.898 IMPAIRED FLEXIBILITY OF LOWER EXTREMITY: ICD-10-CM

## 2023-01-10 PROCEDURE — 97110 THERAPEUTIC EXERCISES: CPT | Mod: PN | Performed by: PHYSICAL THERAPIST

## 2023-01-12 ENCOUNTER — CLINICAL SUPPORT (OUTPATIENT)
Dept: REHABILITATION | Facility: HOSPITAL | Age: 28
End: 2023-01-12
Payer: MEDICAID

## 2023-01-12 DIAGNOSIS — R29.898 IMPAIRED FLEXIBILITY OF LOWER EXTREMITY: ICD-10-CM

## 2023-01-12 DIAGNOSIS — M25.675 DECREASED ROM OF LEFT FOOT: Primary | ICD-10-CM

## 2023-01-12 DIAGNOSIS — M76.62 ACHILLES TENDINITIS OF LEFT LOWER EXTREMITY: ICD-10-CM

## 2023-01-12 PROCEDURE — 97110 THERAPEUTIC EXERCISES: CPT | Mod: PN,CQ

## 2023-01-12 NOTE — PROGRESS NOTES
"[facility]   Physical Therapy Treatment Note     Name: Charly [facility] Number: [id number]    Therapy Diagnosis:   Encounter Diagnoses   Name Primary?    Decreased ROM of left foot Yes    Achilles tendinitis of left lower extremity     Impaired flexibility of lower extremity      Physician: Casey Man MD    Visit Date: 1/12/2023    Physician Orders: PT Eval and Treat   Medical Diagnosis from Referral: Achilles tendinitis of left lower extremity  Evaluation Date: 12/20/2022  Authorization Period Expiration: 3/27/2023  Plan of Care Expiration: 2/10/2023  Visit # / Visits authorized: 4 / 20   (POC 4/12)   FOTO Due visit 5  FOTO Due visit 10      Time In: 110p  Time Out: 156p  Total Billable Time: 30 minutes      Precautions: Standard    Insurance: Payor: MEDICAID / Plan: [employer] / Product Type: Managed Medicaid /     SUBJECTIVE     Pt reports: "Burning" where the injection was given,   He was compliant with home exercise program.  Response to previous treatment: good  Functional change: Improved toe flexibility    Pain: 6/10-skin, not actual heel pain  Location: left foot      OBJECTIVE     Objective Measures updated at progress report unless specified.     Treatment       Charly received the treatments listed below:      THERAPEUTIC EXERCISES to develop ROM and flexibility for 53 minutes including :      Long sitting Gastroc-soleus stretch 3 x 30 sec  Long sitting soleus stretch 3 x 30 sec  Plantar Flexion stretch with towel and manual overpressure 3 x 30 sec  Arch lifts 3  x 10  Toe yoga 3 x 10  Seated heel slides x 30  Ankle DF/IN/eversion/PF with RTB 3 x 10   Wobble board A/P, M/L, cw/ccw x 30  Dorsiflexion/plantarflexion on 1/2 roll  Eversion/Inversion on 1/2 roll    Shuttle:  62# Bilateral x 30  62# heel raises x 20    Plyotoss on green disc, red ball x 30  Single Leg Stance airex 3 x 30 sec   HR/TR on airex x 30       MANUAL THERAPY TECHNIQUES including Joint " mobilizations were applied to left ankle for 3 minutes.   Myofacial Release with therapy bar to L Gastroc and planter surface  A/P talar glides on wedge grade II/III  NOT PERFORMED   M/L Calcaneal glides grade II/III  NOT PERFORMED   A/P ankle mobs, grade II  Inferior ankle distraction, grade II    Patient Education and Home Exercises     Home Exercises Provided and Patient Education Provided     Education provided:   - Importance of improving lower extremity flexibility    Written Home Exercises Provided: Patient instructed to cont prior HEP. Exercises were reviewed and Charly was able to demonstrate them prior to the end of the session.  Charly demonstrated good  understanding of the education provided. See EMR under Patient Instructions for exercises provided during therapy sessions    ASSESSMENT     Charly tolerated PRE's with good form and technique.  Reported burning in plantar surface of foot with standing activities.  Making good progress overall.     Charly Is progressing well towards his goals.   Pt prognosis is Good.     Pt will continue to benefit from skilled outpatient physical therapy to address the deficits listed in the problem list box on initial evaluation, provide pt/family education and to maximize pt's level of independence in the home and community environment.     Pt's spiritual, cultural and educational needs considered and pt agreeable to plan of care and goals.     Anticipated barriers to physical therapy: none    Goals:   Short Term Goals (STG) # weeks Goal Review Date Reviewed Date Met   1. The patient will begin a written HEP 1 progressing 1/12/2023       2. Increase ankle dorsiflexion to 5 degrees 3 progressing 1/12/2023       3. Decrease soft tissue tenderness to mild 3 progressing 1/12/2023          Long Term Goals (LTG) # weeks Goal Review Date Reviewed Date Met   1. The patient will be independent with his HEP for maintenance 6 progressing 1/12/2023       2. Increase ankle dorsiflexion to  10 degrees 6 progressing 1/12/2023       3. Decrease FOTO score to 36 % 6 progressing 1/12/2023       4. The patient will be able to ascend/descend 20 step/stairs without onset of symptoms.    6 progressing 1/12/2023          PLAN     Continue with physical therapy as per plan of care      Kalli Cintron, PTA

## 2023-01-17 ENCOUNTER — CLINICAL SUPPORT (OUTPATIENT)
Dept: REHABILITATION | Facility: HOSPITAL | Age: 28
End: 2023-01-17
Payer: MEDICAID

## 2023-01-17 DIAGNOSIS — R29.898 IMPAIRED FLEXIBILITY OF LOWER EXTREMITY: ICD-10-CM

## 2023-01-17 DIAGNOSIS — M76.62 ACHILLES TENDINITIS OF LEFT LOWER EXTREMITY: ICD-10-CM

## 2023-01-17 DIAGNOSIS — M25.675 DECREASED ROM OF LEFT FOOT: Primary | ICD-10-CM

## 2023-01-17 PROCEDURE — 97110 THERAPEUTIC EXERCISES: CPT | Mod: PN,CQ

## 2023-01-17 NOTE — PROGRESS NOTES
[facility]   Physical Therapy Treatment Note     Name: Charly [facility] Number: [id number]    Therapy Diagnosis:   Encounter Diagnoses   Name Primary?    Decreased ROM of left foot Yes    Achilles tendinitis of left lower extremity     Impaired flexibility of lower extremity      Physician: Casey Man MD    Visit Date: 1/17/2023    Physician Orders: PT Eval and Treat   Medical Diagnosis from Referral: Achilles tendinitis of left lower extremity  Evaluation Date: 12/20/2022  Authorization Period Expiration: 3/27/2023  Plan of Care Expiration: 2/10/2023  Visit # / Visits authorized: 7 / 20   (POC 5/12)   FOTO Due visit 5-1/17/23  FOTO Due visit 10      Time In: 114p - late arrival  Time Out: 157p  Total Billable Time: 43 minutes      Precautions: Standard    Insurance: Payor: MEDICAID / Plan: [employer] / Product Type: Managed Medicaid /     SUBJECTIVE     Pt reports: sensitivity remains on the skin, muscle pain decreased  He was compliant with home exercise program.  Response to previous treatment: no complaints  Functional change: Improved toe flexibility    Pain: 2/10-skin, not actual heel pain  Location: left foot      OBJECTIVE     Objective Measures updated at progress report unless specified.     Treatment       Charly received the treatments listed below:      THERAPEUTIC EXERCISES to develop ROM and flexibility for 38 minutes including :      Long sitting Gastroc-soleus stretch 3 x 30 sec - standing today  Long sitting soleus stretch 3 x 30 sec - standing today  Plantar Flexion stretch with towel 3 x 30 sec  Arch lifts 3  x 10  Toe yoga 3 x 10  Seated heel slides x 30  NOT PERFORMED   Ankle DF/IN/eversion/PF with RTB 3 x 10   Wobble board A/P, M/L, cw/ccw x 30  NOT PERFORMED   Dorsiflexion/plantarflexion on 1/2 roll x 30  Eversion/Inversion on 1/2 roll x 30    Shuttle:  75# Bilateral x 30  62# heel raises x 20    Slant board Gastroc stretch 3 x 30 sec Bilateral    Single Leg Stance airex 3 x 30 sec   HR/TR on airex x 30    Plyotoss on green disc, red ball x 30     MANUAL THERAPY TECHNIQUES including Joint mobilizations were applied to left ankle for 5 minutes.   Myofacial Release with therapy bar to L Gastroc and planter surface  A/P talar glides on wedge grade II/III  NOT PERFORMED   M/L Calcaneal glides grade II/III  NOT PERFORMED   A/P ankle mobs, grade II  Inferior ankle distraction, grade II    Patient Education and Home Exercises     Home Exercises Provided and Patient Education Provided     Education provided:   - Importance of improving lower extremity flexibility    Written Home Exercises Provided: Patient instructed to cont prior HEP. Exercises were reviewed and Charly was able to demonstrate them prior to the end of the session.  Chraly demonstrated good  understanding of the education provided. See EMR under Patient Instructions for exercises provided during therapy sessions    ASSESSMENT     Scabbed area at injection site, with skin peeling.  Pt reports this is better overall than in previous days.  No increase in s/s reported.  Increased ankle Range of Motion and decreased pain reported.     Charly Is progressing well towards his goals.   Pt prognosis is Good.     Pt will continue to benefit from skilled outpatient physical therapy to address the deficits listed in the problem list box on initial evaluation, provide pt/family education and to maximize pt's level of independence in the home and community environment.     Pt's spiritual, cultural and educational needs considered and pt agreeable to plan of care and goals.     Anticipated barriers to physical therapy: none    Goals:   Short Term Goals (STG) # weeks Goal Review Date Reviewed Date Met   1. The patient will begin a written HEP 1 progressing 1/17/2023       2. Increase ankle dorsiflexion to 5 degrees 3 progressing 1/17/2023       3. Decrease soft tissue tenderness to mild 3 progressing 1/17/2023           Long Term Goals (LTG) # weeks Goal Review Date Reviewed Date Met   1. The patient will be independent with his HEP for maintenance 6 progressing 1/17/2023       2. Increase ankle dorsiflexion to 10 degrees 6 progressing 1/17/2023       3. Decrease FOTO score to 36 % 6 progressing 1/17/2023       4. The patient will be able to ascend/descend 20 step/stairs without onset of symptoms.    6 progressing 1/17/2023          PLAN     Continue with physical therapy as per plan of care      Kalli Cintron, PTA

## 2023-01-19 ENCOUNTER — CLINICAL SUPPORT (OUTPATIENT)
Dept: REHABILITATION | Facility: HOSPITAL | Age: 28
End: 2023-01-19
Payer: MEDICAID

## 2023-01-19 DIAGNOSIS — R29.898 IMPAIRED FLEXIBILITY OF LOWER EXTREMITY: ICD-10-CM

## 2023-01-19 DIAGNOSIS — M76.62 ACHILLES TENDINITIS OF LEFT LOWER EXTREMITY: ICD-10-CM

## 2023-01-19 DIAGNOSIS — M25.675 DECREASED ROM OF LEFT FOOT: Primary | ICD-10-CM

## 2023-01-19 PROCEDURE — 97110 THERAPEUTIC EXERCISES: CPT | Mod: PN,CQ

## 2023-01-19 NOTE — PROGRESS NOTES
[facility]   Physical Therapy Treatment Note     Name: Charly [facility] Number: [id number]    Therapy Diagnosis:   Encounter Diagnoses   Name Primary?    Decreased ROM of left foot Yes    Achilles tendinitis of left lower extremity     Impaired flexibility of lower extremity        Physician: Casey Man MD    Visit Date: 1/19/2023    Physician Orders: PT Eval and Treat   Medical Diagnosis from Referral: Achilles tendinitis of left lower extremity  Evaluation Date: 12/20/2022  Authorization Period Expiration: 3/27/2023  Plan of Care Expiration: 2/10/2023  Visit # / Visits authorized: 8 / 20   (POC 6/12)   FOTO Due visit 5-1/17/23  FOTO Due visit 10      Time In: 109p - late arrival  Time Out: 200p  Total Billable Time: 50 minutes      Precautions: Standard    Insurance: Payor: MEDICAID / Plan: [employer] / Product Type: Managed Medicaid /     SUBJECTIVE     Pt reports: sensitivity remains on the skin, muscle pain decreased  He was compliant with home exercise program.  Response to previous treatment: no complaints  Functional change: Improved toe flexibility    Pain: 0/10-skin, not actual heel pain  Location: left foot      OBJECTIVE     Objective Measures updated at progress report unless specified.     Treatment       Charly received the treatments listed below:      THERAPEUTIC EXERCISES to develop ROM and flexibility for 50 minutes including :      Long sitting Gastroc-soleus stretch 3 x 30 sec - standing today  Long sitting soleus stretch 3 x 30 sec - standing today  Plantar Flexion stretch with towel 3 x 30 sec  Arch lifts 3  x 10  Toe yoga 3 x 10  Seated heel slides x 20  Ankle DF/IN/eversion/PF with RTB 3 x 10   Wobble board A/P, M/L, cw/ccw x 30    Dorsiflexion/plantarflexion on 1/2 roll x 30  Eversion/Inversion on 1/2 roll x 30    Shuttle:  75# Bilateral x 30  75# heel raises x 20    Slant board Gastroc stretch 3 x 30 sec Bilateral   Single Leg Stance airex 3 x  30 sec   HR/TR on airex x 30  TRX squats x 30    Plyotoss on green disc, red ball x 30     MANUAL THERAPY TECHNIQUES including Joint mobilizations were applied to left ankle for 5 minutes.   Myofacial Release with therapy bar to L Gastroc and planter surface NOT PERFORMED   A/P talar glides on wedge grade II/III  NOT PERFORMED   M/L Calcaneal glides grade II/III  NOT PERFORMED   A/P ankle mobs, grade II  Inferior ankle distraction, grade II    Patient Education and Home Exercises     Home Exercises Provided and Patient Education Provided     Education provided:   - Importance of improving lower extremity flexibility    Written Home Exercises Provided: Patient instructed to cont prior HEP. Exercises were reviewed and Charly was able to demonstrate them prior to the end of the session.  Charly demonstrated good  understanding of the education provided. See EMR under Patient Instructions for exercises provided during therapy sessions    ASSESSMENT     Charly continues to show progression with L ankle Range of Motion.  He was able to help his mother move with minimal increased L foot pain.     Charly Is progressing well towards his goals.   Pt prognosis is Good.     Pt will continue to benefit from skilled outpatient physical therapy to address the deficits listed in the problem list box on initial evaluation, provide pt/family education and to maximize pt's level of independence in the home and community environment.     Pt's spiritual, cultural and educational needs considered and pt agreeable to plan of care and goals.     Anticipated barriers to physical therapy: none    Goals:   Short Term Goals (STG) # weeks Goal Review Date Reviewed Date Met   1. The patient will begin a written HEP 1 progressing 1/19/2023       2. Increase ankle dorsiflexion to 5 degrees 3 progressing 1/19/2023       3. Decrease soft tissue tenderness to mild 3 progressing 1/19/2023          Long Term Goals (LTG) # weeks Goal Review Date Reviewed Date  Met   1. The patient will be independent with his HEP for maintenance 6 progressing 1/19/2023       2. Increase ankle dorsiflexion to 10 degrees 6 progressing 1/19/2023       3. Decrease FOTO score to 36 % 6 progressing 1/19/2023       4. The patient will be able to ascend/descend 20 step/stairs without onset of symptoms.    6 progressing 1/19/2023          PLAN     Continue with physical therapy as per plan of care      Kalli Cintron, PTA

## 2023-01-24 ENCOUNTER — OFFICE VISIT (OUTPATIENT)
Dept: FAMILY MEDICINE | Facility: CLINIC | Age: 28
End: 2023-01-24
Payer: MEDICAID

## 2023-01-24 ENCOUNTER — CLINICAL SUPPORT (OUTPATIENT)
Dept: REHABILITATION | Facility: HOSPITAL | Age: 28
End: 2023-01-24
Payer: MEDICAID

## 2023-01-24 VITALS
DIASTOLIC BLOOD PRESSURE: 70 MMHG | TEMPERATURE: 98 F | HEART RATE: 72 BPM | WEIGHT: 211 LBS | SYSTOLIC BLOOD PRESSURE: 102 MMHG | OXYGEN SATURATION: 97 % | BODY MASS INDEX: 29.43 KG/M2 | RESPIRATION RATE: 16 BRPM

## 2023-01-24 DIAGNOSIS — M25.675 DECREASED ROM OF LEFT FOOT: Primary | ICD-10-CM

## 2023-01-24 DIAGNOSIS — M76.62 ACHILLES TENDINITIS OF LEFT LOWER EXTREMITY: ICD-10-CM

## 2023-01-24 DIAGNOSIS — R74.01 TRANSAMINITIS: ICD-10-CM

## 2023-01-24 DIAGNOSIS — R29.898 IMPAIRED FLEXIBILITY OF LOWER EXTREMITY: ICD-10-CM

## 2023-01-24 DIAGNOSIS — D83.9 CVID (COMMON VARIABLE IMMUNODEFICIENCY): Primary | ICD-10-CM

## 2023-01-24 DIAGNOSIS — R09.89 CHRONIC SINUS COMPLAINTS: ICD-10-CM

## 2023-01-24 PROCEDURE — 97110 THERAPEUTIC EXERCISES: CPT | Mod: PN | Performed by: PHYSICAL THERAPIST

## 2023-01-24 PROCEDURE — 3008F PR BODY MASS INDEX (BMI) DOCUMENTED: ICD-10-PCS | Mod: CPTII,,, | Performed by: FAMILY MEDICINE

## 2023-01-24 PROCEDURE — 3008F BODY MASS INDEX DOCD: CPT | Mod: CPTII,,, | Performed by: FAMILY MEDICINE

## 2023-01-24 PROCEDURE — 99214 OFFICE O/P EST MOD 30 MIN: CPT | Mod: S$PBB,,, | Performed by: FAMILY MEDICINE

## 2023-01-24 PROCEDURE — 99214 PR OFFICE/OUTPT VISIT, EST, LEVL IV, 30-39 MIN: ICD-10-PCS | Mod: S$PBB,,, | Performed by: FAMILY MEDICINE

## 2023-01-24 PROCEDURE — 99213 OFFICE O/P EST LOW 20 MIN: CPT | Mod: PBBFAC,PO | Performed by: FAMILY MEDICINE

## 2023-01-24 PROCEDURE — 1159F MED LIST DOCD IN RCRD: CPT | Mod: CPTII,,, | Performed by: FAMILY MEDICINE

## 2023-01-24 PROCEDURE — 3074F SYST BP LT 130 MM HG: CPT | Mod: CPTII,,, | Performed by: FAMILY MEDICINE

## 2023-01-24 PROCEDURE — 1159F PR MEDICATION LIST DOCUMENTED IN MEDICAL RECORD: ICD-10-PCS | Mod: CPTII,,, | Performed by: FAMILY MEDICINE

## 2023-01-24 PROCEDURE — 99999 PR PBB SHADOW E&M-EST. PATIENT-LVL III: CPT | Mod: PBBFAC,,, | Performed by: FAMILY MEDICINE

## 2023-01-24 PROCEDURE — 99999 PR PBB SHADOW E&M-EST. PATIENT-LVL III: ICD-10-PCS | Mod: PBBFAC,,, | Performed by: FAMILY MEDICINE

## 2023-01-24 PROCEDURE — 3078F PR MOST RECENT DIASTOLIC BLOOD PRESSURE < 80 MM HG: ICD-10-PCS | Mod: CPTII,,, | Performed by: FAMILY MEDICINE

## 2023-01-24 PROCEDURE — 3078F DIAST BP <80 MM HG: CPT | Mod: CPTII,,, | Performed by: FAMILY MEDICINE

## 2023-01-24 PROCEDURE — 3074F PR MOST RECENT SYSTOLIC BLOOD PRESSURE < 130 MM HG: ICD-10-PCS | Mod: CPTII,,, | Performed by: FAMILY MEDICINE

## 2023-01-24 RX ORDER — AZITHROMYCIN 250 MG/1
500 TABLET, FILM COATED ORAL EVERY OTHER DAY
COMMUNITY
Start: 2022-10-24 | End: 2023-08-25

## 2023-01-24 NOTE — PATIENT INSTRUCTIONS
Deny Parks,     If you are due for any health screening(s) below please notify me so we can arrange them to be ordered and scheduled to maintain your health. Most healthy patients complete it. Don't lose out on improving your health.     All of your core healthy metrics are met.

## 2023-01-24 NOTE — PROGRESS NOTES
Subjective:   Patient ID: Charly Mattson is a 27 y.o. male     Chief Complaint: Checkup    Here for checkup. Doing well. No current complaints.    Review of Systems   Respiratory:  Negative for shortness of breath.    Cardiovascular:  Negative for chest pain.   Gastrointestinal:  Negative for abdominal pain.   Genitourinary:  Negative for dysuria.   Past Medical History:   Diagnosis Date    Fatty liver     Immunoglobulin deficiency     Immunoglobulin deficiency     CVID    Pneumonia      Past Surgical History:   Procedure Laterality Date    ADENOIDECTOMY      BIOPSY N/A 9/23/2020    Procedure: BIOPSY;  Surgeon: [facility];  Location: [facility] OR;  Service: Interventional Radiology;  Laterality: N/A;    MANDIBLE FRACTURE SURGERY      SINUS SURGERY      TONSILLECTOMY      TYMPANOSTOMY TUBE PLACEMENT      TYMPANOSTOMY TUBE PLACEMENT      x 4     Objective:     Vitals:    01/24/23 1005   BP: 102/70   Pulse: 72   Resp: 16   Temp: 97.9 °F (36.6 °C)     Body mass index is 29.43 kg/m².  Physical Exam  Vitals and nursing note reviewed.   Constitutional:       Appearance: He is well-developed.   HENT:      Head: Normocephalic and atraumatic.   Eyes:      General: No scleral icterus.     Conjunctiva/sclera: Conjunctivae normal.   Cardiovascular:      Heart sounds: No murmur heard.  Pulmonary:      Effort: Pulmonary effort is normal. No respiratory distress.   Musculoskeletal:         General: No deformity. Normal range of motion.      Cervical back: Normal range of motion and neck supple.   Skin:     Coloration: Skin is not pale.      Findings: No rash.   Neurological:      Mental Status: He is alert and oriented to person, place, and time.   Psychiatric:         Behavior: Behavior normal.         Thought Content: Thought content normal.         Judgment: Judgment normal.     Assessment:     1. CVID (common variable immunodeficiency)    2. Chronic sinus complaints    3. Transaminitis      Plan:   CVID (common  variable immunodeficiency)  Follows with immunology at [facility]  Chronic sinus complaints  stable  Transaminitis  Noted  history elevated liver enzymes. Notes father had cirrhosis. Does endorse alcohol consumption but only limited only to infrequent occasions.          Total time spent of Less than 30 minutes minutes on the day of the visit.This includes face to face time and preparing to see the patient, obtaining and reviewing separately obtained history, documenting clinical information in the electronic or other health record, independently interpreting results and communicating results to the patient/family/caregiver, or care coordinator.    Established patient with me has been instructed that must see me at least 1 time yearly (every 365 days) for refills of medications. Seeing other providers in this clinic is fine but expectation is to see me yearly.    Porfirio Novoa MD  01/24/2023    Portions of this note have been dictated with MIKAEL Benjamin

## 2023-01-24 NOTE — PROGRESS NOTES
[facility]   Physical Therapy Treatment Note     Name: Charly [facility] Number: [id number]    Therapy Diagnosis:   Encounter Diagnoses   Name Primary?    Decreased ROM of left foot Yes    Achilles tendinitis of left lower extremity     Impaired flexibility of lower extremity        Physician: Casey Man MD    Visit Date: 1/24/2023    Physician Orders: PT Eval and Treat   Medical Diagnosis from Referral: Achilles tendinitis of left lower extremity  Evaluation Date: 12/20/2022  Authorization Period Expiration: 3/27/2023  Plan of Care Expiration: 2/10/2023  Visit # / Visits authorized: 8 / 20   (POC 9/12)   FOTO Due visit 5-1/17/23  FOTO Due visit 10      Time In: 1315  Time Out: 1400  Total Billable Time: 45 minutes      Precautions: Standard    Insurance: Payor: MEDICAID / Plan: [place] [employer] CONNECT / Product Type: Managed Medicaid /     SUBJECTIVE     Pt reports: he is doing better, not having soreness until the following day after therapy. He reports burning sensations with balance exercises  He was compliant with home exercise program.  Response to previous treatment: no complaints  Functional change: Improved toe flexibility    Pain: 0/10-skin, not actual heel pain  Location: left foot      OBJECTIVE     Objective Measures updated at progress report unless specified.     Treatment       Charly received the treatments listed below:      THERAPEUTIC EXERCISES to develop ROM and flexibility for 50 minutes including :      Long sitting Gastroc-soleus stretch 3 x 30 sec - standing today  Long sitting soleus stretch 3 x 30 sec - standing today  Plantar Flexion stretch with towel 3 x 30 sec  Arch lifts 3  x 10  Toe yoga 3 x 10  Seated heel slides x 20  Ankle DF/IN/eversion/PF with RTB 3 x 10   Wobble board A/P, M/L, cw/ccw x 30    Dorsiflexion/plantarflexion on 1/2 roll x 30  Eversion/Inversion on 1/2 roll x 30    Shuttle:  75# Bilateral x 30  75# heel raises x 20    Slant board  Gastroc stretch 3 x 30 sec Bilateral   Single Leg Stance airex 3 x 30 sec   HR/TR on airex x 30  TRX squats x 30    Plyotoss on green disc, red ball x 30     MANUAL THERAPY TECHNIQUES including Joint mobilizations were applied to left ankle for 5 minutes.   Myofacial Release with therapy bar to L Gastroc and planter surface NOT PERFORMED   A/P talar glides on wedge grade II/III  NOT PERFORMED   M/L Calcaneal glides grade II/III  NOT PERFORMED   A/P ankle mobs, grade II  Inferior ankle distraction, grade II    Patient Education and Home Exercises     Home Exercises Provided and Patient Education Provided     Education provided:   - Importance of improving lower extremity flexibility    Written Home Exercises Provided: Patient instructed to cont prior HEP. Exercises were reviewed and Charly was able to demonstrate them prior to the end of the session.  Charly demonstrated good  understanding of the education provided. See EMR under Patient Instructions for exercises provided during therapy sessions    ASSESSMENT     The patient displays improved lower extremity flexibility and single leg balance. Patient tolerated treatment well without report of adverse effects.       Charly Is progressing well towards his goals.   Pt prognosis is Good.     Pt will continue to benefit from skilled outpatient physical therapy to address the deficits listed in the problem list box on initial evaluation, provide pt/family education and to maximize pt's level of independence in the home and community environment.     Pt's spiritual, cultural and educational needs considered and pt agreeable to plan of care and goals.     Anticipated barriers to physical therapy: none    Goals:   Short Term Goals (STG) # weeks Goal Review Date Reviewed Date Met   1. The patient will begin a written HEP 1 progressing 1/24/2023       2. Increase ankle dorsiflexion to 5 degrees 3 progressing 1/24/2023       3. Decrease soft tissue tenderness to mild 3 progressing  1/24/2023          Long Term Goals (LTG) # weeks Goal Review Date Reviewed Date Met   1. The patient will be independent with his HEP for maintenance 6 progressing 1/24/2023       2. Increase ankle dorsiflexion to 10 degrees 6 progressing 1/24/2023       3. Decrease FOTO score to 36 % 6 progressing 1/24/2023       4. The patient will be able to ascend/descend 20 step/stairs without onset of symptoms.    6 progressing 1/24/2023          PLAN     Continue with physical therapy as per plan of care      Tyrone Tam, PT

## 2023-01-25 ENCOUNTER — CLINICAL SUPPORT (OUTPATIENT)
Dept: REHABILITATION | Facility: HOSPITAL | Age: 28
End: 2023-01-25
Payer: MEDICAID

## 2023-01-25 DIAGNOSIS — M25.675 DECREASED ROM OF LEFT FOOT: Primary | ICD-10-CM

## 2023-01-25 DIAGNOSIS — M76.62 ACHILLES TENDINITIS OF LEFT LOWER EXTREMITY: ICD-10-CM

## 2023-01-25 DIAGNOSIS — R29.898 IMPAIRED FLEXIBILITY OF LOWER EXTREMITY: ICD-10-CM

## 2023-01-25 PROCEDURE — 97110 THERAPEUTIC EXERCISES: CPT | Mod: PN | Performed by: PHYSICAL THERAPIST

## 2023-01-25 NOTE — PROGRESS NOTES
[facility]   Physical Therapy Treatment Note     Name: Charly [facility] Number: [id number]    Therapy Diagnosis:   Encounter Diagnoses   Name Primary?    Decreased ROM of left foot Yes    Achilles tendinitis of left lower extremity     Impaired flexibility of lower extremity        Physician: Casey Man MD    Visit Date: 1/25/2023    Physician Orders: PT Eval and Treat   Medical Diagnosis from Referral: Achilles tendinitis of left lower extremity  Evaluation Date: 12/20/2022  Authorization Period Expiration: 3/27/2023  Plan of Care Expiration: 2/10/2023  Visit # / Visits authorized: 9 / 20   (POC 10/12)   FOTO Due visit 5-1/17/23  FOTO Due visit 10      Time In: 1315  Time Out: 1400  Total Billable Time: 45 minutes      Precautions: Standard    Insurance: Payor: MEDICAID / Plan: [employer] CONNECT / Product Type: Managed Medicaid /     SUBJECTIVE     Pt reports: no pain this morning while at work  He was compliant with home exercise program.  Response to previous treatment: no complaints  Functional change: Improved toe flexibility    Pain: 0/10-skin, not actual heel pain  Location: left foot      OBJECTIVE     Objective Measures updated at progress report unless specified.     Treatment       Charly received the treatments listed below:      THERAPEUTIC EXERCISES to develop ROM and flexibility for 60 minutes including :      Long sitting Gastroc-soleus stretch 3 x 30 sec - standing today  Long sitting soleus stretch 3 x 30 sec - standing today  Plantar Flexion stretch with towel 3 x 30 sec  Arch lifts 3  x 10  Toe yoga 3 x 10  Seated heel slides x 20  Ankle DF/IN/eversion/PF with RTB 3 x 10   Wobble board A/P, M/L, cw/ccw x 30    Dorsiflexion/plantarflexion on 1/2 roll x 30  Eversion/Inversion on 1/2 roll x 30    Shuttle:  75# Bilateral x 30  75# heel raises x 20    Slant board Gastroc stretch 3 x 30 sec Bilateral   Single Leg Stance airex 3 x 30 sec   HR/TR on airex x 30  TRX  squats x 30    Plyotoss on green disc, red ball x 30     MANUAL THERAPY TECHNIQUES including Joint mobilizations were applied to left ankle for 5 minutes.   Myofacial Release with therapy bar to L Gastroc and planter surface NOT PERFORMED   A/P talar glides on wedge grade II/III  NOT PERFORMED   M/L Calcaneal glides grade II/III  NOT PERFORMED   A/P ankle mobs, grade II  Inferior ankle distraction, grade II    Patient Education and Home Exercises     Home Exercises Provided and Patient Education Provided     Education provided:   - Importance of improving lower extremity flexibility    Written Home Exercises Provided: Patient instructed to cont prior HEP. Exercises were reviewed and Charly was able to demonstrate them prior to the end of the session.  Charly demonstrated good  understanding of the education provided. See EMR under Patient Instructions for exercises provided during therapy sessions    ASSESSMENT     The patient continues to display improved lower extremity flexibility and single leg balance. Patient tolerated treatment well without report of adverse effects.       Charly Is progressing well towards his goals.   Pt prognosis is Good.     Pt will continue to benefit from skilled outpatient physical therapy to address the deficits listed in the problem list box on initial evaluation, provide pt/family education and to maximize pt's level of independence in the home and community environment.     Pt's spiritual, cultural and educational needs considered and pt agreeable to plan of care and goals.     Anticipated barriers to physical therapy: none    Goals:   Short Term Goals (STG) # weeks Goal Review Date Reviewed Date Met   1. The patient will begin a written HEP 1 progressing 1/25/2023       2. Increase ankle dorsiflexion to 5 degrees 3 progressing 1/25/2023       3. Decrease soft tissue tenderness to mild 3 progressing 1/25/2023          Long Term Goals (LTG) # weeks Goal Review Date Reviewed Date Met   1.  The patient will be independent with his HEP for maintenance 6 progressing 1/25/2023       2. Increase ankle dorsiflexion to 10 degrees 6 progressing 1/25/2023       3. Decrease FOTO score to 36 % 6 progressing 1/25/2023       4. The patient will be able to ascend/descend 20 step/stairs without onset of symptoms.    6 progressing 1/25/2023          PLAN     Continue with physical therapy as per plan of care      Tyrone Tam, PT

## 2023-02-07 ENCOUNTER — PATIENT MESSAGE (OUTPATIENT)
Dept: FAMILY MEDICINE | Facility: CLINIC | Age: 28
End: 2023-02-07
Payer: MEDICAID

## 2023-03-12 ENCOUNTER — PATIENT MESSAGE (OUTPATIENT)
Dept: FAMILY MEDICINE | Facility: CLINIC | Age: 28
End: 2023-03-12
Payer: MEDICAID

## 2023-03-12 DIAGNOSIS — L60.0 INGROWN TOENAIL: Primary | ICD-10-CM

## 2023-03-15 ENCOUNTER — OFFICE VISIT (OUTPATIENT)
Dept: PODIATRY | Facility: CLINIC | Age: 28
End: 2023-03-15
Payer: MEDICAID

## 2023-03-15 VITALS — WEIGHT: 210 LBS | BODY MASS INDEX: 29.29 KG/M2 | RESPIRATION RATE: 16 BRPM

## 2023-03-15 DIAGNOSIS — M79.672 FOOT PAIN, LEFT: ICD-10-CM

## 2023-03-15 DIAGNOSIS — L60.0 INGROWING NAIL WITH INFECTION: Primary | ICD-10-CM

## 2023-03-15 DIAGNOSIS — L60.0 INGROWN TOENAIL: ICD-10-CM

## 2023-03-15 PROCEDURE — 3008F PR BODY MASS INDEX (BMI) DOCUMENTED: ICD-10-PCS | Mod: CPTII,S$GLB,, | Performed by: PODIATRIST

## 2023-03-15 PROCEDURE — 3008F BODY MASS INDEX DOCD: CPT | Mod: CPTII,S$GLB,, | Performed by: PODIATRIST

## 2023-03-15 PROCEDURE — 11750 NAIL REMOVAL: ICD-10-PCS | Mod: S$GLB,,, | Performed by: PODIATRIST

## 2023-03-15 PROCEDURE — 1159F MED LIST DOCD IN RCRD: CPT | Mod: CPTII,S$GLB,, | Performed by: PODIATRIST

## 2023-03-15 PROCEDURE — 1160F PR REVIEW ALL MEDS BY PRESCRIBER/CLIN PHARMACIST DOCUMENTED: ICD-10-PCS | Mod: CPTII,S$GLB,, | Performed by: PODIATRIST

## 2023-03-15 PROCEDURE — 99203 OFFICE O/P NEW LOW 30 MIN: CPT | Mod: 25,S$GLB,, | Performed by: PODIATRIST

## 2023-03-15 PROCEDURE — 99203 PR OFFICE/OUTPT VISIT, NEW, LEVL III, 30-44 MIN: ICD-10-PCS | Mod: 25,S$GLB,, | Performed by: PODIATRIST

## 2023-03-15 PROCEDURE — 1159F PR MEDICATION LIST DOCUMENTED IN MEDICAL RECORD: ICD-10-PCS | Mod: CPTII,S$GLB,, | Performed by: PODIATRIST

## 2023-03-15 PROCEDURE — 11750 EXCISION NAIL&NAIL MATRIX: CPT | Mod: S$GLB,,, | Performed by: PODIATRIST

## 2023-03-15 PROCEDURE — 1160F RVW MEDS BY RX/DR IN RCRD: CPT | Mod: CPTII,S$GLB,, | Performed by: PODIATRIST

## 2023-03-15 RX ORDER — CEPHALEXIN 500 MG/1
500 CAPSULE ORAL EVERY 6 HOURS
Qty: 28 CAPSULE | Refills: 0 | Status: SHIPPED | OUTPATIENT
Start: 2023-03-15 | End: 2023-03-15

## 2023-03-15 RX ORDER — EPINEPHRINE 0.3 MG/.3ML
INJECTION SUBCUTANEOUS
COMMUNITY
Start: 2022-12-13

## 2023-03-15 RX ORDER — CEPHALEXIN 500 MG/1
500 CAPSULE ORAL EVERY 12 HOURS
Qty: 14 CAPSULE | Refills: 0 | Status: SHIPPED | OUTPATIENT
Start: 2023-03-15 | End: 2023-03-15

## 2023-03-15 RX ORDER — CEPHALEXIN 500 MG/1
500 CAPSULE ORAL EVERY 6 HOURS
Qty: 28 CAPSULE | Refills: 0 | Status: SHIPPED | OUTPATIENT
Start: 2023-03-15 | End: 2023-03-22

## 2023-03-15 NOTE — PROGRESS NOTES
[address]  [place] [address]  Phone: [phone]   [phone]    Patient's PCP:Porfirio Novoa MD  Referring Provider: Dr. Porfirio Novoa    Subjective:      Chief Complaint:: Foot Pain, Nail Problem, and Ingrown Toenail    HPI  Charly Mattson is a 27 y.o. male who presents today with a complaint of left great toenail ingrown at the medial border lasting for a couple of months. Onset of symptoms pain at the medial border of the left great toenail and reports no trauma.  Current symptoms include pain in the medial aspect of the left great toenail with redness and swelling.  Aggravating factors are pressure to the area and closed toe shoes. Symptoms have progressed. Treatment to date have included closed toed shoes and pressure.    Vitals:    03/15/23 1406   Resp: 16   Weight: 95.3 kg (210 lb)   PainSc:   8   PainLoc: Toe      Shoe Size:     Past Surgical History:   Procedure Laterality Date    ADENOIDECTOMY      BIOPSY N/A 9/23/2020    Procedure: BIOPSY;  Surgeon: Dosc Diagnostic Provider;  Location: [facility] OR;  Service: Interventional Radiology;  Laterality: N/A;    MANDIBLE FRACTURE SURGERY      SINUS SURGERY      TONSILLECTOMY      TYMPANOSTOMY TUBE PLACEMENT      TYMPANOSTOMY TUBE PLACEMENT      x 4     Past Medical History:   Diagnosis Date    Fatty liver     Immunoglobulin deficiency     Immunoglobulin deficiency     CVID    Pneumonia      Family History   Problem Relation Age of Onset    Cancer Mother     Liver disease Father         Social History:   Marital Status: Single  Alcohol History:  reports current alcohol use.  Tobacco History:  reports that he has never smoked. He has never used smokeless tobacco.  Drug History:  reports no history of drug use.    Review of patient's allergies indicates:   Allergen Reactions    Levaquin [levofloxacin]     Singulair [montelukast] Other (See Comments)     arrhythmias       Current Outpatient Medications   Medication Sig Dispense Refill     acetaminophen (TYLENOL) 325 MG tablet Take 650 mg by mouth as needed.      albuterol (ACCUNEB) 0.63 mg/3 mL Nebu Take 0.63 mg by nebulization every 6 (six) hours as needed. Rescue      azithromycin (Z-CHRISTIANO) 250 MG tablet Take 500 mg by mouth every other day.      cephALEXin (KEFLEX) 500 MG capsule Take 1 capsule (500 mg total) by mouth every 6 (six) hours. for 7 days 28 capsule 0    EPINEPHrine (EPIPEN) 0.3 mg/0.3 mL AtIn       immun glob G,IgG,-pro-IgA 0-50 1 gram/5 mL (20 %) Soln Inject 200 mg/kg into the skin every 7 days. Sunday      mometasone-formoterol (DULERA) 100-5 mcg/actuation HF Inhale 2 Inhalers into the lungs 2 (two) times a day. Controller 8.3 g 1    sodium chloride 3% 3 % nebulizer solution Take 4 mLs by nebulization 2 (two) times a day.       No current facility-administered medications for this visit.       Review of Systems   Constitutional:  Negative for chills, fatigue, fever and unexpected weight change.   HENT:  Negative for hearing loss and trouble swallowing.    Eyes:  Negative for photophobia and visual disturbance.   Respiratory:  Negative for cough, shortness of breath and wheezing.    Cardiovascular:  Negative for chest pain, palpitations and leg swelling.   Gastrointestinal:  Negative for abdominal pain and nausea.   Genitourinary:  Negative for dysuria and frequency.   Musculoskeletal:  Negative for arthralgias, back pain, gait problem, joint swelling and myalgias.   Skin:  Negative for rash and wound.   Neurological:  Negative for tremors, seizures, weakness, numbness and headaches.   Hematological:  Does not bruise/bleed easily.       Objective:        Physical Exam:   Foot Exam  Physical Exam  Ortho/SPM Exam   Constitutional: Patient is oriented to person, place, and time. Patient appears well-developed and well-nourished. No acute distress.      Psychiatric: Patient has a normal mood and affect. Patient's speech is normal and behavior is normal. Judgment is normal. Cognition  and memory are normal.     Dermatological:  The toenail is incurvated, Medial border of the Left hallux.    moderate erythema noted to the affected area.     Absent paronychia.     Absent abscess.    Skin is normal age and health appropriate color, turgor, texture, and temperature bilateral lower extremities without ulceration, hyperpigmentation, discoloration, masses nodules or cords palpated.  No ecchymosis, erythema, edema, or cardinal signs of infection bilateral lower extremities.    Adequate joint range of motion without pain, limitation, nor crepitation Bilateral feet and ankle joints. Muscle strength is 5/5 in all groups bilaterally.     Protective sensation intact. Pain sensation intact bilateral feet and legs.    DP and PT pulses 2/4 bilateral, capillary refill 3 seconds all toes/distal feet, all toes/both feet warm to touch.   Negative lower extremity edema bilateral.    Imaging:            Assessment:       1. Ingrowing nail with infection    2. Ingrown toenail    3. Foot pain, left      Plan:   Ingrowing nail with infection    Ingrown toenail  -     Ambulatory referral/consult to Podiatry    Foot pain, left    Other orders  -     Discontinue: cephALEXin (KEFLEX) 500 MG capsule; Take 1 capsule (500 mg total) by mouth every 6 (six) hours. for 7 days  Dispense: 28 capsule; Refill: 0  -     Discontinue: cephALEXin (KEFLEX) 500 MG capsule; Take 1 capsule (500 mg total) by mouth every 12 (twelve) hours. for 7 days  Dispense: 14 capsule; Refill: 0  -     cephALEXin (KEFLEX) 500 MG capsule; Take 1 capsule (500 mg total) by mouth every 6 (six) hours. for 7 days  Dispense: 28 capsule; Refill: 0  -     Nail Removal      No follow-ups on file.    Nail Removal    Date/Time: 3/15/2023 1:40 PM  Performed by: Feli Garibay DPM  Authorized by: Feli Garibay DPM     Consent Done?:  Yes (Written)  Time out: Immediately prior to the procedure a time out was called    Prep: patient was prepped and draped in usual sterile  fashion    Location:     Location:  Left foot    Location detail:  Left big toe  Anesthesia:     Anesthesia:  Local infiltration  Procedure Details:     Preparation:  Skin prepped with alcohol    Amount removed:  Partial    Side:  Medial    Wedge excision of skin of nail fold: No      Nail bed sutured?: No      Nail matrix removed:  Partial    Removal method:  Phenol and alcohol    Removed nail replaced and anchored: No      Dressing applied:  4x4, antibiotic ointment and gauze roll    Patient tolerance:  Patient tolerated the procedure well with no immediate complications          Counseling:     I provided patient education verbally regarding:   Patient diagnosis, treatment options, as well as alternatives, risks, and benefits.     This note was created using Dragon voice recognition software that occasionally misinterpreted phrases or words.         Ingrown toenail treatment options of no treatment, avulsion of nail border under local with regrowth of nail, chemical matrixectomy for attempted permanent correction of the problem. Patient was educated about daily dressing changes, soaks, and medications following removal of the nail.       Patient should call the office immediately if any signs of infection, such as fever, chills, sweats, increased redness or pain.    Patient was instructed to call the clinic or go to the emergency department if their symptoms do not improve, worsens, or if new symptoms develop.  Patient was advised that if any increased swelling, pain, or numbness arise to go immediately to the ED. Patient knows to call any time if an emergency arises. Shared decision making occurred and patient verbalized understanding in agreement with this plan.

## 2023-03-29 ENCOUNTER — OFFICE VISIT (OUTPATIENT)
Dept: PODIATRY | Facility: CLINIC | Age: 28
End: 2023-03-29
Payer: MEDICAID

## 2023-03-29 VITALS — OXYGEN SATURATION: 96 % | WEIGHT: 217 LBS | HEIGHT: 71 IN | BODY MASS INDEX: 30.38 KG/M2 | HEART RATE: 81 BPM

## 2023-03-29 DIAGNOSIS — L60.0 INGROWING NAIL WITH INFECTION: ICD-10-CM

## 2023-03-29 DIAGNOSIS — M79.671 FOOT PAIN, RIGHT: ICD-10-CM

## 2023-03-29 DIAGNOSIS — L60.0 INGROWN TOENAIL: Primary | ICD-10-CM

## 2023-03-29 PROCEDURE — 1159F PR MEDICATION LIST DOCUMENTED IN MEDICAL RECORD: ICD-10-PCS | Mod: CPTII,S$GLB,, | Performed by: PODIATRIST

## 2023-03-29 PROCEDURE — 11750 EXCISION NAIL&NAIL MATRIX: CPT | Mod: T5,S$GLB,, | Performed by: PODIATRIST

## 2023-03-29 PROCEDURE — 1160F RVW MEDS BY RX/DR IN RCRD: CPT | Mod: CPTII,S$GLB,, | Performed by: PODIATRIST

## 2023-03-29 PROCEDURE — 11750 NAIL REMOVAL: ICD-10-PCS | Mod: T5,S$GLB,, | Performed by: PODIATRIST

## 2023-03-29 PROCEDURE — 1159F MED LIST DOCD IN RCRD: CPT | Mod: CPTII,S$GLB,, | Performed by: PODIATRIST

## 2023-03-29 PROCEDURE — 1160F PR REVIEW ALL MEDS BY PRESCRIBER/CLIN PHARMACIST DOCUMENTED: ICD-10-PCS | Mod: CPTII,S$GLB,, | Performed by: PODIATRIST

## 2023-03-29 PROCEDURE — 99213 PR OFFICE/OUTPT VISIT, EST, LEVL III, 20-29 MIN: ICD-10-PCS | Mod: 25,S$GLB,, | Performed by: PODIATRIST

## 2023-03-29 PROCEDURE — 3008F BODY MASS INDEX DOCD: CPT | Mod: CPTII,S$GLB,, | Performed by: PODIATRIST

## 2023-03-29 PROCEDURE — 3008F PR BODY MASS INDEX (BMI) DOCUMENTED: ICD-10-PCS | Mod: CPTII,S$GLB,, | Performed by: PODIATRIST

## 2023-03-29 PROCEDURE — 99213 OFFICE O/P EST LOW 20 MIN: CPT | Mod: 25,S$GLB,, | Performed by: PODIATRIST

## 2023-03-29 RX ORDER — CEPHALEXIN 500 MG/1
500 CAPSULE ORAL EVERY 6 HOURS
Qty: 40 CAPSULE | Refills: 0 | Status: ON HOLD | OUTPATIENT
Start: 2023-03-29 | End: 2023-04-10 | Stop reason: HOSPADM

## 2023-03-29 NOTE — PROGRESS NOTES
"  [address] [facility] [address] [place] [address]  Phone: [phone]   [phone]    Patient's PCP:Porfirio Novoa MD  Referring Provider: No ref. provider found    Subjective:      Chief Complaint:: Ingrown Toenail (Follow up p&a left great toe, and right great toe, both borders.), Foot Pain, and Nail Problem    HPI    Charly Mattson is a 27 y.o. male who presents today with a complaint of Ingrown Toenail (Follow up p&a left great toe, and right great toe) . Onset of symptoms started years ago  and reports no trauma.  Current symptoms include pain and pressure . Aggravating factors are shoes and socks . Right foot ingrown toenail is still painful . Left great toenail ingrown pain is improved since treatment. Treatment to date have included P& A  on Left foot great toe. None on the right         Vitals:    03/29/23 1603   Pulse: 81   SpO2: 96%   Weight: 98.4 kg (217 lb)   Height: 5' 11" (1.803 m)   PainSc:   7      Shoe Size: 12     Past Surgical History:   Procedure Laterality Date    ADENOIDECTOMY      BIOPSY N/A 9/23/2020    Procedure: BIOPSY;  Surgeon: Kaleb Diagnostic Provider;  Location: [facility];  Service: Interventional Radiology;  Laterality: N/A;    MANDIBLE FRACTURE SURGERY      SINUS SURGERY      TONSILLECTOMY      TYMPANOSTOMY TUBE PLACEMENT      TYMPANOSTOMY TUBE PLACEMENT      x 4     Past Medical History:   Diagnosis Date    Fatty liver     Immunoglobulin deficiency     Immunoglobulin deficiency     CVID    Pneumonia      Family History   Problem Relation Age of Onset    Cancer Mother     Liver disease Father         Social History:   Marital Status: Single  Alcohol History:  reports current alcohol use.  Tobacco History:  reports that he has never smoked. He has never used smokeless tobacco.  Drug History:  reports no history of drug use.    Review of patient's allergies indicates:   Allergen Reactions    Levaquin [levofloxacin]     Singulair [montelukast] Other (See Comments)     arrhythmias "       Current Outpatient Medications   Medication Sig Dispense Refill    acetaminophen (TYLENOL) 325 MG tablet Take 650 mg by mouth as needed.      albuterol (ACCUNEB) 0.63 mg/3 mL Nebu Take 0.63 mg by nebulization every 6 (six) hours as needed. Rescue      azithromycin (Z-CHRISTIANO) 250 MG tablet Take 500 mg by mouth every other day.      EPINEPHrine (EPIPEN) 0.3 mg/0.3 mL AtIn       immun glob G,IgG,-pro-IgA 0-50 1 gram/5 mL (20 %) Soln Inject 200 mg/kg into the skin every 7 days. Sunday      sodium chloride 3% 3 % nebulizer solution Take 4 mLs by nebulization 2 (two) times a day.      cephALEXin (KEFLEX) 500 MG capsule Take 1 capsule (500 mg total) by mouth every 6 (six) hours. for 10 days 40 capsule 0    mometasone-formoterol (DULERA) 100-5 mcg/actuation HFAA Inhale 2 Inhalers into the lungs 2 (two) times a day. Controller 8.3 g 1     No current facility-administered medications for this visit.       Review of Systems   Constitutional:  Negative for chills, fatigue, fever and unexpected weight change.   HENT:  Negative for hearing loss and trouble swallowing.    Eyes:  Negative for photophobia and visual disturbance.   Respiratory:  Negative for cough, shortness of breath and wheezing.    Cardiovascular:  Negative for chest pain, palpitations and leg swelling.   Gastrointestinal:  Negative for abdominal pain and nausea.   Genitourinary:  Negative for dysuria and frequency.   Musculoskeletal:  Negative for arthralgias, back pain, gait problem, joint swelling and myalgias.   Skin:  Negative for rash and wound.   Neurological:  Negative for tremors, seizures, weakness, numbness and headaches.   Hematological:  Does not bruise/bleed easily.       Objective:        Physical Exam:   Foot Exam  Physical Exam  Ortho/SPM Exam   Constitutional: Patient is oriented to person, place, and time. Patient appears well-developed and well-nourished. No acute distress.      Psychiatric: Patient has a normal mood and affect. Patient's  speech is normal and behavior is normal. Judgment is normal. Cognition and memory are normal.      Removal of ingrown nail, left great toenail medial border , healing normally with no signs of infection.   Absent paronychia.     Absent abscess.    Dermatological:  The toenail is incurvated, Medial, Lateral border of the Right hallux.    moderate erythema noted to the affected area.     Absent paronychia.     Absent abscess.    Skin is normal age and health appropriate color, turgor, texture, and temperature bilateral lower extremities without ulceration, hyperpigmentation, discoloration, masses nodules or cords palpated.  No ecchymosis, erythema, edema, or cardinal signs of infection bilateral lower extremities.     Adequate joint range of motion without pain, limitation, nor crepitation Bilateral feet and ankle joints. Muscle strength is 5/5 in all groups bilaterally.     Protective sensation intact. Pain sensation intact bilateral feet and legs.     DP and PT pulses 2/4 bilateral, capillary refill 3 seconds all toes/distal feet, all toes/both feet warm to touch.   Negative lower extremity edema bilateral.  Imaging:            Assessment:       1. Ingrown toenail - Right Foot    2. Ingrowing nail with infection - Right Foot    3. Foot pain, right      Plan:   Ingrown toenail - Right Foot  -     cephALEXin (KEFLEX) 500 MG capsule; Take 1 capsule (500 mg total) by mouth every 6 (six) hours. for 10 days  Dispense: 40 capsule; Refill: 0    Ingrowing nail with infection - Right Foot  -     cephALEXin (KEFLEX) 500 MG capsule; Take 1 capsule (500 mg total) by mouth every 6 (six) hours. for 10 days  Dispense: 40 capsule; Refill: 0    Foot pain, right  -     cephALEXin (KEFLEX) 500 MG capsule; Take 1 capsule (500 mg total) by mouth every 6 (six) hours. for 10 days  Dispense: 40 capsule; Refill: 0    Other orders  -     Nail Removal      No follow-ups on file.    Nail Removal    Date/Time: 3/29/2023 3:20 PM  Performed by:  Feli Garibay DPM  Authorized by: Feli Garibay DPM     Consent Done?:  Yes (Written)  Time out: Immediately prior to the procedure a time out was called    Prep: patient was prepped and draped in usual sterile fashion    Location:     Location:  Right foot    Location detail:  Right big toe  Anesthesia:     Anesthesia:  Digital block  Procedure Details:     Preparation:  Skin prepped with alcohol    Amount removed:  Partial    Side:  Bilateral    Wedge excision of skin of nail fold: No      Nail bed sutured?: No      Nail matrix removed:  Partial    Removal method:  Phenol and alcohol    Removed nail replaced and anchored: No      Dressing applied:  4x4, antibiotic ointment and gauze roll          Counseling:     I provided patient education verbally regarding:   Patient diagnosis, treatment options, as well as alternatives, risks, and benefits.     This note was created using Dragon voice recognition software that occasionally misinterpreted phrases or words.       Ingrown toenail treatment options of no treatment, avulsion of nail border under local with regrowth of nail, chemical matrixectomy for attempted permanent correction of the problem. Patient was educated about daily dressing changes, soaks, and medications following removal of the nail.     Patient should call the office immediately if any signs of infection, such as fever, chills, sweats, increased redness or pain.    Patient was instructed to call the clinic or go to the emergency department if their symptoms do not improve, worsens, or if new symptoms develop.  Patient was advised that if any increased swelling, pain, or numbness arise to go immediately to the ED. Patient knows to call any time if an emergency arises. Shared decision making occurred and patient verbalized understanding in agreement with this plan.

## 2023-04-09 ENCOUNTER — HOSPITAL ENCOUNTER (OUTPATIENT)
Facility: HOSPITAL | Age: 28
Discharge: HOME | End: 2023-04-10
Attending: EMERGENCY MEDICINE | Admitting: STUDENT IN AN ORGANIZED HEALTH CARE EDUCATION/TRAINING PROGRAM
Payer: MEDICAID

## 2023-04-09 DIAGNOSIS — R00.0 TACHYCARDIA: ICD-10-CM

## 2023-04-09 DIAGNOSIS — A41.9 SEPSIS: Primary | ICD-10-CM

## 2023-04-09 LAB
ALBUMIN SERPL BCP-MCNC: 4.5 G/DL (ref 3.5–5.2)
ALP SERPL-CCNC: 140 U/L (ref 55–135)
ALT SERPL W/O P-5'-P-CCNC: 179 U/L (ref 10–44)
ANION GAP SERPL CALC-SCNC: 14 MMOL/L (ref 8–16)
AST SERPL-CCNC: 141 U/L (ref 10–40)
BASOPHILS # BLD AUTO: 0.07 K/UL (ref 0–0.2)
BASOPHILS NFR BLD: 0.5 % (ref 0–1.9)
BILIRUB SERPL-MCNC: 1.4 MG/DL (ref 0.1–1)
BILIRUB UR QL STRIP: NEGATIVE
BUN SERPL-MCNC: 12 MG/DL (ref 6–20)
CALCIUM SERPL-MCNC: 10.3 MG/DL (ref 8.7–10.5)
CHLORIDE SERPL-SCNC: 103 MMOL/L (ref 95–110)
CLARITY UR: CLEAR
CO2 SERPL-SCNC: 22 MMOL/L (ref 23–29)
COLOR UR: YELLOW
CREAT SERPL-MCNC: 1.1 MG/DL (ref 0.5–1.4)
DIFFERENTIAL METHOD: ABNORMAL
EOSINOPHIL # BLD AUTO: 0 K/UL (ref 0–0.5)
EOSINOPHIL NFR BLD: 0.1 % (ref 0–8)
ERYTHROCYTE [DISTWIDTH] IN BLOOD BY AUTOMATED COUNT: 13.3 % (ref 11.5–14.5)
EST. GFR  (NO RACE VARIABLE): >60 ML/MIN/1.73 M^2
GLUCOSE SERPL-MCNC: 94 MG/DL (ref 70–110)
GLUCOSE UR QL STRIP: NEGATIVE
HCT VFR BLD AUTO: 48.8 % (ref 40–54)
HCV AB SERPL QL IA: NORMAL
HGB BLD-MCNC: 16.4 G/DL (ref 14–18)
HGB UR QL STRIP: NEGATIVE
HIV 1+2 AB+HIV1 P24 AG SERPL QL IA: NORMAL
IMM GRANULOCYTES # BLD AUTO: 0.05 K/UL (ref 0–0.04)
IMM GRANULOCYTES NFR BLD AUTO: 0.3 % (ref 0–0.5)
INFLUENZA A, MOLECULAR: NEGATIVE
INFLUENZA B, MOLECULAR: NEGATIVE
KETONES UR QL STRIP: NEGATIVE
LACTATE SERPL-SCNC: 1.3 MMOL/L (ref 0.5–2.2)
LACTATE SERPL-SCNC: 1.5 MMOL/L (ref 0.5–2.2)
LACTATE SERPL-SCNC: 2.3 MMOL/L (ref 0.5–2.2)
LEUKOCYTE ESTERASE UR QL STRIP: NEGATIVE
LYMPHOCYTES # BLD AUTO: 1 K/UL (ref 1–4.8)
LYMPHOCYTES NFR BLD: 7.1 % (ref 18–48)
MCH RBC QN AUTO: 27.2 PG (ref 27–31)
MCHC RBC AUTO-ENTMCNC: 33.6 G/DL (ref 32–36)
MCV RBC AUTO: 81 FL (ref 82–98)
MONOCYTES # BLD AUTO: 1 K/UL (ref 0.3–1)
MONOCYTES NFR BLD: 6.6 % (ref 4–15)
NEUTROPHILS # BLD AUTO: 12.4 K/UL (ref 1.8–7.7)
NEUTROPHILS NFR BLD: 85.4 % (ref 38–73)
NITRITE UR QL STRIP: NEGATIVE
NRBC BLD-RTO: 0 /100 WBC
PH UR STRIP: 8 [PH] (ref 5–8)
PLATELET # BLD AUTO: 174 K/UL (ref 150–450)
PMV BLD AUTO: 11.2 FL (ref 9.2–12.9)
POTASSIUM SERPL-SCNC: 3.7 MMOL/L (ref 3.5–5.1)
PROCALCITONIN SERPL IA-MCNC: 0.05 NG/ML
PROT SERPL-MCNC: 8.7 G/DL (ref 6–8.4)
PROT UR QL STRIP: NEGATIVE
RBC # BLD AUTO: 6.03 M/UL (ref 4.6–6.2)
SARS-COV-2 RDRP RESP QL NAA+PROBE: NEGATIVE
SODIUM SERPL-SCNC: 139 MMOL/L (ref 136–145)
SP GR UR STRIP: 1.01 (ref 1–1.03)
SPECIMEN SOURCE: NORMAL
URN SPEC COLLECT METH UR: NORMAL
UROBILINOGEN UR STRIP-ACNC: NEGATIVE EU/DL
WBC # BLD AUTO: 14.52 K/UL (ref 3.9–12.7)

## 2023-04-09 PROCEDURE — 93010 EKG 12-LEAD: ICD-10-PCS | Mod: ,,, | Performed by: INTERNAL MEDICINE

## 2023-04-09 PROCEDURE — 27000646 HC AEROBIKA DEVICE

## 2023-04-09 PROCEDURE — 87389 HIV-1 AG W/HIV-1&-2 AB AG IA: CPT | Performed by: EMERGENCY MEDICINE

## 2023-04-09 PROCEDURE — U0002 COVID-19 LAB TEST NON-CDC: HCPCS | Performed by: EMERGENCY MEDICINE

## 2023-04-09 PROCEDURE — 94761 N-INVAS EAR/PLS OXIMETRY MLT: CPT

## 2023-04-09 PROCEDURE — 96367 TX/PROPH/DG ADDL SEQ IV INF: CPT

## 2023-04-09 PROCEDURE — 36415 COLL VENOUS BLD VENIPUNCTURE: CPT

## 2023-04-09 PROCEDURE — 99900035 HC TECH TIME PER 15 MIN (STAT)

## 2023-04-09 PROCEDURE — 25000242 PHARM REV CODE 250 ALT 637 W/ HCPCS: Performed by: EMERGENCY MEDICINE

## 2023-04-09 PROCEDURE — 83605 ASSAY OF LACTIC ACID: CPT | Mod: 91 | Performed by: STUDENT IN AN ORGANIZED HEALTH CARE EDUCATION/TRAINING PROGRAM

## 2023-04-09 PROCEDURE — 94669 MECHANICAL CHEST WALL OSCILL: CPT

## 2023-04-09 PROCEDURE — 25000242 PHARM REV CODE 250 ALT 637 W/ HCPCS: Performed by: STUDENT IN AN ORGANIZED HEALTH CARE EDUCATION/TRAINING PROGRAM

## 2023-04-09 PROCEDURE — 94640 AIRWAY INHALATION TREATMENT: CPT

## 2023-04-09 PROCEDURE — 96361 HYDRATE IV INFUSION ADD-ON: CPT

## 2023-04-09 PROCEDURE — G0378 HOSPITAL OBSERVATION PER HR: HCPCS

## 2023-04-09 PROCEDURE — 96375 TX/PRO/DX INJ NEW DRUG ADDON: CPT

## 2023-04-09 PROCEDURE — 94760 N-INVAS EAR/PLS OXIMETRY 1: CPT

## 2023-04-09 PROCEDURE — 81003 URINALYSIS AUTO W/O SCOPE: CPT | Performed by: EMERGENCY MEDICINE

## 2023-04-09 PROCEDURE — 93010 ELECTROCARDIOGRAM REPORT: CPT | Mod: ,,, | Performed by: INTERNAL MEDICINE

## 2023-04-09 PROCEDURE — 84145 PROCALCITONIN (PCT): CPT

## 2023-04-09 PROCEDURE — 25000003 PHARM REV CODE 250

## 2023-04-09 PROCEDURE — 85025 COMPLETE CBC W/AUTO DIFF WBC: CPT | Performed by: EMERGENCY MEDICINE

## 2023-04-09 PROCEDURE — 87040 BLOOD CULTURE FOR BACTERIA: CPT | Performed by: EMERGENCY MEDICINE

## 2023-04-09 PROCEDURE — 27100233

## 2023-04-09 PROCEDURE — 86803 HEPATITIS C AB TEST: CPT | Performed by: EMERGENCY MEDICINE

## 2023-04-09 PROCEDURE — 63600175 PHARM REV CODE 636 W HCPCS

## 2023-04-09 PROCEDURE — 63600175 PHARM REV CODE 636 W HCPCS: Performed by: EMERGENCY MEDICINE

## 2023-04-09 PROCEDURE — 36415 COLL VENOUS BLD VENIPUNCTURE: CPT | Performed by: STUDENT IN AN ORGANIZED HEALTH CARE EDUCATION/TRAINING PROGRAM

## 2023-04-09 PROCEDURE — 80053 COMPREHEN METABOLIC PANEL: CPT | Performed by: EMERGENCY MEDICINE

## 2023-04-09 PROCEDURE — 94664 DEMO&/EVAL PT USE INHALER: CPT | Mod: XB

## 2023-04-09 PROCEDURE — 96365 THER/PROPH/DIAG IV INF INIT: CPT

## 2023-04-09 PROCEDURE — 87502 INFLUENZA DNA AMP PROBE: CPT | Performed by: EMERGENCY MEDICINE

## 2023-04-09 PROCEDURE — 96366 THER/PROPH/DIAG IV INF ADDON: CPT

## 2023-04-09 PROCEDURE — 83605 ASSAY OF LACTIC ACID: CPT | Performed by: EMERGENCY MEDICINE

## 2023-04-09 PROCEDURE — 25000003 PHARM REV CODE 250: Performed by: EMERGENCY MEDICINE

## 2023-04-09 PROCEDURE — 94640 AIRWAY INHALATION TREATMENT: CPT | Mod: XB

## 2023-04-09 PROCEDURE — 99285 EMERGENCY DEPT VISIT HI MDM: CPT | Mod: 25

## 2023-04-09 PROCEDURE — 93005 ELECTROCARDIOGRAM TRACING: CPT

## 2023-04-09 PROCEDURE — 36415 COLL VENOUS BLD VENIPUNCTURE: CPT | Performed by: EMERGENCY MEDICINE

## 2023-04-09 RX ORDER — ALBUTEROL SULFATE 2.5 MG/.5ML
0.63 SOLUTION RESPIRATORY (INHALATION) EVERY 6 HOURS PRN
Status: DISCONTINUED | OUTPATIENT
Start: 2023-04-09 | End: 2023-04-09

## 2023-04-09 RX ORDER — KETOROLAC TROMETHAMINE 30 MG/ML
15 INJECTION, SOLUTION INTRAMUSCULAR; INTRAVENOUS
Status: COMPLETED | OUTPATIENT
Start: 2023-04-09 | End: 2023-04-09

## 2023-04-09 RX ORDER — FLUTICASONE FUROATE AND VILANTEROL 100; 25 UG/1; UG/1
1 POWDER RESPIRATORY (INHALATION) DAILY
Status: DISCONTINUED | OUTPATIENT
Start: 2023-04-09 | End: 2023-04-09

## 2023-04-09 RX ORDER — IPRATROPIUM BROMIDE AND ALBUTEROL SULFATE 2.5; .5 MG/3ML; MG/3ML
3 SOLUTION RESPIRATORY (INHALATION)
Status: COMPLETED | OUTPATIENT
Start: 2023-04-09 | End: 2023-04-09

## 2023-04-09 RX ORDER — ONDANSETRON 2 MG/ML
4 INJECTION INTRAMUSCULAR; INTRAVENOUS
Status: COMPLETED | OUTPATIENT
Start: 2023-04-09 | End: 2023-04-09

## 2023-04-09 RX ORDER — MEROPENEM AND SODIUM CHLORIDE 1 G/50ML
1 INJECTION, SOLUTION INTRAVENOUS
Status: COMPLETED | OUTPATIENT
Start: 2023-04-09 | End: 2023-04-10

## 2023-04-09 RX ORDER — SODIUM CHLORIDE FOR INHALATION 3 %
4 VIAL, NEBULIZER (ML) INHALATION 2 TIMES DAILY
Status: DISCONTINUED | OUTPATIENT
Start: 2023-04-09 | End: 2023-04-09

## 2023-04-09 RX ORDER — SODIUM CHLORIDE FOR INHALATION 3 %
4 VIAL, NEBULIZER (ML) INHALATION 2 TIMES DAILY
Status: DISCONTINUED | OUTPATIENT
Start: 2023-04-09 | End: 2023-04-10 | Stop reason: HOSPADM

## 2023-04-09 RX ORDER — ALBUTEROL SULFATE 2.5 MG/.5ML
2.5 SOLUTION RESPIRATORY (INHALATION) 2 TIMES DAILY
Status: DISCONTINUED | OUTPATIENT
Start: 2023-04-09 | End: 2023-04-10 | Stop reason: HOSPADM

## 2023-04-09 RX ORDER — GUAIFENESIN 600 MG/1
600 TABLET, EXTENDED RELEASE ORAL 2 TIMES DAILY
Status: DISCONTINUED | OUTPATIENT
Start: 2023-04-09 | End: 2023-04-10 | Stop reason: HOSPADM

## 2023-04-09 RX ORDER — FLUTICASONE FUROATE AND VILANTEROL 100; 25 UG/1; UG/1
1 POWDER RESPIRATORY (INHALATION) DAILY
Status: DISCONTINUED | OUTPATIENT
Start: 2023-04-10 | End: 2023-04-10 | Stop reason: HOSPADM

## 2023-04-09 RX ORDER — SODIUM CHLORIDE 9 MG/ML
INJECTION, SOLUTION INTRAVENOUS CONTINUOUS
Status: DISCONTINUED | OUTPATIENT
Start: 2023-04-09 | End: 2023-04-10

## 2023-04-09 RX ORDER — SODIUM CHLORIDE 0.9 % (FLUSH) 0.9 %
10 SYRINGE (ML) INJECTION EVERY 12 HOURS PRN
Status: DISCONTINUED | OUTPATIENT
Start: 2023-04-09 | End: 2023-04-10 | Stop reason: HOSPADM

## 2023-04-09 RX ORDER — MEROPENEM AND SODIUM CHLORIDE 500 MG/50ML
500 INJECTION, SOLUTION INTRAVENOUS ONCE
Status: COMPLETED | OUTPATIENT
Start: 2023-04-09 | End: 2023-04-09

## 2023-04-09 RX ORDER — ACETAMINOPHEN 325 MG/1
650 TABLET ORAL EVERY 8 HOURS PRN
Status: DISCONTINUED | OUTPATIENT
Start: 2023-04-09 | End: 2023-04-10 | Stop reason: HOSPADM

## 2023-04-09 RX ORDER — ONDANSETRON 2 MG/ML
4 INJECTION INTRAMUSCULAR; INTRAVENOUS EVERY 8 HOURS PRN
Status: DISCONTINUED | OUTPATIENT
Start: 2023-04-09 | End: 2023-04-10 | Stop reason: HOSPADM

## 2023-04-09 RX ADMIN — ALBUTEROL SULFATE 2.5 MG: 2.5 SOLUTION RESPIRATORY (INHALATION) at 08:04

## 2023-04-09 RX ADMIN — ONDANSETRON 4 MG: 2 INJECTION INTRAMUSCULAR; INTRAVENOUS at 01:04

## 2023-04-09 RX ADMIN — ACETAMINOPHEN 650 MG: 325 TABLET ORAL at 03:04

## 2023-04-09 RX ADMIN — SODIUM CHLORIDE: 9 INJECTION, SOLUTION INTRAVENOUS at 03:04

## 2023-04-09 RX ADMIN — KETOROLAC TROMETHAMINE 15 MG: 30 INJECTION, SOLUTION INTRAMUSCULAR at 01:04

## 2023-04-09 RX ADMIN — SODIUM CHLORIDE, POTASSIUM CHLORIDE, SODIUM LACTATE AND CALCIUM CHLORIDE 1000 ML: 600; 310; 30; 20 INJECTION, SOLUTION INTRAVENOUS at 01:04

## 2023-04-09 RX ADMIN — IPRATROPIUM BROMIDE AND ALBUTEROL SULFATE 3 ML: 2.5; .5 SOLUTION RESPIRATORY (INHALATION) at 01:04

## 2023-04-09 RX ADMIN — ACETAMINOPHEN 650 MG: 325 TABLET ORAL at 10:04

## 2023-04-09 RX ADMIN — GUAIFENESIN 600 MG: 600 TABLET, EXTENDED RELEASE ORAL at 08:04

## 2023-04-09 RX ADMIN — MEROPENEM AND SODIUM CHLORIDE 1 G: 1 INJECTION, SOLUTION INTRAVENOUS at 10:04

## 2023-04-09 RX ADMIN — SODIUM CHLORIDE SOLN NEBU 3% 4 ML: 3 NEBU SOLN at 08:04

## 2023-04-09 RX ADMIN — VANCOMYCIN HYDROCHLORIDE 2000 MG: 1 INJECTION, POWDER, LYOPHILIZED, FOR SOLUTION INTRAVENOUS at 04:04

## 2023-04-09 RX ADMIN — MEROPENEM AND SODIUM CHLORIDE 500 MG: 500 INJECTION, SOLUTION INTRAVENOUS at 03:04

## 2023-04-09 NOTE — NURSING
Nurses Note -- 4 Eyes      4/9/2023   3:48 PM      Skin assessed during: Admit      [x] No Pressure Injuries Present    []Prevention Measures Documented      [] Yes- Altered Skin Integrity Present or Discovered   [] LDA Added if Not in Epic (Describe Wound)   [] New Altered Skin Integrity was Present on Admit and Documented in LDA   [] Wound Image Taken    Wound Care Consulted? No    Attending Nurse:  Lila Rodriguez RN     Second RN/Staff Member:  Zehra PARRA RN

## 2023-04-09 NOTE — LETTER
April 10, 2023         [address] [place] [id number]  Phone: [phone]       Patient: Charly Mattson   YOB: 1995  Date of Visit: 04/06/2023- 04/10/2023    To Whom It May Concern:    Kj Mattson  was at [facility] on 04/09/2023- 04/10/2023. The patient may return to work on 04/17/2023 with no restrictions. If you have any questions or concerns, or if I can be of further assistance, please do not hesitate to contact me.    Sincerely,    Patrice Fam RN

## 2023-04-09 NOTE — PLAN OF CARE
Chart reviewed.    Patient noted to have suspected sepsis with pulmonary source with a history of bronchiectasis (noted to be localized to RML on prior CT imaging) and recurrent respiratory infections. The CXR showed a possible upper right lung field nodule 15 mm in diameter.    I have ordered CT chest and chest PT BID to accompany the empiric Abx and sputum cultures already ordered.

## 2023-04-09 NOTE — HPI
Charly Mattson is a 28 year old with a PMHx significant for common variable immune deficiency, frequent pulmonary infections, and bronchiectasis, presenting for subjective fever, myalgias, productive cough and wheezing over the last 5 days. He reports first noticing his symptoms which started with muscle aches on Tuesday. He progressively got worse and developed green sputum producing cough. He reports a subjective fever yesterday. He also complains of intermittent nausea. He states he receives IV immunoglobulin replacement once a week. His last injection was on Tuesday. He follows with pulmonology, Dr. Alvares, for frequency pulmonary infections. He has been on prophylaxis Z-jose antonio every M-W-F for over a year. ED workup significant for fever of 100.8, tachycardia and elevated WBC. CMP showing elevated liver enzymes slightly above his baseline. CXR concerning for early PNA. Started on IV merrem and vanc in ED. Procal and lactic acid pending. Patient to be admitted to hospital medicine services for further evaluation and management.

## 2023-04-09 NOTE — PROGRESS NOTES
Pharmacokinetic Initial Assessment: IV Vancomycin    Assessment/Plan:    Initiate intravenous vancomycin with loading dose of 2000 mg once followed by a maintenance dose of vancomycin 1500mg IV every 12 hours  Desired empiric serum trough concentration is 15 to 20 mcg/mL  Draw vancomycin trough level 60 min prior to third dose on 4/10 at approximately 1600.  Pharmacy will continue to follow and monitor vancomycin.      Please contact pharmacy at extension [phone] with any questions regarding this assessment.     Thank you for the consult,   Jonathon Herring       Patient brief summary:  Charly Mattson is a 28 y.o. male initiated on antimicrobial therapy with IV Vancomycin for treatment of suspected lower respiratory infection    Drug Allergies:   Review of patient's allergies indicates:   Allergen Reactions    Levaquin [levofloxacin]     Singulair [montelukast] Other (See Comments)     arrhythmias       Actual Body Weight:   101.6kg    Renal Function:   Estimated Creatinine Clearance: 121.3 mL/min (based on SCr of 1.1 mg/dL).,     Dialysis Method (if applicable):  N/A    CBC (last 72 hours):  Recent Labs   Lab Result Units 04/09/23  1322   WBC K/uL 14.52*   Hemoglobin g/dL 16.4   Hematocrit % 48.8   Platelets K/uL 174   Gran % % 85.4*   Lymph % % 7.1*   Mono % % 6.6   Eosinophil % % 0.1   Basophil % % 0.5   Differential Method  Automated       Metabolic Panel (last 72 hours):  Recent Labs   Lab Result Units 04/09/23  1323 04/09/23  1402   Sodium mmol/L 139  --    Potassium mmol/L 3.7  --    Chloride mmol/L 103  --    CO2 mmol/L 22*  --    Glucose mg/dL 94  --    Glucose, UA   --  Negative   BUN mg/dL 12  --    Creatinine mg/dL 1.1  --    Albumin g/dL 4.5  --    Total Bilirubin mg/dL 1.4*  --    Alkaline Phosphatase U/L 140*  --    AST U/L 141*  --    ALT U/L 179*  --        Drug levels (last 3 results):  No results for input(s): VANCOMYCINRA, VANCORANDOM, VANCOMYCINPE, VANCOPEAK, VANCOMYCINTR, VANCOTROUGH in the last 72  hours.    Microbiologic Results:  Microbiology Results (last 7 days)       Procedure Component Value Units Date/Time    Culture, Respiratory with Gram Stain [[id number]]     Order Status: No result Specimen: Respiratory     Influenza A & B by Molecular [[id number]] Collected: 04/09/23 1303    Order Status: Completed Specimen: Nasal Swab Updated: 04/09/23 1423     Influenza A, Molecular Negative     Influenza B, Molecular Negative     Flu A & B Source Nasal swab    Blood culture x two cultures. Draw prior to antibiotics. [[id number]] Collected: 04/09/23 1323    Order Status: Sent Specimen: Blood from Antecubital, Right Updated: 04/09/23 1323    Blood culture x two cultures. Draw prior to antibiotics. [[id number]] Collected: 04/09/23 1322    Order Status: Sent Specimen: Blood from Antecubital, Left Updated: 04/09/23 1322

## 2023-04-09 NOTE — SUBJECTIVE & OBJECTIVE
Past Medical History:   Diagnosis Date    Fatty liver     Immunoglobulin deficiency     Immunoglobulin deficiency     CVID    Pneumonia        Past Surgical History:   Procedure Laterality Date    ADENOIDECTOMY      BIOPSY N/A 2020    Procedure: BIOPSY;  Surgeon: Kaleb Diagnostic Provider;  Location: [facility] OR;  Service: Interventional Radiology;  Laterality: N/A;    MANDIBLE FRACTURE SURGERY      SINUS SURGERY      TONSILLECTOMY      TYMPANOSTOMY TUBE PLACEMENT      TYMPANOSTOMY TUBE PLACEMENT      x 4       Review of patient's allergies indicates:   Allergen Reactions    Levaquin [levofloxacin]     Singulair [montelukast] Other (See Comments)     arrhythmias       No current facility-administered medications on file prior to encounter.     Current Outpatient Medications on File Prior to Encounter   Medication Sig    acetaminophen (TYLENOL) 325 MG tablet Take 650 mg by mouth as needed.    albuterol (ACCUNEB) 0.63 mg/3 mL Nebu Take 0.63 mg by nebulization every 6 (six) hours as needed. Rescue    azithromycin (Z-CHRISTIANO) 250 MG tablet Take 500 mg by mouth every other day.    [] cephALEXin (KEFLEX) 500 MG capsule Take 1 capsule (500 mg total) by mouth every 6 (six) hours. for 10 days    EPINEPHrine (EPIPEN) 0.3 mg/0.3 mL AtIn     immun glob G,IgG,-pro-IgA 0-50 1 gram/5 mL (20 %) Soln Inject 200 mg/kg into the skin every 7 days.     mometasone-formoterol (DULERA) 100-5 mcg/actuation HFAA Inhale 2 Inhalers into the lungs 2 (two) times a day. Controller    sodium chloride 3% 3 % nebulizer solution Take 4 mLs by nebulization 2 (two) times a day.     Family History       Problem Relation (Age of Onset)    Cancer Mother    Liver disease Father          Tobacco Use    Smoking status: Never    Smokeless tobacco: Never   Substance and Sexual Activity    Alcohol use: Yes    Drug use: No    Sexual activity: Yes     Partners: Female     Review of Systems   Constitutional:  Positive for fatigue and fever. Negative for  chills.   Respiratory:  Positive for cough, chest tightness and wheezing. Negative for shortness of breath.    Cardiovascular:  Positive for palpitations. Negative for leg swelling.   Gastrointestinal:  Positive for nausea. Negative for abdominal distention, abdominal pain and vomiting.   Genitourinary:  Negative for difficulty urinating and dysuria.   Musculoskeletal:  Positive for myalgias.   All other systems reviewed and are negative.  Objective:     Vital Signs (Most Recent):  Temp: 99.3 °F (37.4 °C) (04/09/23 1443)  Pulse: (!) 121 (04/09/23 1432)  Resp: 18 (04/09/23 1432)  BP: (!) 100/53 (04/09/23 1432)  SpO2: 95 % (04/09/23 1317)   Vital Signs (24h Range):  Temp:  [99.3 °F (37.4 °C)-100.8 °F (38.2 °C)] 99.3 °F (37.4 °C)  Pulse:  [119-133] 121  Resp:  [18-20] 18  SpO2:  [95 %] 95 %  BP: (100-127)/(52-92) 100/53     Weight: 98.4 kg (217 lb)  Body mass index is 30.27 kg/m².    Physical Exam  Vitals and nursing note reviewed.   Constitutional:       Appearance: He is ill-appearing.   HENT:      Head: Normocephalic and atraumatic.   Cardiovascular:      Rate and Rhythm: Regular rhythm. Tachycardia present.      Pulses: Normal pulses.      Heart sounds: Normal heart sounds. No murmur heard.    No gallop.   Pulmonary:      Effort: Pulmonary effort is normal. No respiratory distress.      Breath sounds: No wheezing.      Comments: Diminished lung sounds globally, audibly sounds congested  Abdominal:      General: Abdomen is flat.      Palpations: Abdomen is soft.   Musculoskeletal:         General: No swelling or tenderness. Normal range of motion.   Neurological:      General: No focal deficit present.      Mental Status: He is alert. Mental status is at baseline.      Cranial Nerves: No cranial nerve deficit.      Motor: No weakness.           Significant Labs: All pertinent labs within the past 24 hours have been reviewed.  CBC:   Recent Labs   Lab 04/09/23  1322   WBC 14.52*   HGB 16.4   HCT 48.8         CMP:   Recent Labs   Lab 04/09/23  1323      K 3.7      CO2 22*   GLU 94   BUN 12   CREATININE 1.1   CALCIUM 10.3   PROT 8.7*   ALBUMIN 4.5   BILITOT 1.4*   ALKPHOS 140*   *   *   ANIONGAP 14       Significant Imaging: I have reviewed all pertinent imaging results/findings within the past 24 hours.

## 2023-04-09 NOTE — ASSESSMENT & PLAN NOTE
This patient does have evidence of infective focus  My overall impression is sepsis.  Source: Respiratory  Antibiotics given-   Antibiotics (72h ago, onward)    Start     Stop Route Frequency Ordered    04/09/23 1530  meropenem-0.9% sodium chloride 1 g/50 mL IVPB         -- IV Every 8 hours (non-standard times) 04/09/23 1433    04/09/23 1518  vancomycin - pharmacy to dose  ( Pneumonia - Moderate-High MDR )        See Hyperspace for full Linked Orders Report.    -- IV pharmacy to manage frequency 04/09/23 1432    04/09/23 1515  meropenem-0.9% sodium chloride 500 mg/50 mL IVPB         -- IV Once 04/09/23 1414    04/09/23 1500  vancomycin (VANCOCIN) 2,000 mg in dextrose 5 % (D5W) 500 mL IVPB         04/10 0259 IV ED 1 Time 04/09/23 1414        Latest lactate reviewed-  No results for input(s): LACTATE in the last 72 hours.  Organ dysfunction indicated by NA    Fluid challenge Not needed - patient is not hypotensive      Post- resuscitation assessment No - Post resuscitation assessment not needed       Will Not start Pressors- Levophed for MAP of 65  Source control achieved by: IV abx    Concern for developing PNA vs exacerbation of bronchiectasis. Given hx of immunoglobulin deficiency, will start on broad spectrum Abx. Blood cultures and sputum culture pending. Pulmonology consulted

## 2023-04-09 NOTE — H&P
[facility] Medicine  History & Physical    Patient Name: Charly Mattson  MRN: [id number]  Patient Class: OP- Observation  Admission Date: 4/9/2023  Attending Physician: Sreekanth Troncoso MD   Primary Care Provider: Porfirio Novoa MD         Patient information was obtained from patient, past medical records and ER records.     Subjective:     Principal Problem:Sepsis    Chief Complaint:   Chief Complaint   Patient presents with    Fever    Generalized Body Aches    Weakness     X 2 days     Cough        HPI: Charly Mattson is a 28 year old with a PMHx significant for common variable immune deficiency, frequent pulmonary infections, and bronchiectasis, presenting for subjective fever, myalgias, productive cough and wheezing over the last 5 days. He reports first noticing his symptoms which started with muscle aches on Tuesday. He progressively got worse and developed green sputum producing cough. He reports a subjective fever yesterday. He also complains of intermittent nausea. He states he receives IV immunoglobulin replacement once a week. His last injection was on Tuesday. He follows with pulmonology, Dr. Alvares, for frequency pulmonary infections. He has been on prophylaxis Z-jose antonio every M-W-F for over a year. ED workup significant for fever of 100.8, tachycardia and elevated WBC. CMP showing elevated liver enzymes slightly above his baseline. CXR concerning for early PNA. Started on IV merrem and vanc in ED. Procal and lactic acid pending. Patient to be admitted to hospital medicine services for further evaluation and management.           Past Medical History:   Diagnosis Date    Fatty liver     Immunoglobulin deficiency     Immunoglobulin deficiency     CVID    Pneumonia        Past Surgical History:   Procedure Laterality Date    ADENOIDECTOMY      BIOPSY N/A 9/23/2020    Procedure: BIOPSY;  Surgeon: Dosc Diagnostic Provider;  Location: [facility] OR;  Service: Interventional Radiology;   Laterality: N/A;    MANDIBLE FRACTURE SURGERY      SINUS SURGERY      TONSILLECTOMY      TYMPANOSTOMY TUBE PLACEMENT      TYMPANOSTOMY TUBE PLACEMENT      x 4       Review of patient's allergies indicates:   Allergen Reactions    Levaquin [levofloxacin]     Singulair [montelukast] Other (See Comments)     arrhythmias       No current facility-administered medications on file prior to encounter.     Current Outpatient Medications on File Prior to Encounter   Medication Sig    acetaminophen (TYLENOL) 325 MG tablet Take 650 mg by mouth as needed.    albuterol (ACCUNEB) 0.63 mg/3 mL Nebu Take 0.63 mg by nebulization every 6 (six) hours as needed. Rescue    azithromycin (Z-CHRISTIANO) 250 MG tablet Take 500 mg by mouth every other day.    [] cephALEXin (KEFLEX) 500 MG capsule Take 1 capsule (500 mg total) by mouth every 6 (six) hours. for 10 days    EPINEPHrine (EPIPEN) 0.3 mg/0.3 mL AtIn     immun glob G,IgG,-pro-IgA 0-50 1 gram/5 mL (20 %) Soln Inject 200 mg/kg into the skin every 7 days.     mometasone-formoterol (DULERA) 100-5 mcg/actuation HFAA Inhale 2 Inhalers into the lungs 2 (two) times a day. Controller    sodium chloride 3% 3 % nebulizer solution Take 4 mLs by nebulization 2 (two) times a day.     Family History       Problem Relation (Age of Onset)    Cancer Mother    Liver disease Father          Tobacco Use    Smoking status: Never    Smokeless tobacco: Never   Substance and Sexual Activity    Alcohol use: Yes    Drug use: No    Sexual activity: Yes     Partners: Female     Review of Systems   Constitutional:  Positive for fatigue and fever. Negative for chills.   Respiratory:  Positive for cough, chest tightness and wheezing. Negative for shortness of breath.    Cardiovascular:  Positive for palpitations. Negative for leg swelling.   Gastrointestinal:  Positive for nausea. Negative for abdominal distention, abdominal pain and vomiting.   Genitourinary:  Negative for difficulty  urinating and dysuria.   Musculoskeletal:  Positive for myalgias.   All other systems reviewed and are negative.  Objective:     Vital Signs (Most Recent):  Temp: 99.3 °F (37.4 °C) (04/09/23 1443)  Pulse: (!) 121 (04/09/23 1432)  Resp: 18 (04/09/23 1432)  BP: (!) 100/53 (04/09/23 1432)  SpO2: 95 % (04/09/23 1317)   Vital Signs (24h Range):  Temp:  [99.3 °F (37.4 °C)-100.8 °F (38.2 °C)] 99.3 °F (37.4 °C)  Pulse:  [119-133] 121  Resp:  [18-20] 18  SpO2:  [95 %] 95 %  BP: (100-127)/(52-92) 100/53     Weight: 98.4 kg (217 lb)  Body mass index is 30.27 kg/m².    Physical Exam  Vitals and nursing note reviewed.   Constitutional:       Appearance: He is ill-appearing.   HENT:      Head: Normocephalic and atraumatic.   Cardiovascular:      Rate and Rhythm: Regular rhythm. Tachycardia present.      Pulses: Normal pulses.      Heart sounds: Normal heart sounds. No murmur heard.    No gallop.   Pulmonary:      Effort: Pulmonary effort is normal. No respiratory distress.      Breath sounds: No wheezing.      Comments: Diminished lung sounds globally, audibly sounds congested  Abdominal:      General: Abdomen is flat.      Palpations: Abdomen is soft.   Musculoskeletal:         General: No swelling or tenderness. Normal range of motion.   Neurological:      General: No focal deficit present.      Mental Status: He is alert. Mental status is at baseline.      Cranial Nerves: No cranial nerve deficit.      Motor: No weakness.           Significant Labs: All pertinent labs within the past 24 hours have been reviewed.  CBC:   Recent Labs   Lab 04/09/23  1322   WBC 14.52*   HGB 16.4   HCT 48.8        CMP:   Recent Labs   Lab 04/09/23  1323      K 3.7      CO2 22*   GLU 94   BUN 12   CREATININE 1.1   CALCIUM 10.3   PROT 8.7*   ALBUMIN 4.5   BILITOT 1.4*   ALKPHOS 140*   *   *   ANIONGAP 14       Significant Imaging: I have reviewed all pertinent imaging results/findings within the past 24  hours.    Assessment/Plan:     * Sepsis  This patient does have evidence of infective focus  My overall impression is sepsis.  Source: Respiratory  Antibiotics given-   Antibiotics (72h ago, onward)    Start     Stop Route Frequency Ordered    04/09/23 1530  meropenem-0.9% sodium chloride 1 g/50 mL IVPB         -- IV Every 8 hours (non-standard times) 04/09/23 1433    04/09/23 1518  vancomycin - pharmacy to dose  ( Pneumonia - Moderate-High MDR )        See Hyperspace for full Linked Orders Report.    -- IV pharmacy to manage frequency 04/09/23 1432    04/09/23 1515  meropenem-0.9% sodium chloride 500 mg/50 mL IVPB         -- IV Once 04/09/23 1414    04/09/23 1500  vancomycin (VANCOCIN) 2,000 mg in dextrose 5 % (D5W) 500 mL IVPB         04/10 0259 IV ED 1 Time 04/09/23 1414        Latest lactate reviewed-  No results for input(s): LACTATE in the last 72 hours.  Organ dysfunction indicated by NA    Fluid challenge Not needed - patient is not hypotensive      Post- resuscitation assessment No - Post resuscitation assessment not needed       Will Not start Pressors- Levophed for MAP of 65  Source control achieved by: IV abx    Concern for developing PNA vs exacerbation of bronchiectasis. Given hx of immunoglobulin deficiency, will start on broad spectrum Abx. Blood cultures and sputum culture pending. Pulmonology consulted     Bronchiectasis  See above      Transaminitis  Acute  Monitor daily with CMP      CVID (common variable immunodeficiency)  Noted  On Immunoglobulin injections        VTE Risk Mitigation (From admission, onward)         Ordered     IP VTE HIGH RISK PATIENT  Once         04/09/23 1432     Place sequential compression device  Until discontinued         04/09/23 1432                     On 04/09/2023, patient should be placed in hospital observation services under my care in collaboration with Dr. Troncoso.      Jesús Hurtado PA-C  Department of Hospital Medicine  [facility] - Emergency Dept

## 2023-04-09 NOTE — ED PROVIDER NOTES
Encounter Date: 4/9/2023    SCRIBE #1 NOTE: I, Sarah Narayanan, am scribing for, and in the presence of,  Shay Dacosta MD.     History     Chief Complaint   Patient presents with    Fever    Generalized Body Aches    Weakness     X 2 days     Cough     Time seen by provider: 12:58 PM on 04/09/2023    Charly Mattson is a 28 y.o. male who presents to the ED with an onset of fever, body aches, chills, wheezing, cough with thick green mucus, nausea, and decreased appetite. He is on Albuterol and a nebulizer with little relief. The patient denies diarrhea, vomiting, sore throat or any other symptoms at this time. Patient denies known sick contact. He has a PMHx of immunoglobulin deficiency and CVID treated with Azithromycin. He has no pertinent PSHx.     The history is provided by the patient.   Review of patient's allergies indicates:   Allergen Reactions    Levaquin [levofloxacin]     Singulair [montelukast] Other (See Comments)     arrhythmias     Past Medical History:   Diagnosis Date    Fatty liver     Immunoglobulin deficiency     Immunoglobulin deficiency     CVID    Pneumonia      Past Surgical History:   Procedure Laterality Date    ADENOIDECTOMY      BIOPSY N/A 9/23/2020    Procedure: BIOPSY;  Surgeon: Klaeb Diagnostic Provider;  Location: [facility] OR;  Service: Interventional Radiology;  Laterality: N/A;    MANDIBLE FRACTURE SURGERY      SINUS SURGERY      TONSILLECTOMY      TYMPANOSTOMY TUBE PLACEMENT      TYMPANOSTOMY TUBE PLACEMENT      x 4     Family History   Problem Relation Age of Onset    Cancer Mother     Liver disease Father      Social History     Tobacco Use    Smoking status: Never    Smokeless tobacco: Never   Substance Use Topics    Alcohol use: Yes    Drug use: No     Review of Systems   Constitutional:  Positive for appetite change, chills and fever.   HENT:  Negative for congestion and sore throat.    Eyes:  Negative for visual disturbance.   Respiratory:  Positive for cough and wheezing.     Cardiovascular:  Negative for chest pain.   Gastrointestinal:  Positive for nausea. Negative for abdominal pain, diarrhea and vomiting.   Genitourinary:  Negative for dysuria.   Musculoskeletal:  Positive for myalgias. Negative for joint swelling.   Skin:  Negative for rash.   Neurological:  Negative for syncope.   Hematological:  Does not bruise/bleed easily.   Psychiatric/Behavioral:  Negative for confusion.      Physical Exam     Initial Vitals [04/09/23 1251]   BP Pulse Resp Temp SpO2   (!) 127/92 (!) 133 20 (!) 100.8 °F (38.2 °C) 95 %      MAP       --         Physical Exam    Nursing note and vitals reviewed.  Constitutional: Vital signs are normal. He appears well-nourished.   HENT:   Head: Normocephalic and atraumatic.   Eyes: Conjunctivae and EOM are normal.   Neck: Neck supple. No thyroid mass present.   Normal range of motion.  Cardiovascular:  Regular rhythm and normal heart sounds.   Tachycardia present.   Exam reveals no gallop and no friction rub.       No murmur heard.  Pulmonary/Chest: He has wheezes (bilaterally). He has rhonchi (more right than left) in the right lower field and the left lower field. He has no rales.   Audible chest congestion.   Abdominal: Abdomen is soft. Bowel sounds are normal. There is no abdominal tenderness.   Musculoskeletal:      Cervical back: Normal range of motion and neck supple.     Neurological: He is alert and oriented to person, place, and time. He has normal strength. No cranial nerve deficit or sensory deficit.   Skin: Skin is warm and dry. No rash noted. No erythema.   Psychiatric: He has a normal mood and affect. His speech is normal. Cognition and memory are normal.       ED Course   Procedures  Labs Reviewed   CBC W/ AUTO DIFFERENTIAL - Abnormal; Notable for the following components:       Result Value    WBC 14.52 (*)     MCV 81 (*)     Gran # (ANC) 12.4 (*)     Immature Grans (Abs) 0.05 (*)     Gran % 85.4 (*)     Lymph % 7.1 (*)     All other components  within normal limits    Narrative:     Release to patient->Immediate   COMPREHENSIVE METABOLIC PANEL - Abnormal; Notable for the following components:    CO2 22 (*)     Total Protein 8.7 (*)     Total Bilirubin 1.4 (*)     Alkaline Phosphatase 140 (*)      (*)      (*)     All other components within normal limits    Narrative:     Release to patient->Immediate   INFLUENZA A & B BY MOLECULAR   URINALYSIS, REFLEX TO URINE CULTURE    Narrative:     Specimen Source->Urine   SARS-COV-2 RNA AMPLIFICATION, QUAL     EKG Readings: (Independently Interpreted)   Initial Reading: No STEMI. Rhythm: Sinus Tachycardia. Heart Rate: 119. Ectopy: No Ectopy. Conduction: Normal. Axis: Right Axis Deviation.   Normal intervals.   ECG Results              EKG 12-lead (In process)  Result time 04/10/23 09:03:50      In process by Interface, Lab In [facility] (04/10/23 09:03:50)                   Narrative:    Test Reason : R00.0,    Vent. Rate : 119 BPM     Atrial Rate : 119 BPM     P-R Int : 156 ms          QRS Dur : 078 ms      QT Int : 304 ms       P-R-T Axes : 059 095 051 degrees     QTc Int : 427 ms    Sinus tachycardia  Rightward axis  Borderline Abnormal ECG  When compared with ECG of 12-AUG-2022 07:47,  No significant change was found    Referred By: [email or web address]   SELF           Confirmed By:                                   Imaging Results              X-Ray Chest AP Portable (Final result)  Result time 04/09/23 13:45:57      Final result by Ronald Davila MD (04/09/23 13:45:57)                   Impression:      Questionable subtle small right upper lobe pulmonary nodule.  This finding should be confirmed with dedicated PA and lateral views of the chest.      Electronically signed by: Ronald Davila  Date:    04/09/2023  Time:    13:45               Narrative:    EXAMINATION:  XR CHEST AP PORTABLE    CLINICAL HISTORY:  Sepsis;    TECHNIQUE:  Portable view of the chest was  performed.    COMPARISON:  08/30/2022.    FINDINGS:  Questionable small subtle 15 mm poorly defined nodule right upper lobe.  Left lung is clear.  Heart size normal.  Trachea midline.    Bony thorax intact.                                       Medications   sodium chloride 0.9% flush 10 mL (has no administration in time range)   0.9%  NaCl infusion ( Intravenous New Bag 4/10/23 0333)   vancomycin - pharmacy to dose (has no administration in time range)   meropenem-0.9% sodium chloride 1 g/50 mL IVPB (0 g Intravenous Stopped 4/10/23 0833)   ondansetron injection 4 mg (has no administration in time range)   acetaminophen tablet 650 mg (650 mg Oral Given 4/9/23 2246)   guaiFENesin 12 hr tablet 600 mg (600 mg Oral Given 4/10/23 0805)   albuterol sulfate nebulizer solution 2.5 mg (2.5 mg Nebulization Given 4/10/23 0843)   sodium chloride 3% nebulizer solution 4 mL (4 mLs Nebulization Given 4/9/23 2012)   fluticasone furoate-vilanteroL 100-25 mcg/dose diskus inhaler 1 puff (1 puff Inhalation Given 4/10/23 0902)   vancomycin 1,500 mg in dextrose 5 % (D5W) 250 mL IVPB (Vial-Mate) (1,500 mg Intravenous Trough Due As Scheduled Before Dose 4/10/23 1600)   ketorolac injection 15 mg (15 mg Intravenous Given 4/9/23 1311)   ondansetron injection 4 mg (4 mg Intravenous Given 4/9/23 1311)   albuterol-ipratropium 2.5 mg-0.5 mg/3 mL nebulizer solution 3 mL (3 mLs Nebulization Given by Other 4/9/23 1317)   lactated ringers bolus 1,000 mL (0 mLs Intravenous Stopped 4/9/23 1446)   vancomycin (VANCOCIN) 2,000 mg in dextrose 5 % (D5W) 500 mL IVPB (0 mg Intravenous Stopped 4/9/23 1847)   meropenem-0.9% sodium chloride 500 mg/50 mL IVPB (0 mg Intravenous Stopped 4/9/23 1614)   ketorolac injection 15 mg (15 mg Intravenous Given 4/10/23 1027)     Medical Decision Making:   History:   Old Medical Records: I decided to obtain old medical records.  Independently Interpreted Test(s):   I have ordered and independently interpreted X-rays - see  "prior notes.  I have ordered and independently interpreted EKG Reading(s) - see prior notes  Clinical Tests:   Lab Tests: Ordered and Reviewed  Radiological Study: Ordered and Reviewed  Medical Tests: Ordered and Reviewed  Sepsis Perfusion Assessment: "I attest a sepsis perfusion exam was performed within 6 hours of sepsis, severe sepsis, or septic shock presentation, following fluid resuscitation."    Sepsis Perfusion Assessment Complete: 4/9/2023 2:30 PM    ED Management:  Pt rapidly assessed. Sig hx of CVID and takes every-other-day azithromycin therapy and multiple past admission for PNA and sepsis. Sepsis orderset initiated after rapid assessment. Concern for recurrence of PNA, also experiencing diffuse wheezing requiring breathing treatments. Fluids and fever treatment with BSA provided. He warrants admission for further stabilization of his breathing course and IV antibiotics. Case d/w and accepted by the on call NP. Pt in agreement with POC.         Scribe Attestation:   Scribe #1: I performed the above scribed service and the documentation accurately describes the services I performed. I attest to the accuracy of the note.    Attending Attestation:         Attending Critical Care:   Critical Care Times:   Direct Patient Care (initial evaluation, reassessments, and time considering the case)................................................................15 minutes.   Additional History from reviewing old medical records or taking additional history from the family, EMS, PCP, etc.......................5 minutes.   Ordering, Reviewing, and Interpreting Diagnostic Studies...............................................................................................................5 minutes.   Documentation..................................................................................................................................................................................5 minutes.   Consultation with " other Physicians. .................................................................................................................................................0 minutes.   Consultation with the patient's family directly relating to the patient's condition, care, and DNR status (when patient unable)......5 minutes.   Other..................................................................................................................................................................................................5 minutes.   ==============================================================  Total Critical Care Time - exclusive of procedural time: 40 minutes.  ==============================================================  Critical care was necessary to treat or prevent imminent or life-threatening deterioration of the following conditions: sepsis and respiratory failure.   The following critical care procedures were done by me (see procedure notes): pulse oximetry.   Critical care was time spent personally by me on the following activities: obtaining history from patient or relative, examination of patient, review of old charts, ordering and performing treatments and interventions, ordering lab, x-rays, and/or EKG, development of treatment plan with patient or relative, discussions with primary provider, evaluation of patient's response to treatment, interpretation of cardiac measurements, discussion with consultants and re-evaluation of patient's conition.   Critical Care Condition: potentially life-threatening                    I, Dr. Shay Dacosta, personally performed the services described in this documentation. All medical record entries made by the scribe were at my direction and in my presence.  I have reviewed the chart and agree that the record reflects my personal performance and is accurate and complete. Shay Dacosta MD.  11:56 AM 04/10/2023    Clinical Impression:   Final diagnoses:  [R00.0]  Tachycardia  [A41.9] Sepsis        ED Disposition Condition    Observation                 Shay Dacosta MD  04/10/23 115

## 2023-04-10 VITALS
TEMPERATURE: 98 F | WEIGHT: 224 LBS | SYSTOLIC BLOOD PRESSURE: 121 MMHG | RESPIRATION RATE: 16 BRPM | HEART RATE: 101 BPM | BODY MASS INDEX: 31.36 KG/M2 | HEIGHT: 71 IN | OXYGEN SATURATION: 96 % | DIASTOLIC BLOOD PRESSURE: 74 MMHG

## 2023-04-10 LAB
ALBUMIN SERPL BCP-MCNC: 3.2 G/DL (ref 3.5–5.2)
ALP SERPL-CCNC: 100 U/L (ref 55–135)
ALT SERPL W/O P-5'-P-CCNC: 123 U/L (ref 10–44)
ANION GAP SERPL CALC-SCNC: 8 MMOL/L (ref 8–16)
AST SERPL-CCNC: 92 U/L (ref 10–40)
BASOPHILS # BLD AUTO: 0.02 K/UL (ref 0–0.2)
BASOPHILS NFR BLD: 0.2 % (ref 0–1.9)
BILIRUB DIRECT SERPL-MCNC: 0.3 MG/DL (ref 0.1–0.3)
BILIRUB SERPL-MCNC: 0.8 MG/DL (ref 0.1–1)
BUN SERPL-MCNC: 13 MG/DL (ref 6–20)
CALCIUM SERPL-MCNC: 8.7 MG/DL (ref 8.7–10.5)
CHLORIDE SERPL-SCNC: 109 MMOL/L (ref 95–110)
CO2 SERPL-SCNC: 22 MMOL/L (ref 23–29)
CREAT SERPL-MCNC: 1.1 MG/DL (ref 0.5–1.4)
DIFFERENTIAL METHOD: ABNORMAL
EOSINOPHIL # BLD AUTO: 0.1 K/UL (ref 0–0.5)
EOSINOPHIL NFR BLD: 0.7 % (ref 0–8)
ERYTHROCYTE [DISTWIDTH] IN BLOOD BY AUTOMATED COUNT: 13.2 % (ref 11.5–14.5)
EST. GFR  (NO RACE VARIABLE): >60 ML/MIN/1.73 M^2
GLUCOSE SERPL-MCNC: 121 MG/DL (ref 70–110)
HCT VFR BLD AUTO: 39.5 % (ref 40–54)
HGB BLD-MCNC: 13 G/DL (ref 14–18)
IMM GRANULOCYTES # BLD AUTO: 0.03 K/UL (ref 0–0.04)
IMM GRANULOCYTES NFR BLD AUTO: 0.3 % (ref 0–0.5)
LYMPHOCYTES # BLD AUTO: 1.7 K/UL (ref 1–4.8)
LYMPHOCYTES NFR BLD: 15.3 % (ref 18–48)
MAGNESIUM SERPL-MCNC: 1.9 MG/DL (ref 1.6–2.6)
MCH RBC QN AUTO: 27.1 PG (ref 27–31)
MCHC RBC AUTO-ENTMCNC: 32.9 G/DL (ref 32–36)
MCV RBC AUTO: 82 FL (ref 82–98)
MONOCYTES # BLD AUTO: 1 K/UL (ref 0.3–1)
MONOCYTES NFR BLD: 9 % (ref 4–15)
NEUTROPHILS # BLD AUTO: 8.3 K/UL (ref 1.8–7.7)
NEUTROPHILS NFR BLD: 74.5 % (ref 38–73)
NRBC BLD-RTO: 0 /100 WBC
PHOSPHATE SERPL-MCNC: 3.5 MG/DL (ref 2.7–4.5)
PLATELET # BLD AUTO: 135 K/UL (ref 150–450)
PMV BLD AUTO: 11.8 FL (ref 9.2–12.9)
POTASSIUM SERPL-SCNC: 3.9 MMOL/L (ref 3.5–5.1)
PROT SERPL-MCNC: 6.3 G/DL (ref 6–8.4)
RBC # BLD AUTO: 4.8 M/UL (ref 4.6–6.2)
SODIUM SERPL-SCNC: 139 MMOL/L (ref 136–145)
WBC # BLD AUTO: 11.13 K/UL (ref 3.9–12.7)

## 2023-04-10 PROCEDURE — 25000003 PHARM REV CODE 250

## 2023-04-10 PROCEDURE — 25000242 PHARM REV CODE 250 ALT 637 W/ HCPCS: Performed by: STUDENT IN AN ORGANIZED HEALTH CARE EDUCATION/TRAINING PROGRAM

## 2023-04-10 PROCEDURE — 94664 DEMO&/EVAL PT USE INHALER: CPT | Mod: XB

## 2023-04-10 PROCEDURE — 94669 MECHANICAL CHEST WALL OSCILL: CPT

## 2023-04-10 PROCEDURE — 85025 COMPLETE CBC W/AUTO DIFF WBC: CPT

## 2023-04-10 PROCEDURE — 63600175 PHARM REV CODE 636 W HCPCS

## 2023-04-10 PROCEDURE — G0378 HOSPITAL OBSERVATION PER HR: HCPCS

## 2023-04-10 PROCEDURE — 83735 ASSAY OF MAGNESIUM: CPT

## 2023-04-10 PROCEDURE — 84100 ASSAY OF PHOSPHORUS: CPT

## 2023-04-10 PROCEDURE — 96361 HYDRATE IV INFUSION ADD-ON: CPT

## 2023-04-10 PROCEDURE — 96376 TX/PRO/DX INJ SAME DRUG ADON: CPT

## 2023-04-10 PROCEDURE — 94761 N-INVAS EAR/PLS OXIMETRY MLT: CPT

## 2023-04-10 PROCEDURE — 94640 AIRWAY INHALATION TREATMENT: CPT | Mod: XB

## 2023-04-10 PROCEDURE — 99214 PR OFFICE/OUTPT VISIT, EST, LEVL IV, 30-39 MIN: ICD-10-PCS | Mod: ,,, | Performed by: INTERNAL MEDICINE

## 2023-04-10 PROCEDURE — 87070 CULTURE OTHR SPECIMN AEROBIC: CPT

## 2023-04-10 PROCEDURE — 36415 COLL VENOUS BLD VENIPUNCTURE: CPT

## 2023-04-10 PROCEDURE — 94640 AIRWAY INHALATION TREATMENT: CPT

## 2023-04-10 PROCEDURE — 99214 OFFICE O/P EST MOD 30 MIN: CPT | Mod: ,,, | Performed by: INTERNAL MEDICINE

## 2023-04-10 PROCEDURE — 99900035 HC TECH TIME PER 15 MIN (STAT)

## 2023-04-10 PROCEDURE — 80048 BASIC METABOLIC PNL TOTAL CA: CPT

## 2023-04-10 PROCEDURE — 80076 HEPATIC FUNCTION PANEL: CPT

## 2023-04-10 PROCEDURE — 96366 THER/PROPH/DIAG IV INF ADDON: CPT

## 2023-04-10 PROCEDURE — 63600175 PHARM REV CODE 636 W HCPCS: Performed by: INTERNAL MEDICINE

## 2023-04-10 PROCEDURE — 87205 SMEAR GRAM STAIN: CPT

## 2023-04-10 RX ORDER — AMOXICILLIN AND CLAVULANATE POTASSIUM 875; 125 MG/1; MG/1
1 TABLET, FILM COATED ORAL EVERY 12 HOURS
Qty: 20 TABLET | Refills: 0 | Status: SHIPPED | OUTPATIENT
Start: 2023-04-10 | End: 2023-04-20

## 2023-04-10 RX ORDER — GUAIFENESIN 600 MG/1
600 TABLET, EXTENDED RELEASE ORAL 2 TIMES DAILY
Qty: 20 TABLET | Refills: 0 | Status: SHIPPED | OUTPATIENT
Start: 2023-04-10 | End: 2023-04-20

## 2023-04-10 RX ORDER — AMOXICILLIN AND CLAVULANATE POTASSIUM 875; 125 MG/1; MG/1
1 TABLET, FILM COATED ORAL EVERY 12 HOURS
Status: DISCONTINUED | OUTPATIENT
Start: 2023-04-10 | End: 2023-04-10 | Stop reason: HOSPADM

## 2023-04-10 RX ORDER — KETOROLAC TROMETHAMINE 30 MG/ML
15 INJECTION, SOLUTION INTRAMUSCULAR; INTRAVENOUS ONCE
Status: COMPLETED | OUTPATIENT
Start: 2023-04-10 | End: 2023-04-10

## 2023-04-10 RX ADMIN — GUAIFENESIN 600 MG: 600 TABLET, EXTENDED RELEASE ORAL at 08:04

## 2023-04-10 RX ADMIN — SODIUM CHLORIDE: 9 INJECTION, SOLUTION INTRAVENOUS at 03:04

## 2023-04-10 RX ADMIN — VANCOMYCIN HYDROCHLORIDE 1500 MG: 1.5 INJECTION, POWDER, LYOPHILIZED, FOR SOLUTION INTRAVENOUS at 05:04

## 2023-04-10 RX ADMIN — MEROPENEM AND SODIUM CHLORIDE 1 G: 1 INJECTION, SOLUTION INTRAVENOUS at 07:04

## 2023-04-10 RX ADMIN — FLUTICASONE FUROATE AND VILANTEROL TRIFENATATE 1 PUFF: 100; 25 POWDER RESPIRATORY (INHALATION) at 09:04

## 2023-04-10 RX ADMIN — KETOROLAC TROMETHAMINE 15 MG: 30 INJECTION, SOLUTION INTRAMUSCULAR; INTRAVENOUS at 10:04

## 2023-04-10 RX ADMIN — ALBUTEROL SULFATE 2.5 MG: 2.5 SOLUTION RESPIRATORY (INHALATION) at 08:04

## 2023-04-10 RX ADMIN — MEROPENEM AND SODIUM CHLORIDE 1 G: 1 INJECTION, SOLUTION INTRAVENOUS at 02:04

## 2023-04-10 NOTE — PLAN OF CARE
Patient cleared for discharge from case management standpoint.       04/10/23 1514   Final Note   Assessment Type Final Discharge Note   Anticipated Discharge Disposition Home   What phone number can be called within the next 1-3 days to see how you are doing after discharge? [phone]   Hospital Resources/Appts/Education Provided Provided patient/caregiver with written discharge plan information

## 2023-04-10 NOTE — PLAN OF CARE
Problem: Adult Inpatient Plan of Care  Goal: Optimal Comfort and Wellbeing  Outcome: Ongoing, Progressing     Problem: Adjustment to Illness (Sepsis/Septic Shock)  Goal: Optimal Coping  Outcome: Ongoing, Progressing     Problem: Infection Progression (Sepsis/Septic Shock)  Goal: Absence of Infection Signs and Symptoms  Outcome: Ongoing, Progressing

## 2023-04-10 NOTE — SUBJECTIVE & OBJECTIVE
Interval History: Notes reviewed no acute events overnight. Patient seen this morning resting in bed with no acute distress. He reports feeling somewhat better. Admits to continued cough. Denies any SOB. Last fever at 1504 yesterday w/ temp of 100.8. CT chest showing patchy opacification throughout pulmonary parenchyma likely PNA. WBC back WNL. Lactic acid elevated at 2.3. repeat lactic acid at 1.3. Liver enzymes trending downwards. Blood cultures and sputum cultures pending. Pulmonology following.     Review of Systems   Constitutional:  Positive for fatigue. Negative for fever.   Respiratory:  Positive for cough and wheezing. Negative for shortness of breath.    Cardiovascular:  Negative for chest pain and palpitations.   Gastrointestinal:  Negative for abdominal distention, abdominal pain, nausea and vomiting.   Genitourinary:  Negative for difficulty urinating and dysuria.   Musculoskeletal:  Negative for arthralgias and myalgias.   Neurological:  Positive for headaches.   Psychiatric/Behavioral:  Negative for behavioral problems and confusion.    All other systems reviewed and are negative.  Objective:     Vital Signs (Most Recent):  Temp: 96.8 °F (36 °C) (04/10/23 0728)  Pulse: 84 (04/10/23 0902)  Resp: 20 (04/10/23 0902)  BP: (!) 102/58 (04/10/23 0728)  SpO2: (!) 94 % (04/10/23 0902)   Vital Signs (24h Range):  Temp:  [96.8 °F (36 °C)-100.9 °F (38.3 °C)] 96.8 °F (36 °C)  Pulse:  [] 84  Resp:  [16-20] 20  SpO2:  [91 %-98 %] 94 %  BP: (100-127)/(52-92) 102/58     Weight: 101.6 kg (223 lb 15.8 oz)  Body mass index is 31.25 kg/m².    Intake/Output Summary (Last 24 hours) at 4/10/2023 1029  Last data filed at 4/10/2023 0413  Gross per 24 hour   Intake 2725 ml   Output 1200 ml   Net 1525 ml      Physical Exam  Vitals and nursing note reviewed.   Constitutional:       General: He is not in acute distress.     Appearance: He is normal weight.   HENT:      Head: Normocephalic and atraumatic.   Cardiovascular:       Rate and Rhythm: Normal rate and regular rhythm.      Pulses: Normal pulses.      Heart sounds: Normal heart sounds. No murmur heard.    No gallop.   Pulmonary:      Effort: Pulmonary effort is normal. No respiratory distress.      Breath sounds: Wheezing present.   Abdominal:      General: Abdomen is flat. There is no distension.      Palpations: Abdomen is soft.      Tenderness: There is no abdominal tenderness.   Musculoskeletal:         General: No swelling or tenderness. Normal range of motion.   Neurological:      General: No focal deficit present.      Mental Status: He is alert. Mental status is at baseline.       Significant Labs: All pertinent labs within the past 24 hours have been reviewed.  CBC:   Recent Labs   Lab 04/09/23  1322 04/10/23  0449   WBC 14.52* 11.13   HGB 16.4 13.0*   HCT 48.8 39.5*    135*     CMP:   Recent Labs   Lab 04/09/23  1323 04/10/23  0449    139   K 3.7 3.9    109   CO2 22* 22*   GLU 94 121*   BUN 12 13   CREATININE 1.1 1.1   CALCIUM 10.3 8.7   PROT 8.7* 6.3   ALBUMIN 4.5 3.2*   BILITOT 1.4* 0.8   ALKPHOS 140* 100   * 92*   * 123*   ANIONGAP 14 8     Lactic Acid:   Recent Labs   Lab 04/09/23  1450 04/09/23  1715 04/09/23  2157   LACTATE 1.5 2.3* 1.3       Significant Imaging: I have reviewed all pertinent imaging results/findings within the past 24 hours.

## 2023-04-10 NOTE — NURSING
Pt given discharge instructions. Verbalized understanding. Pt has belongings in hand. Pt escorted to ER exit. No complaints or concerns voiced. Pt discharged to home.

## 2023-04-10 NOTE — ASSESSMENT & PLAN NOTE
This patient does have evidence of infective focus  My overall impression is sepsis.  Source: Respiratory  Antibiotics given-   Antibiotics (72h ago, onward)    Start     Stop Route Frequency Ordered    04/10/23 0500  vancomycin 1,500 mg in dextrose 5 % (D5W) 250 mL IVPB (Vial-Mate)         -- IV Every 12 hours (non-standard times) 04/09/23 1653    04/09/23 2300  meropenem-0.9% sodium chloride 1 g/50 mL IVPB         -- IV Every 8 hours (non-standard times) 04/09/23 1433    04/09/23 1518  vancomycin - pharmacy to dose  ( Pneumonia - Moderate-High MDR )        See Hyperspace for full Linked Orders Report.    -- IV pharmacy to manage frequency 04/09/23 1432        Latest lactate reviewed-  Recent Labs   Lab 04/09/23  1450 04/09/23  1715 04/09/23  2157   LACTATE 1.5 2.3* 1.3     Organ dysfunction indicated by NA    Fluid challenge Not needed - patient is not hypotensive      Post- resuscitation assessment No - Post resuscitation assessment not needed       Will Not start Pressors- Levophed for MAP of 65  Source control achieved by: IV abx    Concern for developing PNA vs exacerbation of bronchiectasis. Given hx of immunoglobulin deficiency, will start on broad spectrum Abx. Blood cultures and sputum culture pending. Pulmonology consulted     Ct showing patchy opacification likely representing PNA

## 2023-04-10 NOTE — PLAN OF CARE
04/09/23 2012   Patient Assessment/Suction   Level of Consciousness (AVPU) alert   Respiratory Effort Normal;Unlabored   Expansion/Accessory Muscles/Retractions expansion symmetric   All Lung Fields Breath Sounds clear   Rhythm/Pattern, Respiratory pattern regular   Cough Frequency infrequent   Cough Type nonproductive   PRE-TX-O2   Device (Oxygen Therapy) room air   SpO2 98 %   Pulse Oximetry Type Intermittent   Pulse (!) 114   Resp 18   Aerosol Therapy   $ Aerosol Therapy Charges Aerosol Treatment   Respiratory Treatment Status (SVN) given   Treatment Route (SVN) mask   Patient Position (SVN) semi-[place]'s   Post Treatment Assessment (SVN) breath sounds unchanged   Signs of Intolerance (SVN) none   Breath Sounds Post-Respiratory Treatment   Post-treatment Heart Rate (beats/min) 110   Post-treatment Resp Rate (breaths/min) 18   High Frequency Chest Compression Vest   $ Chest Compression Charges Therapy;Vest - Equipment   Daily Review of Necessity (HFCCV) completed   Vest Type (HFCCV) chest wrap vest   Frequency Type (HFCCV) single frequency   Single Frequency (Hz) 10   Pressure (cm H20) 5   Duration (HFCCV) 10 mins   Patient Position (HFCCV) semi-[place]'s   Post Treatment Assessment (HFCCV) breath sounds unchanged   Signs of Intolerance (HFCCV) none

## 2023-04-10 NOTE — PROGRESS NOTES
[facility] Hospital Medicine  Progress Note    Patient Name: Charly Mattson  MRN: [id number]  Patient Class: OP- Observation   Admission Date: 4/9/2023  Length of Stay: 0 days  Attending Physician: Sreekanth Troncoso MD  Primary Care Provider: Porfirio Novoa MD        Subjective:     Principal Problem:Sepsis        HPI:  Charly Mattson is a 28 year old with a PMHx significant for common variable immune deficiency, frequent pulmonary infections, and bronchiectasis, presenting for subjective fever, myalgias, productive cough and wheezing over the last 5 days. He reports first noticing his symptoms which started with muscle aches on Tuesday. He progressively got worse and developed green sputum producing cough. He reports a subjective fever yesterday. He also complains of intermittent nausea. He states he receives IV immunoglobulin replacement once a week. His last injection was on Tuesday. He follows with pulmonology, Dr. Alvares, for frequency pulmonary infections. He has been on prophylaxis Z-jose antonio every M-W-F for over a year. ED workup significant for fever of 100.8, tachycardia and elevated WBC. CMP showing elevated liver enzymes slightly above his baseline. CXR concerning for early PNA. Started on IV merrem and vanc in ED. Procal and lactic acid pending. Patient to be admitted to hospital medicine services for further evaluation and management.           Overview/Hospital Course:  No notes on file    Interval History: Notes reviewed no acute events overnight. Patient seen this morning resting in bed with no acute distress. He reports feeling somewhat better. Admits to continued cough. Denies any SOB. Last fever at 1504 yesterday w/ temp of 100.8. CT chest showing patchy opacification throughout pulmonary parenchyma likely PNA. WBC back WNL. Lactic acid elevated at 2.3. repeat lactic acid at 1.3. Liver enzymes trending downwards. Blood cultures and sputum cultures pending. Pulmonology following.      Review of Systems   Constitutional:  Positive for fatigue. Negative for fever.   Respiratory:  Positive for cough and wheezing. Negative for shortness of breath.    Cardiovascular:  Negative for chest pain and palpitations.   Gastrointestinal:  Negative for abdominal distention, abdominal pain, nausea and vomiting.   Genitourinary:  Negative for difficulty urinating and dysuria.   Musculoskeletal:  Negative for arthralgias and myalgias.   Neurological:  Positive for headaches.   Psychiatric/Behavioral:  Negative for behavioral problems and confusion.    All other systems reviewed and are negative.  Objective:     Vital Signs (Most Recent):  Temp: 96.8 °F (36 °C) (04/10/23 0728)  Pulse: 84 (04/10/23 0902)  Resp: 20 (04/10/23 0902)  BP: (!) 102/58 (04/10/23 0728)  SpO2: (!) 94 % (04/10/23 0902)   Vital Signs (24h Range):  Temp:  [96.8 °F (36 °C)-100.9 °F (38.3 °C)] 96.8 °F (36 °C)  Pulse:  [] 84  Resp:  [16-20] 20  SpO2:  [91 %-98 %] 94 %  BP: (100-127)/(52-92) 102/58     Weight: 101.6 kg (223 lb 15.8 oz)  Body mass index is 31.25 kg/m².    Intake/Output Summary (Last 24 hours) at 4/10/2023 1029  Last data filed at 4/10/2023 0413  Gross per 24 hour   Intake 2725 ml   Output 1200 ml   Net 1525 ml      Physical Exam  Vitals and nursing note reviewed.   Constitutional:       General: He is not in acute distress.     Appearance: He is normal weight.   HENT:      Head: Normocephalic and atraumatic.   Cardiovascular:      Rate and Rhythm: Normal rate and regular rhythm.      Pulses: Normal pulses.      Heart sounds: Normal heart sounds. No murmur heard.    No gallop.   Pulmonary:      Effort: Pulmonary effort is normal. No respiratory distress.      Breath sounds: Wheezing present.   Abdominal:      General: Abdomen is flat. There is no distension.      Palpations: Abdomen is soft.      Tenderness: There is no abdominal tenderness.   Musculoskeletal:         General: No swelling or tenderness. Normal range of  motion.   Neurological:      General: No focal deficit present.      Mental Status: He is alert. Mental status is at baseline.       Significant Labs: All pertinent labs within the past 24 hours have been reviewed.  CBC:   Recent Labs   Lab 04/09/23  1322 04/10/23  0449   WBC 14.52* 11.13   HGB 16.4 13.0*   HCT 48.8 39.5*    135*     CMP:   Recent Labs   Lab 04/09/23  1323 04/10/23  0449    139   K 3.7 3.9    109   CO2 22* 22*   GLU 94 121*   BUN 12 13   CREATININE 1.1 1.1   CALCIUM 10.3 8.7   PROT 8.7* 6.3   ALBUMIN 4.5 3.2*   BILITOT 1.4* 0.8   ALKPHOS 140* 100   * 92*   * 123*   ANIONGAP 14 8     Lactic Acid:   Recent Labs   Lab 04/09/23  1450 04/09/23  1715 04/09/23  2157   LACTATE 1.5 2.3* 1.3       Significant Imaging: I have reviewed all pertinent imaging results/findings within the past 24 hours.      Assessment/Plan:      * Sepsis  This patient does have evidence of infective focus  My overall impression is sepsis.  Source: Respiratory  Antibiotics given-   Antibiotics (72h ago, onward)    Start     Stop Route Frequency Ordered    04/10/23 0500  vancomycin 1,500 mg in dextrose 5 % (D5W) 250 mL IVPB (Vial-Mate)         -- IV Every 12 hours (non-standard times) 04/09/23 1653    04/09/23 2300  meropenem-0.9% sodium chloride 1 g/50 mL IVPB         -- IV Every 8 hours (non-standard times) 04/09/23 1433    04/09/23 1518  vancomycin - pharmacy to dose  ( Pneumonia - Moderate-High MDR )        See Hyperspace for full Linked Orders Report.    -- IV pharmacy to manage frequency 04/09/23 1432        Latest lactate reviewed-  Recent Labs   Lab 04/09/23  1450 04/09/23  1715 04/09/23  2157   LACTATE 1.5 2.3* 1.3     Organ dysfunction indicated by NA    Fluid challenge Not needed - patient is not hypotensive      Post- resuscitation assessment No - Post resuscitation assessment not needed       Will Not start Pressors- Levophed for MAP of 65  Source control achieved by: IV abx    Concern  for developing PNA vs exacerbation of bronchiectasis. Given hx of immunoglobulin deficiency, will start on broad spectrum Abx. Blood cultures and sputum culture pending. Pulmonology consulted     Ct showing patchy opacification likely representing PNA    Bronchiectasis  See above      Transaminitis  Acute  Monitor daily with CMP      CVID (common variable immunodeficiency)  Noted  On Immunoglobulin injections weekly        VTE Risk Mitigation (From admission, onward)         Ordered     IP VTE HIGH RISK PATIENT  Once         04/09/23 1432     Place sequential compression device  Until discontinued         04/09/23 1432                Discharge Planning   LAILA:      Code Status: Full Code   Is the patient medically ready for discharge?:     Reason for patient still in hospital (select all that apply): Patient trending condition and Treatment                     Jesús Hurtado PA-C  Department of Hospital Medicine   [facility]

## 2023-04-10 NOTE — RESPIRATORY THERAPY
Patient receiving aerosol tx via 2.5mg ventolin and nacl now and bid followed by cpt via vest and acapella  and cpt vest now and now and bid.  Patient receiving 4nl 3% nacl and Breo x 1 puff qday.  Hr 94  and 02 saturation 95% on room air.

## 2023-04-10 NOTE — PLAN OF CARE
Problem: Adult Inpatient Plan of Care  Goal: Plan of Care Review  Outcome: Ongoing, Progressing     Problem: Adult Inpatient Plan of Care  Goal: Patient-Specific Goal (Individualized)  Outcome: Ongoing, Progressing     Problem: Adult Inpatient Plan of Care  Goal: Absence of Hospital-Acquired Illness or Injury  Outcome: Ongoing, Progressing     Problem: Adult Inpatient Plan of Care  Goal: Optimal Comfort and Wellbeing  Outcome: Ongoing, Progressing     Problem: Adjustment to Illness (Sepsis/Septic Shock)  Goal: Optimal Coping  Outcome: Ongoing, Progressing     Problem: Infection Progression (Sepsis/Septic Shock)  Goal: Absence of Infection Signs and Symptoms  Outcome: Ongoing, Progressing

## 2023-04-10 NOTE — DISCHARGE INSTRUCTIONS
Discharge Instructions, [facility] Medicine    Complete course of Augmetin over the next 10 days. Follow up with your primary pulmonologist next Tuesday.     Thank you for choosing [facility] for your medical care.     You were admitted to the hospital with Sepsis.     Please note your discharge instructions, including diet/activity restrictions, follow-up appointments, and medication changes.  If you have any questions about your medical issues, prescriptions, or any other questions, please feel free to contact the [facility] Medicine Dept at [phone] and we will help.    If you are previously with Home health, outpatient PT/OT or under a therapy program, you are cleared to return to those programs.    Please direct all long term medication refills and follow up to your primary care provider, Porfirio Novoa MD. Thank you again for letting us take care of your health care needs.    Please note the following discharge instructions per your discharging physician-  Jesús Hurtado PA-C

## 2023-04-10 NOTE — CONSULTS
Pulmonary/Critical Care Consult      PATIENT NAME: Charly Mattson  MRN: [id number]  TODAY'S DATE: 04/10/2023  ADMIT DATE: 2023  AGE: 28 y.o. : 1995    CONSULT REQUESTED BY: Sreekanth Troncoso MD    REASON FOR CONSULT:   Pneumonia  Bronchiectasis    HISTORY OF PRESENT ILLNESS   Charly Mattson is a 28 y.o. male with a PMH of CVID and bronchiectasis with frequent exacerbations on whom we have been consulted for bronchiectasis exacerbation.    He has been taking azithromycin MWF for over a year for suppression of bronchiectasis exacerbations. Despite this, he has had greenish sputum, myalgia, and fevers for 5 days.    Last culture results available to me (from [facility]) grew pan-sensitive H. Flu in 2022.      REVIEW OF SYSTEMS  GENERAL: Feeling well. No fevers, chills, or night sweats.  EYES: Vision is good.  ENT: No sinusitis or pharyngitis.   HEART: No chest pain or palpitations.  LUNGS: No cough, sputum, or wheezing.  GI: No abdominal pain, nausea, vomiting, or diarrhea.  : No dysuria, urgency, or frequency.  SKIN: No lesions or rashes.  MUSCULOSKELETAL: No joint pain or myalgias.  NEURO: No headaches or neuropathy.  LYMPH: No edema or adenopathy.  PSYCH: No anxiety or depression.  ENDO: No unexpected weight change.    ALLERGIES  Review of patient's allergies indicates:   Allergen Reactions    Levaquin [levofloxacin]     Singulair [montelukast] Other (See Comments)     arrhythmias       INPATIENT SCHEDULED MEDICATIONS   albuterol sulfate  2.5 mg Nebulization BID    fluticasone furoate-vilanteroL  1 puff Inhalation Daily    guaiFENesin  600 mg Oral BID    meropenem (MERREM) IVPB  1 g Intravenous Q8H    sodium chloride 3%  4 mL Nebulization BID    vancomycin (VANCOCIN) IVPB  1,500 mg Intravenous Q12H      sodium chloride 0.9% 125 mL/hr at 04/10/23 0333       MEDICAL AND SURGICAL HISTORY  Past Medical History:   Diagnosis Date    Fatty liver     Immunoglobulin deficiency     Immunoglobulin  deficiency     CVID    Pneumonia      Past Surgical History:   Procedure Laterality Date    ADENOIDECTOMY      BIOPSY N/A 9/23/2020    Procedure: BIOPSY;  Surgeon: Kaleb Diagnostic Provider;  Location: [facility] OR;  Service: Interventional Radiology;  Laterality: N/A;    MANDIBLE FRACTURE SURGERY      SINUS SURGERY      TONSILLECTOMY      TYMPANOSTOMY TUBE PLACEMENT      TYMPANOSTOMY TUBE PLACEMENT      x 4       ALCOHOL, TOBACCO AND DRUG USE  Social History     Tobacco Use   Smoking Status Never   Smokeless Tobacco Never     Social History     Substance and Sexual Activity   Alcohol Use Yes     Social History     Substance and Sexual Activity   Drug Use No       FAMILY HISTORY  Family History   Problem Relation Age of Onset    Cancer Mother     Liver disease Father        VITAL SIGNS (MOST RECENT)  Temp: 97.1 °F (36.2 °C) (04/10/23 1120)  Pulse: 97 (04/10/23 1120)  Resp: 16 (04/10/23 1120)  BP: 116/61 (04/10/23 1120)  SpO2: (!) 94 % (04/10/23 1120)    INTAKE AND OUTPUT (LAST 24 HOURS):  Intake/Output Summary (Last 24 hours) at 4/10/2023 1233  Last data filed at 4/10/2023 1135  Gross per 24 hour   Intake 2725 ml   Output 2900 ml   Net -175 ml       WEIGHT  Wt Readings from Last 1 Encounters:   04/09/23 101.6 kg (223 lb 15.8 oz)       PHYSICAL EXAM  GENERAL: A&O. NAD.  HEENT: Extraocular movements intact. Pharynx moist.  NECK: Supple. No JVD or hepatojugular reflux.  HEART: Regular rate and normal rhythm. No murmur or gallop auscultated.  LUNGS: Clear to auscultation and percussion. Lung excursion symmetrical.   ABDOMEN: Soft, non-tender, non-distended, no masses palpated.  EXTREMITIES: Normal muscle tone and joint movement, no cyanosis or clubbing.   LYMPHATICS: No adenopathy palpated, no edema.  SKIN: Dry, intact, no lesions.   NEURO: No gross deficit.  PSYCH: Appropriate affect    ACUTE PHASE REACTANT (LAST 24 HOURS)  No results for input(s): FERRITIN, CRP, LDH, DDIMER in the last 24 hours.    CBC LAST (LAST 24  HOURS)  Recent Labs   Lab 04/10/23  0449   WBC 11.13   RBC 4.80   HGB 13.0*   HCT 39.5*   MCV 82   MCH 27.1   MCHC 32.9   RDW 13.2   *   MPV 11.8   GRAN 74.5*  8.3*   LYMPH 15.3*  1.7   MONO 9.0  1.0   BASO 0.02   NRBC 0       CHEMISTRY LAST (LAST 24 HOURS)  Recent Labs   Lab 04/10/23  0449      K 3.9      CO2 22*   ANIONGAP 8   BUN 13   CREATININE 1.1   *   CALCIUM 8.7   MG 1.9   ALBUMIN 3.2*   PROT 6.3   ALKPHOS 100   *   AST 92*   BILITOT 0.8       COAGULATION LAST (LAST 24 HOURS)  No results for input(s): LABPT, INR, APTT in the last 24 hours.    CARDIAC PROFILE (LAST 24 HOURS)  No results for input(s): BNP, CPK, CPKMB, LDH, TROPONINI in the last 168 hours.    LAST 7 DAYS MICROBIOLOGY   Microbiology Results (last 7 days)       Procedure Component Value Units Date/Time    Culture, Respiratory with Gram Stain [[id number]] Collected: 04/10/23 1040    Order Status: Sent Specimen: Sputum Updated: 04/10/23 1053    Blood culture x two cultures. Draw prior to antibiotics. [[id number]] Collected: 04/09/23 1322    Order Status: Completed Specimen: Blood from Antecubital, Left Updated: 04/10/23 0515     Blood Culture, Routine No Growth to date    Narrative:      Aerobic and anaerobic    Blood culture x two cultures. Draw prior to antibiotics. [[id number]] Collected: 04/09/23 1323    Order Status: Completed Specimen: Blood from Antecubital, Right Updated: 04/10/23 0515     Blood Culture, Routine No Growth to date    Narrative:      Aerobic and anaerobic    Influenza A & B by Molecular [[id number]] Collected: 04/09/23 1303    Order Status: Completed Specimen: Nasal Swab Updated: 04/09/23 1423     Influenza A, Molecular Negative     Influenza B, Molecular Negative     Flu A & B Source Nasal swab            MOST RECENT IMAGING  CT Chest Without Contrast  Narrative: EXAMINATION:  CT CHEST WITHOUT CONTRAST    CLINICAL HISTORY:  Abnormal xray - lung nodule, >= 1 cm;    TECHNIQUE:  Low dose axial  images, sagittal and coronal reformations were obtained from the thoracic inlet to the lung bases. Contrast was not administered.    COMPARISON:  04/09/2023    FINDINGS:  There is patchy opacification within the right upper lung zone measuring 2 2.1 cm correlating with the abnormality seen on recent chest radiograph.  There are other regions of patchy opacification predominantly at the right lung base seen throughout the pulmonary parenchyma.  There is no pneumothorax or pleural effusion.    There is no pericardial effusion.  Visualized portions of the superior abdomen are unremarkable.    The osseous structures are unremarkable.  Impression: There is patchy opacification seen throughout the pulmonary parenchyma some of which has a nodular quality.  This does correlate with the location of the abnormality seen on chest radiograph from 04/09/2023.  The findings at this point have an appearance of inflammatory process such as pneumonia.  Follow-up imaging after treatment to ensure resolution is recommended.    Electronically signed by: Rosa Flood MD  Date:    04/10/2023  Time:    07:53      CURRENT VISIT EKG  Results for orders placed or performed during the hospital encounter of 04/09/23   EKG 12-lead    Narrative    Test Reason : R00.0,    Vent. Rate : 119 BPM     Atrial Rate : 119 BPM     P-R Int : 156 ms          QRS Dur : 078 ms      QT Int : 304 ms       P-R-T Axes : 059 095 051 degrees     QTc Int : 427 ms    Sinus tachycardia  Rightward axis  Borderline Abnormal ECG  When compared with ECG of 12-AUG-2022 07:47,  No significant change was found    Referred By: [email or web address]   SELF           Confirmed By:        ECHOCARDIOGRAM RESULTS  No results found for this or any previous visit.        RESPIRATORY SUPPORT              LAST ARTERIAL BLOOD GAS  ABG  No results for input(s): PH, PO2, PCO2, HCO3, BE in the last 168 hours.    IMPRESSION AND PLAN  Charly Mattson is a 28 y.o. male with a PMH of CVID and  bronchiectasis with frequent exacerbations on whom we have been consulted for bronchiectasis exacerbation.    #CAP with MDR risk factors  #Bronchiectasis with acute exacerbation  #Severe sepsis  #Mild transaminitis  #Mild lactic acidosis, resolved  - follow up sputum culture, blood cultures  - narrow abx to amxicillin-clavulanic acid upon discharge (ordered) and continue for 10 days  - d/c vanc and meropenem (after last dose this afternoon)  - can discontinue IV fluid administration    Safe for discharge from a pulmonary perspective. However, it is important that the sputum cultures be followed up outpatient. I have advised the patient to make a follow up with his pulmonologist, Dr. Ricco Alvares within the next week.    Discussed with hospitalist. I have reviewed the patient's most recent CBC and serum chemistry, as well as the most recent chest x-ray and/or chest CT. My interpretation of the chest imaging is discrete alveolar/interstitial opacity of RML and tree-in-bud opacities of RML and RLL consistent with infectious etiology.    Cristian Peoples MD  [facility]  Department of Pulmonology  Date of Service: 04/10/2023  12:33 PM

## 2023-04-10 NOTE — PROGRESS NOTES
Charly Vernon [id number] is a 28 y.o. male who has been consulted for vancomycin dosing.    Pharmacy consult for vancomycin dosing in no longer required.  Vancomycin was discontinued.    Thank you for allowing us to participate in this patient's care.     Joyce Herring, PharmD

## 2023-04-11 NOTE — DISCHARGE SUMMARY
[facility] Hospital Medicine  Discharge Summary      Patient Name: Charly Mattson  MRN: [id number]  LU: [id number]  Patient Class: OP- Observation  Admission Date: 4/9/2023  Hospital Length of Stay: 0 days  Discharge Date and Time: 4/10/2023  4:24 PM  Attending Physician: No att. providers found   Discharging Provider: Jesús Hurtado PA-C  Primary Care Provider: Porfirio Novoa MD    Primary Care Team: Networked reference to record PCT     HPI:   Charly Mattson is a 28 year old with a PMHx significant for common variable immune deficiency, frequent pulmonary infections, and bronchiectasis, presenting for subjective fever, myalgias, productive cough and wheezing over the last 5 days. He reports first noticing his symptoms which started with muscle aches on Tuesday. He progressively got worse and developed green sputum producing cough. He reports a subjective fever yesterday. He also complains of intermittent nausea. He states he receives IV immunoglobulin replacement once a week. His last injection was on Tuesday. He follows with pulmonology, Dr. Alvares, for frequency pulmonary infections. He has been on prophylaxis Z-jose antonio every M-W-F for over a year. ED workup significant for fever of 100.8, tachycardia and elevated WBC. CMP showing elevated liver enzymes slightly above his baseline. CXR concerning for early PNA. Started on IV merrem and vanc in ED. Procal and lactic acid pending. Patient to be admitted to hospital medicine services for further evaluation and management.           * No surgery found *      Hospital Course:   Patient was monitored closely throughout course of hospital stay by hospital medicine team. Started on broad spectrum antibiotics on admission. Pulmonology consulted on admission. Blood and sputum culture obtained. Trended elevated liver enzymes over course of stay. Leukocytosis and tachycardia resolved after initiation of antibiotics. Pulmonology recommended course of Augmetin at  discharge with follow up with primary pulmonologist. Patient fever resolved and respiratory symptoms improving on day of discharge. Patient cleared by pulmonology. Patient verbalized understanding of discharge instructions and return precautions.    Physical Exam:  General- Patient alert and oriented x3 in NAD  HEENT- PERRLA, EOMI, OP clear, MMM  Neck- No JVD, Lymphadenopathy, Thyromegaly  CV- Regular rate and rhythm, No Murmur/leann/rubs  Resp- diminished lung sounds, No increased WOB  GI- Non tender/non-distended, BS normoactive x4 quads, no HSM  Extrem- No cyanosis, clubbing, edema. Pulses 2+ and symmetric  Neuro- Strength 5/5 flexors/extensors, DTRs 2+ and symmetric, Intact sensation to light touch grossly       Goals of Care Treatment Preferences:  Code Status: Full Code      Consults:   Consults (From admission, onward)        Status Ordering Provider     Inpatient consult to Pulmonology  Once        Provider:  Cristian Peoples MD    Completed HILDA DESAI          No new Assessment & Plan notes have been filed under this hospital service since the last note was generated.  Service: Hospital Medicine    Final Active Diagnoses:    Diagnosis Date Noted POA    PRINCIPAL PROBLEM:  Sepsis [A41.9] 08/10/2021 Yes    Bronchiectasis [J47.9] 05/03/2022 Yes    Transaminitis [R74.01] 05/28/2020 Yes    CVID (common variable immunodeficiency) [D83.9] 05/14/2019 Yes      Problems Resolved During this Admission:       Discharged Condition: good    Disposition: Home or Self Care    Follow Up:   Follow-up Information     Ricco Alvares MD Follow up on 4/18/2023.    Specialties: Hospitalist, Pulmonary Disease  Why: follow up on sputum culture  Contact information:  [address] [place] [address]  [id number]  [place] [address]  [phone]                       Patient Instructions:      Diet Adult Regular     Notify your health care provider if you experience any of the following:  temperature >100.4     Notify your health care  provider if you experience any of the following:  persistent nausea and vomiting or diarrhea     Notify your health care provider if you experience any of the following:  severe uncontrolled pain     Notify your health care provider if you experience any of the following:  redness, tenderness, or signs of infection (pain, swelling, redness, odor or green/yellow discharge around incision site)     Notify your health care provider if you experience any of the following:  increased confusion or weakness     Notify your health care provider if you experience any of the following:  persistent dizziness, light-headedness, or visual disturbances     Activity as tolerated       Significant Diagnostic Studies: Labs:   BMP:   Recent Labs   Lab 04/09/23  1323 04/10/23  0449   GLU 94 121*    139   K 3.7 3.9    109   CO2 22* 22*   BUN 12 13   CREATININE 1.1 1.1   CALCIUM 10.3 8.7   MG  --  1.9    and CMP   Recent Labs   Lab 04/09/23  1323 04/10/23  0449    139   K 3.7 3.9    109   CO2 22* 22*   GLU 94 121*   BUN 12 13   CREATININE 1.1 1.1   CALCIUM 10.3 8.7   PROT 8.7* 6.3   ALBUMIN 4.5 3.2*   BILITOT 1.4* 0.8   ALKPHOS 140* 100   * 92*   * 123*   ANIONGAP 14 8     Microbiology:   Sputum Culture   Lab Results   Component Value Date    GSRESP <10 epithelial cells per low power field. 04/10/2023    GSRESP Many WBC's 04/10/2023    GSRESP Rare Gram positive cocci 04/10/2023    RESPIRATORYC Normal respiratory ashlee 09/01/2022    RESPIRATORYC No S aureus or Pseudomonas isolated. 09/01/2022       Pending Diagnostic Studies:     None         Medications:  Reconciled Home Medications:      Medication List      START taking these medications    amoxicillin-clavulanate 875-125mg 875-125 mg per tablet  Commonly known as: AUGMENTIN  Take 1 tablet by mouth every 12 (twelve) hours. for 10 days     guaiFENesin 600 mg 12 hr tablet  Commonly known as: MUCINEX  Take 1 tablet (600 mg total) by mouth 2 (two)  times daily. for 10 days        CONTINUE taking these medications    acetaminophen 325 MG tablet  Commonly known as: TYLENOL  Take 650 mg by mouth as needed.     albuterol 0.63 mg/3 mL Nebu  Commonly known as: ACCUNEB  Take 0.63 mg by nebulization every 6 (six) hours as needed. Rescue     azithromycin 250 MG tablet  Commonly known as: Z-CHRISTIANO  Take 500 mg by mouth every other day.     DULERA 100-5 mcg/actuation Hfaa  Generic drug: mometasone-formoterol  Inhale 2 Inhalers into the lungs 2 (two) times a day. Controller     EPINEPHrine 0.3 mg/0.3 mL Atin  Commonly known as: EPIPEN     immun glob G(IgG)-pro-IgA 0-50 1 gram/5 mL (20 %) Soln  Inject 200 mg/kg into the skin every 7 days. Sunday     sodium chloride 3% 3 % nebulizer solution  Take 4 mLs by nebulization 2 (two) times a day.        STOP taking these medications    cephALEXin 500 MG capsule  Commonly known as: KEFLEX            Indwelling Lines/Drains at time of discharge:   Lines/Drains/Airways     None                 Time spent on the discharge of patient: 33 minutes         Jesús Hurtado PA-C  Department of Hospital Medicine  [facility]

## 2023-04-11 NOTE — HOSPITAL COURSE
Patient was monitored closely throughout course of hospital stay by hospital medicine team. Started on broad spectrum antibiotics on admission. Pulmonology consulted on admission. Blood and sputum culture obtained. Trended elevated liver enzymes over course of stay. Leukocytosis and tachycardia resolved after initiation of antibiotics. Pulmonology recommended course of Augmetin at discharge with follow up with primary pulmonologist. Patient fever resolved and respiratory symptoms improving on day of discharge. Patient cleared by pulmonology. Patient verbalized understanding of discharge instructions and return precautions.    Physical Exam:  General- Patient alert and oriented x3 in NAD  HEENT- PERRLA, EOMI, OP clear, MMM  Neck- No JVD, Lymphadenopathy, Thyromegaly  CV- Regular rate and rhythm, No Murmur/leann/rubs  Resp- diminished lung sounds, No increased WOB  GI- Non tender/non-distended, BS normoactive x4 quads, no HSM  Extrem- No cyanosis, clubbing, edema. Pulses 2+ and symmetric  Neuro- Strength 5/5 flexors/extensors, DTRs 2+ and symmetric, Intact sensation to light touch grossly

## 2023-04-13 LAB
BACTERIA SPEC AEROBE CULT: NO GROWTH
GRAM STN SPEC: NORMAL

## 2023-04-14 LAB
BACTERIA BLD CULT: NORMAL
BACTERIA BLD CULT: NORMAL

## 2023-05-02 ENCOUNTER — OCCUPATIONAL HEALTH (OUTPATIENT)
Dept: URGENT CARE | Facility: CLINIC | Age: 28
End: 2023-05-02

## 2023-05-02 DIAGNOSIS — Z00.00 ENCOUNTER FOR PHYSICAL EXAMINATION: Primary | ICD-10-CM

## 2023-05-02 PROCEDURE — 99499 PR PHYSICAL - DOT/CDL: ICD-10-PCS | Mod: S$GLB,,, | Performed by: EMERGENCY MEDICINE

## 2023-05-02 PROCEDURE — 99499 UNLISTED E&M SERVICE: CPT | Mod: S$GLB,,, | Performed by: EMERGENCY MEDICINE

## 2023-06-13 ENCOUNTER — OFFICE VISIT (OUTPATIENT)
Dept: FAMILY MEDICINE | Facility: CLINIC | Age: 28
End: 2023-06-13
Payer: MEDICAID

## 2023-06-13 ENCOUNTER — HOSPITAL ENCOUNTER (EMERGENCY)
Facility: HOSPITAL | Age: 28
Discharge: HOME | End: 2023-06-13
Attending: EMERGENCY MEDICINE
Payer: MEDICAID

## 2023-06-13 VITALS
RESPIRATION RATE: 16 BRPM | OXYGEN SATURATION: 96 % | DIASTOLIC BLOOD PRESSURE: 52 MMHG | TEMPERATURE: 99 F | SYSTOLIC BLOOD PRESSURE: 96 MMHG | HEIGHT: 71 IN | HEART RATE: 91 BPM | BODY MASS INDEX: 29.54 KG/M2 | WEIGHT: 211 LBS

## 2023-06-13 VITALS
DIASTOLIC BLOOD PRESSURE: 72 MMHG | RESPIRATION RATE: 16 BRPM | WEIGHT: 211.63 LBS | OXYGEN SATURATION: 99 % | HEART RATE: 101 BPM | TEMPERATURE: 98 F | SYSTOLIC BLOOD PRESSURE: 120 MMHG | BODY MASS INDEX: 29.53 KG/M2

## 2023-06-13 DIAGNOSIS — J32.9 SINUSITIS, UNSPECIFIED CHRONICITY, UNSPECIFIED LOCATION: Primary | ICD-10-CM

## 2023-06-13 DIAGNOSIS — J01.90 ACUTE SINUSITIS, RECURRENCE NOT SPECIFIED, UNSPECIFIED LOCATION: Primary | ICD-10-CM

## 2023-06-13 DIAGNOSIS — R04.0 EPISTAXIS: ICD-10-CM

## 2023-06-13 PROCEDURE — 99999 PR PBB SHADOW E&M-EST. PATIENT-LVL III: ICD-10-PCS | Mod: PBBFAC,,, | Performed by: FAMILY MEDICINE

## 2023-06-13 PROCEDURE — 99214 PR OFFICE/OUTPT VISIT, EST, LEVL IV, 30-39 MIN: ICD-10-PCS | Mod: S$PBB,,, | Performed by: FAMILY MEDICINE

## 2023-06-13 PROCEDURE — 3074F SYST BP LT 130 MM HG: CPT | Mod: CPTII,,, | Performed by: FAMILY MEDICINE

## 2023-06-13 PROCEDURE — 3074F PR MOST RECENT SYSTOLIC BLOOD PRESSURE < 130 MM HG: ICD-10-PCS | Mod: CPTII,,, | Performed by: FAMILY MEDICINE

## 2023-06-13 PROCEDURE — 3008F BODY MASS INDEX DOCD: CPT | Mod: CPTII,,, | Performed by: FAMILY MEDICINE

## 2023-06-13 PROCEDURE — 96372 THER/PROPH/DIAG INJ SC/IM: CPT | Mod: PBBFAC,PO

## 2023-06-13 PROCEDURE — 3078F DIAST BP <80 MM HG: CPT | Mod: CPTII,,, | Performed by: FAMILY MEDICINE

## 2023-06-13 PROCEDURE — 87502 INFLUENZA DNA AMP PROBE: CPT | Performed by: EMERGENCY MEDICINE

## 2023-06-13 PROCEDURE — 99999 PR PBB SHADOW E&M-EST. PATIENT-LVL III: CPT | Mod: PBBFAC,,, | Performed by: FAMILY MEDICINE

## 2023-06-13 PROCEDURE — 1159F MED LIST DOCD IN RCRD: CPT | Mod: CPTII,,, | Performed by: FAMILY MEDICINE

## 2023-06-13 PROCEDURE — 99213 OFFICE O/P EST LOW 20 MIN: CPT | Mod: PBBFAC,PO | Performed by: FAMILY MEDICINE

## 2023-06-13 PROCEDURE — 99214 OFFICE O/P EST MOD 30 MIN: CPT | Mod: S$PBB,,, | Performed by: FAMILY MEDICINE

## 2023-06-13 PROCEDURE — 99284 EMERGENCY DEPT VISIT MOD MDM: CPT | Mod: 25,27

## 2023-06-13 PROCEDURE — 1159F PR MEDICATION LIST DOCUMENTED IN MEDICAL RECORD: ICD-10-PCS | Mod: CPTII,,, | Performed by: FAMILY MEDICINE

## 2023-06-13 PROCEDURE — 3008F PR BODY MASS INDEX (BMI) DOCUMENTED: ICD-10-PCS | Mod: CPTII,,, | Performed by: FAMILY MEDICINE

## 2023-06-13 PROCEDURE — 3078F PR MOST RECENT DIASTOLIC BLOOD PRESSURE < 80 MM HG: ICD-10-PCS | Mod: CPTII,,, | Performed by: FAMILY MEDICINE

## 2023-06-13 PROCEDURE — U0002 COVID-19 LAB TEST NON-CDC: HCPCS | Performed by: EMERGENCY MEDICINE

## 2023-06-13 RX ORDER — CEFTRIAXONE 1 G/1
1 INJECTION, POWDER, FOR SOLUTION INTRAMUSCULAR; INTRAVENOUS
Status: COMPLETED | OUTPATIENT
Start: 2023-06-13 | End: 2023-06-13

## 2023-06-13 RX ORDER — OXYMETAZOLINE HCL 0.05 %
2 SPRAY, NON-AEROSOL (ML) NASAL
Status: SHIPPED | OUTPATIENT
Start: 2023-06-13

## 2023-06-13 RX ORDER — OXYMETAZOLINE HCL 0.05 %
2 SPRAY, NON-AEROSOL (ML) NASAL DAILY PRN
Qty: 15 ML | Refills: 0 | Status: SHIPPED | OUTPATIENT
Start: 2023-06-13 | End: 2023-06-16

## 2023-06-13 RX ORDER — DOXYCYCLINE HYCLATE 100 MG
100 TABLET ORAL 2 TIMES DAILY
Qty: 14 TABLET | Refills: 0 | Status: SHIPPED | OUTPATIENT
Start: 2023-06-13 | End: 2023-06-20

## 2023-06-13 RX ADMIN — CEFTRIAXONE SODIUM 1 G: 500 INJECTION, POWDER, FOR SOLUTION INTRAMUSCULAR; INTRAVENOUS at 11:06

## 2023-06-13 NOTE — PROGRESS NOTES
Subjective:   Patient ID: Charly Mattson is a 28 y.o. male     Chief Complaint: sinus problem    Notes rhinorrhea, congestion, muscle aches, and subjective fevers for 5 days. Pulm started on augmentin but no improvement. Denies sob/cp.    Review of Systems   Respiratory:  Negative for shortness of breath.    Cardiovascular:  Negative for chest pain.   Gastrointestinal:  Negative for abdominal pain.   Genitourinary:  Negative for dysuria.   Past Medical History:   Diagnosis Date    Fatty liver     Immunoglobulin deficiency     Immunoglobulin deficiency     CVID    Pneumonia      Past Surgical History:   Procedure Laterality Date    ADENOIDECTOMY      BIOPSY N/A 9/23/2020    Procedure: BIOPSY;  Surgeon: Kaleb Diagnostic Provider;  Location: [facility] OR;  Service: Interventional Radiology;  Laterality: N/A;    MANDIBLE FRACTURE SURGERY      SINUS SURGERY      TONSILLECTOMY      TYMPANOSTOMY TUBE PLACEMENT      TYMPANOSTOMY TUBE PLACEMENT      x 4     Objective:     Vitals:    06/13/23 1034   BP: 120/72   Pulse: 101   Resp: 16   Temp: 98.1 °F (36.7 °C)     Body mass index is 29.53 kg/m².  Physical Exam  Vitals and nursing note reviewed.   Constitutional:       Appearance: He is well-developed.   HENT:      Head: Normocephalic and atraumatic.   Eyes:      General: No scleral icterus.     Conjunctiva/sclera: Conjunctivae normal.   Pulmonary:      Effort: Pulmonary effort is normal. No respiratory distress.      Breath sounds: No wheezing or rales.   Musculoskeletal:         General: No deformity. Normal range of motion.      Cervical back: Normal range of motion and neck supple.   Skin:     Coloration: Skin is not pale.      Findings: No rash.   Neurological:      Mental Status: He is alert and oriented to person, place, and time.   Psychiatric:         Behavior: Behavior normal.         Thought Content: Thought content normal.         Judgment: Judgment normal.     Assessment:     1. Acute sinusitis, recurrence not  specified, unspecified location    2. Epistaxis      Plan:   Acute sinusitis, recurrence not specified, unspecified location  -     cefTRIAXone injection 1 g  -     doxycycline (VIBRA-TABS) 100 MG tablet; Take 1 tablet (100 mg total) by mouth 2 (two) times daily. for 7 days  Dispense: 14 tablet; Refill: 0    Epistaxis  -     oxymetazoline 0.05 % nasal spray 2 spray  -     oxymetazoline (AFRIN, OXYMETAZOLINE,) 0.05 % nasal spray; 2 sprays by Nasal route daily as needed for Congestion.  Dispense: 15 mL; Refill: 0            Total time spent of Less than 30 minutes minutes on the day of the visit.This includes face to face time and preparing to see the patient, obtaining and reviewing separately obtained history, documenting clinical information in the electronic or other health record, independently interpreting results and communicating results to the patient/family/caregiver, or care coordinator.    Established patient with me has been instructed that must see me at least 1 time yearly (every 365 days) for refills of medications. Seeing other providers in this clinic is fine but expectation is to see me yearly.    Porfirio Novoa MD  06/13/2023    Portions of this note have been dictated with MIKAEL Benjamin

## 2023-06-13 NOTE — FIRST PROVIDER EVALUATION
Emergency Department TeleTriage Encounter Note      CHIEF COMPLAINT    Chief Complaint   Patient presents with    Cough    COVID-19 Concerns    Fever     Patient states he has had congestion, nose bleeds, cough fever        VITAL SIGNS   Initial Vitals [06/13/23 1524]   BP Pulse Resp Temp SpO2   (!) 115/56 (!) 114 19 99.1 °F (37.3 °C) 96 %      MAP       --            ALLERGIES    Review of patient's allergies indicates:   Allergen Reactions    Levaquin [levofloxacin]     Singulair [montelukast] Other (See Comments)     arrhythmias       PROVIDER TRIAGE NOTE  This is a teletriage evaluation of a 28 y.o. male presenting to the ED with c/o body aches, cough, ear pain, fever (t-max 101) that started 1 week.  Started amoxicillin 9 days ago, seen by PCP today received a Rocephin injection and changed ABX to Doxy but patient has not started it yet.  Last tylenol at 1 pm. Limited physical exam via telehealth: The patient is awake, alert, answering questions appropriately and is not in respiratory distress.  As the Teletriage provider, I performed an initial assessment and ordered appropriate labs and imaging studies, if any, to facilitate the patient's care once placed in the ED. Once a room is available, care and a full evaluation will be completed by an alternate ED provider.  Any additional orders and the final disposition will be determined by that provider.  All imaging and labs will not be followed-up by the Teletriage Team, including myself.          ORDERS  Labs Reviewed - No data to display    ED Orders (720h ago, onward)      Start Ordered     Status Ordering Provider    06/13/23 1531 06/13/23 1530  POCT COVID-19 Rapid Screening  Once         Ordered MICA LAYNE    06/13/23 1531 06/13/23 1530  POCT Influenza A/B Molecular  Once         Ordered MICA LAYNE    06/13/23 1531 06/13/23 1530  X-Ray Chest PA And Lateral  1 time imaging         Ordered MICA LAYNE              Virtual Visit Note: The provider  triage portion of this emergency department evaluation and documentation was performed via [employer], a HIPAA-compliant telemedicine application, in concert with a tele-presenter in the room. A face to face patient evaluation with one of my colleagues will occur once the patient is placed in an emergency department room.      DISCLAIMER: This note was prepared with [employer]uan[employer] voice recognition transcription software. Garbled syntax, mangled pronouns, and other bizarre constructions may be attributed to that software system.

## 2023-06-13 NOTE — ED PROVIDER NOTES
Encounter Date: 6/13/2023    SCRIBE #1 NOTE: I, Paula Foster, am scribing for, and in the presence of,  Osman Tamez MD.     History     Chief Complaint   Patient presents with    Cough    COVID-19 Concerns    Fever     Patient states he has had congestion, nose bleeds, cough fever      Time seen by provider: 4:30 PM on 06/13/2023    Charly Mattson is a 28 y.o. male with a PMHx of bronchiectasis, immunoglobulin deficiency, chronic bronchitis, and PNA who presents to the ED for evaluation of general malaise that onset x 8 days ago.  Patient states experiencing similar Sx ~ 1 month ago for which he was prescribed Augmentin for management of bronchiectasis with acute exacerbation by his pulmonologist Dr. Alvares.  He notes the Sx have not been fully relieved since then, and have since grown to include fevers, sinus pressure, a sore throat, intermittent nosebleeds, and body aches over the past few days.  Patient was evaluated again earlier today by his PCP Dr. Novoa, where he received a Rocephin injection and was started on Doxycycline with persistent Sx.  He expresses concern for COVID-19 and is requesting to be tested here in the ED.  The patient confirms diarrhea that onset today, along with nausea, but denies vomiting or any other symptoms at this time.  PSHx includes tympanostomy tube placement, adenoidectomy, tonsillectomy, and sinus surgery.      The history is provided by the patient.   Review of patient's allergies indicates:   Allergen Reactions    Levaquin [levofloxacin]     Singulair [montelukast] Other (See Comments)     arrhythmias     Past Medical History:   Diagnosis Date    Fatty liver     Immunoglobulin deficiency     Immunoglobulin deficiency     CVID    Pneumonia      Past Surgical History:   Procedure Laterality Date    ADENOIDECTOMY      BIOPSY N/A 9/23/2020    Procedure: BIOPSY;  Surgeon: Kaleb Diagnostic Provider;  Location: [facility] OR;  Service: Interventional Radiology;  Laterality: N/A;     MANDIBLE FRACTURE SURGERY      SINUS SURGERY      TONSILLECTOMY      TYMPANOSTOMY TUBE PLACEMENT      TYMPANOSTOMY TUBE PLACEMENT      x 4     Family History   Problem Relation Age of Onset    Cancer Mother     Liver disease Father      Social History     Tobacco Use    Smoking status: Never    Smokeless tobacco: Never   Substance Use Topics    Alcohol use: Yes    Drug use: No     Review of Systems   Constitutional:  Positive for fever.   HENT:  Positive for nosebleeds, sinus pressure and sore throat.    Respiratory:  Negative for shortness of breath.    Cardiovascular:  Negative for chest pain.   Gastrointestinal:  Positive for diarrhea and nausea. Negative for vomiting.   Genitourinary:  Negative for dysuria.   Musculoskeletal:  Positive for myalgias. Negative for back pain.   Skin:  Negative for rash.   Neurological:  Negative for weakness.   Hematological:  Does not bruise/bleed easily.     Physical Exam     Initial Vitals [06/13/23 1524]   BP Pulse Resp Temp SpO2   (!) 115/56 (!) 114 19 99.1 °F (37.3 °C) 96 %      MAP       --         Physical Exam    Nursing note and vitals reviewed.  Constitutional: He appears well-developed and well-nourished. He is not diaphoretic. No distress.   HENT:   Head: Normocephalic and atraumatic.   Nose: Sinus tenderness present. No epistaxis.   Mouth/Throat: Uvula is midline, oropharynx is clear and moist and mucous membranes are normal. No oropharyngeal exudate, posterior oropharyngeal edema or posterior oropharyngeal erythema.   Sinus tenderness.     Eyes: EOM are normal. Pupils are equal, round, and reactive to light.   Neck: Neck supple.   Normal range of motion.  Cardiovascular:  Normal rate, regular rhythm, normal heart sounds and intact distal pulses.     Exam reveals no gallop and no friction rub.       No murmur heard.  Pulmonary/Chest: Breath sounds normal. No respiratory distress. He has no wheezes. He has no rhonchi. He has no rales.   Abdominal: Abdomen is soft.  Bowel sounds are normal. There is no abdominal tenderness. There is no rebound and no guarding.   Musculoskeletal:         General: Normal range of motion.      Cervical back: Normal range of motion and neck supple.     Neurological: He is alert and oriented to person, place, and time.   Skin: Skin is warm.   Psychiatric: He has a normal mood and affect. His behavior is normal. Judgment and thought content normal.       ED Course   Procedures  Labs Reviewed   INFLUENZA A & B BY MOLECULAR   SARS-COV-2 RNA AMPLIFICATION, QUAL          Imaging Results              X-Ray Chest PA And Lateral (Final result)  Result time 06/13/23 16:04:54      Final result by Antoni Mcnamara Jr., MD (06/13/23 16:04:54)                   Impression:      No acute abnormality.  A density in the right upper lobe has resolved since the prior chest x-ray      Electronically signed by: Antoni Mcnamara MD  Date:    06/13/2023  Time:    16:04               Narrative:    EXAMINATION:  XR CHEST PA AND LATERAL    CLINICAL HISTORY:  cough;    TECHNIQUE:  PA and lateral views of the chest were performed.    COMPARISON:  Chest x-ray of April 9, 2023    FINDINGS:  The lungs are clear, with normal appearance of pulmonary vasculature and no pleural effusion or pneumothorax.  The density identified in the right upper lobe on the prior chest x-ray has resolved.    The cardiac silhouette is normal in size. The hilar and mediastinal contours are unremarkable.    Bones are intact.                                       Medications - No data to display  Medical Decision Making:   History:   Old Medical Records: I decided to obtain old medical records.  Initial Assessment:   28-year-old male presented with multiple complaints.  Differential Diagnosis:   Initial differential diagnosis included but not limited to pneumonia, COVID, and sinus infection.  Clinical Tests:   Lab Tests: Ordered and Reviewed  Radiological Study: Ordered and Reviewed  ED  Management:  The patient was emergently evaluated in the emergency department, his evaluation was significant for a well-appearing young male with a normal lung exam.  The patient's x-ray shows no acute abnormalities per Radiology.  The patient's COVID and influenza swabs were negative.  The patient does have sinus tenderness noted on examination.  The patient likely has an acute sinusitis.  The patient was given a Rocephin shot and started on doxycycline by his primary care physician today.  There is no need for further workup or treatment at this time.  He is stable for discharge to home.  He is referred to ENT for follow-up and further care as an outpatient.        Scribe Attestation:   Scribe #1: I performed the above scribed service and the documentation accurately describes the services I performed. I attest to the accuracy of the note.               I, Dr. Osman Tamez, personally performed the services described in this documentation. All medical record entries made by the scribe were at my direction and in my presence.  I have reviewed the chart and agree that the record reflects my personal performance and is accurate and complete. Osman Tamez MD.  6:08 PM 06/13/2023      Clinical Impression:   Final diagnoses:  [J32.9] Sinusitis, unspecified chronicity, unspecified location (Primary)        ED Disposition Condition    Discharge Stable          ED Prescriptions    None       Follow-up Information       Follow up With Specialties Details Why Contact Info    Porfirio Novoa MD Family Medicine   [address] [place] [id number]  [phone]               Osman Tamez MD  06/13/23 2553

## 2023-06-13 NOTE — ED NOTES
Assumed care:  Charly Mattson is awake, alert and oriented x 3, skin warm and dry, in NAD.  Patient CO cough, congestion, sinus pressure, nose bleeds, and diarrhea that started 8 days ago.  Antibiotic was changed today by primary but patient has not started new prescription.    Patient identifiers for Charly Mattson checked and correct.  LOC:  Charly Mattson is awake, alert, and aware of environment with an appropriate affect. He is oriented x 3 and speaking appropriately.  APPEARANCE:  He is resting comfortably and in no acute distress. He is clean and well groomed, patient's clothing is properly fastened.  SKIN:  The skin is warm and dry. He has normal skin turgor and moist mucus membranes. Skin is intact; no bruising or breakdown noted.  MUSCULOSKELETAL:  He is moving all extremities well, no obvious deformities noted. Pulses intact.   RESPIRATORY:  Airway is open and patent. Respirations are spontaneous and non-labored with normal effort and rate.  Cough, congestion  CARDIAC:  He has a normal rate and rhythm. No peripheral edema noted. Capillary refill < 3 seconds.  ABDOMEN:  No distention noted.  Soft and non-tender upon palpation.  diarrhea  NEUROLOGICAL:  PERRL. Facial expression is symmetrical. Hand grasps are equal bilaterally. Normal sensation in all extremities when touched with finger.  Allergies reported:    Review of patient's allergies indicates:   Allergen Reactions    Levaquin [levofloxacin]     Singulair [montelukast] Other (See Comments)     arrhythmias

## 2023-06-13 NOTE — PROGRESS NOTES
Patient verified by name and . Allergies reviewed. Patient received 1g Rocephin in right Ventrogluteal. Patient tolerated injection well. Patient advised to wait in clinic for 15 minutes in case of adverse reactions. Patient demonstrated understanding.

## 2023-06-14 ENCOUNTER — TELEPHONE (OUTPATIENT)
Dept: FAMILY MEDICINE | Facility: CLINIC | Age: 28
End: 2023-06-14
Payer: MEDICAID

## 2023-06-14 DIAGNOSIS — J01.90 ACUTE SINUSITIS, RECURRENCE NOT SPECIFIED, UNSPECIFIED LOCATION: ICD-10-CM

## 2023-06-14 DIAGNOSIS — R04.0 EPISTAXIS, RECURRENT: Primary | ICD-10-CM

## 2023-07-10 PROBLEM — A41.9 SEPSIS: Status: RESOLVED | Noted: 2021-08-10 | Resolved: 2023-07-10

## 2023-07-28 ENCOUNTER — OFFICE VISIT (OUTPATIENT)
Dept: URGENT CARE | Facility: CLINIC | Age: 28
End: 2023-07-28
Payer: MEDICAID

## 2023-07-28 VITALS
WEIGHT: 210 LBS | HEIGHT: 71 IN | TEMPERATURE: 99 F | OXYGEN SATURATION: 95 % | SYSTOLIC BLOOD PRESSURE: 108 MMHG | BODY MASS INDEX: 29.4 KG/M2 | DIASTOLIC BLOOD PRESSURE: 62 MMHG | HEART RATE: 88 BPM | RESPIRATION RATE: 16 BRPM

## 2023-07-28 DIAGNOSIS — J01.40 SUBACUTE PANSINUSITIS: ICD-10-CM

## 2023-07-28 DIAGNOSIS — R05.9 COUGH, UNSPECIFIED TYPE: ICD-10-CM

## 2023-07-28 DIAGNOSIS — H66.43 SUPPURATIVE OTITIS MEDIA OF BOTH EARS, UNSPECIFIED CHRONICITY: Primary | ICD-10-CM

## 2023-07-28 PROCEDURE — 99214 PR OFFICE/OUTPT VISIT, EST, LEVL IV, 30-39 MIN: ICD-10-PCS | Mod: S$GLB,,,

## 2023-07-28 PROCEDURE — 99214 OFFICE O/P EST MOD 30 MIN: CPT | Mod: S$GLB,,,

## 2023-07-28 RX ORDER — CEFDINIR 300 MG/1
300 CAPSULE ORAL 2 TIMES DAILY
Qty: 14 CAPSULE | Refills: 0 | Status: SHIPPED | OUTPATIENT
Start: 2023-07-28 | End: 2023-08-04

## 2023-07-28 RX ORDER — AZELASTINE 1 MG/ML
1 SPRAY, METERED NASAL 2 TIMES DAILY
Qty: 30 ML | Refills: 0 | Status: SHIPPED | OUTPATIENT
Start: 2023-07-28 | End: 2024-07-27

## 2023-07-28 NOTE — PROGRESS NOTES
"Subjective:      Patient ID: Charly Mattson is a 28 y.o. male.    Vitals:  height is 5' 11" (1.803 m) and weight is 95.3 kg (210 lb). His temperature is 98.7 °F (37.1 °C). His blood pressure is 108/62 and his pulse is 88. His respiration is 16 and oxygen saturation is 95%.     Chief Complaint: Cough    Mr. Mattson, who has a history of bronchiectasis, seasonal environmental allergies, and immunoglobulin deficiency, with a chief complaint of malingering cough since June.  Patient reports he was seen in urgent cares for sinus infections and otitis media and placed on Augmentin.  He reports he initially had improvement in his symptoms for 2 days, and then a recurrence.  He is also reporting associated productive cough, bilateral otalgia, and body aches.  He has been taking Augmentin as directed for the past 8 days with no improvement of symptoms.  Patient reports he was previously on doxycycline, and was told to discontinue treatment due to ineffective for otitis media.  On chart review, patient was given a Rocephin injection in conjunction with the doxy for sinusitis in June.  Patient was also started on Afrin at that time.  He denies current use of Afrin.  Does report he is using Flonase.  Patient is also on azithromycin chronically for his lung disease.  He also reports he has a scheduled ENT appointment, but is not available for another month.          Cough  This is a new problem. The current episode started 1 to 4 weeks ago (x 10 days). The problem has been gradually worsening. The problem occurs constantly. The cough is Productive of sputum. Associated symptoms include ear congestion, ear pain, myalgias, nasal congestion, postnasal drip, rhinorrhea and a sore throat. Pertinent negatives include no chills, fever or shortness of breath. He has tried a beta-agonist inhaler (Treated for same symptoms 8 days ago) for the symptoms. The treatment provided mild relief. His past medical history is significant for " bronchiectasis and environmental allergies.     Constitution: Negative for chills and fever.   HENT:  Positive for ear pain, congestion, postnasal drip, sinus pain, sinus pressure and sore throat. Negative for nosebleeds.    Neck: Negative for painful lymph nodes.   Cardiovascular:  Negative for palpitations.   Respiratory:  Positive for cough. Negative for shortness of breath.    Gastrointestinal:  Negative for nausea, vomiting and diarrhea.   Endocrine: cold intolerance and heat intolerance.   Genitourinary:  Negative for dysuria, frequency and urgency.   Musculoskeletal:  Positive for muscle ache.   Allergic/Immunologic: Positive for environmental allergies, seasonal allergies and recurrent sinus infections.   Neurological:  Negative for dizziness, disorientation and altered mental status.   Hematologic/Lymphatic: Negative for swollen lymph nodes.   Psychiatric/Behavioral:  Negative for altered mental status and disorientation.     Objective:     Physical Exam   Constitutional: He is oriented to person, place, and time. He appears well-developed. He is cooperative.  Non-toxic appearance. He does not appear ill. No distress. normal  HENT:   Head: Normocephalic and atraumatic.   Ears:   Right Ear: Hearing, external ear and ear canal normal. No no drainage or swelling. No foreign bodies. No mastoid tenderness. Tympanic membrane is erythematous and retracted. A middle ear effusion is present.   Left Ear: Hearing, external ear and ear canal normal. No no drainage or swelling. No foreign bodies. No mastoid tenderness. Tympanic membrane is erythematous and retracted. A middle ear effusion is present.   Nose: Rhinorrhea present. No mucosal edema or nasal deformity. No epistaxis. Right sinus exhibits maxillary sinus tenderness and frontal sinus tenderness. Left sinus exhibits maxillary sinus tenderness and frontal sinus tenderness.   Mouth/Throat: Uvula is midline, oropharynx is clear and moist and mucous membranes are  normal. Mucous membranes are moist. No trismus in the jaw. Normal dentition. No uvula swelling. No oropharyngeal exudate. Oropharynx is clear.   Eyes: Conjunctivae and lids are normal. Pupils are equal, round, and reactive to light. Extraocular movement intact   Neck: Trachea normal and phonation normal. Neck supple.   Cardiovascular: Normal rate, regular rhythm, normal heart sounds and normal pulses.   Pulmonary/Chest: Effort normal and breath sounds normal.   Abdominal: Normal appearance.   Musculoskeletal: Normal range of motion.         General: Normal range of motion.   Neurological: no focal deficit. He is alert and oriented to person, place, and time. He exhibits normal muscle tone.   Skin: Skin is warm, dry, intact and not diaphoretic. Capillary refill takes 2 to 3 seconds.   Psychiatric: His speech is normal and behavior is normal. Judgment and thought content normal.   Nursing note and vitals reviewed.    Assessment:     1. Suppurative otitis media of both ears, unspecified chronicity    2. Cough, unspecified type    3. Subacute pansinusitis        Plan:       Suppurative otitis media of both ears, unspecified chronicity  -     cefdinir (OMNICEF) 300 MG capsule; Take 1 capsule (300 mg total) by mouth 2 (two) times daily. for 7 days  Dispense: 14 capsule; Refill: 0    Cough, unspecified type  -     X-Ray Chest PA And Lateral; Future; Expected date: 07/28/2023    Subacute pansinusitis  -     cefdinir (OMNICEF) 300 MG capsule; Take 1 capsule (300 mg total) by mouth 2 (two) times daily. for 7 days  Dispense: 14 capsule; Refill: 0  -     azelastine (ASTELIN) 137 mcg (0.1 %) nasal spray; 1 spray (137 mcg total) by Nasal route 2 (two) times daily.  Dispense: 30 mL; Refill: 0        Narrative & Impression  EXAMINATION:  XR CHEST PA AND LATERAL     CLINICAL HISTORY:  Cough, unspecified     TECHNIQUE:  PA and lateral views of the chest were performed.     COMPARISON:  06/13/2023     FINDINGS:  Lungs are clear.Normal  cardiomediastinal silhouette.Normal pulmonary vascular distribution.No pleural effusion or pneumothorax.Widened right AC interval which is chronic.        Electronically signed by: Tito Fuchs  Date:                                            07/28/2023  Time:                                           14:01

## 2023-08-25 ENCOUNTER — OFFICE VISIT (OUTPATIENT)
Dept: URGENT CARE | Facility: CLINIC | Age: 28
End: 2023-08-25
Payer: MEDICAID

## 2023-08-25 VITALS
HEIGHT: 71 IN | OXYGEN SATURATION: 97 % | HEART RATE: 93 BPM | BODY MASS INDEX: 29.4 KG/M2 | DIASTOLIC BLOOD PRESSURE: 76 MMHG | TEMPERATURE: 98 F | SYSTOLIC BLOOD PRESSURE: 114 MMHG | RESPIRATION RATE: 16 BRPM | WEIGHT: 210 LBS

## 2023-08-25 DIAGNOSIS — Z87.01 HISTORY OF PNEUMONIA: ICD-10-CM

## 2023-08-25 DIAGNOSIS — Z20.822 EXPOSURE TO COVID-19 VIRUS: ICD-10-CM

## 2023-08-25 DIAGNOSIS — R05.9 COUGH, UNSPECIFIED TYPE: Primary | ICD-10-CM

## 2023-08-25 LAB
CTP QC/QA: YES
SARS-COV-2 AG RESP QL IA.RAPID: NEGATIVE

## 2023-08-25 PROCEDURE — 99214 PR OFFICE/OUTPT VISIT, EST, LEVL IV, 30-39 MIN: ICD-10-PCS | Mod: S$GLB,,, | Performed by: NURSE PRACTITIONER

## 2023-08-25 PROCEDURE — 99214 OFFICE O/P EST MOD 30 MIN: CPT | Mod: S$GLB,,, | Performed by: NURSE PRACTITIONER

## 2023-08-25 PROCEDURE — 87811 SARS-COV-2 COVID19 W/OPTIC: CPT | Mod: QW,S$GLB,, | Performed by: NURSE PRACTITIONER

## 2023-08-25 PROCEDURE — 87811 SARS CORONAVIRUS 2 ANTIGEN POCT, MANUAL READ: ICD-10-PCS | Mod: QW,S$GLB,, | Performed by: NURSE PRACTITIONER

## 2023-08-25 RX ORDER — ACETYLCYSTEINE 200 MG/ML
800 SOLUTION ORAL; RESPIRATORY (INHALATION)
COMMUNITY
Start: 2023-04-17 | End: 2024-02-01 | Stop reason: ALTCHOICE

## 2023-08-25 RX ORDER — CEFUROXIME AXETIL 250 MG/1
TABLET ORAL
COMMUNITY
Start: 2023-08-23 | End: 2024-02-01

## 2023-08-25 NOTE — LETTER
August 25, 2023      [facility]  [address] [address] [address] [facility] [address] [address]       Patient: Charly Mattson   YOB: 1995  Date of Visit: 08/25/2023    To Whom It May Concern:    Kj Mattson  was at [facility] on 08/25/2023. The patient may return to work/school on 08/25/2023  with no restrictions. If you have any questions or concerns, or if I can be of further assistance, please do not hesitate to contact me.    Sincerely,    Caren Henderson, NP

## 2023-08-25 NOTE — PATIENT INSTRUCTIONS
Current medications already prescribed    A negative test result does not rule out infection with covid.  Repeat testing at home or at any facility that tests for covid is recommended.  Recommendation is to test a minimum of one more time at least 48 hours from initial negative test.  For symptomatic patients, it is recommended to retest within 3 days and for asymptomatic (without symptoms) it is recommended to retest within 5 days.      Recommend retesting for any worsening of symptoms or in 48 hours

## 2023-08-25 NOTE — PROGRESS NOTES
"Subjective:      Patient ID: Charly Mattson is a 28 y.o. male.    Vitals:  height is 5' 11" (1.803 m) and weight is 95.3 kg (210 lb). His temperature is 98.1 °F (36.7 °C). His blood pressure is 114/76 and his pulse is 93. His respiration is 16 and oxygen saturation is 97%.     Chief Complaint: COVID-19 Concerns    Pt states son & daughter tested positive for covid 2 days ago, started w/ cough & job wants him to get tested for covid.       Constitution: Negative for chills, fatigue and fever.   HENT:  Positive for congestion. Negative for sore throat.    Respiratory:  Positive for cough (Chronic).    Gastrointestinal:  Negative for nausea, vomiting and diarrhea.   Musculoskeletal:  Positive for muscle ache ("for weeks").   Neurological:  Negative for headaches.      Objective:     Physical Exam   Constitutional: He is oriented to person, place, and time. He is cooperative.  Non-toxic appearance. He does not appear ill. No distress. awake  HENT:   Head: Normocephalic and atraumatic.   Ears:   Right Ear: Tympanic membrane, external ear and ear canal normal.   Left Ear: Tympanic membrane, external ear and ear canal normal.   Nose: Congestion present. No rhinorrhea.   Mouth/Throat: Mucous membranes are moist. No oropharyngeal exudate or posterior oropharyngeal erythema.   Eyes: Conjunctivae are normal. Right eye exhibits no discharge. Left eye exhibits no discharge.   Neck: Neck supple. No neck rigidity present.   Cardiovascular: Normal rate, regular rhythm and normal heart sounds.   Pulmonary/Chest: Effort normal and breath sounds normal. No accessory muscle usage. No tachypnea. No respiratory distress. He has no wheezes. He has no rhonchi. He has no rales. He exhibits no tenderness.   Abdominal: Normal appearance.   Musculoskeletal:      Cervical back: He exhibits no tenderness.   Lymphadenopathy:     He has no cervical adenopathy.   Neurological: no focal deficit. He is alert and oriented to person, place, and time. No " sensory deficit.   Skin: Skin is warm, dry, not diaphoretic and no rash. Capillary refill takes 2 to 3 seconds.   Psychiatric: His behavior is normal. Mood normal.   Nursing note and vitals reviewed.      Assessment:     1. Cough, unspecified type    2. History of pneumonia    3. Exposure to COVID-19 virus      COVID negative    Plan:       Cough, unspecified type  -     SARS Coronavirus 2 Antigen, POCT Manual Read    History of pneumonia    Exposure to COVID-19 virus

## 2023-09-04 ENCOUNTER — OFFICE VISIT (OUTPATIENT)
Dept: URGENT CARE | Facility: CLINIC | Age: 28
End: 2023-09-04
Payer: MEDICAID

## 2023-09-04 VITALS
DIASTOLIC BLOOD PRESSURE: 79 MMHG | HEART RATE: 73 BPM | TEMPERATURE: 98 F | HEIGHT: 71 IN | RESPIRATION RATE: 16 BRPM | SYSTOLIC BLOOD PRESSURE: 121 MMHG | BODY MASS INDEX: 30.66 KG/M2 | OXYGEN SATURATION: 96 % | WEIGHT: 219 LBS

## 2023-09-04 DIAGNOSIS — J01.00 SUBACUTE MAXILLARY SINUSITIS: Primary | ICD-10-CM

## 2023-09-04 PROCEDURE — 99213 OFFICE O/P EST LOW 20 MIN: CPT | Mod: S$GLB,,, | Performed by: NURSE PRACTITIONER

## 2023-09-04 PROCEDURE — 99213 PR OFFICE/OUTPT VISIT, EST, LEVL III, 20-29 MIN: ICD-10-PCS | Mod: S$GLB,,, | Performed by: NURSE PRACTITIONER

## 2023-09-04 RX ORDER — AZITHROMYCIN 250 MG/1
250 TABLET, FILM COATED ORAL
COMMUNITY
Start: 2022-09-13 | End: 2024-02-01 | Stop reason: DRUGHIGH

## 2023-09-04 RX ORDER — PREDNISONE 20 MG/1
20 TABLET ORAL 2 TIMES DAILY
Qty: 6 TABLET | Refills: 0 | Status: SHIPPED | OUTPATIENT
Start: 2023-09-04 | End: 2023-09-07

## 2023-09-04 RX ORDER — FLUTICASONE PROPIONATE 50 MCG
2 SPRAY, SUSPENSION (ML) NASAL DAILY PRN
Qty: 15.8 ML | Refills: 0 | Status: SHIPPED | OUTPATIENT
Start: 2023-09-04 | End: 2023-09-18

## 2023-09-04 RX ORDER — DEXAMETHASONE SODIUM PHOSPHATE 4 MG/ML
8 INJECTION, SOLUTION INTRA-ARTICULAR; INTRALESIONAL; INTRAMUSCULAR; INTRAVENOUS; SOFT TISSUE
Status: COMPLETED | OUTPATIENT
Start: 2023-09-04 | End: 2023-09-04

## 2023-09-04 RX ORDER — BROMPHENIRAMINE MALEATE, PSEUDOEPHEDRINE HYDROCHLORIDE, AND DEXTROMETHORPHAN HYDROBROMIDE 2; 30; 10 MG/5ML; MG/5ML; MG/5ML
10 SYRUP ORAL EVERY 4 HOURS PRN
Qty: 118 ML | Refills: 0 | Status: SHIPPED | OUTPATIENT
Start: 2023-09-04 | End: 2023-09-14

## 2023-09-04 RX ADMIN — DEXAMETHASONE SODIUM PHOSPHATE 8 MG: 4 INJECTION, SOLUTION INTRA-ARTICULAR; INTRALESIONAL; INTRAMUSCULAR; INTRAVENOUS; SOFT TISSUE at 02:09

## 2023-09-04 NOTE — PATIENT INSTRUCTIONS
Increase clear fluid intake  Stop all current over the counter cough, cold, flu medicine  Tylenol/motrin otc for fever or pain  Use Flonase nasal spray and Bromfed syrup for sinus congestion and pressure.   Add a humidifier to your room at bedtime for respiratory comfort.  Continue all antibiotics  Follow up with PCP  Go immediately to the nearest emergency room for shortness of breath, chest pain,  or other emergent concern.  Return to clinic for new, worse, or unresolving symptoms

## 2023-09-04 NOTE — PROGRESS NOTES
"Subjective:      Patient ID: Charly Mattson is a 28 y.o. male.    Vitals:  height is 5' 11" (1.803 m) and weight is 99.3 kg (219 lb). His oral temperature is 98.1 °F (36.7 °C). His blood pressure is 121/79 and his pulse is 73. His respiration is 16 and oxygen saturation is 96%.     Chief Complaint: Sinus Problem    Facial pain that began about 3 days ago.  Pain is located under left eye/left cheek area.  Patient is currently being treated for sinus infection.  Taking cefdinir.  Denies any other treatment other than Tylenol and ibuprofen  Patient states facial pain is worsening.  He is rotating tylenol and motrin for pain, with little relief.     Sinus Problem  This is a new problem. The current episode started in the past 7 days. Associated symptoms include congestion and sinus pressure. Pertinent negatives include no chills, coughing, ear pain, headaches, shortness of breath or sore throat. Past treatments include acetaminophen (nsaids).       Constitution: Negative for chills and fever.   HENT:  Positive for congestion, sinus pain and sinus pressure. Negative for ear pain and sore throat.    Cardiovascular:  Negative for chest pain, palpitations and sob on exertion.   Eyes:  Negative for eye pain and eye redness.   Respiratory:  Negative for cough, shortness of breath, stridor and wheezing.    Skin:  Negative for rash.   Neurological:  Negative for dizziness, light-headedness, passing out, headaches, disorientation and altered mental status.   Psychiatric/Behavioral:  Negative for altered mental status, disorientation and confusion.       Objective:     Physical Exam   Constitutional: He is oriented to person, place, and time. He appears well-developed. He is cooperative.  Non-toxic appearance. He does not appear ill. No distress.   HENT:   Head: Normocephalic and atraumatic.   Ears:   Right Ear: Hearing and external ear normal.   Left Ear: Hearing and external ear normal.   Nose: Congestion present. No mucosal " edema, rhinorrhea, purulent discharge, sinus tenderness or nasal deformity. No epistaxis. Right sinus exhibits no maxillary sinus tenderness and no frontal sinus tenderness. Left sinus exhibits maxillary sinus tenderness. Left sinus exhibits no frontal sinus tenderness.   Mouth/Throat: Uvula is midline, oropharynx is clear and moist and mucous membranes are normal. Mucous membranes are moist. No trismus in the jaw. Normal dentition. No uvula swelling. No oropharyngeal exudate, posterior oropharyngeal edema or posterior oropharyngeal erythema. Oropharynx is clear.   Eyes: Conjunctivae and lids are normal. No scleral icterus.   Neck: Trachea normal and phonation normal. Neck supple. No edema present. No erythema present. No neck rigidity present.   Cardiovascular: Normal rate, regular rhythm, normal heart sounds and normal pulses.   Pulmonary/Chest: Effort normal and breath sounds normal. No stridor. No respiratory distress. He has no decreased breath sounds. He has no rhonchi.   Abdominal: Normal appearance.   Musculoskeletal: Normal range of motion.         General: No deformity. Normal range of motion.   Lymphadenopathy:     He has no cervical adenopathy.   Neurological: no focal deficit. He is alert and oriented to person, place, and time. He exhibits normal muscle tone. Coordination normal.   Skin: Skin is warm, dry, intact, not diaphoretic and not pale. Capillary refill takes 2 to 3 seconds.   Psychiatric: His speech is normal and behavior is normal. Judgment and thought content normal.   Nursing note and vitals reviewed.      Assessment:     1. Subacute maxillary sinusitis        Plan:       Subacute maxillary sinusitis  -     dexAMETHasone injection 8 mg  -     brompheniramine-pseudoeph-DM (BROMFED DM) 2-30-10 mg/5 mL Syrp; Take 10 mLs by mouth every 4 (four) hours as needed (sinus congestion/drainage).  Dispense: 118 mL; Refill: 0  -     fluticasone propionate (FLONASE) 50 mcg/actuation nasal spray; 2 sprays  (100 mcg total) by Each Nostril route daily as needed for Rhinitis.  Dispense: 15.8 mL; Refill: 0      I have discussed the physical exam findings with the patient. We discussed symptom monitoring, conservative care methods, medication use, and follow up orders. The patient verbalized understanding and agreement with the plan of care.

## 2023-10-19 NOTE — PATIENT INSTRUCTIONS
Understanding Ingrown Toenails    An ingrown nail is the result of a nail growing into the skin that surrounds it. This often occurs at either edge of the big toe. Ingrown nails may be caused by improper trimming, inherited nail deformities, injuries, fungal infections, or pressure.    Symptoms  Ingrown nails may cause pain at the tip of the toe or all the way to the base of the toe. The pain is often worse while walking. An ingrown nail may also lead to infection, inflammation, or a more serious condition. If its infected, you might see pus or redness.    Evaluation  To determine the extent of your problem, your healthcare provider examines and possibly presses the painful area. If other problems are suspected, blood tests, cultures, and X-rays may be done as well.    Treatment  If the nail isnt infected, your healthcare provider may trim the corner of it to help relieve your symptoms. He or she may need to remove one side of your nail back to the cuticle. The base of the nail may then be treated with a chemical to keep the ingrown part from growing back. Severe infections or ingrown nails may require antibiotics and temporary or permanent removal of a portion of the nail. To prevent pain, a local anesthetic may be used in these procedures. This treatment is usually done at your healthcare provider's office.    Prevention  Many nail problems can be prevented by wearing the right shoes and trimming your nails properly. To help avoid infection, keep your feet clean and dry. If you have diabetes, talk with your healthcare provider before doing any foot self-care.  The right shoes: Get your feet measured (your size may change as you age). Wear shoes that are supportive and roomy enough for your toes to wiggle. Look for shoes made of natural materials such as leather, which allow your feet to breathe.  Proper trimming: To avoid problems, trim your toenails straight across without cutting down into the corners. If you  cant trim your own nails, ask your healthcare provider to do so for you.    Date Last Reviewed: 10/1/2016  © 5151-3387 [employer]. [address] [place] [address], [place] [address]. All rights reserved. This information is not intended as a substitute for professional medical care. Always follow your healthcare professional's instructions.     INSTRUCTIONS FOLLOWING CORRECTIVE NAIL SURGERY TODAY    Go directly home and elevate foot.  Restrict your activities the remainder of the day.  Apply ice pack if needed.  Keep bandages clean and dry.  You may notice some oozing coming through the bandages; this is normal.  Do not remove the dressing, apply additional dressing on top should you need to.  If bandage becomes saturated with blood, call us.  Local anesthesia will last 3-6 hours.  For discomfort, take Advil, Tylenol or pain medication if prescribed.  If you were given antibiotics, take them until they are all gone. It is important to finish the antibiotics even if the wound looks better. This ensures that the infection clears.      TOMORROW    When you take your shower, get dressing saturated then remove the dressing so that the dressing does not pull or stick to the toe and bathe as normal.  Soak in 2 Tablespoons of Epsom Salt daily for 1 hour  Pour peroxide over the toe  Dry area.  Apply Neosporin and gauze bandage.  No Band-Aid  Re-bandage twice daily for two weeks until next appointment.  If any problems arise, call the office. We do have a 24 hours answering service.    If you had a procedure with phenol, it is normal for your toe to drain and have redness for 4-6 weeks.  Any redness or streaks going up the foot is NOT normal.     Your post-operative appointment in two weeks is not included in today's charges.  You will be expected to pay any co-payment or deductible.

## 2023-10-26 ENCOUNTER — OFFICE VISIT (OUTPATIENT)
Dept: PODIATRY | Facility: CLINIC | Age: 28
End: 2023-10-26
Payer: MEDICAID

## 2023-10-26 VITALS — BODY MASS INDEX: 30.19 KG/M2 | HEIGHT: 71 IN | WEIGHT: 215.63 LBS

## 2023-10-26 DIAGNOSIS — L60.0 INGROWN NAIL: Primary | ICD-10-CM

## 2023-10-26 DIAGNOSIS — M79.674 PAIN OF TOE OF RIGHT FOOT: ICD-10-CM

## 2023-10-26 DIAGNOSIS — M79.671 FOOT PAIN, RIGHT: ICD-10-CM

## 2023-10-26 DIAGNOSIS — L60.0 INGROWING NAIL WITH INFECTION: ICD-10-CM

## 2023-10-26 PROCEDURE — 3008F BODY MASS INDEX DOCD: CPT | Mod: CPTII,,, | Performed by: PODIATRIST

## 2023-10-26 PROCEDURE — 1160F PR REVIEW ALL MEDS BY PRESCRIBER/CLIN PHARMACIST DOCUMENTED: ICD-10-PCS | Mod: CPTII,,, | Performed by: PODIATRIST

## 2023-10-26 PROCEDURE — 11750 EXCISION NAIL&NAIL MATRIX: CPT | Mod: PBBFAC,PN | Performed by: PODIATRIST

## 2023-10-26 PROCEDURE — 1159F MED LIST DOCD IN RCRD: CPT | Mod: CPTII,,, | Performed by: PODIATRIST

## 2023-10-26 PROCEDURE — 1159F PR MEDICATION LIST DOCUMENTED IN MEDICAL RECORD: ICD-10-PCS | Mod: CPTII,,, | Performed by: PODIATRIST

## 2023-10-26 PROCEDURE — 1160F RVW MEDS BY RX/DR IN RCRD: CPT | Mod: CPTII,,, | Performed by: PODIATRIST

## 2023-10-26 PROCEDURE — 99213 PR OFFICE/OUTPT VISIT, EST, LEVL III, 20-29 MIN: ICD-10-PCS | Mod: 25,S$PBB,, | Performed by: PODIATRIST

## 2023-10-26 PROCEDURE — 11750 NAIL REMOVAL: ICD-10-PCS | Mod: S$PBB,,, | Performed by: PODIATRIST

## 2023-10-26 PROCEDURE — 3008F PR BODY MASS INDEX (BMI) DOCUMENTED: ICD-10-PCS | Mod: CPTII,,, | Performed by: PODIATRIST

## 2023-10-26 PROCEDURE — 99213 OFFICE O/P EST LOW 20 MIN: CPT | Mod: PBBFAC,PN,25 | Performed by: PODIATRIST

## 2023-10-26 PROCEDURE — 99213 OFFICE O/P EST LOW 20 MIN: CPT | Mod: 25,S$PBB,, | Performed by: PODIATRIST

## 2023-10-26 PROCEDURE — 99999 PR PBB SHADOW E&M-EST. PATIENT-LVL III: ICD-10-PCS | Mod: PBBFAC,,, | Performed by: PODIATRIST

## 2023-10-26 PROCEDURE — 99999 PR PBB SHADOW E&M-EST. PATIENT-LVL III: CPT | Mod: PBBFAC,,, | Performed by: PODIATRIST

## 2023-10-26 RX ORDER — DOXYCYCLINE 100 MG/1
100 CAPSULE ORAL EVERY 12 HOURS
Qty: 28 CAPSULE | Refills: 0 | Status: SHIPPED | OUTPATIENT
Start: 2023-10-26 | End: 2023-11-09

## 2023-10-26 NOTE — LETTER
October 26, 2023      [facility] - Podiatry  [address] [place] [address]  Phone: [phone]  Fax: [phone]       Patient: Charly Mattson   YOB: 1995  Date of Visit: 10/26/2023    To Whom It May Concern:    Kj Mattson  was at [facility] on 10/26/2023. The patient may return to work on 10/26/2023 with restrictions. Please allow patient to wear tennis shoes full time while at work. If you have any questions or concerns, or if I can be of further assistance, please do not hesitate to contact me.    Sincerely, Electronically Signed by: ZE Sutherland MA

## 2023-10-26 NOTE — PROGRESS NOTES
"  [address] [facility] [address] [place] [address]  Phone: [phone]   [phone]    Patient's PCP:Porfirio Novoa MD  Referring Provider: No ref. provider found    Subjective:      Chief Complaint:: Toe Pain and Ingrown Toenail (Right great toe medial border)    Toe Pain   Pertinent negatives include no numbness.     Charly Mattson is a 28 y.o. male who presents today with a complaint of right 1st toenail medial border ingrown nail with pain and redness present.  Patient states the pain is worse the more he is walking or weight-bearing.  Denies any history of trauma.      Vitals:    10/26/23 1558   Weight: 97.8 kg (215 lb 9.8 oz)   Height: 5' 11" (1.803 m)   PainSc:   8      Shoe Size: 12    Past Surgical History:   Procedure Laterality Date    ADENOIDECTOMY      BIOPSY N/A 9/23/2020    Procedure: BIOPSY;  Surgeon: [facility] Diagnostic Provider;  Location: [facility] OR;  Service: Interventional Radiology;  Laterality: N/A;    MANDIBLE FRACTURE SURGERY      SINUS SURGERY      TONSILLECTOMY      TYMPANOSTOMY TUBE PLACEMENT      TYMPANOSTOMY TUBE PLACEMENT      x 4     Past Medical History:   Diagnosis Date    Fatty liver     Immunoglobulin deficiency     Immunoglobulin deficiency     CVID    Pneumonia      Family History   Problem Relation Age of Onset    Cancer Mother     Liver disease Father         Social History:   Marital Status: Single  Alcohol History:  reports current alcohol use.  Tobacco History:  reports that he has never smoked. He has never used smokeless tobacco.  Drug History:  reports no history of drug use.    Review of patient's allergies indicates:   Allergen Reactions    Levaquin [levofloxacin]     Singulair [montelukast] Other (See Comments)     arrhythmias       Current Outpatient Medications   Medication Sig Dispense Refill    acetaminophen (TYLENOL) 325 MG tablet Take 650 mg by mouth as needed.      acetylcysteine 200 mg/ml, 20%, (MUCOMYST) 200 mg/mL (20 %) nebulizer solution Inhale 800 mg " into the lungs.      albuterol (ACCUNEB) 0.63 mg/3 mL Nebu Take 0.63 mg by nebulization every 6 (six) hours as needed. Rescue      azelastine (ASTELIN) 137 mcg (0.1 %) nasal spray 1 spray (137 mcg total) by Nasal route 2 (two) times daily. 30 mL 0    azithromycin (Z-CHRISTIANO) 250 MG tablet Take 250 mg by mouth.      cefUROXime (CEFTIN) 250 MG tablet SMARTSI Tablet(s) By Mouth Morning-Night      doxycycline (VIBRAMYCIN) 100 MG Cap Take 1 capsule (100 mg total) by mouth every 12 (twelve) hours. for 14 days 28 capsule 0    EPINEPHrine (EPIPEN) 0.3 mg/0.3 mL AtIn       immun glob G,IgG,-pro-IgA 0-50 1 gram/5 mL (20 %) Soln Inject 200 mg/kg into the skin every 7 days.       olopatadine-mometasone 665-25 mcg/spray [place] 1 spray by Nasal route.       Current Facility-Administered Medications   Medication Dose Route Frequency Provider Last Rate Last Admin    oxymetazoline 0.05 % nasal spray 2 spray  2 spray Each Nostril 1 time in Clinic/HOD Porfirio Novoa MD           Review of Systems   Constitutional:  Negative for chills, fatigue, fever and unexpected weight change.   HENT:  Negative for hearing loss and trouble swallowing.    Eyes:  Negative for photophobia and visual disturbance.   Respiratory:  Negative for cough, shortness of breath and wheezing.    Cardiovascular:  Negative for chest pain, palpitations and leg swelling.   Gastrointestinal:  Negative for abdominal pain and nausea.   Genitourinary:  Negative for dysuria and frequency.   Musculoskeletal:  Negative for arthralgias, back pain, gait problem, joint swelling and myalgias.   Skin:  Negative for rash and wound.   Neurological:  Negative for tremors, seizures, weakness, numbness and headaches.   Hematological:  Does not bruise/bleed easily.         Objective:        Physical Exam:   Foot Exam  Physical Exam  Ortho/SPM Exam   Constitutional: Patient is oriented to person, place, and time. Patient appears well-developed and well-nourished. No acute  distress.      Psychiatric: Patient has a normal mood and affect. Patient's speech is normal and behavior is normal. Judgment is normal. Cognition and memory are normal.       Dermatological:  The toenail is incurvated, Medial border of the Right hallux.    moderate erythema noted to the affected area.     Absent paronychia.     Absent abscess.    Skin is normal age and health appropriate color, turgor, texture, and temperature bilateral lower extremities without ulceration, hyperpigmentation, discoloration, masses nodules or cords palpated.  No ecchymosis, erythema, edema, or cardinal signs of infection bilateral lower extremities.       Protective sensation intact. Pain sensation intact bilateral feet and legs.    DP and PT pulses 2/4 bilateral, capillary refill 3 seconds all toes/distal feet, all toes/both feet warm to touch.   Negative lower extremity edema bilateral.    Imaging:            Assessment:       1. Ingrown nail    2. Pain of toe of right foot    3. Ingrowing nail with infection    4. Foot pain, right      Plan:   Ingrown nail    Pain of toe of right foot    Ingrowing nail with infection    Foot pain, right    Other orders  -     doxycycline (VIBRAMYCIN) 100 MG Cap; Take 1 capsule (100 mg total) by mouth every 12 (twelve) hours. for 14 days  Dispense: 28 capsule; Refill: 0  -     Nail Removal      No follow-ups on file.    Nail Removal    Date/Time: 10/26/2023 3:30 PM    Performed by: Feli Garibay DPM  Authorized by: Feli Garibay DPM    Consent Done?:  Yes (Written)  Time out: Immediately prior to the procedure a time out was called    Prep: patient was prepped and draped in usual sterile fashion    Location:     Location:  Right foot    Location detail:  Right big toe  Anesthesia:     Anesthesia:  Local infiltration  Procedure Details:     Preparation:  Skin prepped with alcohol    Amount removed:  Partial    Side:  Medial    Wedge excision of skin of nail fold: No      Nail bed sutured?: No       Nail matrix removed:  Partial    Removal method:  Phenol and alcohol    Removed nail replaced and anchored: No      Dressing applied:  4x4, antibiotic ointment and gauze roll    Patient tolerance:  Patient tolerated the procedure well with no immediate complications            Counseling:     I provided patient education verbally regarding:   Patient diagnosis, treatment options, as well as alternatives, risks, and benefits.     This note was created using Dragon voice recognition software that occasionally misinterpreted phrases or words.     We discussed ingrown toenail treatment options of no treatment, avulsion of nail border under local with regrowth of nail, chemical matrixectomy for attempted permanent correction of the problem. Patient was educated about daily dressing changes, soaks, and medications following removal of the nail.

## 2024-01-10 ENCOUNTER — OFFICE VISIT (OUTPATIENT)
Dept: URGENT CARE | Facility: CLINIC | Age: 29
End: 2024-01-10
Payer: MEDICAID

## 2024-01-10 VITALS
DIASTOLIC BLOOD PRESSURE: 73 MMHG | HEIGHT: 71 IN | SYSTOLIC BLOOD PRESSURE: 120 MMHG | HEART RATE: 94 BPM | BODY MASS INDEX: 30.1 KG/M2 | TEMPERATURE: 98 F | RESPIRATION RATE: 20 BRPM | OXYGEN SATURATION: 96 % | WEIGHT: 215 LBS

## 2024-01-10 DIAGNOSIS — D80.1 HYPOGAMMAGLOBULINEMIA: ICD-10-CM

## 2024-01-10 DIAGNOSIS — Z20.828 EXPOSURE TO THE FLU: ICD-10-CM

## 2024-01-10 DIAGNOSIS — R53.83 FATIGUE, UNSPECIFIED TYPE: Primary | ICD-10-CM

## 2024-01-10 DIAGNOSIS — R06.89 ADVENTITIOUS BREATH SOUNDS: ICD-10-CM

## 2024-01-10 DIAGNOSIS — Z87.01 HISTORY OF PNEUMONIA: ICD-10-CM

## 2024-01-10 DIAGNOSIS — J22 LOWER RESPIRATORY INFECTION: ICD-10-CM

## 2024-01-10 DIAGNOSIS — J47.1 BRONCHIECTASIS WITH ACUTE EXACERBATION: ICD-10-CM

## 2024-01-10 LAB
CTP QC/QA: YES
CTP QC/QA: YES
FLUAV AG NPH QL: NEGATIVE
FLUBV AG NPH QL: NEGATIVE
SARS-COV-2 AG RESP QL IA.RAPID: NEGATIVE

## 2024-01-10 PROCEDURE — 87804 INFLUENZA ASSAY W/OPTIC: CPT | Mod: QW,,, | Performed by: NURSE PRACTITIONER

## 2024-01-10 PROCEDURE — 99214 OFFICE O/P EST MOD 30 MIN: CPT | Mod: S$GLB,,, | Performed by: NURSE PRACTITIONER

## 2024-01-10 PROCEDURE — 87811 SARS-COV-2 COVID19 W/OPTIC: CPT | Mod: QW,S$GLB,, | Performed by: NURSE PRACTITIONER

## 2024-01-10 RX ORDER — BENZONATATE 100 MG/1
100 CAPSULE ORAL 3 TIMES DAILY PRN
Qty: 30 CAPSULE | Refills: 0 | Status: SHIPPED | OUTPATIENT
Start: 2024-01-10 | End: 2024-01-20

## 2024-01-10 RX ORDER — PROMETHAZINE HYDROCHLORIDE AND DEXTROMETHORPHAN HYDROBROMIDE 6.25; 15 MG/5ML; MG/5ML
5 SYRUP ORAL NIGHTLY PRN
Qty: 118 ML | Refills: 0 | Status: SHIPPED | OUTPATIENT
Start: 2024-01-10

## 2024-01-10 RX ORDER — ALBUTEROL SULFATE 90 UG/1
2 AEROSOL, METERED RESPIRATORY (INHALATION) EVERY 6 HOURS PRN
Qty: 18 G | Refills: 0 | Status: SHIPPED | OUTPATIENT
Start: 2024-01-10

## 2024-01-10 RX ORDER — AMOXICILLIN AND CLAVULANATE POTASSIUM 875; 125 MG/1; MG/1
1 TABLET, FILM COATED ORAL EVERY 12 HOURS
Qty: 14 TABLET | Refills: 0 | Status: SHIPPED | OUTPATIENT
Start: 2024-01-10 | End: 2024-01-17

## 2024-01-10 NOTE — PROGRESS NOTES
"Subjective:      Patient ID: Charly Mattson is a 28 y.o. male.    Vitals:  height is 5' 11" (1.803 m) and weight is 97.5 kg (215 lb). His temperature is 97.7 °F (36.5 °C). His blood pressure is 120/73 and his pulse is 94. His respiration is 20 and oxygen saturation is 96%.     Chief Complaint: Fatigue    Pulmonary history to include longstanding bronchiolectasis, recurrent pneumonia.  Onset of symptoms approximately 1 week or more exposed to influenza in the home.  States that he was seen at [facility] care several days ago also tested negative for COVID and influenza when there was given a steroid shot.  States that he is currently taking azithromycin every Monday Wednesday and Friday for life due to his chronic pulmonary condition.  Is scheduled to have PE tube placed right TM next week.  History of hypo gamma globulinemia.    Fatigue  Episode onset: x's 3 days ago. The problem has been gradually worsening. Associated symptoms include chills, congestion, coughing, fatigue, a fever (X1 week) and headaches. Pertinent negatives include no rash. He has tried NSAIDs for the symptoms. The treatment provided mild relief.       Constitution: Positive for chills, fatigue and fever (X1 week).   HENT:  Positive for ear pain (Both ears) and congestion. Negative for ear discharge (PE tube present left, no drainage).    Respiratory:  Positive for chest tightness, cough and wheezing.    Skin:  Negative for rash.   Neurological:  Positive for headaches.      Objective:     Physical Exam   Constitutional: He is oriented to person, place, and time. He is cooperative.  Non-toxic appearance. He appears ill. No distress. awake  HENT:   Head: Normocephalic and atraumatic.   Ears:   Right Ear: Tympanic membrane, external ear and ear canal normal.   Left Ear: Tympanic membrane, external ear and ear canal normal.   Nose: Congestion present. No rhinorrhea.   Mouth/Throat: Mucous membranes are moist. Posterior oropharyngeal erythema " present. No oropharyngeal exudate.   Eyes: Conjunctivae are normal. Right eye exhibits no discharge. Left eye exhibits no discharge.   Neck: Neck supple. No neck rigidity present.   Cardiovascular: Normal rate, regular rhythm and normal heart sounds.   Pulmonary/Chest: Effort normal. No accessory muscle usage. No tachypnea. No respiratory distress. He has wheezes in the right lower field and the left lower field. He has no rhonchi. He has no rales. He exhibits no tenderness.   Friction rub heard throughout         Comments: Friction rub heard throughout    Abdominal: Normal appearance.   Musculoskeletal:      Cervical back: He exhibits no tenderness.   Lymphadenopathy:     He has no cervical adenopathy.   Neurological: no focal deficit. He is alert and oriented to person, place, and time. No sensory deficit.   Skin: Skin is warm, dry, not diaphoretic and no rash. Capillary refill takes 2 to 3 seconds.   Psychiatric: His behavior is normal. Mood normal.   Nursing note and vitals reviewed.      Assessment:     1. Fatigue, unspecified type    2. History of pneumonia    3. Bronchiectasis with acute exacerbation    4. Exposure to the flu    5. Adventitious breath sounds    6. Hypogammaglobulinemia    7. Lower respiratory infection      COVID negative  Flu a/B negative    CXR: FINDINGS:  Lungs are clear.Normal cardiomediastinal silhouette.Normal pulmonary vascular distribution.No pleural effusion or pneumothorax.Chronic widening right AC interval.    Plan:       Fatigue, unspecified type  -     SARS Coronavirus 2 Antigen, POCT Manual Read  -     POCT Influenza A/B Rapid Antigen    History of pneumonia  -     XR CHEST PA AND LATERAL; Future; Expected date: 01/10/2024    Bronchiectasis with acute exacerbation  -     XR CHEST PA AND LATERAL; Future; Expected date: 01/10/2024    Exposure to the flu    Adventitious breath sounds  -     XR CHEST PA AND LATERAL; Future; Expected date: 01/10/2024    Hypogammaglobulinemia    Lower  respiratory infection  -     amoxicillin-clavulanate 875-125mg (AUGMENTIN) 875-125 mg per tablet; Take 1 tablet by mouth every 12 (twelve) hours. for 7 days  Dispense: 14 tablet; Refill: 0  -     albuterol (VENTOLIN HFA) 90 mcg/actuation inhaler; Inhale 2 puffs into the lungs every 6 (six) hours as needed for Wheezing. Rescue  Dispense: 18 g; Refill: 0  -     promethazine-dextromethorphan (PROMETHAZINE-DM) 6.25-15 mg/5 mL Syrp; Take 5 mLs by mouth nightly as needed (cough).  Dispense: 118 mL; Refill: 0  -     benzonatate (TESSALON) 100 MG capsule; Take 1 capsule (100 mg total) by mouth 3 (three) times daily as needed for Cough.  Dispense: 30 capsule; Refill: 0

## 2024-01-10 NOTE — PATIENT INSTRUCTIONS
Continue azithromycin as already prescribed.    Augmentin twice a day for7 days.    Albuterol inhaler or nebulizer every 4-6 hours while awake until symptoms are significantly improved then just as needed.    Cough and deep breathing exercises every 1-2 hours while awake until symptoms are improving then as needed.    Increase fluid intake significantly to help thin secretions.    Guaifenesin 1200 mg twice a day over-the-counter for 10 days.   Return to clinic, seek medical re-evaluation if symptoms worsen or fail to improve after 48 hours of the above treatment plan  Mario Goldsmith cough pills that help with cough caused by the postnasal drip, throat irritation.  There good to take at the time and do not cause drowsiness.    Phenergan cough syrup at night only to suppress cough so that you are able to rest.  You can take this together with Tessalon Perles for added benefit  Refrain from taking any decongestants, alcohol, caffeine as this will thicken mucous

## 2024-01-10 NOTE — LETTER
January 10, 2024      [facility]  [address] [facility] [address]       Patient: Charly Mattson   YOB: 1995  Date of Visit: 01/10/2024    To Whom It May Concern:    Kj Mattson  was at [facility] on 01/10/2024. The patient may return to work/school on 01/15/2024 as long as symptoms are improving and there has been no fever the preceding 24 hours. If you have any questions or concerns, or if I can be of further assistance, please do not hesitate to contact me.    Sincerely,    Caren Henderson, NP

## 2024-02-01 ENCOUNTER — HOSPITAL ENCOUNTER (OUTPATIENT)
Facility: HOSPITAL | Age: 29
Discharge: HOME | End: 2024-02-02
Attending: EMERGENCY MEDICINE | Admitting: INTERNAL MEDICINE
Payer: MEDICAID

## 2024-02-01 DIAGNOSIS — R00.0 TACHYCARDIA: ICD-10-CM

## 2024-02-01 DIAGNOSIS — J47.1 BRONCHIECTASIS WITH ACUTE EXACERBATION: Primary | ICD-10-CM

## 2024-02-01 LAB
ALBUMIN SERPL BCP-MCNC: 3.9 G/DL (ref 3.5–5.2)
ALLENS TEST: NORMAL
ALP SERPL-CCNC: 110 U/L (ref 55–135)
ALT SERPL W/O P-5'-P-CCNC: 67 U/L (ref 10–44)
ANION GAP SERPL CALC-SCNC: 13 MMOL/L (ref 8–16)
AST SERPL-CCNC: 62 U/L (ref 10–40)
BASOPHILS # BLD AUTO: 0.05 K/UL (ref 0–0.2)
BASOPHILS NFR BLD: 0.5 % (ref 0–1.9)
BILIRUB SERPL-MCNC: 0.8 MG/DL (ref 0.1–1)
BILIRUB UR QL STRIP: NEGATIVE
BUN SERPL-MCNC: 13 MG/DL (ref 6–20)
CALCIUM SERPL-MCNC: 9.5 MG/DL (ref 8.7–10.5)
CHLORIDE SERPL-SCNC: 106 MMOL/L (ref 95–110)
CLARITY UR: CLEAR
CO2 SERPL-SCNC: 19 MMOL/L (ref 23–29)
COLOR UR: YELLOW
CREAT SERPL-MCNC: 0.9 MG/DL (ref 0.5–1.4)
DELSYS: NORMAL
DIFFERENTIAL METHOD BLD: ABNORMAL
EOSINOPHIL # BLD AUTO: 0.1 K/UL (ref 0–0.5)
EOSINOPHIL NFR BLD: 0.6 % (ref 0–8)
ERYTHROCYTE [DISTWIDTH] IN BLOOD BY AUTOMATED COUNT: 13.2 % (ref 11.5–14.5)
EST. GFR  (NO RACE VARIABLE): >60 ML/MIN/1.73 M^2
GLUCOSE SERPL-MCNC: 95 MG/DL (ref 70–110)
GLUCOSE UR QL STRIP: NEGATIVE
GROUP A STREP, MOLECULAR: NEGATIVE
HCT VFR BLD AUTO: 44.9 % (ref 40–54)
HGB BLD-MCNC: 15.1 G/DL (ref 14–18)
HGB UR QL STRIP: NEGATIVE
IMM GRANULOCYTES # BLD AUTO: 0.03 K/UL (ref 0–0.04)
IMM GRANULOCYTES NFR BLD AUTO: 0.3 % (ref 0–0.5)
INFLUENZA A, MOLECULAR: NEGATIVE
INFLUENZA B, MOLECULAR: NEGATIVE
KETONES UR QL STRIP: NEGATIVE
LACTATE SERPL-SCNC: 0.9 MMOL/L (ref 0.5–2.2)
LDH SERPL L TO P-CCNC: 1.14 MMOL/L (ref 0.5–2.2)
LEUKOCYTE ESTERASE UR QL STRIP: NEGATIVE
LYMPHOCYTES # BLD AUTO: 1 K/UL (ref 1–4.8)
LYMPHOCYTES NFR BLD: 9.1 % (ref 18–48)
MCH RBC QN AUTO: 27.1 PG (ref 27–31)
MCHC RBC AUTO-ENTMCNC: 33.6 G/DL (ref 32–36)
MCV RBC AUTO: 81 FL (ref 82–98)
MODE: NORMAL
MONOCYTES # BLD AUTO: 1.1 K/UL (ref 0.3–1)
MONOCYTES NFR BLD: 9.9 % (ref 4–15)
NEUTROPHILS # BLD AUTO: 8.5 K/UL (ref 1.8–7.7)
NEUTROPHILS NFR BLD: 79.6 % (ref 38–73)
NITRITE UR QL STRIP: NEGATIVE
NRBC BLD-RTO: 0 /100 WBC
PH UR STRIP: 7 [PH] (ref 5–8)
PLATELET # BLD AUTO: 161 K/UL (ref 150–450)
PMV BLD AUTO: 10.5 FL (ref 9.2–12.9)
POTASSIUM SERPL-SCNC: 4.1 MMOL/L (ref 3.5–5.1)
PROT SERPL-MCNC: 7.5 G/DL (ref 6–8.4)
PROT UR QL STRIP: NEGATIVE
RBC # BLD AUTO: 5.58 M/UL (ref 4.6–6.2)
SAMPLE: NORMAL
SARS-COV-2 RDRP RESP QL NAA+PROBE: NEGATIVE
SITE: NORMAL
SODIUM SERPL-SCNC: 138 MMOL/L (ref 136–145)
SP GR UR STRIP: 1.01 (ref 1–1.03)
SPECIMEN SOURCE: NORMAL
URN SPEC COLLECT METH UR: NORMAL
UROBILINOGEN UR STRIP-ACNC: NEGATIVE EU/DL
WBC # BLD AUTO: 10.66 K/UL (ref 3.9–12.7)

## 2024-02-01 PROCEDURE — 25000003 PHARM REV CODE 250: Performed by: NURSE PRACTITIONER

## 2024-02-01 PROCEDURE — 25000003 PHARM REV CODE 250

## 2024-02-01 PROCEDURE — 94761 N-INVAS EAR/PLS OXIMETRY MLT: CPT

## 2024-02-01 PROCEDURE — 80053 COMPREHEN METABOLIC PANEL: CPT | Performed by: NURSE PRACTITIONER

## 2024-02-01 PROCEDURE — G0378 HOSPITAL OBSERVATION PER HR: HCPCS

## 2024-02-01 PROCEDURE — 63600175 PHARM REV CODE 636 W HCPCS: Performed by: EMERGENCY MEDICINE

## 2024-02-01 PROCEDURE — 87651 STREP A DNA AMP PROBE: CPT | Performed by: NURSE PRACTITIONER

## 2024-02-01 PROCEDURE — 25000242 PHARM REV CODE 250 ALT 637 W/ HCPCS

## 2024-02-01 PROCEDURE — 83605 ASSAY OF LACTIC ACID: CPT

## 2024-02-01 PROCEDURE — 96366 THER/PROPH/DIAG IV INF ADDON: CPT

## 2024-02-01 PROCEDURE — 87040 BLOOD CULTURE FOR BACTERIA: CPT | Performed by: NURSE PRACTITIONER

## 2024-02-01 PROCEDURE — 96367 TX/PROPH/DG ADDL SEQ IV INF: CPT

## 2024-02-01 PROCEDURE — 93005 ELECTROCARDIOGRAM TRACING: CPT

## 2024-02-01 PROCEDURE — 63600175 PHARM REV CODE 636 W HCPCS

## 2024-02-01 PROCEDURE — 93010 ELECTROCARDIOGRAM REPORT: CPT | Mod: ,,, | Performed by: GENERAL PRACTICE

## 2024-02-01 PROCEDURE — 99285 EMERGENCY DEPT VISIT HI MDM: CPT | Mod: 25

## 2024-02-01 PROCEDURE — 85025 COMPLETE CBC W/AUTO DIFF WBC: CPT | Performed by: NURSE PRACTITIONER

## 2024-02-01 PROCEDURE — 94640 AIRWAY INHALATION TREATMENT: CPT

## 2024-02-01 PROCEDURE — 36415 COLL VENOUS BLD VENIPUNCTURE: CPT | Performed by: NURSE PRACTITIONER

## 2024-02-01 PROCEDURE — 99900035 HC TECH TIME PER 15 MIN (STAT)

## 2024-02-01 PROCEDURE — 25000003 PHARM REV CODE 250: Performed by: EMERGENCY MEDICINE

## 2024-02-01 PROCEDURE — 96375 TX/PRO/DX INJ NEW DRUG ADDON: CPT

## 2024-02-01 PROCEDURE — 87502 INFLUENZA DNA AMP PROBE: CPT | Performed by: NURSE PRACTITIONER

## 2024-02-01 PROCEDURE — 81003 URINALYSIS AUTO W/O SCOPE: CPT | Performed by: NURSE PRACTITIONER

## 2024-02-01 PROCEDURE — 99900031 HC PATIENT EDUCATION (STAT)

## 2024-02-01 PROCEDURE — 83605 ASSAY OF LACTIC ACID: CPT | Performed by: NURSE PRACTITIONER

## 2024-02-01 PROCEDURE — U0002 COVID-19 LAB TEST NON-CDC: HCPCS | Performed by: NURSE PRACTITIONER

## 2024-02-01 PROCEDURE — 96365 THER/PROPH/DIAG IV INF INIT: CPT

## 2024-02-01 PROCEDURE — 63600175 PHARM REV CODE 636 W HCPCS: Performed by: NURSE PRACTITIONER

## 2024-02-01 RX ORDER — NALOXONE HCL 0.4 MG/ML
0.02 VIAL (ML) INJECTION
Status: DISCONTINUED | OUTPATIENT
Start: 2024-02-01 | End: 2024-02-02 | Stop reason: HOSPADM

## 2024-02-01 RX ORDER — TALC
9 POWDER (GRAM) TOPICAL NIGHTLY PRN
Status: DISCONTINUED | OUTPATIENT
Start: 2024-02-01 | End: 2024-02-02 | Stop reason: HOSPADM

## 2024-02-01 RX ORDER — FAMOTIDINE 10 MG/ML
20 INJECTION INTRAVENOUS
Status: COMPLETED | OUTPATIENT
Start: 2024-02-01 | End: 2024-02-01

## 2024-02-01 RX ORDER — AZITHROMYCIN 500 MG/1
500 TABLET, FILM COATED ORAL
COMMUNITY

## 2024-02-01 RX ORDER — FAMOTIDINE 20 MG/1
20 TABLET, FILM COATED ORAL 2 TIMES DAILY
Status: DISCONTINUED | OUTPATIENT
Start: 2024-02-01 | End: 2024-02-02 | Stop reason: HOSPADM

## 2024-02-01 RX ORDER — DIPHENHYDRAMINE HYDROCHLORIDE 50 MG/ML
50 INJECTION INTRAMUSCULAR; INTRAVENOUS
Status: COMPLETED | OUTPATIENT
Start: 2024-02-01 | End: 2024-02-01

## 2024-02-01 RX ORDER — LANOLIN ALCOHOL/MO/W.PET/CERES
800 CREAM (GRAM) TOPICAL
Status: DISCONTINUED | OUTPATIENT
Start: 2024-02-01 | End: 2024-02-02 | Stop reason: HOSPADM

## 2024-02-01 RX ORDER — SODIUM CHLORIDE 0.9 % (FLUSH) 0.9 %
10 SYRINGE (ML) INJECTION EVERY 8 HOURS
Status: DISCONTINUED | OUTPATIENT
Start: 2024-02-01 | End: 2024-02-02 | Stop reason: HOSPADM

## 2024-02-01 RX ORDER — HUMAN IMMUNOGLOBULIN G 0.2 G/ML
200 LIQUID SUBCUTANEOUS
COMMUNITY

## 2024-02-01 RX ORDER — ACETAMINOPHEN 325 MG/1
650 TABLET ORAL EVERY 6 HOURS PRN
Status: DISCONTINUED | OUTPATIENT
Start: 2024-02-01 | End: 2024-02-02 | Stop reason: HOSPADM

## 2024-02-01 RX ORDER — ONDANSETRON HYDROCHLORIDE 2 MG/ML
4 INJECTION, SOLUTION INTRAVENOUS EVERY 6 HOURS PRN
Status: DISCONTINUED | OUTPATIENT
Start: 2024-02-01 | End: 2024-02-02 | Stop reason: HOSPADM

## 2024-02-01 RX ORDER — IPRATROPIUM BROMIDE AND ALBUTEROL SULFATE 2.5; .5 MG/3ML; MG/3ML
3 SOLUTION RESPIRATORY (INHALATION) EVERY 12 HOURS
Status: DISCONTINUED | OUTPATIENT
Start: 2024-02-01 | End: 2024-02-02 | Stop reason: HOSPADM

## 2024-02-01 RX ORDER — IBUPROFEN 200 MG
16 TABLET ORAL
Status: DISCONTINUED | OUTPATIENT
Start: 2024-02-01 | End: 2024-02-02 | Stop reason: HOSPADM

## 2024-02-01 RX ORDER — SODIUM CHLORIDE FOR INHALATION 3 %
4 VIAL, NEBULIZER (ML) INHALATION 2 TIMES DAILY
COMMUNITY

## 2024-02-01 RX ORDER — GLUCAGON 1 MG
1 KIT INJECTION
Status: DISCONTINUED | OUTPATIENT
Start: 2024-02-01 | End: 2024-02-02 | Stop reason: HOSPADM

## 2024-02-01 RX ORDER — ALBUTEROL SULFATE 1.25 MG/3ML
1.25 SOLUTION RESPIRATORY (INHALATION) 2 TIMES DAILY
COMMUNITY
Start: 2023-12-13

## 2024-02-01 RX ORDER — ACETAMINOPHEN 500 MG
1000 TABLET ORAL EVERY 8 HOURS PRN
COMMUNITY

## 2024-02-01 RX ORDER — IBUPROFEN 200 MG
24 TABLET ORAL
Status: DISCONTINUED | OUTPATIENT
Start: 2024-02-01 | End: 2024-02-02 | Stop reason: HOSPADM

## 2024-02-01 RX ORDER — ALUMINUM HYDROXIDE, MAGNESIUM HYDROXIDE, AND SIMETHICONE 1200; 120; 1200 MG/30ML; MG/30ML; MG/30ML
30 SUSPENSION ORAL 4 TIMES DAILY PRN
Status: DISCONTINUED | OUTPATIENT
Start: 2024-02-01 | End: 2024-02-02 | Stop reason: HOSPADM

## 2024-02-01 RX ORDER — LINEZOLID 2 MG/ML
600 INJECTION, SOLUTION INTRAVENOUS
Status: DISCONTINUED | OUTPATIENT
Start: 2024-02-01 | End: 2024-02-02 | Stop reason: HOSPADM

## 2024-02-01 RX ORDER — ONDANSETRON HYDROCHLORIDE 2 MG/ML
4 INJECTION, SOLUTION INTRAVENOUS EVERY 8 HOURS PRN
Status: DISCONTINUED | OUTPATIENT
Start: 2024-02-01 | End: 2024-02-02 | Stop reason: HOSPADM

## 2024-02-01 RX ORDER — ACETAMINOPHEN 500 MG
1000 TABLET ORAL EVERY 6 HOURS PRN
Status: DISCONTINUED | OUTPATIENT
Start: 2024-02-01 | End: 2024-02-02 | Stop reason: HOSPADM

## 2024-02-01 RX ORDER — SODIUM CHLORIDE 9 MG/ML
1000 INJECTION, SOLUTION INTRAVENOUS
Status: COMPLETED | OUTPATIENT
Start: 2024-02-01 | End: 2024-02-01

## 2024-02-01 RX ORDER — HYDROCODONE POLISTIREX AND CHLORPHENIRAMINE POLISTIREX 10; 8 MG/5ML; MG/5ML
5 SUSPENSION, EXTENDED RELEASE ORAL EVERY 12 HOURS PRN
Status: DISCONTINUED | OUTPATIENT
Start: 2024-02-01 | End: 2024-02-02 | Stop reason: HOSPADM

## 2024-02-01 RX ADMIN — HYDROCODONE POLISTIREX AND CHLORPHENIRAMINE POLISTIREX 5 ML: 10; 8 SUSPENSION, EXTENDED RELEASE ORAL at 04:02

## 2024-02-01 RX ADMIN — IPRATROPIUM BROMIDE AND ALBUTEROL SULFATE 3 ML: .5; 2.5 SOLUTION RESPIRATORY (INHALATION) at 05:02

## 2024-02-01 RX ADMIN — DIPHENHYDRAMINE HYDROCHLORIDE 50 MG: 50 INJECTION INTRAMUSCULAR; INTRAVENOUS at 12:02

## 2024-02-01 RX ADMIN — MEROPENEM 1 G: 1 INJECTION, POWDER, FOR SOLUTION INTRAVENOUS at 10:02

## 2024-02-01 RX ADMIN — FAMOTIDINE 20 MG: 10 INJECTION, SOLUTION INTRAVENOUS at 12:02

## 2024-02-01 RX ADMIN — VANCOMYCIN HYDROCHLORIDE 2000 MG: 1 INJECTION, POWDER, LYOPHILIZED, FOR SOLUTION INTRAVENOUS at 11:02

## 2024-02-01 RX ADMIN — FAMOTIDINE 20 MG: 20 TABLET, FILM COATED ORAL at 08:02

## 2024-02-01 RX ADMIN — SODIUM CHLORIDE 1000 ML: 9 INJECTION, SOLUTION INTRAVENOUS at 10:02

## 2024-02-01 RX ADMIN — PIPERACILLIN AND TAZOBACTAM 4.5 G: 4; .5 INJECTION, POWDER, FOR SOLUTION INTRAVENOUS at 05:02

## 2024-02-01 RX ADMIN — LINEZOLID 600 MG: 600 INJECTION, SOLUTION INTRAVENOUS at 04:02

## 2024-02-01 NOTE — SUBJECTIVE & OBJECTIVE
Past Medical History:   Diagnosis Date    Fatty liver     Immunoglobulin deficiency     Immunoglobulin deficiency     CVID    Pneumonia        Past Surgical History:   Procedure Laterality Date    ADENOIDECTOMY      BIOPSY N/A 9/23/2020    Procedure: BIOPSY;  Surgeon: [facility];  Location: [facility] OR;  Service: Interventional Radiology;  Laterality: N/A;    MANDIBLE FRACTURE SURGERY      SINUS SURGERY      TONSILLECTOMY      TYMPANOSTOMY TUBE PLACEMENT      TYMPANOSTOMY TUBE PLACEMENT      x 4       Review of patient's allergies indicates:   Allergen Reactions    Vancomycin Hives and Itching    Levaquin [levofloxacin]     Singulair [montelukast] Other (See Comments)     arrhythmias       Current Facility-Administered Medications on File Prior to Encounter   Medication    oxymetazoline 0.05 % nasal spray 2 spray     Current Outpatient Medications on File Prior to Encounter   Medication Sig    acetaminophen (TYLENOL) 500 MG tablet Take 1,000 mg by mouth every 8 (eight) hours as needed for Pain.    albuterol (ACCUNEB) 1.25 mg/3 mL Nebu Take 1.25 mg by nebulization 2 (two) times a day.    albuterol (VENTOLIN HFA) 90 mcg/actuation inhaler Inhale 2 puffs into the lungs every 6 (six) hours as needed for Wheezing. Rescue    azithromycin (ZITHROMAX) 500 MG tablet Take 500 mg by mouth every Mon, Wed, Fri.    HIZENTRA 4 gram/20 mL (20 %) Syrg Inject 200 mg/kg into the skin every 7 days. Tuesdays    sodium chloride 3% 3 % nebulizer solution Take 4 mLs by nebulization 2 (two) times a day.    azelastine (ASTELIN) 137 mcg (0.1 %) nasal spray 1 spray (137 mcg total) by Nasal route 2 (two) times daily.    EPINEPHrine (EPIPEN) 0.3 mg/0.3 mL AtIn     promethazine-dextromethorphan (PROMETHAZINE-DM) 6.25-15 mg/5 mL Syrp Take 5 mLs by mouth nightly as needed (cough).    [DISCONTINUED] acetaminophen (TYLENOL) 325 MG tablet Take 650 mg by mouth as needed.    [DISCONTINUED] acetylcysteine 200 mg/ml, 20%, (MUCOMYST) 200 mg/mL (20  %) nebulizer solution Inhale 800 mg into the lungs.    [DISCONTINUED] albuterol (ACCUNEB) 0.63 mg/3 mL Nebu Take 0.63 mg by nebulization every 6 (six) hours as needed. Rescue    [DISCONTINUED] azithromycin (Z-CHRISTIANO) 250 MG tablet Take 250 mg by mouth.    [DISCONTINUED] cefUROXime (CEFTIN) 250 MG tablet SMARTSI Tablet(s) By Mouth Morning-Night    [DISCONTINUED] immun glob G,IgG,-pro-IgA 0-50 1 gram/5 mL (20 %) Soln Inject 200 mg/kg into the skin every 7 days.     [DISCONTINUED] olopatadine-mometasone 665-25 mcg/spray [place] 1 spray by Nasal route.     Family History       Problem Relation (Age of Onset)    Cancer Mother    Liver disease Father          Tobacco Use    Smoking status: Never    Smokeless tobacco: Never   Substance and Sexual Activity    Alcohol use: Yes    Drug use: No    Sexual activity: Yes     Partners: Female     Review of Systems   Constitutional:  Positive for chills and fatigue.   HENT:  Positive for sore throat. Negative for trouble swallowing.    Eyes:  Negative for visual disturbance.   Respiratory:  Positive for cough and chest tightness.         Patient reports that his normally has a productive cough with clear mucous. Reports that mucous became dark green on 2024   Cardiovascular:  Negative for chest pain and leg swelling.   Gastrointestinal:  Negative for abdominal pain, constipation, diarrhea, nausea and vomiting.   Genitourinary:  Negative for difficulty urinating and dysuria.   Musculoskeletal:  Positive for myalgias.   Skin:  Positive for color change.        Patient reports his skin became red after receiving vancomycin in ED   Allergic/Immunologic: Positive for immunocompromised state.        Patient has CVID and takes HIZENTRA on    Neurological:  Negative for dizziness, weakness and light-headedness.   All other systems reviewed and are negative.    Objective:     Vital Signs (Most Recent):  Temp: 98.4 °F (36.9 °C) (24 1230)  Pulse: 94 (24  1400)  Resp: 14 (02/01/24 1400)  BP: 117/69 (02/01/24 1400)  SpO2: 95 % (02/01/24 1400) Vital Signs (24h Range):  Temp:  [98.4 °F (36.9 °C)-99.8 °F (37.7 °C)] 98.4 °F (36.9 °C)  Pulse:  [] 94  Resp:  [14-19] 14  SpO2:  [95 %-99 %] 95 %  BP: (107-119)/(58-71) 117/69     Weight: 99.8 kg (220 lb)  Body mass index is 30.68 kg/m².     Physical Exam  Vitals reviewed.   Constitutional:       General: He is not in acute distress.     Appearance: Normal appearance. He is well-developed.   HENT:      Head: Normocephalic and atraumatic.      Right Ear: Tympanic membrane is erythematous.      Left Ear: Tympanic membrane is erythematous.      Ears:      Comments: Tympanostomy tubes present bilaterally      Mouth/Throat:      Mouth: Mucous membranes are moist.      Pharynx: Oropharynx is clear. Uvula midline.   Eyes:      Extraocular Movements: Extraocular movements intact.      Pupils: Pupils are equal, round, and reactive to light.   Cardiovascular:      Rate and Rhythm: Normal rate and regular rhythm.      Heart sounds: Normal heart sounds.   Pulmonary:      Breath sounds: Examination of the right-middle field reveals decreased breath sounds. Examination of the left-middle field reveals decreased breath sounds. Examination of the right-lower field reveals decreased breath sounds. Examination of the left-lower field reveals decreased breath sounds. Decreased breath sounds present.      Comments: Patient unable to breath deeply without coughing  Abdominal:      General: Bowel sounds are normal. There is no distension.      Palpations: Abdomen is soft.      Tenderness: There is no abdominal tenderness.   Musculoskeletal:         General: Normal range of motion.      Cervical back: Normal range of motion and neck supple.      Right lower leg: No edema.      Left lower leg: No edema.   Skin:     General: Skin is warm and dry.      Capillary Refill: Capillary refill takes less than 2 seconds.      Comments: Facial Flushing  noted   Neurological:      General: No focal deficit present.      Mental Status: He is alert and oriented to person, place, and time.      GCS: GCS eye subscore is 4. GCS verbal subscore is 5. GCS motor subscore is 6.   Psychiatric:         Mood and Affect: Mood normal.         Behavior: Behavior normal.         Thought Content: Thought content normal.         Judgment: Judgment normal.              CRANIAL NERVES     CN III, IV, VI   Pupils are equal, round, and reactive to light.       Significant Labs: All pertinent labs within the past 24 hours have been reviewed.  CBC:   Recent Labs   Lab 02/01/24  0951   WBC 10.66   HGB 15.1   HCT 44.9        CMP:   Recent Labs   Lab 02/01/24  0951      K 4.1      CO2 19*   GLU 95   BUN 13   CREATININE 0.9   CALCIUM 9.5   PROT 7.5   ALBUMIN 3.9   BILITOT 0.8   ALKPHOS 110   AST 62*   ALT 67*   ANIONGAP 13     Lactic Acid:   Recent Labs   Lab 02/01/24  1356   LACTATE 0.9     Urine Studies:   Recent Labs   Lab 02/01/24  1050   COLORU Yellow   APPEARANCEUA Clear   PHUR 7.0   SPECGRAV 1.015   PROTEINUA Negative   GLUCUA Negative   KETONESU Negative   BILIRUBINUA Negative   OCCULTUA Negative   NITRITE Negative   UROBILINOGEN Negative   LEUKOCYTESUR Negative       Significant Imaging: I have reviewed all pertinent imaging results/findings within the past 24 hours.  Narrative & Impression  EXAMINATION:  XR CHEST AP PORTABLE     CLINICAL HISTORY:  Sepsis;     TECHNIQUE:  Single frontal view of the chest was performed.     COMPARISON:  01/10/2024     FINDINGS:  The heart is not enlarged.  The lungs are well expanded and clear.  The costophrenic sulci are sharp.     Impression:     No acute cardiopulmonary disease        Electronically signed by: Desi Seymour MD  Date:                                            02/01/2024  Time:                                           11:05

## 2024-02-01 NOTE — ASSESSMENT & PLAN NOTE
Chronic problem and per chart review patient has a history of hepatic steatosis.  -Will trend with daily CMPs

## 2024-02-01 NOTE — CARE UPDATE
02/01/24 1700   Patient Assessment/Suction   Level of Consciousness (AVPU) alert   Respiratory Effort Normal;Unlabored   Expansion/Accessory Muscles/Retractions no use of accessory muscles;no retractions;expansion symmetric   All Lung Fields Breath Sounds Anterior:;clear   ANN Breath Sounds clear   LLL Breath Sounds clear   RUL Breath Sounds clear   RLL Breath Sounds clear   Rhythm/Pattern, Respiratory unlabored;pattern regular;depth regular   Cough Frequency infrequent   Cough Type good   PRE-TX-O2   Device (Oxygen Therapy) room air   SpO2 97 %   Pulse Oximetry Type Intermittent   $ Pulse Oximetry - Multiple Charge Pulse Oximetry - Multiple   Pulse 95   Resp 18   Positioning   Head of Bed (HOB) Positioning HOB at 20-30 degrees   Aerosol Therapy   $ Aerosol Therapy Charges Aerosol Treatment   Daily Review of Necessity (SVN) completed   Respiratory Treatment Status (SVN) given   Treatment Route (SVN) mask;oxygen   Patient Position (SVN) HOB elevated   Post Treatment Assessment (SVN) breath sounds unchanged   Signs of Intolerance (SVN) none   Breath Sounds Post-Respiratory Treatment   Throughout All Fields Post-Treatment All Fields   Throughout All Fields Post-Treatment no change   Post-treatment Heart Rate (beats/min) 96   Post-treatment Resp Rate (breaths/min) 18   Education   $ Education Bronchodilator;15 min   Respiratory Evaluation   $ Care Plan Tech Time 15 min   Evaluation For New Orders   Home Oxygen   Has Home Oxygen? No   Home Aerosol, MDI, DPI, and Other Treatments/Therapies   Home Respiratory Therapy Per Patient/Review of Chart Yes   Aerosol Home Meds/Freq Albuterol BID   MDI Home Meds/Freq As Needed for Emergency   Bronchodilator Care Plan   Bronchodilator Care Plan Aerosol   Aerosol Meds w/ frequency Albuterol - Ipratropium (DUO-NEB) 0.5mg-3mg(2.5ml) 3ml Nebulizer Solution2.5mg BID

## 2024-02-01 NOTE — ASSESSMENT & PLAN NOTE
Concern for developing PNA vs exacerbation of bronchiectasis. Given hx of immunoglobulin deficiency, will start on broad spectrum Abx. Blood cultures and sputum culture pending.   -Nebulizer Q 12 hours   -Cough medicine PRN  -Respiratory Culture   -Consult to Pulmonology  - Iv antibiotics to be continued

## 2024-02-01 NOTE — LETTER
February 2, 2024    Charly Mattson  [address] [facility] [place] [address]                   [address] [place] [address] [address] [id number]   February 2, 2024     Patient: Charly Mattson   YOB: 1995   Date of Visit: 2/1/2024       To Whom it May Concern:    Charly Mattson was seen virtually on 2/1/2024. He may return to work on 02/05/2024 .    Please excuse him from any classes or work missed.    If you have any questions or concerns, please don't hesitate to call.    Sincerely,         Florencia Merida, Patient Care Assistant

## 2024-02-01 NOTE — LETTER
February 2, 2024         [address] [place] [address] DR ROMAN [address]USE [id number]       Patient: Charly Mattson   YOB: 1995  Date of Visit: 02/01/2024-02/02/2024    To Whom It May Concern:    Kj Mattson  was at [facility] on 02/01/2024- 02/02/2024. The patient may return to work on 02/05/2024 with no restrictions. If you have any questions or concerns, or if I can be of further assistance, please do not hesitate to contact me.    Sincerely,    Patrice Fam RN

## 2024-02-01 NOTE — PHARMACY MED REC
"Admission Medication History     The home medication history was taken by Manuel Dubois.    You may go to "Admission" then "Reconcile Home Medications" tabs to review and/or act upon these items.     The home medication list has been updated by the Pharmacy department.   Please read ALL comments highlighted in yellow.   Please address this information as you see fit.    Feel free to contact us if you have any questions or require assistance.      The medications listed below were removed from the home medication list. Please reorder if appropriate:  Patient reports no longer taking the following medication(s):  Acetylcysteine 200 mg / ml  Cefuroxime 250 mg  Olopatadine-mometasone spray    Medications listed below were obtained from: Patient/family and Analytic software- [employer]  Current Facility-Administered Medications on File Prior to Encounter   Medication Dose Route Frequency Provider Last Rate Last Admin    oxymetazoline 0.05 % nasal spray 2 spray  2 spray Each Nostril 1 time in Clinic/HOD Porfirio Novoa MD         Current Outpatient Medications on File Prior to Encounter   Medication Sig Dispense Refill    acetaminophen (TYLENOL) 500 MG tablet Take 1,000 mg by mouth every 8 (eight) hours as needed for Pain.      albuterol (ACCUNEB) 1.25 mg/3 mL Nebu Take 1.25 mg by nebulization 2 (two) times a day.      albuterol (VENTOLIN HFA) 90 mcg/actuation inhaler Inhale 2 puffs into the lungs every 6 (six) hours as needed for Wheezing. Rescue 18 g 0    azithromycin (ZITHROMAX) 500 MG tablet Take 500 mg by mouth every Mon, Wed, Fri.      HIZENTRA 4 gram/20 mL (20 %) Syrg Inject 200 mg/kg into the skin every 7 days. Tuesdays      sodium chloride 3% 3 % nebulizer solution Take 4 mLs by nebulization 2 (two) times a day.      azelastine (ASTELIN) 137 mcg (0.1 %) nasal spray 1 spray (137 mcg total) by Nasal route 2 (two) times daily. 30 mL 0    EPINEPHrine (EPIPEN) 0.3 mg/0.3 mL AtIn       promethazine-dextromethorphan " (PROMETHAZINE-DM) 6.25-15 mg/5 mL Syrp Take 5 mLs by mouth nightly as needed (cough). 118 mL 0    [DISCONTINUED] acetaminophen (TYLENOL) 325 MG tablet Take 650 mg by mouth as needed.      [DISCONTINUED] acetylcysteine 200 mg/ml, 20%, (MUCOMYST) 200 mg/mL (20 %) nebulizer solution Inhale 800 mg into the lungs.      [DISCONTINUED] albuterol (ACCUNEB) 0.63 mg/3 mL Nebu Take 0.63 mg by nebulization every 6 (six) hours as needed. Rescue      [DISCONTINUED] azithromycin (Z-CHRISTIANO) 250 MG tablet Take 250 mg by mouth.      [DISCONTINUED] cefUROXime (CEFTIN) 250 MG tablet SMARTSI Tablet(s) By Mouth Morning-Night      [DISCONTINUED] immun glob G,IgG,-pro-IgA 0-50 1 gram/5 mL (20 %) Soln Inject 200 mg/kg into the skin every 7 days.       [DISCONTINUED] olopatadine-mometasone 665-25 mcg/spray [place] 1 spray by Nasal route.             Manuel Dubois  EXT 1924                .

## 2024-02-01 NOTE — H&P
[facility] Medicine  History & Physical    Patient Name: Charly Mattson  MRN: [id number]  Patient Class: OP- Observation  Admission Date: 2/1/2024  Attending Physician: Mallory Em MD   Primary Care Provider: Porfirio Novoa MD         Patient information was obtained from patient and ER records.     Subjective:     Principal Problem:Bronchiectasis with acute exacerbation    Chief Complaint:   Chief Complaint   Patient presents with    Sore Throat    Cough    COVID-19 Concerns     Cough, sore throat, body aches         HPI: Mr. Charly Mattson is a 28 year old male with a previous medical history of CVID (for common variable immune deficiency, frequent pulmonary infections, and bronchiectasis. Patient presented to the emergency room with productive cough of dark green sputum which is normally clear, sore throat, fatigue, chills, and myalgias starting two days ago and getting progressively worse. He states he receives IV immunoglobulin replacement once a week. His last injection was on Tuesday. He follows with pulmonology, Dr. Alvares, for frequency pulmonary infections. He has been on prophylaxis Z-jose antonio every M-W-F for over a year. Patient reports he had bilateral tympanostomy performed approximately one month ago with no subsequent complications. ED workup negative for covid, flu and strep throat. Initial chest xray reveals no acute process. Code sepsis called in emergency room based on patient history of CVID and presentation with vancomycin initiated but subsequently discontinued due to patient having adverse reaction. Patient to be admitted to hospital medicine services for further evaluation and management.           Past Medical History:   Diagnosis Date    Fatty liver     Immunoglobulin deficiency     Immunoglobulin deficiency     CVID    Pneumonia        Past Surgical History:   Procedure Laterality Date    ADENOIDECTOMY      BIOPSY N/A 9/23/2020    Procedure: BIOPSY;  Surgeon: Kaleb  Diagnostic Provider;  Location: [facility] OR;  Service: Interventional Radiology;  Laterality: N/A;    MANDIBLE FRACTURE SURGERY      SINUS SURGERY      TONSILLECTOMY      TYMPANOSTOMY TUBE PLACEMENT      TYMPANOSTOMY TUBE PLACEMENT      x 4       Review of patient's allergies indicates:   Allergen Reactions    Vancomycin Hives and Itching    Levaquin [levofloxacin]     Singulair [montelukast] Other (See Comments)     arrhythmias       Current Facility-Administered Medications on File Prior to Encounter   Medication    oxymetazoline 0.05 % nasal spray 2 spray     Current Outpatient Medications on File Prior to Encounter   Medication Sig    acetaminophen (TYLENOL) 500 MG tablet Take 1,000 mg by mouth every 8 (eight) hours as needed for Pain.    albuterol (ACCUNEB) 1.25 mg/3 mL Nebu Take 1.25 mg by nebulization 2 (two) times a day.    albuterol (VENTOLIN HFA) 90 mcg/actuation inhaler Inhale 2 puffs into the lungs every 6 (six) hours as needed for Wheezing. Rescue    azithromycin (ZITHROMAX) 500 MG tablet Take 500 mg by mouth every Mon, Wed, Fri.    HIZENTRA 4 gram/20 mL (20 %) Syrg Inject 200 mg/kg into the skin every 7 days. Tuesdays    sodium chloride 3% 3 % nebulizer solution Take 4 mLs by nebulization 2 (two) times a day.    azelastine (ASTELIN) 137 mcg (0.1 %) nasal spray 1 spray (137 mcg total) by Nasal route 2 (two) times daily.    EPINEPHrine (EPIPEN) 0.3 mg/0.3 mL AtIn     promethazine-dextromethorphan (PROMETHAZINE-DM) 6.25-15 mg/5 mL Syrp Take 5 mLs by mouth nightly as needed (cough).    [DISCONTINUED] acetaminophen (TYLENOL) 325 MG tablet Take 650 mg by mouth as needed.    [DISCONTINUED] acetylcysteine 200 mg/ml, 20%, (MUCOMYST) 200 mg/mL (20 %) nebulizer solution Inhale 800 mg into the lungs.    [DISCONTINUED] albuterol (ACCUNEB) 0.63 mg/3 mL Nebu Take 0.63 mg by nebulization every 6 (six) hours as needed. Rescue    [DISCONTINUED] azithromycin (Z-CHRISTIANO) 250 MG tablet Take 250 mg by mouth.    [DISCONTINUED]  cefUROXime (CEFTIN) 250 MG tablet SMARTSI Tablet(s) By Mouth Morning-Night    [DISCONTINUED] immun glob G,IgG,-pro-IgA 0-50 1 gram/5 mL (20 %) Soln Inject 200 mg/kg into the skin every 7 days.     [DISCONTINUED] olopatadine-mometasone 665-25 mcg/spray [place] 1 spray by Nasal route.     Family History       Problem Relation (Age of Onset)    Cancer Mother    Liver disease Father          Tobacco Use    Smoking status: Never    Smokeless tobacco: Never   Substance and Sexual Activity    Alcohol use: Yes    Drug use: No    Sexual activity: Yes     Partners: Female     Review of Systems   Constitutional:  Positive for chills and fatigue.   HENT:  Positive for sore throat. Negative for trouble swallowing.    Eyes:  Negative for visual disturbance.   Respiratory:  Positive for cough and chest tightness.         Patient reports that his normally has a productive cough with clear mucous. Reports that mucous became dark green on 2024   Cardiovascular:  Negative for chest pain and leg swelling.   Gastrointestinal:  Negative for abdominal pain, constipation, diarrhea, nausea and vomiting.   Genitourinary:  Negative for difficulty urinating and dysuria.   Musculoskeletal:  Positive for myalgias.   Skin:  Positive for color change.        Patient reports his skin became red after receiving vancomycin in ED   Allergic/Immunologic: Positive for immunocompromised state.        Patient has CVID and takes HIZENTRA on    Neurological:  Negative for dizziness, weakness and light-headedness.   All other systems reviewed and are negative.    Objective:     Vital Signs (Most Recent):  Temp: 98.4 °F (36.9 °C) (24 1230)  Pulse: 94 (24 1400)  Resp: 14 (24 1400)  BP: 117/69 (24 1400)  SpO2: 95 % (24 1400) Vital Signs (24h Range):  Temp:  [98.4 °F (36.9 °C)-99.8 °F (37.7 °C)] 98.4 °F (36.9 °C)  Pulse:  [] 94  Resp:  [14-19] 14  SpO2:  [95 %-99 %] 95 %  BP: (107-119)/(58-71) 117/69      Weight: 99.8 kg (220 lb)  Body mass index is 30.68 kg/m².     Physical Exam  Vitals reviewed.   Constitutional:       General: He is not in acute distress.     Appearance: Normal appearance. He is well-developed.   HENT:      Head: Normocephalic and atraumatic.      Right Ear: Tympanic membrane is erythematous.      Left Ear: Tympanic membrane is erythematous.      Ears:      Comments: Tympanostomy tubes present bilaterally      Mouth/Throat:      Mouth: Mucous membranes are moist.      Pharynx: Oropharynx is clear. Uvula midline.   Eyes:      Extraocular Movements: Extraocular movements intact.      Pupils: Pupils are equal, round, and reactive to light.   Cardiovascular:      Rate and Rhythm: Normal rate and regular rhythm.      Heart sounds: Normal heart sounds.   Pulmonary:      Breath sounds: Examination of the right-middle field reveals decreased breath sounds. Examination of the left-middle field reveals decreased breath sounds. Examination of the right-lower field reveals decreased breath sounds. Examination of the left-lower field reveals decreased breath sounds. Decreased breath sounds present.      Comments: Patient unable to breath deeply without coughing  Abdominal:      General: Bowel sounds are normal. There is no distension.      Palpations: Abdomen is soft.      Tenderness: There is no abdominal tenderness.   Musculoskeletal:         General: Normal range of motion.      Cervical back: Normal range of motion and neck supple.      Right lower leg: No edema.      Left lower leg: No edema.   Skin:     General: Skin is warm and dry.      Capillary Refill: Capillary refill takes less than 2 seconds.      Comments: Facial Flushing noted   Neurological:      General: No focal deficit present.      Mental Status: He is alert and oriented to person, place, and time.      GCS: GCS eye subscore is 4. GCS verbal subscore is 5. GCS motor subscore is 6.   Psychiatric:         Mood and Affect: Mood normal.          Behavior: Behavior normal.         Thought Content: Thought content normal.         Judgment: Judgment normal.              CRANIAL NERVES     CN III, IV, VI   Pupils are equal, round, and reactive to light.       Significant Labs: All pertinent labs within the past 24 hours have been reviewed.  CBC:   Recent Labs   Lab 02/01/24  0951   WBC 10.66   HGB 15.1   HCT 44.9        CMP:   Recent Labs   Lab 02/01/24  0951      K 4.1      CO2 19*   GLU 95   BUN 13   CREATININE 0.9   CALCIUM 9.5   PROT 7.5   ALBUMIN 3.9   BILITOT 0.8   ALKPHOS 110   AST 62*   ALT 67*   ANIONGAP 13     Lactic Acid:   Recent Labs   Lab 02/01/24  1356   LACTATE 0.9     Urine Studies:   Recent Labs   Lab 02/01/24  1050   COLORU Yellow   APPEARANCEUA Clear   PHUR 7.0   SPECGRAV 1.015   PROTEINUA Negative   GLUCUA Negative   KETONESU Negative   BILIRUBINUA Negative   OCCULTUA Negative   NITRITE Negative   UROBILINOGEN Negative   LEUKOCYTESUR Negative       Significant Imaging: I have reviewed all pertinent imaging results/findings within the past 24 hours.  Narrative & Impression  EXAMINATION:  XR CHEST AP PORTABLE     CLINICAL HISTORY:  Sepsis;     TECHNIQUE:  Single frontal view of the chest was performed.     COMPARISON:  01/10/2024     FINDINGS:  The heart is not enlarged.  The lungs are well expanded and clear.  The costophrenic sulci are sharp.     Impression:     No acute cardiopulmonary disease        Electronically signed by: Desi Seymour MD  Date:                                            02/01/2024  Time:                                           11:05  Assessment/Plan:     * Bronchiectasis with acute exacerbation  Concern for developing PNA vs exacerbation of bronchiectasis. Given hx of immunoglobulin deficiency, will start on broad spectrum Abx. Blood cultures and sputum culture pending.   -Nebulizer Q 12 hours   -Cough medicine PRN  -Respiratory Culture   -Consult to Pulmonology  - Iv antibiotics to be  continued         Transaminitis  Chronic problem and per chart review patient has a history of hepatic steatosis.  -Will trend with daily CMPs      CVID (common variable immunodeficiency)  Chronic problem noted:   Patient takes HIZENTRA weekly  -last dose Tuesday 1/30/2024        VTE Risk Mitigation (From admission, onward)      None                 On 02/01/2024, patient should be placed in hospital observation services under my care in collaboration with Dr. Em.           Silvia Hill NP  Department of Hospital Medicine  [facility]

## 2024-02-01 NOTE — ED NOTES
Pt having redness/itching during vancomycin infusion. Vancomycin infusion stopped. Vitals stable, provider notified.

## 2024-02-01 NOTE — PLAN OF CARE
Nurses Note -- 4 Eyes      2/1/2024   4:33 PM      Skin assessed during: Admit      [x] No Altered Skin Integrity Present    [x]Prevention Measures Documented      [] Yes- Altered Skin Integrity Present or Discovered   [] LDA Added if Not in Epic (Describe Wound)   [] New Altered Skin Integrity was Present on Admit and Documented in LDA   [] Wound Image Taken    Wound Care Consulted? No    Attending Nurse:  Beau Cervantes RN/Staff Member:   TAN Fam

## 2024-02-01 NOTE — HPI
Mr. Charly Mattson is a 28 year old male with a previous medical history of CVID (for common variable immune deficiency, frequent pulmonary infections, and bronchiectasis. Patient presented to the emergency room with productive cough of dark green sputum which is normally clear, sore throat, fatigue, chills, and myalgias starting two days ago and getting progressively worse. He states he receives IV immunoglobulin replacement once a week. His last injection was on Tuesday. He follows with pulmonology, Dr. Alvares, for frequency pulmonary infections. He has been on prophylaxis Z-jose antonio every M-W-F for over a year. Patient reports he had bilateral tympanostomy performed approximately one month ago with no subsequent complications. ED workup negative for covid, flu and strep throat. Initial chest xray reveals no acute process. Code sepsis called in emergency room based on patient history of CVID and presentation with vancomycin initiated but subsequently discontinued due to patient having adverse reaction. Patient to be admitted to hospital medicine services for further evaluation and management.

## 2024-02-02 ENCOUNTER — TELEPHONE (OUTPATIENT)
Dept: FAMILY MEDICINE | Facility: CLINIC | Age: 29
End: 2024-02-02
Payer: MEDICAID

## 2024-02-02 VITALS
TEMPERATURE: 98 F | RESPIRATION RATE: 18 BRPM | OXYGEN SATURATION: 93 % | HEART RATE: 84 BPM | DIASTOLIC BLOOD PRESSURE: 59 MMHG | SYSTOLIC BLOOD PRESSURE: 113 MMHG | HEIGHT: 71 IN | WEIGHT: 220 LBS | BODY MASS INDEX: 30.8 KG/M2

## 2024-02-02 LAB
ALBUMIN SERPL BCP-MCNC: 3.4 G/DL (ref 3.5–5.2)
ALP SERPL-CCNC: 96 U/L (ref 55–135)
ALT SERPL W/O P-5'-P-CCNC: 56 U/L (ref 10–44)
ANION GAP SERPL CALC-SCNC: 10 MMOL/L (ref 8–16)
AST SERPL-CCNC: 49 U/L (ref 10–40)
BASOPHILS # BLD AUTO: 0.05 K/UL (ref 0–0.2)
BASOPHILS NFR BLD: 1 % (ref 0–1.9)
BILIRUB SERPL-MCNC: 0.8 MG/DL (ref 0.1–1)
BUN SERPL-MCNC: 12 MG/DL (ref 6–20)
CALCIUM SERPL-MCNC: 8.9 MG/DL (ref 8.7–10.5)
CHLORIDE SERPL-SCNC: 108 MMOL/L (ref 95–110)
CO2 SERPL-SCNC: 22 MMOL/L (ref 23–29)
CREAT SERPL-MCNC: 1.2 MG/DL (ref 0.5–1.4)
DIFFERENTIAL METHOD BLD: ABNORMAL
EOSINOPHIL # BLD AUTO: 0.1 K/UL (ref 0–0.5)
EOSINOPHIL NFR BLD: 2.1 % (ref 0–8)
ERYTHROCYTE [DISTWIDTH] IN BLOOD BY AUTOMATED COUNT: 13.4 % (ref 11.5–14.5)
EST. GFR  (NO RACE VARIABLE): >60 ML/MIN/1.73 M^2
GLUCOSE SERPL-MCNC: 103 MG/DL (ref 70–110)
HCT VFR BLD AUTO: 43 % (ref 40–54)
HGB BLD-MCNC: 14 G/DL (ref 14–18)
IMM GRANULOCYTES # BLD AUTO: 0.01 K/UL (ref 0–0.04)
IMM GRANULOCYTES NFR BLD AUTO: 0.2 % (ref 0–0.5)
LYMPHOCYTES # BLD AUTO: 1.2 K/UL (ref 1–4.8)
LYMPHOCYTES NFR BLD: 23.6 % (ref 18–48)
MAGNESIUM SERPL-MCNC: 1.8 MG/DL (ref 1.6–2.6)
MCH RBC QN AUTO: 26.9 PG (ref 27–31)
MCHC RBC AUTO-ENTMCNC: 32.6 G/DL (ref 32–36)
MCV RBC AUTO: 83 FL (ref 82–98)
MONOCYTES # BLD AUTO: 0.7 K/UL (ref 0.3–1)
MONOCYTES NFR BLD: 12.7 % (ref 4–15)
NEUTROPHILS # BLD AUTO: 3.1 K/UL (ref 1.8–7.7)
NEUTROPHILS NFR BLD: 60.4 % (ref 38–73)
NRBC BLD-RTO: 0 /100 WBC
PLATELET # BLD AUTO: 142 K/UL (ref 150–450)
PMV BLD AUTO: 11 FL (ref 9.2–12.9)
POTASSIUM SERPL-SCNC: 4 MMOL/L (ref 3.5–5.1)
PROCALCITONIN SERPL IA-MCNC: 0.04 NG/ML (ref 0–0.5)
PROT SERPL-MCNC: 6.7 G/DL (ref 6–8.4)
RBC # BLD AUTO: 5.21 M/UL (ref 4.6–6.2)
SODIUM SERPL-SCNC: 140 MMOL/L (ref 136–145)
WBC # BLD AUTO: 5.13 K/UL (ref 3.9–12.7)

## 2024-02-02 PROCEDURE — 87070 CULTURE OTHR SPECIMN AEROBIC: CPT | Performed by: NURSE PRACTITIONER

## 2024-02-02 PROCEDURE — 94760 N-INVAS EAR/PLS OXIMETRY 1: CPT

## 2024-02-02 PROCEDURE — 83735 ASSAY OF MAGNESIUM: CPT

## 2024-02-02 PROCEDURE — 25000003 PHARM REV CODE 250

## 2024-02-02 PROCEDURE — 63600175 PHARM REV CODE 636 W HCPCS

## 2024-02-02 PROCEDURE — A4216 STERILE WATER/SALINE, 10 ML: HCPCS

## 2024-02-02 PROCEDURE — 94640 AIRWAY INHALATION TREATMENT: CPT

## 2024-02-02 PROCEDURE — 87205 SMEAR GRAM STAIN: CPT | Performed by: NURSE PRACTITIONER

## 2024-02-02 PROCEDURE — 96366 THER/PROPH/DIAG IV INF ADDON: CPT

## 2024-02-02 PROCEDURE — 36415 COLL VENOUS BLD VENIPUNCTURE: CPT | Performed by: NURSE PRACTITIONER

## 2024-02-02 PROCEDURE — 99214 OFFICE O/P EST MOD 30 MIN: CPT | Mod: ,,, | Performed by: INTERNAL MEDICINE

## 2024-02-02 PROCEDURE — 25000242 PHARM REV CODE 250 ALT 637 W/ HCPCS

## 2024-02-02 PROCEDURE — 36415 COLL VENOUS BLD VENIPUNCTURE: CPT

## 2024-02-02 PROCEDURE — 25000003 PHARM REV CODE 250: Performed by: NURSE PRACTITIONER

## 2024-02-02 PROCEDURE — 85025 COMPLETE CBC W/AUTO DIFF WBC: CPT

## 2024-02-02 PROCEDURE — 84145 PROCALCITONIN (PCT): CPT | Performed by: NURSE PRACTITIONER

## 2024-02-02 PROCEDURE — G0378 HOSPITAL OBSERVATION PER HR: HCPCS

## 2024-02-02 PROCEDURE — 94761 N-INVAS EAR/PLS OXIMETRY MLT: CPT

## 2024-02-02 PROCEDURE — 80053 COMPREHEN METABOLIC PANEL: CPT

## 2024-02-02 RX ORDER — DOXYCYCLINE 100 MG/1
100 CAPSULE ORAL EVERY 12 HOURS
Qty: 28 CAPSULE | Refills: 0 | Status: SHIPPED | OUTPATIENT
Start: 2024-02-02 | End: 2024-02-16

## 2024-02-02 RX ORDER — GUAIFENESIN 600 MG/1
600 TABLET, EXTENDED RELEASE ORAL 2 TIMES DAILY
Status: DISCONTINUED | OUTPATIENT
Start: 2024-02-02 | End: 2024-02-02 | Stop reason: HOSPADM

## 2024-02-02 RX ORDER — AMOXICILLIN AND CLAVULANATE POTASSIUM 875; 125 MG/1; MG/1
1 TABLET, FILM COATED ORAL EVERY 12 HOURS
Qty: 28 TABLET | Refills: 0 | Status: SHIPPED | OUTPATIENT
Start: 2024-02-02 | End: 2024-02-16

## 2024-02-02 RX ADMIN — LINEZOLID 600 MG: 600 INJECTION, SOLUTION INTRAVENOUS at 05:02

## 2024-02-02 RX ADMIN — GUAIFENESIN 600 MG: 600 TABLET, EXTENDED RELEASE ORAL at 10:02

## 2024-02-02 RX ADMIN — PIPERACILLIN AND TAZOBACTAM 4.5 G: 4; .5 INJECTION, POWDER, FOR SOLUTION INTRAVENOUS at 09:02

## 2024-02-02 RX ADMIN — FAMOTIDINE 20 MG: 20 TABLET, FILM COATED ORAL at 09:02

## 2024-02-02 RX ADMIN — PIPERACILLIN AND TAZOBACTAM 4.5 G: 4; .5 INJECTION, POWDER, FOR SOLUTION INTRAVENOUS at 12:02

## 2024-02-02 RX ADMIN — Medication 10 ML: at 05:02

## 2024-02-02 RX ADMIN — IPRATROPIUM BROMIDE AND ALBUTEROL SULFATE 3 ML: .5; 2.5 SOLUTION RESPIRATORY (INHALATION) at 08:02

## 2024-02-02 NOTE — PLAN OF CARE
Pt clear for DC from case management standpoint. Discharging to home      Scheduled next available apt with pt's pulm. Placed on waiting list in case sooner apt becomes available       02/02/24 1325   Final Note   Assessment Type Final Discharge Note   Anticipated Discharge Disposition Home

## 2024-02-02 NOTE — TELEPHONE ENCOUNTER
----- Message from Tami Cortes RN sent at 2/2/2024  9:35 AM CST -----  Regarding: hospital followup  Good morning    This patient is discharging from [facility] today VS tomorrow and will need a hospital follow up scheduled. Follow up needs to be within 8-14 days per smart scheduling suggestion. Please call pt for scheduling.    Thank you!  Tami

## 2024-02-02 NOTE — PLAN OF CARE
Problem: Adult Inpatient Plan of Care  Goal: Plan of Care Review  Outcome: Ongoing, Progressing     Problem: Adult Inpatient Plan of Care  Goal: Optimal Comfort and Wellbeing  Outcome: Ongoing, Progressing     Problem: Infection  Goal: Absence of Infection Signs and Symptoms  Outcome: Ongoing, Progressing

## 2024-02-02 NOTE — CARE UPDATE
02/02/24 0816   Patient Assessment/Suction   Level of Consciousness (AVPU) alert   Respiratory Effort Normal   Expansion/Accessory Muscles/Retractions expansion symmetric   NAN Breath Sounds clear   LLL Breath Sounds diminished   RUL Breath Sounds clear   RML Breath Sounds clear   RLL Breath Sounds diminished   Rhythm/Pattern, Respiratory pattern regular   Cough Frequency infrequent   Cough Type good;dry;nonproductive   PRE-TX-O2   Device (Oxygen Therapy) room air   SpO2 (!) 92 %   Pulse Oximetry Type Intermittent   $ Pulse Oximetry - Multiple Charge Pulse Oximetry - Multiple   Pulse 79   Resp 18   Aerosol Therapy   $ Aerosol Therapy Charges Aerosol Treatment   Daily Review of Necessity (SVN) completed   Respiratory Treatment Status (SVN) given   Treatment Route (SVN) mask   Patient Position (SVN) semi-[place]'s   Signs of Intolerance (SVN) none   Breath Sounds Post-Respiratory Treatment   Throughout All Fields Post-Treatment aeration increased   Post-treatment Heart Rate (beats/min) 80   Post-treatment Resp Rate (breaths/min) 18

## 2024-02-02 NOTE — PLAN OF CARE
Unable to schedule hospital follow up with PCP. In basket message sent to PCP office requesting follow up apt       02/02/24 0957   Post-Acute Status   Hospital Resources/Appts/Education Provided Appointment suggestion unavailable

## 2024-02-02 NOTE — CONSULTS
02/02/2024      Admit Date: 2/1/2024  [facility]  New Patient Consult    Chief Complaint   Patient presents with    Sore Throat    Cough    COVID-19 Concerns     Cough, sore throat, body aches        History of Present Illness:  Pt is a 27 yo male with CVID, bronchiectasis, recurrent respiratory infections who presented to ED with cough, dark green sputum. He was observed overnight and given IV vanco and zosyn. He did have red man syndrome and vanco changed to linezolid. Pulmonary is consulted for further recommendations. Pt started having sinus and upper respiratory congestion about 2 wks ago, went to  and given steroid and amoxicillin. For 2d he has now been having worsening dark green phlegm with red streaks as well as body aches and chills, +subjective fever. He stays on azithromycin 3x weekly and hizentra weekly as prophylactic therapy. He has had recurrent respiratory infections and the last time he was hospitalized was 4/2023. He follows outpatient with Dr. Ricco Alvares at [facility]. He states last summer he had to be treated for about 10 ear infections and then underwent bilat PE tubes (Dr Aranda) with improvement since then.      PFSH:  Past Medical History:   Diagnosis Date    Fatty liver     Immunoglobulin deficiency     Immunoglobulin deficiency     CVID    Pneumonia      Past Surgical History:   Procedure Laterality Date    ADENOIDECTOMY      BIOPSY N/A 9/23/2020    Procedure: BIOPSY;  Surgeon: Kaleb Diagnostic Provider;  Location: [facility] OR;  Service: Interventional Radiology;  Laterality: N/A;    MANDIBLE FRACTURE SURGERY      SINUS SURGERY      TONSILLECTOMY      TYMPANOSTOMY TUBE PLACEMENT      TYMPANOSTOMY TUBE PLACEMENT      x 4     Social History     Tobacco Use    Smoking status: Never    Smokeless tobacco: Never   Substance Use Topics    Alcohol use: Never    Drug use: No     Family History   Problem Relation Age of Onset    Cancer Mother     Liver disease Father      Review of patient's allergies  "indicates:   Allergen Reactions    Vancomycin Hives and Itching    Levaquin [levofloxacin]     Singulair [montelukast] Other (See Comments)     arrhythmias       Performance Status:Performance Status:The patient's activity level is no limits with regular activity.    Review of Systems:  a review of eleven systems covering constitutional, Psych, Eye, HEENT, Respiratory, Cardiac, GI, , Musculoskeletal, Endocrine, Dermatologicwas negative except the above mentioned abnormalities and for any pertinent findings as listed below:  All negative with pertinent positives as above          Exam:Comprehensive exam done. /65 (BP Location: Left arm, Patient Position: Lying)   Pulse 79   Temp 98.2 °F (36.8 °C) (Oral)   Resp 18   Ht 5' 11" (1.803 m)   Wt 99.8 kg (220 lb)   SpO2 (!) 92%   BMI 30.68 kg/m²   Exam included Vitals as listed, and patient's appearance and affect and alertness and mood, oral exam for yeast and hygiene and pharynx lesions and Mallapatti (M) score, neck with inspection for jvd and masses and thyroid abnormalities and lymph nodes (supraclavicular and infraclavicular nodes also examined and noted if abn), chest exam included symmetry and effort and fremitus and percussion and auscultation, cardiac exam included rhythm and gallops and murmur and rubs and jvd and edema, abdominal exam for mass and hepatosplenomegaly and tenderness and hernias and bowel sounds, Musculoskeletal exam with muscle tone and posture and mobility/gait and  strenght, and skin for rashes and cyanosis and pallor and turgor, extremity for clubbing.  Findings were normal except as listed below:  Awake, alert no distress  M3, oropharynx clear  HR regular  Breath sounds with L basilar fine rales otherwise clear  Abd soft nontender  No edema/clubbing    Radiographs reviewed: view by direct vision   CXR 2/1/24- clear lung fields      Labs     Recent Labs   Lab 02/02/24  0516   WBC 5.13   HGB 14.0   HCT 43.0   * " "    Recent Labs   Lab 02/01/24  1356 02/02/24  0516   NA  --  140   K  --  4.0   CL  --  108   CO2  --  22*   BUN  --  12   CREATININE  --  1.2   GLU  --  103   CALCIUM  --  8.9   MG  --  1.8   AST  --  49*   ALT  --  56*   ALKPHOS  --  96   BILITOT  --  0.8   PROT  --  6.7   ALBUMIN  --  3.4*   LACTATE 0.9  --    No results for input(s): "PH", "PCO2", "PO2", "HCO3" in the last 24 hours.  Microbiology Results (last 7 days)       Procedure Component Value Units Date/Time    Blood culture x two cultures. Draw prior to antibiotics. [[id number]] Collected: 02/01/24 0949    Order Status: Completed Specimen: Blood from Antecubital, Right Arm Updated: 02/01/24 2117     Blood Culture, Routine No Growth to date    Narrative:      Aerobic and anaerobic    Blood culture x two cultures. Draw prior to antibiotics. [[id number]] Collected: 02/01/24 0951    Order Status: Completed Specimen: Blood from Antecubital, Left Arm Updated: 02/01/24 2117     Blood Culture, Routine No Growth to date    Narrative:      Aerobic and anaerobic    Influenza A & B by Molecular [[id number]] Collected: 02/01/24 0944    Order Status: Completed Specimen: Nasopharyngeal Swab Updated: 02/01/24 1031     Influenza A, Molecular Negative     Influenza B, Molecular Negative     Flu A & B Source NP    Group A Strep, Molecular [[id number]] Collected: 02/01/24 0944    Order Status: Completed Specimen: Throat Updated: 02/01/24 1022     Group A Strep, Molecular Negative     Comment: Arcanobacterium haemolyticum and Beta Streptococcus group C   and G will not be detected by this test method.  Please order   Throat Culture ([id number]) if suspected.         Culture, Respiratory with Gram Stain [[id number]]     Order Status: No result Specimen: Respiratory             Impression:  Active Hospital Problems    Diagnosis  POA    *Bronchiectasis with acute exacerbation [J47.1]  Yes    Transaminitis [R74.01]  Yes    CVID (common variable immunodeficiency) [D83.9]  Yes    "   Resolved Hospital Problems   No resolved problems to display.               Plan:   Chronic bronchiectasis w exacerbation  CVID    - pt stable for outpatient management with close pulmonary follow up recommended  - pt agrees to call Dr. Alvares's office for appt in 1-2 wks  - will plan to treat him with 2 wk course of augmentin + doxycycline  - d/w hospitalist NP  - stable for dc    Chrissy Mckeon MD  Pulmonary & Critical Care Medicine

## 2024-02-02 NOTE — HOSPITAL COURSE
Patient was admitted with acute exacerbation of bronchiectasis.  He was monitored closely during his stay.  He was placed on broad-spectrum antibiotics and Pulmonary was consulted.  He had improvement in his symptoms overnight.  Pulmonary recommended oral antibiotic treatment for 14 days and cleared patient for discharge.  Discharge instructions as well as return precautions were discussed with patient good understanding.  Patient has a follow-up with his pulmonologist, Dr. Ricco Alvares, within 1 week.

## 2024-02-02 NOTE — PLAN OF CARE
[facility] - Med/Surg  Initial Discharge Assessment       Primary Care Provider: Porfirio Novoa MD    Admission Diagnosis: Bronchiectasis with acute exacerbation [J47.1]    Admission Date: 2/1/2024  Expected Discharge Date: 2/2/2024    Transition of Care Barriers: None    Payor: MEDICAID / Plan: [place] [employer] / Product Type: Managed Medicaid /     Extended Emergency Contact Information  Primary Emergency Contact: Melissa Bedolla   [place]  Mobile Phone: [phone]  Relation: Significant other  Preferred language: English   needed? No  Secondary Emergency Contact: Marley Mattson  Address: [address] [place] [address] [facility]  Home Phone: [phone]  Mobile Phone: [phone]  Relation: Mother    Discharge Plan A: Home with family  Discharge Plan B: Home      [facility] - [place] - [address] [facility] [address]  [place] [address]  Phone: [phone] Fax: [phone]    DC assessment completed with patient at bedside. Verified information on facesheet as correct. Denies POA. FELIX is pt's mother. Lives at listed address with his girlfriend and 2 dependent children. PCP is Dr. Novoa- last apt was about a year ago. Denies hh/hd/blood thinners. Does IVIG q2 weeks at home through specialty pharmacy. DME- nebulizer. Independent at baseline. Drives himself to apts and plans to drive himself home up. Back up transportation is girlfriend or mother. Reports taking home medications as prescribed and can currently afford them. Denies recent inpt stay in last 30 days. Verified insurance on file. DC plan is home.     Initial Assessment (most recent)       Adult Discharge Assessment - 02/02/24 1205          Discharge Assessment    Assessment Type Discharge Planning Assessment     Confirmed/corrected address, phone number and insurance Yes     Confirmed Demographics Correct on Facesheet     Source of Information  patient     Does patient/caregiver understand observation status Yes     Communicated LAILA with patient/caregiver Yes     Reason For Admission bronchiectasis     People in Home child(jostin), dependent;significant other     Facility Arrived From: home     Do you expect to return to your current living situation? Yes     Prior to hospitilization cognitive status: Alert/Oriented     Current cognitive status: Alert/Oriented     Walking or Climbing Stairs Difficulty no     Dressing/Bathing Difficulty no     Equipment Currently Used at Home nebulizer     Readmission within 30 days? No     Patient currently being followed by outpatient case management? No     Do you currently have service(s) that help you manage your care at home? No     Do you take prescription medications? Yes     Do you have prescription coverage? Yes     Do you have any problems affording any of your prescribed medications? No     Is the patient taking medications as prescribed? yes     Who is going to help you get home at discharge? self     How do you get to doctors appointments? car, drives self     Are you on dialysis? No     Do you take coumadin? No     Discharge Plan A Home with family     Discharge Plan B Home     DME Needed Upon Discharge  none     Discharge Plan discussed with: Patient     Transition of Care Barriers None

## 2024-02-02 NOTE — DISCHARGE SUMMARY
[facility] Medicine  Discharge Summary      Patient Name: Charly Mattson  MRN: [id number]  LU: [id number]  Patient Class: OP- Observation  Admission Date: 2/1/2024  Hospital Length of Stay: 0 days  Discharge Date and Time:  02/02/2024 2:32 PM  Attending Physician: Mallory Em MD   Discharging Provider: Fabiola Rendon NP  Primary Care Provider: Porfirio Novoa MD    Primary Care Team: Networked reference to record PCT     HPI:   Mr. Charly Mattson is a 28 year old male with a previous medical history of CVID (for common variable immune deficiency, frequent pulmonary infections, and bronchiectasis. Patient presented to the emergency room with productive cough of dark green sputum which is normally clear, sore throat, fatigue, chills, and myalgias starting two days ago and getting progressively worse. He states he receives IV immunoglobulin replacement once a week. His last injection was on Tuesday. He follows with pulmonology, Dr. Alvares, for frequency pulmonary infections. He has been on prophylaxis Z-jose antonio every M-W-F for over a year. Patient reports he had bilateral tympanostomy performed approximately one month ago with no subsequent complications. ED workup negative for covid, flu and strep throat. Initial chest xray reveals no acute process. Code sepsis called in emergency room based on patient history of CVID and presentation with vancomycin initiated but subsequently discontinued due to patient having adverse reaction. Patient to be admitted to hospital medicine services for further evaluation and management.           * No surgery found *      Hospital Course:   Patient was admitted with acute exacerbation of bronchiectasis.  He was monitored closely during his stay.  He was placed on broad-spectrum antibiotics and Pulmonary was consulted.  He had improvement in his symptoms overnight.  Pulmonary recommended oral antibiotic treatment for 14 days and cleared patient for  discharge.  Discharge instructions as well as return precautions were discussed with patient good understanding.  Patient has a follow-up with his pulmonologist, Dr. Ricco Alvares, within 1 week.     Goals of Care Treatment Preferences:  Code Status: Full Code      Consults:   Consults (From admission, onward)          Status Ordering Provider     Inpatient consult to Pulmonology  Once        Provider:  Chrissy Mckeon MD    Completed ROE SANCHES            No new Assessment & Plan notes have been filed under this hospital service since the last note was generated.  Service: Hospital Medicine    Final Active Diagnoses:    Diagnosis Date Noted POA    PRINCIPAL PROBLEM:  Bronchiectasis with acute exacerbation [J47.1] 08/10/2021 Yes    Transaminitis [R74.01] 05/28/2020 Yes    CVID (common variable immunodeficiency) [D83.9] 05/14/2019 Yes      Problems Resolved During this Admission:       Discharged Condition: good    Disposition: Home or Self Care    Follow Up:   Follow-up Information       Ricco Alvares MD. Go on 2/20/2024.    Specialties: Hospitalist, Pulmonary Disease  Why: 3 PM for hospital   placed on waiting list in case sooner apt becomes available  Contact information:  [address] [place], [place] [address]  [address]  [facility] [address]  [phone]               Porfirio Novoa MD Follow up.    Specialty: Family Medicine  Why: requested apt  office to contact you for scheduling  Contact information:  [address] [facility] [address] [phone]                           Patient Instructions:   No discharge procedures on file.    Significant Diagnostic Studies: Labs: CMP   Recent Labs   Lab 02/01/24  0951 02/02/24  0516    140   K 4.1 4.0    108   CO2 19* 22*   GLU 95 103   BUN 13 12   CREATININE 0.9 1.2   CALCIUM 9.5 8.9   PROT 7.5 6.7   ALBUMIN 3.9 3.4*   BILITOT 0.8 0.8   ALKPHOS 110 96   AST 62* 49*   ALT 67* 56*   ANIONGAP 13 10    and CBC   Recent Labs   Lab 02/01/24  0951  02/02/24  0516   WBC 10.66 5.13   HGB 15.1 14.0   HCT 44.9 43.0    142*     Radiology: X-Ray:    X-Ray Chest AP Portable [[id number]] Resulted: 02/01/24 1105   Order Status: Completed Updated: 02/01/24 1108   Narrative:     EXAMINATION:  XR CHEST AP PORTABLE    CLINICAL HISTORY:  Sepsis;    TECHNIQUE:  Single frontal view of the chest was performed.    COMPARISON:  01/10/2024    FINDINGS:  The heart is not enlarged.  The lungs are well expanded and clear.  The costophrenic sulci are sharp.   Impression:       No acute cardiopulmonary disease      Electronically signed by: Desi Seymour MD  Date: 02/01/2024  Time: 11:05     Pending Diagnostic Studies:       None           Medications:  Reconciled Home Medications:      Medication List        START taking these medications      amoxicillin-clavulanate 875-125mg 875-125 mg per tablet  Commonly known as: AUGMENTIN  Take 1 tablet by mouth every 12 (twelve) hours. for 14 days     doxycycline 100 MG Cap  Commonly known as: VIBRAMYCIN  Take 1 capsule (100 mg total) by mouth every 12 (twelve) hours. for 14 days            CONTINUE taking these medications      acetaminophen 500 MG tablet  Commonly known as: TYLENOL  Take 1,000 mg by mouth every 8 (eight) hours as needed for Pain.     * albuterol 1.25 mg/3 mL Nebu  Commonly known as: ACCUNEB  Take 1.25 mg by nebulization 2 (two) times a day.     * albuterol 90 mcg/actuation inhaler  Commonly known as: VENTOLIN HFA  Inhale 2 puffs into the lungs every 6 (six) hours as needed for Wheezing. Rescue     azelastine 137 mcg (0.1 %) nasal spray  Commonly known as: ASTELIN  1 spray (137 mcg total) by Nasal route 2 (two) times daily.     azithromycin 500 MG tablet  Commonly known as: ZITHROMAX  Take 500 mg by mouth every Mon, Wed, Fri.     EPINEPHrine 0.3 mg/0.3 mL Atin  Commonly known as: EPIPEN     HIZENTRA 4 gram/20 mL (20 %) Syrg  Generic drug: immun glob G(IgG)-pro-IgA 0-50  Inject 200 mg/kg into the skin every 7 days.  Tuesdays     promethazine-dextromethorphan 6.25-15 mg/5 mL Syrp  Commonly known as: PROMETHAZINE-DM  Take 5 mLs by mouth nightly as needed (cough).     sodium chloride 3% 3 % nebulizer solution  Take 4 mLs by nebulization 2 (two) times a day.           * This list has 2 medication(s) that are the same as other medications prescribed for you. Read the directions carefully, and ask your doctor or other care provider to review them with you.                  Indwelling Lines/Drains at time of discharge:   Lines/Drains/Airways       None                   Time spent on the discharge of patient: 32 minutes         Fabiola Rendon NP  Department of Hospital Medicine  [facility]/Surg

## 2024-02-02 NOTE — CARE UPDATE
02/01/24 1951   Patient Assessment/Suction   Level of Consciousness (AVPU) alert   PRE-TX-O2   Device (Oxygen Therapy) room air   SpO2 97 %   Pulse Oximetry Type Intermittent   $ Pulse Oximetry - Multiple Charge Pulse Oximetry - Multiple

## 2024-02-04 LAB
BACTERIA SPEC AEROBE CULT: NORMAL
GRAM STN SPEC: NORMAL

## 2024-02-06 LAB
BACTERIA BLD CULT: NORMAL
BACTERIA BLD CULT: NORMAL

## 2024-07-09 ENCOUNTER — PATIENT MESSAGE (OUTPATIENT)
Dept: ADMINISTRATIVE | Facility: HOSPITAL | Age: 29
End: 2024-07-09
Payer: MEDICAID

## 2024-07-17 ENCOUNTER — HOSPITAL ENCOUNTER (EMERGENCY)
Facility: HOSPITAL | Age: 29
Discharge: HOME | End: 2024-07-17
Attending: EMERGENCY MEDICINE
Payer: MEDICAID

## 2024-07-17 VITALS
HEART RATE: 79 BPM | TEMPERATURE: 98 F | SYSTOLIC BLOOD PRESSURE: 113 MMHG | WEIGHT: 217 LBS | HEIGHT: 71 IN | BODY MASS INDEX: 30.38 KG/M2 | RESPIRATION RATE: 16 BRPM | OXYGEN SATURATION: 99 % | DIASTOLIC BLOOD PRESSURE: 70 MMHG

## 2024-07-17 DIAGNOSIS — J01.01 ACUTE RECURRENT MAXILLARY SINUSITIS: Primary | ICD-10-CM

## 2024-07-17 DIAGNOSIS — J40 BRONCHITIS: ICD-10-CM

## 2024-07-17 PROCEDURE — 25000003 PHARM REV CODE 250: Performed by: EMERGENCY MEDICINE

## 2024-07-17 PROCEDURE — 87502 INFLUENZA DNA AMP PROBE: CPT | Performed by: PHYSICIAN ASSISTANT

## 2024-07-17 PROCEDURE — 99283 EMERGENCY DEPT VISIT LOW MDM: CPT

## 2024-07-17 PROCEDURE — U0002 COVID-19 LAB TEST NON-CDC: HCPCS | Performed by: PHYSICIAN ASSISTANT

## 2024-07-17 RX ORDER — DOXYCYCLINE 100 MG/1
100 CAPSULE ORAL 2 TIMES DAILY
Qty: 20 CAPSULE | Refills: 0 | Status: SHIPPED | OUTPATIENT
Start: 2024-07-17 | End: 2024-07-27

## 2024-07-17 RX ORDER — CETIRIZINE HYDROCHLORIDE 10 MG/1
10 TABLET ORAL
Status: COMPLETED | OUTPATIENT
Start: 2024-07-17 | End: 2024-07-17

## 2024-07-17 RX ORDER — ACETAMINOPHEN 325 MG/1
TABLET ORAL
Status: DISCONTINUED
Start: 2024-07-17 | End: 2024-07-18 | Stop reason: HOSPADM

## 2024-07-17 RX ORDER — ACETAMINOPHEN 325 MG/1
650 TABLET ORAL
Status: COMPLETED | OUTPATIENT
Start: 2024-07-17 | End: 2024-07-17

## 2024-07-17 RX ORDER — CETIRIZINE HYDROCHLORIDE 10 MG/1
10 TABLET ORAL DAILY
Qty: 30 TABLET | Refills: 0 | Status: SHIPPED | OUTPATIENT
Start: 2024-07-17 | End: 2025-07-17

## 2024-07-17 RX ORDER — DOXYCYCLINE HYCLATE 100 MG
100 TABLET ORAL
Status: COMPLETED | OUTPATIENT
Start: 2024-07-17 | End: 2024-07-17

## 2024-07-17 RX ADMIN — DOXYCYCLINE HYCLATE 100 MG: 100 TABLET, COATED ORAL at 10:07

## 2024-07-17 RX ADMIN — ACETAMINOPHEN 650 MG: 325 TABLET ORAL at 10:07

## 2024-07-17 RX ADMIN — CETIRIZINE HYDROCHLORIDE 10 MG: 10 TABLET, FILM COATED ORAL at 10:07

## 2024-07-18 NOTE — DISCHARGE INSTRUCTIONS
Take Tylenol and/or ibuprofen as package directs for body aches, fevers more than or equal to 100.4°, and headaches    Please purchase an antihistamine with decongestant such as Claritin Allegra or Zyrtec D and take as package directs

## 2024-07-18 NOTE — FIRST PROVIDER EVALUATION
Emergency Department TeleTriage Encounter Note      CHIEF COMPLAINT    Chief Complaint   Patient presents with    Fever     Fever and chills x 2 days.  Now having body aches.  Currently on abx for ear infection        VITAL SIGNS   Initial Vitals [07/17/24 1948]   BP Pulse Resp Temp SpO2   106/62 69 16 98 °F (36.7 °C) 95 %      MAP       --            ALLERGIES    Review of patient's allergies indicates:   Allergen Reactions    Vancomycin Hives and Itching    Levaquin [levofloxacin]     Singulair [montelukast] Other (See Comments)     arrhythmias       PROVIDER TRIAGE NOTE  Patient presents with cough productive of green sputum, chills, and body aches for 2 days.       ORDERS  Labs Reviewed - No data to display    ED Orders (720h ago, onward)      None              Virtual Visit Note: The provider triage portion of this emergency department evaluation and documentation was performed via [employer], a HIPAA-compliant telemedicine application, in concert with a tele-presenter in the room. A face to face patient evaluation with one of my colleagues will occur once the patient is placed in an emergency department room.      DISCLAIMER: This note was prepared with [employer] voice recognition transcription software. Garbled syntax, mangled pronouns, and other bizarre constructions may be attributed to that software system.

## 2024-07-18 NOTE — ED PROVIDER NOTES
Encounter Date: 7/17/2024       History     Chief Complaint   Patient presents with    Fever     Fever and chills x 2 days.  Now having body aches.  Currently on abx for ear infection      Emergent eval of a 29-year-old male with a immunoglobulin deficiency common variable immunodeficiency, fatty liver disease, PE tubes in past, who presents to the ER due to fever and chills for 2 days with body aches.  Reports he was had 4-5 days of right ear pain he has PE tubes currently in place he was unable to he was  So they called him in Ciprodex he reports no improvement.  He was not having drainage from the ear..  He was states that since a ear pain began he was now having nasal congestion and blows he was no a dark green mucus.  Cough with dark green mucus, bilateral frontal headache, right sinus pain.  And body aches.  Chills and sweats but no fever.  Decreased appetite.  No nausea vomiting or diarrhea.      Review of patient's allergies indicates:   Allergen Reactions    Vancomycin Hives and Itching    Levaquin [levofloxacin]     Singulair [montelukast] Other (See Comments)     arrhythmias     Past Medical History:   Diagnosis Date    Fatty liver     Immunoglobulin deficiency     Immunoglobulin deficiency     CVID    Pneumonia      Past Surgical History:   Procedure Laterality Date    ADENOIDECTOMY      BIOPSY N/A 9/23/2020    Procedure: BIOPSY;  Surgeon: Kaleb Diagnostic Provider;  Location: [facility] OR;  Service: Interventional Radiology;  Laterality: N/A;    MANDIBLE FRACTURE SURGERY      SINUS SURGERY      TONSILLECTOMY      TYMPANOSTOMY TUBE PLACEMENT      TYMPANOSTOMY TUBE PLACEMENT      x 4     Family History   Problem Relation Name Age of Onset    Cancer Mother      Liver disease Father       Social History     Tobacco Use    Smoking status: Never    Smokeless tobacco: Never   Substance Use Topics    Alcohol use: Never    Drug use: No     Review of Systems   Constitutional:  Positive for appetite change, chills  and diaphoresis. Negative for activity change, fatigue and fever.   HENT:  Positive for congestion, ear pain, postnasal drip, rhinorrhea, sinus pressure and sinus pain. Negative for ear discharge, facial swelling, sore throat, trouble swallowing and voice change.    Respiratory:  Negative for cough, chest tightness and shortness of breath.    Cardiovascular:  Negative for chest pain and palpitations.   Gastrointestinal:  Negative for abdominal pain, constipation, diarrhea, nausea and vomiting.   Musculoskeletal:  Positive for back pain and myalgias. Negative for neck pain and neck stiffness.   Allergic/Immunologic: Positive for immunocompromised state.   Neurological:  Positive for headaches. Negative for dizziness, weakness, light-headedness and numbness.   Psychiatric/Behavioral:  Negative for confusion.    All other systems reviewed and are negative.      Physical Exam     Initial Vitals [07/17/24 1948]   BP Pulse Resp Temp SpO2   106/62 69 16 98 °F (36.7 °C) 95 %      MAP       --         Physical Exam    Nursing note and vitals reviewed.  Constitutional: He appears well-developed and well-nourished. He is not diaphoretic. No distress.   HENT:   Head: Normocephalic and atraumatic.   Right Ear: External ear normal.   Left Ear: External ear normal.   Nose: Nose normal.   Mouth/Throat: Oropharynx is clear and moist.   Nasal congestion right worse than left right-sided maxillary sinus tenderness no tenderness to frontal sinuses left TM with PE tube in place right TM erythematous no bulging or purulent middle ear effusion no PE tube visualized  Normal oropharynx   Eyes: Conjunctivae and EOM are normal. Pupils are equal, round, and reactive to light.   Neck: Neck supple. No tracheal deviation present.   Normal range of motion.  Cardiovascular:  Normal rate, regular rhythm, normal heart sounds and intact distal pulses.     Exam reveals no gallop and no friction rub.       No murmur heard.  Blood pressure 116/60 pulse  81   Pulmonary/Chest: Breath sounds normal. No stridor. No respiratory distress. He has no wheezes. He has no rhonchi. He has no rales. He exhibits no tenderness.   97% on room air respirations 16 clear breath sounds   Abdominal: Abdomen is soft. Bowel sounds are normal. He exhibits no distension and no mass. There is no abdominal tenderness. There is no rebound and no guarding.   Musculoskeletal:         General: No edema. Normal range of motion.      Cervical back: Normal range of motion and neck supple.     Neurological: He is alert and oriented to person, place, and time. He has normal strength. No cranial nerve deficit or sensory deficit.   Skin: Skin is warm and dry. No rash noted. No erythema. No pallor.   Psychiatric: He has a normal mood and affect. His behavior is normal. Judgment and thought content normal.         ED Course   Procedures  Labs Reviewed   INFLUENZA A & B BY MOLECULAR   SARS-COV-2 RNA AMPLIFICATION, QUAL          Imaging Results    None          Medications   cetirizine tablet 10 mg (has no administration in time range)   doxycycline tablet 100 mg (has no administration in time range)   acetaminophen tablet 650 mg (has no administration in time range)     Medical Decision Making  Emergent eval of a 29-year-old male with a immunoglobulin deficiency common variable immunodeficiency, fatty liver disease, PE tubes in past, who presents to the ER due to fever and chills for 2 days with body aches.  Reports he was had 4-5 days of right ear pain he has PE tubes currently in place he was unable to he was DrMadhav So they called him in Ciprodex he reports no improvement.  He was not having drainage from the ear..  He was states that since a ear pain began he was now having nasal congestion and blows he was no a dark green mucus.  Cough with dark green mucus, bilateral frontal headache, right sinus pain.  And body aches.  Chills and sweats but no fever.  Decreased appetite.  No nausea vomiting or  diarrhea.  On physical exam patient was in no distress sats 97% on room air respirations 16 clear breath sounds bilaterally no wheezing pulse 81 temperature 98.4° blood pressure 116/60.  Nasal congestion on right, clear oropharynx, right TM mildly erythematous no middle ear effusion.  I am unable to visualize the PE tube in the right TM.  Left PE tube is in place.  No frontal sinus tenderness mild right maxillary sinus tenderness.  Congested sounding voice  MDM    Patient presents for emergent evaluation of acute 4-5 days of right ear pain called in Ciprodex by a primary care physician now with sinus congestion green rhinorrhea green cough chills and sweats underlying history of CVID  that poses a threat to life and/or bodily function.   Differential diagnosis includes but was not limited to pneumonia bronchitis viral URI sinusitis otitis media otitis externa meningitis encephalitis sepsis cavernous sinus thrombosis periorbital orbital cellulitis tonsillitis pharyngitis COVID flu.   In the ED patient found to have acute sinusitis and bronchitis  I ordered labs and personally reviewed them.  Labs significant for covid and flu neg      Discharge MDM     Patient was managed in the ED with requested first dose of abx and meds here. Doxycycline 100mg, cetirizine 10 mg, Tylenol 650 mg.  Will discharge home with doxy, will take antihistamines Tylenol ibuprofen at home for symptoms  The response to treatment was good.    Patient was discharged in stable condition.  Detailed return precautions discussed.  Patient was told to follow up with primary care physician or specialist based on their diagnosis  Connie Ureña MD      Risk  OTC drugs.  Prescription drug management.                                      Clinical Impression:  Final diagnoses:  [J01.01] Acute recurrent maxillary sinusitis (Primary)  [J40] Bronchitis          ED Disposition Condition    Discharge Stable          ED Prescriptions       Medication Sig  Dispense Start Date End Date Auth. Provider    cetirizine (ZYRTEC) 10 MG tablet Take 1 tablet (10 mg total) by mouth once daily. 30 tablet 7/17/2024 7/17/2025 Connie Ureña MD    doxycycline (VIBRAMYCIN) 100 MG Cap Take 1 capsule (100 mg total) by mouth 2 (two) times daily. for 10 days 20 capsule 7/17/2024 7/27/2024 Connie Ureña MD          Follow-up Information       Follow up With Specialties Details Why Contact Info Additional Information    Porfirio Novoa MD Family Medicine Schedule an appointment as soon as possible for a visit  If your symptoms do not improve [address] [facility] [id number] [phone]       [facility] Emergency Medicine Go to  If symptoms worsen [address] [place] [address] [address] [facility] [address] 1st floor             Connie Ureña MD  07/17/24 5091

## 2024-08-06 ENCOUNTER — TELEPHONE (OUTPATIENT)
Dept: FAMILY MEDICINE | Facility: CLINIC | Age: 29
End: 2024-08-06
Payer: MEDICAID

## 2024-09-04 ENCOUNTER — TELEPHONE (OUTPATIENT)
Dept: FAMILY MEDICINE | Facility: CLINIC | Age: 29
End: 2024-09-04
Payer: MEDICAID

## 2024-09-05 ENCOUNTER — OCCUPATIONAL HEALTH (OUTPATIENT)
Dept: URGENT CARE | Facility: CLINIC | Age: 29
End: 2024-09-05
Payer: MEDICAID

## 2024-12-10 ENCOUNTER — HOSPITAL ENCOUNTER (EMERGENCY)
Facility: HOSPITAL | Age: 29
Discharge: HOME | End: 2024-12-10
Attending: EMERGENCY MEDICINE
Payer: MEDICAID

## 2024-12-10 VITALS
DIASTOLIC BLOOD PRESSURE: 63 MMHG | RESPIRATION RATE: 18 BRPM | TEMPERATURE: 99 F | WEIGHT: 220 LBS | BODY MASS INDEX: 30.8 KG/M2 | SYSTOLIC BLOOD PRESSURE: 112 MMHG | HEART RATE: 81 BPM | OXYGEN SATURATION: 97 % | HEIGHT: 71 IN

## 2024-12-10 DIAGNOSIS — R07.9 CHEST PAIN: ICD-10-CM

## 2024-12-10 DIAGNOSIS — S39.012A STRAIN OF LUMBAR REGION, INITIAL ENCOUNTER: ICD-10-CM

## 2024-12-10 DIAGNOSIS — J40 BRONCHITIS: Primary | ICD-10-CM

## 2024-12-10 LAB
ALBUMIN SERPL BCP-MCNC: 3.9 G/DL (ref 3.5–5.2)
ALP SERPL-CCNC: 100 U/L (ref 40–150)
ALT SERPL W/O P-5'-P-CCNC: 74 U/L (ref 10–44)
ANION GAP SERPL CALC-SCNC: 12 MMOL/L (ref 8–16)
AST SERPL-CCNC: 60 U/L (ref 10–40)
BASOPHILS # BLD AUTO: 0.06 K/UL (ref 0–0.2)
BASOPHILS NFR BLD: 0.7 % (ref 0–1.9)
BILIRUB SERPL-MCNC: 0.7 MG/DL (ref 0.1–1)
BNP SERPL-MCNC: 16 PG/ML (ref 0–99)
BUN SERPL-MCNC: 9 MG/DL (ref 6–20)
CALCIUM SERPL-MCNC: 9.8 MG/DL (ref 8.7–10.5)
CHLORIDE SERPL-SCNC: 108 MMOL/L (ref 95–110)
CO2 SERPL-SCNC: 23 MMOL/L (ref 23–29)
CREAT SERPL-MCNC: 1.1 MG/DL (ref 0.5–1.4)
D DIMER PPP IA.FEU-MCNC: 0.23 MG/L FEU
DIFFERENTIAL METHOD BLD: ABNORMAL
EOSINOPHIL # BLD AUTO: 0.1 K/UL (ref 0–0.5)
EOSINOPHIL NFR BLD: 0.9 % (ref 0–8)
ERYTHROCYTE [DISTWIDTH] IN BLOOD BY AUTOMATED COUNT: 13.3 % (ref 11.5–14.5)
EST. GFR  (NO RACE VARIABLE): >60 ML/MIN/1.73 M^2
GLUCOSE SERPL-MCNC: 99 MG/DL (ref 70–110)
HCT VFR BLD AUTO: 45.6 % (ref 40–54)
HCV AB SERPL QL IA: NEGATIVE
HGB BLD-MCNC: 14.9 G/DL (ref 14–18)
HIV 1+2 AB+HIV1 P24 AG SERPL QL IA: NEGATIVE
IMM GRANULOCYTES # BLD AUTO: 0.02 K/UL (ref 0–0.04)
IMM GRANULOCYTES NFR BLD AUTO: 0.2 % (ref 0–0.5)
INFLUENZA A, MOLECULAR: NEGATIVE
INFLUENZA B, MOLECULAR: NEGATIVE
LYMPHOCYTES # BLD AUTO: 1.6 K/UL (ref 1–4.8)
LYMPHOCYTES NFR BLD: 18.8 % (ref 18–48)
MAGNESIUM SERPL-MCNC: 2 MG/DL (ref 1.6–2.6)
MCH RBC QN AUTO: 26.9 PG (ref 27–31)
MCHC RBC AUTO-ENTMCNC: 32.7 G/DL (ref 32–36)
MCV RBC AUTO: 83 FL (ref 82–98)
MONOCYTES # BLD AUTO: 0.9 K/UL (ref 0.3–1)
MONOCYTES NFR BLD: 10.3 % (ref 4–15)
NEUTROPHILS # BLD AUTO: 6 K/UL (ref 1.8–7.7)
NEUTROPHILS NFR BLD: 69.1 % (ref 38–73)
NRBC BLD-RTO: 0 /100 WBC
PLATELET # BLD AUTO: 176 K/UL (ref 150–450)
PMV BLD AUTO: 11.3 FL (ref 9.2–12.9)
POTASSIUM SERPL-SCNC: 4.3 MMOL/L (ref 3.5–5.1)
PROT SERPL-MCNC: 7.5 G/DL (ref 6–8.4)
RBC # BLD AUTO: 5.53 M/UL (ref 4.6–6.2)
SARS-COV-2 RDRP RESP QL NAA+PROBE: NEGATIVE
SODIUM SERPL-SCNC: 143 MMOL/L (ref 136–145)
SPECIMEN SOURCE: NORMAL
TROPONIN I SERPL DL<=0.01 NG/ML-MCNC: <0.006 NG/ML (ref 0–0.03)
WBC # BLD AUTO: 8.65 K/UL (ref 3.9–12.7)

## 2024-12-10 PROCEDURE — 87635 SARS-COV-2 COVID-19 AMP PRB: CPT | Performed by: NURSE PRACTITIONER

## 2024-12-10 PROCEDURE — 94640 AIRWAY INHALATION TREATMENT: CPT

## 2024-12-10 PROCEDURE — 63600175 PHARM REV CODE 636 W HCPCS: Performed by: EMERGENCY MEDICINE

## 2024-12-10 PROCEDURE — 36415 COLL VENOUS BLD VENIPUNCTURE: CPT | Performed by: EMERGENCY MEDICINE

## 2024-12-10 PROCEDURE — 99285 EMERGENCY DEPT VISIT HI MDM: CPT | Mod: 25

## 2024-12-10 PROCEDURE — 85025 COMPLETE CBC W/AUTO DIFF WBC: CPT | Performed by: EMERGENCY MEDICINE

## 2024-12-10 PROCEDURE — 84484 ASSAY OF TROPONIN QUANT: CPT | Performed by: EMERGENCY MEDICINE

## 2024-12-10 PROCEDURE — 85379 FIBRIN DEGRADATION QUANT: CPT | Performed by: EMERGENCY MEDICINE

## 2024-12-10 PROCEDURE — 96372 THER/PROPH/DIAG INJ SC/IM: CPT | Performed by: EMERGENCY MEDICINE

## 2024-12-10 PROCEDURE — 86803 HEPATITIS C AB TEST: CPT | Performed by: EMERGENCY MEDICINE

## 2024-12-10 PROCEDURE — 93005 ELECTROCARDIOGRAM TRACING: CPT

## 2024-12-10 PROCEDURE — 87389 HIV-1 AG W/HIV-1&-2 AB AG IA: CPT | Performed by: EMERGENCY MEDICINE

## 2024-12-10 PROCEDURE — 96374 THER/PROPH/DIAG INJ IV PUSH: CPT

## 2024-12-10 PROCEDURE — 87502 INFLUENZA DNA AMP PROBE: CPT | Performed by: NURSE PRACTITIONER

## 2024-12-10 PROCEDURE — 25000242 PHARM REV CODE 250 ALT 637 W/ HCPCS

## 2024-12-10 PROCEDURE — 80053 COMPREHEN METABOLIC PANEL: CPT | Performed by: EMERGENCY MEDICINE

## 2024-12-10 PROCEDURE — 83735 ASSAY OF MAGNESIUM: CPT | Performed by: EMERGENCY MEDICINE

## 2024-12-10 PROCEDURE — 83880 ASSAY OF NATRIURETIC PEPTIDE: CPT | Performed by: EMERGENCY MEDICINE

## 2024-12-10 RX ORDER — IPRATROPIUM BROMIDE AND ALBUTEROL SULFATE 2.5; .5 MG/3ML; MG/3ML
SOLUTION RESPIRATORY (INHALATION)
Status: COMPLETED
Start: 2024-12-10 | End: 2024-12-10

## 2024-12-10 RX ORDER — IPRATROPIUM BROMIDE AND ALBUTEROL SULFATE 2.5; .5 MG/3ML; MG/3ML
3 SOLUTION RESPIRATORY (INHALATION)
Status: DISCONTINUED | OUTPATIENT
Start: 2024-12-10 | End: 2024-12-10 | Stop reason: HOSPADM

## 2024-12-10 RX ORDER — METHOCARBAMOL 500 MG/1
1000 TABLET, FILM COATED ORAL 3 TIMES DAILY PRN
Qty: 20 TABLET | Refills: 0 | Status: SHIPPED | OUTPATIENT
Start: 2024-12-10 | End: 2024-12-15

## 2024-12-10 RX ORDER — PREDNISONE 20 MG/1
60 TABLET ORAL
Status: COMPLETED | OUTPATIENT
Start: 2024-12-10 | End: 2024-12-10

## 2024-12-10 RX ORDER — ORPHENADRINE CITRATE 30 MG/ML
60 INJECTION INTRAMUSCULAR; INTRAVENOUS
Status: COMPLETED | OUTPATIENT
Start: 2024-12-10 | End: 2024-12-10

## 2024-12-10 RX ORDER — KETOROLAC TROMETHAMINE 30 MG/ML
15 INJECTION, SOLUTION INTRAMUSCULAR; INTRAVENOUS
Status: COMPLETED | OUTPATIENT
Start: 2024-12-10 | End: 2024-12-10

## 2024-12-10 RX ORDER — PREDNISONE 20 MG/1
40 TABLET ORAL DAILY
Qty: 10 TABLET | Refills: 0 | Status: SHIPPED | OUTPATIENT
Start: 2024-12-10 | End: 2024-12-15

## 2024-12-10 RX ORDER — DICLOFENAC SODIUM 50 MG/1
50 TABLET, DELAYED RELEASE ORAL 3 TIMES DAILY PRN
Qty: 15 TABLET | Refills: 0 | Status: SHIPPED | OUTPATIENT
Start: 2024-12-10

## 2024-12-10 RX ADMIN — ORPHENADRINE CITRATE 60 MG: 60 INJECTION INTRAMUSCULAR; INTRAVENOUS at 08:12

## 2024-12-10 RX ADMIN — KETOROLAC TROMETHAMINE 15 MG: 30 INJECTION, SOLUTION INTRAMUSCULAR; INTRAVENOUS at 08:12

## 2024-12-10 RX ADMIN — IPRATROPIUM BROMIDE AND ALBUTEROL SULFATE: .5; 3 SOLUTION RESPIRATORY (INHALATION) at 07:12

## 2024-12-10 RX ADMIN — PREDNISONE 60 MG: 20 TABLET ORAL at 08:12

## 2024-12-10 NOTE — FIRST PROVIDER EVALUATION
" Emergency Department TeleTriage Encounter Note      CHIEF COMPLAINT    Chief Complaint   Patient presents with    Back Pain     Lower back "locked up"    Chest Pain    Otalgia     Bilateral, "mucous coming out"       VITAL SIGNS   Initial Vitals [12/10/24 1749]   BP Pulse Resp Temp SpO2   108/61 89 18 99.3 °F (37.4 °C) 96 %      MAP       --            ALLERGIES    Review of patient's allergies indicates:   Allergen Reactions    Vancomycin Hives and Itching    Levaquin [levofloxacin]     Singulair [montelukast] Other (See Comments)     arrhythmias       PROVIDER TRIAGE NOTE  Verbal consent for the teletriage evaluation was given by the patient at the start of the evaluation.  All efforts will be made to maintain patient's privacy during the evaluation.      This is a teletriage evaluation of a 29 y.o. male presenting to the ED with c/o CP, lower back pain, bilateral ear drainage for 2 days. Limited physical exam via telehealth: The patient is awake, alert, answering questions appropriately and is not in respiratory distress.  As the Teletriage provider, I performed an initial assessment and ordered appropriate labs and imaging studies, if any, to facilitate the patient's care once placed in the ED. Once a room is available, care and a full evaluation will be completed by an alternate ED provider.  Any additional orders and the final disposition will be determined by that provider.  All imaging and labs will not be followed-up by the Teletriage Team, including myself.         ORDERS  Labs Reviewed   INFLUENZA A & B BY MOLECULAR   HEPATITIS C ANTIBODY   HIV 1 / 2 ANTIBODY   SARS-COV-2 RNA AMPLIFICATION, QUAL       ED Orders (720h ago, onward)      Start Ordered     Status Ordering Provider    12/10/24 1755 12/10/24 1755  COVID-19 Rapid Screening  STAT         Ordered MICA LAYNE    12/10/24 1755 12/10/24 1755  Influenza A & B by Molecular  Once         Ordered MICA LAYNE    12/10/24 1755 12/10/24 1755  X-Ray " Chest PA And Lateral  1 time imaging         Ordered ROVERTO, MICA RIDDLE    12/10/24 1752 12/10/24 1752  Hepatitis C Antibody  STAT         Pending Collection GELA ZIMMERMAN    12/10/24 1752 12/10/24 1752  HIV 1/2 Ag/Ab (4th Gen)  STAT         Pending Collection GELA ZIMMERMAN    12/10/24 1751 12/10/24 1750  EKG 12-lead  Once         Ordered JAMIR MAGAÑA              Virtual Visit Note: The provider triage portion of this emergency department evaluation and documentation was performed via [employer], a HIPAA-compliant telemedicine application, in concert with a tele-presenter in the room. A face to face patient evaluation with one of my colleagues will occur once the patient is placed in an emergency department room.      DISCLAIMER: This note was prepared with Under[employer] voice recognition transcription software. Garbled syntax, mangled pronouns, and other bizarre constructions may be attributed to that software system.

## 2024-12-11 NOTE — ED PROVIDER NOTES
"Encounter Date: 12/10/2024       History     Chief Complaint   Patient presents with    Back Pain     Lower back "locked up"    Chest Pain    Otalgia     Bilateral, "mucous coming out"     29-year-old male with a past medical history of bronchiectasis and immune deficiency presents with multiple complaints.  The patient reports that he developed chest pain and lower back pain earlier today.  He reports that he has had bilateral ear pain as well as congestion since last week.  There has been no associated fever, nausea/vomiting, shortness of breath, or abdominal pain.  He denies any associated trauma as well.  There are no alleviating or aggravating factors.      Review of patient's allergies indicates:   Allergen Reactions    Vancomycin Hives and Itching    Levaquin [levofloxacin]     Singulair [montelukast] Other (See Comments)     arrhythmias     Past Medical History:   Diagnosis Date    Fatty liver     Immunoglobulin deficiency     Immunoglobulin deficiency     CVID    Pneumonia      Past Surgical History:   Procedure Laterality Date    ADENOIDECTOMY      BIOPSY N/A 9/23/2020    Procedure: BIOPSY;  Surgeon: Kaleb Diagnostic Provider;  Location: [facility] OR;  Service: Interventional Radiology;  Laterality: N/A;    MANDIBLE FRACTURE SURGERY      SINUS SURGERY      TONSILLECTOMY      TYMPANOSTOMY TUBE PLACEMENT      TYMPANOSTOMY TUBE PLACEMENT      x 4     Family History   Problem Relation Name Age of Onset    Cancer Mother      Liver disease Father       Social History     Tobacco Use    Smoking status: Never    Smokeless tobacco: Never   Substance Use Topics    Alcohol use: Never    Drug use: No     Review of Systems   Constitutional:  Negative for chills, diaphoresis, fatigue and fever.   HENT:  Positive for congestion. Negative for rhinorrhea.    Respiratory:  Positive for cough. Negative for shortness of breath.    Cardiovascular:  Positive for chest pain.   Gastrointestinal:  Negative for abdominal pain, diarrhea, " nausea and vomiting.   Genitourinary:  Negative for dysuria, frequency and testicular pain.   Musculoskeletal:  Positive for back pain. Negative for gait problem.   Skin:  Negative for color change.   Neurological:  Negative for dizziness and numbness.   Psychiatric/Behavioral:  Negative for agitation and confusion.        Physical Exam     Initial Vitals [12/10/24 1749]   BP Pulse Resp Temp SpO2   108/61 89 18 99.3 °F (37.4 °C) 96 %      MAP       --         Physical Exam    Nursing note and vitals reviewed.  Constitutional: He appears well-developed and well-nourished.   HENT:   Head: Normocephalic and atraumatic.   Eyes: EOM are normal. Pupils are equal, round, and reactive to light.   Neck: Neck supple.   Cardiovascular:  Normal rate and regular rhythm.           Pulmonary/Chest: He has wheezes.   Expiratory wheezes noted to all lung fields.   Abdominal: Abdomen is soft. Bowel sounds are normal. He exhibits no distension. There is no abdominal tenderness. There is no rebound and no guarding.   Musculoskeletal:         General: Normal range of motion.      Cervical back: Neck supple.     Neurological: He is alert and oriented to person, place, and time.   Skin: Skin is warm and dry.   Psychiatric: He has a normal mood and affect.         ED Course   Procedures  Labs Reviewed   COMPREHENSIVE METABOLIC PANEL - Abnormal       Result Value    Sodium 143      Potassium 4.3      Chloride 108      CO2 23      Glucose 99      BUN 9      Creatinine 1.1      Calcium 9.8      Total Protein 7.5      Albumin 3.9      Total Bilirubin 0.7      Alkaline Phosphatase 100      AST 60 (*)     ALT 74 (*)     eGFR >60      Anion Gap 12     CBC W/ AUTO DIFFERENTIAL - Abnormal    WBC 8.65      RBC 5.53      Hemoglobin 14.9      Hematocrit 45.6      MCV 83      MCH 26.9 (*)     MCHC 32.7      RDW 13.3      Platelets 176      MPV 11.3      Immature Granulocytes 0.2      Gran # (ANC) 6.0      Immature Grans (Abs) 0.02      Lymph # 1.6       Mono # 0.9      Eos # 0.1      Baso # 0.06      nRBC 0      Gran % 69.1      Lymph % 18.8      Mono % 10.3      Eosinophil % 0.9      Basophil % 0.7      Differential Method Automated     INFLUENZA A & B BY MOLECULAR    Influenza A, Molecular Negative      Influenza B, Molecular Negative      Flu A & B Source Nasal swab     HEPATITIS C ANTIBODY    Hepatitis C Ab Negative      Narrative:     Release to patient->Immediate   HIV 1 / 2 ANTIBODY    HIV 1/2 Ag/Ab Negative      Narrative:     Release to patient->Immediate   SARS-COV-2 RNA AMPLIFICATION, QUAL    SARS-CoV-2 RNA, Amplification, Qual Negative     D DIMER, QUANTITATIVE    D-Dimer 0.23     B-TYPE NATRIURETIC PEPTIDE    BNP 16     MAGNESIUM    Magnesium 2.0     TROPONIN I    Troponin I <0.006       EKG Readings: (Independently Interpreted)   Initial Reading: No STEMI. Rhythm: Normal Sinus Rhythm. Heart Rate: 86. Ectopy: No Ectopy. Conduction: Normal. ST Segments: Normal ST Segments. T Waves: Normal. Clinical Impression: Normal Sinus Rhythm       Imaging Results              X-Ray Chest PA And Lateral (Final result)  Result time 12/10/24 18:52:23      Final result by Humberto Westbrook DO (12/10/24 18:52:23)                   Impression:      No acute abnormality.      Electronically signed by: Humberto Westbrook  Date:    12/10/2024  Time:    18:52               Narrative:    EXAMINATION:  XR CHEST PA AND LATERAL    CLINICAL HISTORY:  Chest pain;    TECHNIQUE:  PA and lateral views of the chest were performed.    COMPARISON:  02/01/2024.    FINDINGS:  The lungs are well expanded and clear. No focal opacities are seen. The pleural spaces are clear. The cardiac silhouette is unremarkable. The visualized osseous structures are unremarkable.                                       Medications   albuterol-ipratropium 2.5 mg-0.5 mg/3 mL nebulizer solution 3 mL (3 mLs Nebulization Not Given 12/10/24 1930)   predniSONE tablet 60 mg (60 mg Oral Given 12/10/24 2027)   ketorolac  injection 15 mg (15 mg Intravenous Given 12/10/24 2027)   orphenadrine injection 60 mg (60 mg Intramuscular Given 12/10/24 2027)   albuterol-ipratropium (DUO-NEB) 2.5 mg-0.5 mg/3 mL nebulizer solution (  Given 12/10/24 1945)     Medical Decision Making  29-year-old male presents with multiple complaints.    Initial differential diagnosis included but not limited to atypical MI, viral illness, and pneumonia.    Amount and/or Complexity of Data Reviewed  Labs: ordered.  Radiology: ordered.  ECG/medicine tests: ordered.    Risk  Prescription drug management.  Risk Details: The patient was emergently evaluated in the emergency department, his evaluation was significant for a young male with expiratory wheezes noted.  This was treated with a respiratory nebulizer treatment here in the emergency department.  His chest x-ray showed no acute abnormalities per Radiology.  The patient's labs showed no acute processes.  The patient likely has a viral bronchitis causing his symptoms.  He is stable for discharge home and does not require further care workup at this time.  He is to continue his home medications as previously prescribed.  I will add p.o. prednisone, p.o. diclofenac and p.o. Robaxin to his medication regimen.  He is referred to primary care for follow-up.                                      Clinical Impression:  Final diagnoses:  [R07.9] Chest pain  [J40] Bronchitis (Primary)  [S39.012A] Strain of lumbar region, initial encounter          ED Disposition Condition    Discharge Stable          ED Prescriptions       Medication Sig Dispense Start Date End Date Auth. Provider    predniSONE (DELTASONE) 20 MG tablet Take 2 tablets (40 mg total) by mouth once daily. for 5 days 10 tablet 12/10/2024 12/15/2024 Osman Tamez MD    methocarbamoL (ROBAXIN) 500 MG Tab Take 2 tablets (1,000 mg total) by mouth 3 (three) times daily as needed (pain). 20 tablet 12/10/2024 12/15/2024 Osman Tamez MD    diclofenac  (VOLTAREN) 50 MG EC tablet Take 1 tablet (50 mg total) by mouth 3 (three) times daily as needed (pain). 15 tablet 12/10/2024 -- Osman Tamez MD          Follow-up Information       Follow up With Specialties Details Why Contact Info    Porfirio Novoa MD Family Medicine Schedule an appointment as soon as possible for a visit   [address] [facility] [id number]  [phone]               Osman Tamez MD  12/10/24 8711

## 2024-12-11 NOTE — ED NOTES
Assumed care:  Charly Mattson is awake, alert and oriented x 3, skin warm and dry, in NAD.  Patient CO intermitting CP x 3 days, denies SOB or numbness or tingling.  Patient also CO low back pain radiating down both legs that started today, cough, and sinus congestion.    Patient identifiers for Charly Mattson checked and correct.  LOC:  Charly Mattson is awake, alert, and aware of environment with an appropriate affect. He is oriented x 3 and speaking appropriately.  APPEARANCE:  He is resting comfortably and in no acute distress. He is clean and well groomed, patient's clothing is properly fastened.  SKIN:  The skin is warm and dry. He has normal skin turgor and moist mucus membranes. Skin is intact; no bruising or breakdown noted.  MUSCULOSKELETAL:  He is moving all extremities well, no obvious deformities noted. Pulses intact.   RESPIRATORY:  Airway is open and patent. Respirations are spontaneous and non-labored with normal effort and rate.  Cough, congestion  CARDIAC:  He has a normal rate and rhythm. No peripheral edema noted. Capillary refill < 3 seconds.  CP  ABDOMEN:  No distention noted.  Soft and non-tender upon palpation.  NEUROLOGICAL:  PERRL. Facial expression is symmetrical. Hand grasps are equal bilaterally. Normal sensation in all extremities when touched with finger.  Allergies reported:    Review of patient's allergies indicates:   Allergen Reactions    Vancomycin Hives and Itching    Levaquin [levofloxacin]     Singulair [montelukast] Other (See Comments)     arrhythmias

## 2025-02-05 NOTE — PROGRESS NOTES
1915-Report from Neli THOMAS    [facility] Hospital Medicine  Progress Note    Patient Name: Charly Mattson  MRN: [id number]  Patient Class: OP- Observation   Admission Date: 2022  Length of Stay: 0 days  Attending Physician: Devin Solis MD  Primary Care Provider: Primary Doctor No        Subjective:     Principal Problem:Sepsis        HPI:  Mr. Mattson is a 27 year old male with a history of hypogammaglobulinemia currently receiving HIZENTRA weekly, bronchiectasis, recurrent sinopulmonary infections on azithromycin 3x/week, elevated liver enzymes, and splenomegaly who presents today with complaints of fever up to 101 following a bronchoscopy today at [facility]. It is severe. It is associated with cough, mild hemoptysis, headache, nausea, and vomiting. He denies chest pain, SOB, dizziness, or LOC. He was recently on 10 days of augmentin for an abscessed tooth s/p complicated root canal that finished on 22. Spoke with Dr. Ricco Alvares who performed the bronchoscopy and noted blood, and he suspects he is having a bronchiectasis flare with concern for developing pna. In the ED: CXR with concern for infiltrated on the right lung base, , T 99.2, RR 22, and Lactate WNL.       PFTs (2022):  o FVC: 88%  o FEV1: 77%  o FEV1/FVC: 73  o T%  o DLCO: 80%    Sputum Cx  Date: 22  Heavy Growth Haemophilus influenzae       Overview/Hospital Course:  No notes on file    Interval History:  Notes reviewed, no acute events overnight.  Patient resting comfortably in bed.  He reports feeling a little better since admission but still overall feels weak and ill.  He reports productive sputum that is blood tinged and green in color.  He denies shortness of breath or chest pain.  Awaiting Pulmonary eval and further recs.  Will continue to monitor closely and continue current treatment plan.    Review of Systems   Constitutional:  Positive for activity change, appetite change, chills, fatigue and fever.   HENT:  Positive for  congestion, postnasal drip, rhinorrhea, sinus pressure, sinus pain and sore throat. Negative for ear pain and trouble swallowing.    Respiratory:  Positive for cough. Negative for choking, chest tightness and shortness of breath.    Cardiovascular:  Negative for chest pain, palpitations and leg swelling.   Gastrointestinal:  Negative for abdominal pain, diarrhea, nausea and vomiting.   Genitourinary:  Negative for difficulty urinating, dysuria and urgency.   Musculoskeletal:  Negative for arthralgias, back pain and gait problem.   Skin:  Negative for color change, pallor, rash and wound.   Neurological:  Negative for dizziness, weakness and light-headedness.   Psychiatric/Behavioral:  Negative for agitation, behavioral problems and confusion.    All other systems reviewed and are negative.  Objective:     Vital Signs (Most Recent):  Temp: 98.2 °F (36.8 °C) (08/31/22 0719)  Pulse: 98 (08/31/22 1352)  Resp: 16 (08/31/22 1352)  BP: (!) 102/52 (08/31/22 0719)  SpO2: 97 % (08/31/22 1352)   Vital Signs (24h Range):  Temp:  [98.2 °F (36.8 °C)-99.7 °F (37.6 °C)] 98.2 °F (36.8 °C)  Pulse:  [] 98  Resp:  [16-22] 16  SpO2:  [92 %-99 %] 97 %  BP: ()/(51-86) 102/52     Weight: 93.2 kg (205 lb 7.5 oz)  Body mass index is 28.66 kg/m².    Intake/Output Summary (Last 24 hours) at 8/31/2022 1417  Last data filed at 8/31/2022 1300  Gross per 24 hour   Intake --   Output 850 ml   Net -850 ml      Physical Exam  Vitals and nursing note reviewed.   Constitutional:       General: He is not in acute distress.     Appearance: Normal appearance. He is well-developed. He is not ill-appearing or diaphoretic.   HENT:      Head: Normocephalic and atraumatic.      Right Ear: External ear normal.      Left Ear: External ear normal.      Nose: Congestion and rhinorrhea present.      Mouth/Throat:      Mouth: Mucous membranes are moist.      Pharynx: Oropharynx is clear. No oropharyngeal exudate or posterior oropharyngeal erythema.    Eyes:      General: No scleral icterus.     Conjunctiva/sclera: Conjunctivae normal.      Pupils: Pupils are equal, round, and reactive to light.   Neck:      Vascular: No JVD.   Cardiovascular:      Rate and Rhythm: Normal rate and regular rhythm.      Pulses: Normal pulses.      Heart sounds: Normal heart sounds. No murmur heard.  Pulmonary:      Effort: Pulmonary effort is normal. No respiratory distress.      Breath sounds: Normal breath sounds. No stridor. No wheezing, rhonchi or rales.      Comments: Decreased to RLL  Abdominal:      General: Bowel sounds are normal. There is no distension.      Palpations: Abdomen is soft.      Tenderness: There is no abdominal tenderness.   Musculoskeletal:         General: No swelling or tenderness. Normal range of motion.      Cervical back: Normal range of motion and neck supple.   Skin:     General: Skin is warm and dry.      Capillary Refill: Capillary refill takes 2 to 3 seconds.      Coloration: Skin is not jaundiced or pale.      Findings: No erythema.   Neurological:      General: No focal deficit present.      Mental Status: He is alert and oriented to person, place, and time.      Cranial Nerves: No cranial nerve deficit.      Sensory: No sensory deficit.      Motor: No weakness.   Psychiatric:         Mood and Affect: Mood normal.         Behavior: Behavior normal.         Thought Content: Thought content normal.       Significant Labs: All pertinent labs within the past 24 hours have been reviewed.  CBC:   Recent Labs   Lab 08/30/22 1959 08/31/22  0452   WBC 10.81 10.39   HGB 15.2 12.9*   HCT 46.0 38.9*   * 116*     CMP:   Recent Labs   Lab 08/30/22 1959 08/31/22  0452    138   K 3.9 3.7    109   CO2 23 21*   * 104   BUN 13 11   CREATININE 1.1 0.9   CALCIUM 9.8 8.9   PROT 7.8 6.3   ALBUMIN 4.0 3.3*   BILITOT 0.9 0.9   ALKPHOS 157* 130   * 104*   * 95*   ANIONGAP 10 8       Significant Imaging: I have reviewed all  pertinent imaging results/findings within the past 24 hours.      Assessment/Plan:      * Sepsis  Admit to med/tele  Probable RLL pna in the setting of CVID and bronchoscopy today     This patient does have evidence of infective focus  My overall impression is sepsis. Vital signs were reviewed and noted in progress note.  Antibiotics given-   Antibiotics (From admission, onward)    Start     Stop Route Frequency Ordered    08/31/22 0630  meropenem-0.9% sodium chloride 1 g/50 mL IVPB         -- IV Every 8 hours (non-standard times) 08/30/22 2216    08/30/22 2300  vancomycin 1.5 g in dextrose 5 % 250 mL IVPB (ready to mix)         -- IV Every 12 hours (non-standard times) 08/30/22 2236    08/30/22 2235  vancomycin - pharmacy to dose  (vancomycin IVPB)        See Hyperspace for full Linked Orders Report.    -- IV pharmacy to manage frequency 08/30/22 2135 08/30/22 2115  meropenem-0.9% sodium chloride 1 g/50 mL IVPB         08/31 0914 IV ED 1 Time 08/30/22 2114        Cultures were taken-   Microbiology Results (last 7 days)     Procedure Component Value Units Date/Time    Culture, Respiratory with Gram Stain [[id number]]     Order Status: No result Specimen: Respiratory from Sputum, Expectorated     Influenza A & B by Molecular [[id number]] Collected: 08/30/22 2033    Order Status: Completed Specimen: Nasopharyngeal Swab Updated: 08/30/22 2113     Influenza A, Molecular Negative     Influenza B, Molecular Negative     Flu A & B Source Nasal swab    Blood culture [[id number]] Collected: 08/30/22 2049    Order Status: Sent Specimen: Blood from Antecubital, Right Updated: 08/30/22 2049    Blood culture [[id number]] Collected: 08/30/22 2049    Order Status: Sent Specimen: Blood from Antecubital, Left Updated: 08/30/22 2049    Influenza A & B by Molecular [[id number]] Collected: 08/30/22 2025    Order Status: Sent Specimen: Nasal Swab Updated: 08/30/22 2026        Latest lactate reviewed, they are-  Recent Labs   Lab  08/30/22 2049   LACTATE 1.5         Source- pneumonia    Source control Achieved by- abx        Bronchiectasis with acute exacerbation  Continue NS nebs with CPT  Consult pulm         Transaminitis  Stable today  Trend CMP      Pneumonia/ Right Lower lobe  S/p bronchoscopy today with CVID/immunocompromised state  Check procal  Cover broadly with meropenem/vanc   Consult pulm       CVID (common variable immunodeficiency)  Due for IVIG infusion   Dr. Martinez at [place] managing        VTE Risk Mitigation (From admission, onward)         Ordered     IP VTE LOW RISK PATIENT  Once         08/30/22 2216     Place sequential compression device  Until discontinued         08/30/22 2216                Discharge Planning   LAILA:      Code Status: Full Code   Is the patient medically ready for discharge?:     Reason for patient still in hospital (select all that apply): Patient trending condition, Treatment and Consult recommendations  Discharge Plan A: Home with family                  Chrissy Winslow NP  Department of Hospital Medicine   [facility]

## 2025-04-07 NOTE — ED NOTES
Antibiotics currently being prepared by pharmacy.  Message sent to deliver to U.   
Lab at bedside for cultures.   
Telebox [id number] placed on patient after talking to VICK Espinosa.  Called into monitor room.   Report given to Estefani.   
0 = independent

## 2025-04-27 ENCOUNTER — HOSPITAL ENCOUNTER (INPATIENT)
Facility: HOSPITAL | Age: 30
LOS: 2 days | Discharge: HOME | DRG: 871 | End: 2025-04-30
Attending: EMERGENCY MEDICINE | Admitting: HOSPITALIST
Payer: MEDICAID

## 2025-04-27 DIAGNOSIS — R07.9 CHEST PAIN: ICD-10-CM

## 2025-04-27 DIAGNOSIS — J18.9 PNEUMONIA DUE TO INFECTIOUS ORGANISM, UNSPECIFIED LATERALITY, UNSPECIFIED PART OF LUNG: Primary | ICD-10-CM

## 2025-04-27 DIAGNOSIS — Z13.6 SCREENING FOR CARDIOVASCULAR CONDITION: ICD-10-CM

## 2025-04-27 LAB
ABSOLUTE EOSINOPHIL (SMH): 0.02 K/UL
ABSOLUTE MONOCYTE (SMH): 0.99 K/UL (ref 0.3–1)
ABSOLUTE NEUTROPHIL COUNT (SMH): 13.5 K/UL (ref 1.8–7.7)
ADENOVIRUS (SMH): NOT DETECTED
ALBUMIN SERPL-MCNC: 4.2 G/DL (ref 3.5–5.2)
ALLENS TEST: ABNORMAL
ALP SERPL-CCNC: 105 UNIT/L (ref 40–150)
ALT SERPL-CCNC: 87 UNIT/L (ref 10–44)
ANION GAP (SMH): 10 MMOL/L (ref 8–16)
AST SERPL-CCNC: 81 UNIT/L (ref 11–45)
BASOPHILS # BLD AUTO: 0.05 K/UL
BASOPHILS NFR BLD AUTO: 0.3 %
BILIRUB SERPL-MCNC: 1.3 MG/DL (ref 0.1–1)
BILIRUB UR QL STRIP.AUTO: NEGATIVE
BORDETELLA PARAPERTUSSIS (IS1001) (SMH): NOT DETECTED
BORDETELLA PERTUSSIS (PTXP) (SMH): NOT DETECTED
BUN SERPL-MCNC: 13 MG/DL (ref 6–20)
CALCIUM SERPL-MCNC: 9.7 MG/DL (ref 8.7–10.5)
CHLAMYDIA PNEUMONIAE (SMH): NOT DETECTED
CHLORIDE SERPL-SCNC: 103 MMOL/L (ref 95–110)
CLARITY UR: CLEAR
CO2 SERPL-SCNC: 23 MMOL/L (ref 23–29)
COLOR UR AUTO: YELLOW
CORONAVIRUS 229E, COMMON COLD VIRUS (SMH): NOT DETECTED
CORONAVIRUS HKU1, COMMON COLD VIRUS (SMH): NOT DETECTED
CORONAVIRUS NL63, COMMON COLD VIRUS (SMH): NOT DETECTED
CORONAVIRUS OC43, COMMON COLD VIRUS (SMH): NOT DETECTED
CREAT SERPL-MCNC: 1.1 MG/DL (ref 0.5–1.4)
CRP SERPL-MCNC: 20 MG/L
DELSYS: ABNORMAL
ERYTHROCYTE [DISTWIDTH] IN BLOOD BY AUTOMATED COUNT: 13.1 % (ref 11.5–14.5)
ERYTHROCYTE [SEDIMENTATION RATE] IN BLOOD BY WESTERGREN METHOD: 18 MM/H
ERYTHROCYTE [SEDIMENTATION RATE] IN BLOOD: 10 MM/HR (ref 0–10)
FIO2: 21
FLUBV RNA NPH QL NAA+NON-PROBE: NOT DETECTED
GFR SERPLBLD CREATININE-BSD FMLA CKD-EPI: >60 ML/MIN/1.73/M2
GLUCOSE SERPL-MCNC: 96 MG/DL (ref 70–110)
GLUCOSE UR QL STRIP: NEGATIVE
GROUP A STREP MOLECULAR (OHS): NEGATIVE
HCO3 UR-SCNC: 22.3 MMOL/L (ref 24–28)
HCT VFR BLD AUTO: 45 % (ref 40–54)
HGB BLD-MCNC: 15 GM/DL (ref 14–18)
HGB UR QL STRIP: NEGATIVE
HPIV1 RNA NPH QL NAA+NON-PROBE: NOT DETECTED
HPIV2 RNA NPH QL NAA+NON-PROBE: NOT DETECTED
HPIV3 RNA NPH QL NAA+NON-PROBE: NOT DETECTED
HPIV4 RNA NPH QL NAA+NON-PROBE: NOT DETECTED
HUMAN METAPNEUMOVIRUS (SMH): NOT DETECTED
IMM GRANULOCYTES # BLD AUTO: 0.07 K/UL (ref 0–0.04)
IMM GRANULOCYTES NFR BLD AUTO: 0.4 % (ref 0–0.5)
INFLUENZA A (SUBTYPES H1,H1-2009,H3) (SMH): NOT DETECTED
INFLUENZA A MOLECULAR (OHS): NEGATIVE
INFLUENZA B MOLECULAR (OHS): NEGATIVE
KETONES UR QL STRIP: ABNORMAL
LACTATE SERPL-SCNC: 1.2 MMOL/L (ref 0.5–2.2)
LDH SERPL L TO P-CCNC: 0.98 MMOL/L (ref 0.5–2.2)
LEUKOCYTE ESTERASE UR QL STRIP: NEGATIVE
LYMPHOCYTES # BLD AUTO: 1.18 K/UL (ref 1–4.8)
MAGNESIUM SERPL-MCNC: 1.8 MG/DL (ref 1.6–2.6)
MCH RBC QN AUTO: 27.5 PG (ref 27–31)
MCHC RBC AUTO-ENTMCNC: 33.3 G/DL (ref 32–36)
MCV RBC AUTO: 82 FL (ref 82–98)
MODE: ABNORMAL
MRSA PCR SCRN (SMH): NOT DETECTED
MYCOPLASMA PNEUMONIAE (SMH): NOT DETECTED
NITRITE UR QL STRIP: NEGATIVE
NUCLEATED RBC (/100WBC) (SMH): 0 /100 WBC
PCO2 BLDA: 36.3 MMHG (ref 35–45)
PH SMN: 7.4 [PH] (ref 7.35–7.45)
PH UR STRIP: 6 [PH]
PLATELET # BLD AUTO: 141 K/UL (ref 150–450)
PMV BLD AUTO: 11.5 FL (ref 9.2–12.9)
PO2 BLDA: 20 MMHG (ref 40–60)
POC BE: -3 MMOL/L (ref -2–2)
POC SATURATED O2: 32 % (ref 95–100)
POC TCO2: 23 MMOL/L (ref 24–29)
POTASSIUM SERPL-SCNC: 3.9 MMOL/L (ref 3.5–5.1)
PROCALCITONIN SERPL-MCNC: 0.14 NG/ML
PROT SERPL-MCNC: 7.6 GM/DL (ref 6–8.4)
PROT UR QL STRIP: NEGATIVE
RBC # BLD AUTO: 5.46 M/UL (ref 4.6–6.2)
RELATIVE EOSINOPHIL (SMH): 0.1 % (ref 0–8)
RELATIVE LYMPHOCYTE (SMH): 7.4 % (ref 18–48)
RELATIVE MONOCYTE (SMH): 6.2 % (ref 4–15)
RELATIVE NEUTROPHIL (SMH): 85.6 % (ref 38–73)
RESPIRATORY INFECTION PANEL SOURCE (SMH): NORMAL
RSV RNA NPH QL NAA+NON-PROBE: NOT DETECTED
RV+EV RNA NPH QL NAA+NON-PROBE: NOT DETECTED
SAMPLE: ABNORMAL
SARS-COV-2 RDRP RESP QL NAA+PROBE: NEGATIVE
SARS-COV-2 RNA RESP QL NAA+PROBE: NOT DETECTED
SITE: ABNORMAL
SODIUM SERPL-SCNC: 136 MMOL/L (ref 136–145)
SP GR UR STRIP: 1.01
SP02: 97
TROPONIN I SERPL HS-MCNC: <3 NG/L
UROBILINOGEN UR STRIP-ACNC: NEGATIVE EU/DL
WBC # BLD AUTO: 15.85 K/UL (ref 3.9–12.7)

## 2025-04-27 PROCEDURE — 87040 BLOOD CULTURE FOR BACTERIA: CPT | Mod: 91 | Performed by: EMERGENCY MEDICINE

## 2025-04-27 PROCEDURE — 81003 URINALYSIS AUTO W/O SCOPE: CPT | Performed by: EMERGENCY MEDICINE

## 2025-04-27 PROCEDURE — 63600175 PHARM REV CODE 636 W HCPCS: Performed by: NURSE PRACTITIONER

## 2025-04-27 PROCEDURE — 84145 PROCALCITONIN (PCT): CPT | Performed by: NURSE PRACTITIONER

## 2025-04-27 PROCEDURE — 36415 COLL VENOUS BLD VENIPUNCTURE: CPT | Performed by: EMERGENCY MEDICINE

## 2025-04-27 PROCEDURE — 84484 ASSAY OF TROPONIN QUANT: CPT | Performed by: EMERGENCY MEDICINE

## 2025-04-27 PROCEDURE — 85651 RBC SED RATE NONAUTOMATED: CPT | Performed by: NURSE PRACTITIONER

## 2025-04-27 PROCEDURE — 0202U NFCT DS 22 TRGT SARS-COV-2: CPT | Performed by: NURSE PRACTITIONER

## 2025-04-27 PROCEDURE — 82803 BLOOD GASES ANY COMBINATION: CPT

## 2025-04-27 PROCEDURE — 93005 ELECTROCARDIOGRAM TRACING: CPT

## 2025-04-27 PROCEDURE — 94640 AIRWAY INHALATION TREATMENT: CPT | Mod: XB

## 2025-04-27 PROCEDURE — 83735 ASSAY OF MAGNESIUM: CPT | Performed by: EMERGENCY MEDICINE

## 2025-04-27 PROCEDURE — 94760 N-INVAS EAR/PLS OXIMETRY 1: CPT | Mod: XB

## 2025-04-27 PROCEDURE — G0378 HOSPITAL OBSERVATION PER HR: HCPCS

## 2025-04-27 PROCEDURE — 87205 SMEAR GRAM STAIN: CPT | Performed by: HOSPITALIST

## 2025-04-27 PROCEDURE — 87641 MR-STAPH DNA AMP PROBE: CPT | Performed by: NURSE PRACTITIONER

## 2025-04-27 PROCEDURE — U0002 COVID-19 LAB TEST NON-CDC: HCPCS | Performed by: EMERGENCY MEDICINE

## 2025-04-27 PROCEDURE — 25000003 PHARM REV CODE 250: Performed by: EMERGENCY MEDICINE

## 2025-04-27 PROCEDURE — 94799 UNLISTED PULMONARY SVC/PX: CPT

## 2025-04-27 PROCEDURE — 83605 ASSAY OF LACTIC ACID: CPT

## 2025-04-27 PROCEDURE — 83605 ASSAY OF LACTIC ACID: CPT | Mod: 59 | Performed by: NURSE PRACTITIONER

## 2025-04-27 PROCEDURE — 94640 AIRWAY INHALATION TREATMENT: CPT

## 2025-04-27 PROCEDURE — 25000242 PHARM REV CODE 250 ALT 637 W/ HCPCS: Performed by: EMERGENCY MEDICINE

## 2025-04-27 PROCEDURE — 96372 THER/PROPH/DIAG INJ SC/IM: CPT | Performed by: NURSE PRACTITIONER

## 2025-04-27 PROCEDURE — 86140 C-REACTIVE PROTEIN: CPT | Performed by: NURSE PRACTITIONER

## 2025-04-27 PROCEDURE — 25000242 PHARM REV CODE 250 ALT 637 W/ HCPCS: Performed by: NURSE PRACTITIONER

## 2025-04-27 PROCEDURE — 87502 INFLUENZA DNA AMP PROBE: CPT | Performed by: EMERGENCY MEDICINE

## 2025-04-27 PROCEDURE — 25000003 PHARM REV CODE 250: Performed by: NURSE PRACTITIONER

## 2025-04-27 PROCEDURE — 94669 MECHANICAL CHEST WALL OSCILL: CPT

## 2025-04-27 PROCEDURE — 63600175 PHARM REV CODE 636 W HCPCS: Performed by: EMERGENCY MEDICINE

## 2025-04-27 PROCEDURE — 99900035 HC TECH TIME PER 15 MIN (STAT)

## 2025-04-27 PROCEDURE — 93010 ELECTROCARDIOGRAM REPORT: CPT | Mod: ,,, | Performed by: GENERAL PRACTICE

## 2025-04-27 PROCEDURE — 94761 N-INVAS EAR/PLS OXIMETRY MLT: CPT | Mod: XB

## 2025-04-27 PROCEDURE — 80053 COMPREHEN METABOLIC PANEL: CPT | Performed by: EMERGENCY MEDICINE

## 2025-04-27 PROCEDURE — 87651 STREP A DNA AMP PROBE: CPT | Performed by: EMERGENCY MEDICINE

## 2025-04-27 PROCEDURE — 85025 COMPLETE CBC W/AUTO DIFF WBC: CPT | Performed by: EMERGENCY MEDICINE

## 2025-04-27 RX ORDER — IBUPROFEN 200 MG
16 TABLET ORAL
Status: DISCONTINUED | OUTPATIENT
Start: 2025-04-27 | End: 2025-04-30 | Stop reason: HOSPADM

## 2025-04-27 RX ORDER — LINEZOLID 600 MG/1
600 TABLET, FILM COATED ORAL EVERY 12 HOURS
Status: DISCONTINUED | OUTPATIENT
Start: 2025-04-27 | End: 2025-04-29

## 2025-04-27 RX ORDER — GLUCAGON 1 MG
1 KIT INJECTION
Status: DISCONTINUED | OUTPATIENT
Start: 2025-04-27 | End: 2025-04-30 | Stop reason: HOSPADM

## 2025-04-27 RX ORDER — HYDROCODONE BITARTRATE AND ACETAMINOPHEN 5; 325 MG/1; MG/1
1 TABLET ORAL EVERY 6 HOURS PRN
Refills: 0 | Status: DISCONTINUED | OUTPATIENT
Start: 2025-04-27 | End: 2025-04-30 | Stop reason: HOSPADM

## 2025-04-27 RX ORDER — IPRATROPIUM BROMIDE AND ALBUTEROL SULFATE 2.5; .5 MG/3ML; MG/3ML
3 SOLUTION RESPIRATORY (INHALATION)
Status: COMPLETED | OUTPATIENT
Start: 2025-04-27 | End: 2025-04-27

## 2025-04-27 RX ORDER — ALUMINUM HYDROXIDE, MAGNESIUM HYDROXIDE, AND SIMETHICONE 1200; 120; 1200 MG/30ML; MG/30ML; MG/30ML
30 SUSPENSION ORAL 4 TIMES DAILY PRN
Status: DISCONTINUED | OUTPATIENT
Start: 2025-04-27 | End: 2025-04-30 | Stop reason: HOSPADM

## 2025-04-27 RX ORDER — IBUPROFEN 200 MG
24 TABLET ORAL
Status: DISCONTINUED | OUTPATIENT
Start: 2025-04-27 | End: 2025-04-30 | Stop reason: HOSPADM

## 2025-04-27 RX ORDER — ACETAMINOPHEN 325 MG/1
650 TABLET ORAL EVERY 8 HOURS PRN
Status: DISCONTINUED | OUTPATIENT
Start: 2025-04-27 | End: 2025-04-30 | Stop reason: HOSPADM

## 2025-04-27 RX ORDER — NALOXONE HCL 0.4 MG/ML
0.02 VIAL (ML) INJECTION
Status: DISCONTINUED | OUTPATIENT
Start: 2025-04-27 | End: 2025-04-30 | Stop reason: HOSPADM

## 2025-04-27 RX ORDER — IPRATROPIUM BROMIDE AND ALBUTEROL SULFATE 2.5; .5 MG/3ML; MG/3ML
3 SOLUTION RESPIRATORY (INHALATION)
Status: DISCONTINUED | OUTPATIENT
Start: 2025-04-27 | End: 2025-04-30 | Stop reason: HOSPADM

## 2025-04-27 RX ORDER — TALC
6 POWDER (GRAM) TOPICAL NIGHTLY PRN
Status: DISCONTINUED | OUTPATIENT
Start: 2025-04-27 | End: 2025-04-30 | Stop reason: HOSPADM

## 2025-04-27 RX ORDER — ENOXAPARIN SODIUM 100 MG/ML
40 INJECTION SUBCUTANEOUS EVERY 24 HOURS
Status: DISCONTINUED | OUTPATIENT
Start: 2025-04-27 | End: 2025-04-30 | Stop reason: HOSPADM

## 2025-04-27 RX ORDER — POLYETHYLENE GLYCOL 3350 17 G/17G
17 POWDER, FOR SOLUTION ORAL 2 TIMES DAILY PRN
Status: DISCONTINUED | OUTPATIENT
Start: 2025-04-27 | End: 2025-04-30 | Stop reason: HOSPADM

## 2025-04-27 RX ORDER — CEFEPIME HYDROCHLORIDE 2 G/1
2 INJECTION, POWDER, FOR SOLUTION INTRAVENOUS
Status: DISCONTINUED | OUTPATIENT
Start: 2025-04-27 | End: 2025-04-30 | Stop reason: HOSPADM

## 2025-04-27 RX ORDER — ONDANSETRON HYDROCHLORIDE 2 MG/ML
4 INJECTION, SOLUTION INTRAVENOUS EVERY 6 HOURS PRN
Status: DISCONTINUED | OUTPATIENT
Start: 2025-04-27 | End: 2025-04-30 | Stop reason: HOSPADM

## 2025-04-27 RX ORDER — SODIUM CHLORIDE FOR INHALATION 3 %
4 VIAL, NEBULIZER (ML) INHALATION 2 TIMES DAILY
Status: DISCONTINUED | OUTPATIENT
Start: 2025-04-27 | End: 2025-04-30 | Stop reason: HOSPADM

## 2025-04-27 RX ORDER — MAGNESIUM SULFATE HEPTAHYDRATE 40 MG/ML
2 INJECTION, SOLUTION INTRAVENOUS ONCE
Status: COMPLETED | OUTPATIENT
Start: 2025-04-27 | End: 2025-04-27

## 2025-04-27 RX ORDER — LANOLIN ALCOHOL/MO/W.PET/CERES
800 CREAM (GRAM) TOPICAL
Status: DISCONTINUED | OUTPATIENT
Start: 2025-04-27 | End: 2025-04-30 | Stop reason: HOSPADM

## 2025-04-27 RX ORDER — SODIUM CHLORIDE 0.9 % (FLUSH) 0.9 %
10 SYRINGE (ML) INJECTION EVERY 12 HOURS PRN
Status: DISCONTINUED | OUTPATIENT
Start: 2025-04-27 | End: 2025-04-30 | Stop reason: HOSPADM

## 2025-04-27 RX ORDER — AMOXICILLIN 250 MG
1 CAPSULE ORAL 2 TIMES DAILY
Status: DISCONTINUED | OUTPATIENT
Start: 2025-04-27 | End: 2025-04-30 | Stop reason: HOSPADM

## 2025-04-27 RX ORDER — AZELASTINE 1 MG/ML
1 SPRAY, METERED NASAL 2 TIMES DAILY
Status: DISCONTINUED | OUTPATIENT
Start: 2025-04-27 | End: 2025-04-30 | Stop reason: HOSPADM

## 2025-04-27 RX ORDER — CETIRIZINE HYDROCHLORIDE 10 MG/1
10 TABLET ORAL DAILY
Status: DISCONTINUED | OUTPATIENT
Start: 2025-04-27 | End: 2025-04-30 | Stop reason: HOSPADM

## 2025-04-27 RX ADMIN — HYDROCODONE BITARTRATE AND ACETAMINOPHEN 1 TABLET: 5; 325 TABLET ORAL at 05:04

## 2025-04-27 RX ADMIN — IPRATROPIUM BROMIDE AND ALBUTEROL SULFATE 3 ML: .5; 3 SOLUTION RESPIRATORY (INHALATION) at 04:04

## 2025-04-27 RX ADMIN — SODIUM CHLORIDE 1000 ML: 9 INJECTION, SOLUTION INTRAVENOUS at 05:04

## 2025-04-27 RX ADMIN — SENNOSIDES AND DOCUSATE SODIUM 1 TABLET: 50; 8.6 TABLET ORAL at 09:04

## 2025-04-27 RX ADMIN — CEFEPIME 2 G: 2 INJECTION, POWDER, FOR SOLUTION INTRAVENOUS at 09:04

## 2025-04-27 RX ADMIN — IPRATROPIUM BROMIDE AND ALBUTEROL SULFATE 3 ML: 2.5; .5 SOLUTION RESPIRATORY (INHALATION) at 07:04

## 2025-04-27 RX ADMIN — LINEZOLID 600 MG: 600 TABLET, FILM COATED ORAL at 08:04

## 2025-04-27 RX ADMIN — CETIRIZINE HYDROCHLORIDE 10 MG: 10 TABLET, FILM COATED ORAL at 09:04

## 2025-04-27 RX ADMIN — LINEZOLID 600 MG: 600 TABLET, FILM COATED ORAL at 09:04

## 2025-04-27 RX ADMIN — AZELASTINE 137 MCG: 1 SPRAY, METERED NASAL at 02:04

## 2025-04-27 RX ADMIN — PIPERACILLIN SODIUM AND TAZOBACTAM SODIUM 3.38 G: 3; .375 INJECTION, POWDER, LYOPHILIZED, FOR SOLUTION INTRAVENOUS at 05:04

## 2025-04-27 RX ADMIN — MAGNESIUM SULFATE HEPTAHYDRATE 2 G: 40 INJECTION, SOLUTION INTRAVENOUS at 05:04

## 2025-04-27 RX ADMIN — SENNOSIDES AND DOCUSATE SODIUM 1 TABLET: 50; 8.6 TABLET ORAL at 08:04

## 2025-04-27 RX ADMIN — IPRATROPIUM BROMIDE AND ALBUTEROL SULFATE 3 ML: 2.5; .5 SOLUTION RESPIRATORY (INHALATION) at 06:04

## 2025-04-27 RX ADMIN — SODIUM CHLORIDE SOLN NEBU 3% 4 ML: 3 NEBU SOLN at 06:04

## 2025-04-27 RX ADMIN — CEFEPIME 2 G: 2 INJECTION, POWDER, FOR SOLUTION INTRAVENOUS at 05:04

## 2025-04-27 RX ADMIN — ENOXAPARIN SODIUM 40 MG: 40 INJECTION SUBCUTANEOUS at 05:04

## 2025-04-27 RX ADMIN — IPRATROPIUM BROMIDE AND ALBUTEROL SULFATE 3 ML: 2.5; .5 SOLUTION RESPIRATORY (INHALATION) at 12:04

## 2025-04-27 RX ADMIN — AZELASTINE 137 MCG: 1 SPRAY, METERED NASAL at 08:04

## 2025-04-27 NOTE — PLAN OF CARE
On license of [facility] - Med/Surg  Initial Discharge Assessment       Primary Care Provider: Porfirio Novoa MD    Admission Diagnosis: Pneumonia due to infectious organism, unspecified laterality, unspecified part of lung [J18.9]    Admission Date: 4/27/2025  Expected Discharge Date:     Transition of Care Barriers: None    Payor: MEDICAID / [facility] [place] [facility] / Product Type: Managed Medicaid /     Extended Emergency Contact Information  Primary Emergency Contact: Melissa eBdolla  Address: [id number] [place] [address] [place]  Mobile Phone: [phone]  Relation: Significant other  Preferred language: English   needed? No  Secondary Emergency Contact: Marley Mattson  Address: [address] [place] [address] [facility]  Home Phone: [phone]  Mobile Phone: [phone]  Relation: Mother    Discharge Plan A: Home with family  Discharge Plan B: Home      [email or web address] [address]  [address] [address]  [place] [address]  Phone: [phone] Fax: [phone]    DC assessment completed at bedside with pt, information on facesheet verified. Lives with s/o and 2 children. Plans to drive self home. PCP is [facility]. Pharm is [facility]. Denies coumadin/hh/hd. DME- neb. Insurance verified. Denies POA. Denies recent admission. Planning to DC home when clear.      Initial Assessment (most recent)       Adult Discharge Assessment - 04/27/25 1245          Discharge Assessment    Assessment Type Discharge Planning Assessment     Confirmed/corrected address, phone number and insurance Yes     Confirmed Demographics Correct on Facesheet     Source of Information patient     Does patient/caregiver understand observation status Yes     Communicated LAILA with patient/caregiver Yes     People in Home child(jostin), dependent;significant other     Facility Arrived From: home     Do you expect to return to your current  living situation? Yes     Do you have help at home or someone to help you manage your care at home? Yes     Who are your caregiver(s) and their phone number(s)? s/o     Prior to hospitilization cognitive status: Alert/Oriented     Current cognitive status: Alert/Oriented     Equipment Currently Used at Home nebulizer     Readmission within 30 days? No     Patient currently being followed by outpatient case management? No     Do you currently have service(s) that help you manage your care at home? No     Do you take prescription medications? Yes     Do you have prescription coverage? Yes     Do you have any problems affording any of your prescribed medications? No     Is the patient taking medications as prescribed? yes     Who is going to help you get home at discharge? self     How do you get to doctors appointments? car, drives self     Are you on dialysis? No     Do you take coumadin? No     Discharge Plan A Home with family     Discharge Plan B Home     DME Needed Upon Discharge  none     Discharge Plan discussed with: Patient     Transition of Care Barriers None

## 2025-04-27 NOTE — ASSESSMENT & PLAN NOTE
This patient does have evidence of infective focus  My overall impression is sepsis.  Source: Respiratory  Antibiotics given-   Antibiotics (72h ago, onward)      None          Latest lactate reviewed-  Recent Labs   Lab 04/27/25  0318   POCLAC 0.98     Organ dysfunction indicated by n/a    Fluid challenge Fluid Not Needed - Patient is not hypotensive and/or lactate is less than 4.0.     Post- resuscitation assessment No - Post resuscitation assessment not needed       Will Not start Pressors- Levophed for MAP of 65  Source control achieved by: abx

## 2025-04-27 NOTE — H&P
Critical access hospital Medicine  History & Physical    Patient Name: Charly Mattson  MRN: [id number]  Patient Class: OP- Observation  Admission Date: 4/27/2025  Attending Physician: Elizabeth Brooke MD   Primary Care Provider: Porfirio Novoa MD         Patient information was obtained from patient, past medical records, and ER records.     Subjective:     Principal Problem:Bronchiectasis with acute exacerbation    Chief Complaint:   Chief Complaint   Patient presents with    Fever     102 at home, took no medications.     Pleurisy     Began yesterday, productive         HPI: Mr. 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 It is moderate. Temp was 102 at home. It is associated with cough productive of dark green sputum, malodorous nasal discharge, pleuritic chest pain, and fatigue. Denies N/V/D, SOB, dizziness, LOC, or known sick contacts. He was previously on preventative/rotating abx with azithromycin, but has been off since at least December. He reports intermittent compliance with CPT vest/pulmonary toileting. In the ED, he's tachycardic 110s, RR 24, afebrile, normotensive, and satting 94% or better on room air. CXR with no acute consolidations. WBC 15K with a left shift. Flu/covid/strep negative. Hospital medicine is consulted for admission for bronchiectasis with acute exacerbation and sepsis.     Past Medical History:   Diagnosis Date    Fatty liver     Immunoglobulin deficiency     Immunoglobulin deficiency     CVID    Pneumonia        Past Surgical History:   Procedure Laterality Date    ADENOIDECTOMY      BIOPSY N/A 9/23/2020    Procedure: BIOPSY;  Surgeon: Dosc Diagnostic Provider;  Location: [facility] OR;  Service: Interventional Radiology;  Laterality: N/A;    MANDIBLE FRACTURE SURGERY      SINUS SURGERY      TONSILLECTOMY       TYMPANOSTOMY TUBE PLACEMENT      TYMPANOSTOMY TUBE PLACEMENT      x 4       Review of patient's allergies indicates:   Allergen Reactions    Vancomycin Hives and Itching    Levaquin [levofloxacin]     Singulair [montelukast] Other (See Comments)     arrhythmias       Current Facility-Administered Medications on File Prior to Encounter   Medication    oxymetazoline 0.05 % nasal spray 2 spray     Current Outpatient Medications on File Prior to Encounter   Medication Sig    acetaminophen (TYLENOL) 500 MG tablet Take 1,000 mg by mouth every 8 (eight) hours as needed for Pain.    albuterol (ACCUNEB) 1.25 mg/3 mL Nebu Take 1.25 mg by nebulization 2 (two) times a day.    albuterol (VENTOLIN HFA) 90 mcg/actuation inhaler Inhale 2 puffs into the lungs every 6 (six) hours as needed for Wheezing. Rescue    azelastine (ASTELIN) 137 mcg (0.1 %) nasal spray 1 spray (137 mcg total) by Nasal route 2 (two) times daily.    azithromycin (ZITHROMAX) 500 MG tablet Take 500 mg by mouth every Mon, Wed, Fri.    cetirizine (ZYRTEC) 10 MG tablet Take 1 tablet (10 mg total) by mouth once daily.    diclofenac (VOLTAREN) 50 MG EC tablet Take 1 tablet (50 mg total) by mouth 3 (three) times daily as needed (pain).    EPINEPHrine (EPIPEN) 0.3 mg/0.3 mL AtIn     HIZENTRA 4 gram/20 mL (20 %) Syrg Inject 200 mg/kg into the skin every 7 days. Tuesdays    promethazine-dextromethorphan (PROMETHAZINE-DM) 6.25-15 mg/5 mL Syrp Take 5 mLs by mouth nightly as needed (cough).    sodium chloride 3% 3 % nebulizer solution Take 4 mLs by nebulization 2 (two) times a day.     Family History       Problem Relation (Age of Onset)    Cancer Mother    Liver disease Father          Tobacco Use    Smoking status: Never    Smokeless tobacco: Never   Substance and Sexual Activity    Alcohol use: Never    Drug use: No    Sexual activity: Yes     Partners: Female     Review of Systems   Constitutional:  Positive for chills, fatigue and fever.   HENT:  Positive for  congestion. Negative for ear pain.    Eyes:  Negative for visual disturbance.   Respiratory:  Positive for cough and chest tightness. Negative for shortness of breath.    Cardiovascular:  Positive for chest pain. Negative for palpitations.   Gastrointestinal:  Negative for diarrhea, nausea and vomiting.   Genitourinary:  Negative for difficulty urinating.   Musculoskeletal:  Negative for neck stiffness.   Skin:  Negative for wound.   Neurological:  Negative for dizziness.   Psychiatric/Behavioral:  Negative for confusion.      Objective:     Vital Signs (Most Recent):  Temp: 98.3 °F (36.8 °C) (04/27/25 0113)  Pulse: 110 (04/27/25 0535)  Resp: 18 (04/27/25 0545)  BP: 120/63 (04/27/25 0532)  SpO2: (!) 94 % (04/27/25 0534) Vital Signs (24h Range):  Temp:  [98.3 °F (36.8 °C)] 98.3 °F (36.8 °C)  Pulse:  [110-118] 110  Resp:  [18-24] 18  SpO2:  [94 %-99 %] 94 %  BP: (114-124)/(62-75) 120/63     Weight: 108.9 kg (240 lb)  Body mass index is 33.47 kg/m².     Physical Exam  Vitals and nursing note reviewed.   Constitutional:       General: He is not in acute distress.     Appearance: He is obese.   HENT:      Head: Normocephalic and atraumatic.      Nose: Congestion present.      Mouth/Throat:      Mouth: Mucous membranes are moist.      Pharynx: Oropharynx is clear.   Eyes:      Extraocular Movements: Extraocular movements intact.      Pupils: Pupils are equal, round, and reactive to light.   Cardiovascular:      Rate and Rhythm: Normal rate and regular rhythm.   Pulmonary:      Effort: Pulmonary effort is normal.      Comments: Diminished throughout, coarse   Abdominal:      General: Bowel sounds are normal.      Palpations: Abdomen is soft.   Musculoskeletal:         General: Normal range of motion.      Cervical back: Normal range of motion and neck supple.   Skin:     General: Skin is warm and dry.      Capillary Refill: Capillary refill takes less than 2 seconds.   Neurological:      General: No focal deficit present.  "     Mental Status: He is alert and oriented to person, place, and time.   Psychiatric:         Mood and Affect: Mood normal.         Behavior: Behavior normal.              CRANIAL NERVES     CN III, IV, VI   Pupils are equal, round, and reactive to light.       Significant Labs: All pertinent labs within the past 24 hours have been reviewed.  CBC:   Recent Labs   Lab 04/27/25 0315   WBC 15.85*   HGB 15.0   HCT 45.0   *     CMP:   Recent Labs   Lab 04/27/25 0315      K 3.9   CO2 23   BUN 13   CREATININE 1.1   CALCIUM 9.7   ALBUMIN 4.2   BILITOT 1.3*   ALKPHOS 105   AST 81*   ALT 87*       Significant Imaging: I have reviewed all pertinent imaging results/findings within the past 24 hours.    X-Ray Chest AP Portable  Result Date: 4/27/2025  EXAMINATION: XR CHEST AP PORTABLE CLINICAL HISTORY: Provided history is "Sepsis;  ". TECHNIQUE: One view of the chest. COMPARISON: 12/10/2024. FINDINGS: Patient is rotated.  Cardiomediastinal silhouette is not enlarged.  Lung volumes are relatively low, accentuating basilar markings.  No focal consolidation.  No sizable pleural effusion.  No pneumothorax.  Operative changes partially imaged in the mandible.     No acute cardiopulmonary finding identified on this limited single view. Electronically signed by: Aiden Knox MD Date:    04/27/2025 Time:    04:22      Assessment/Plan:     Assessment & Plan  Bronchiectasis with acute exacerbation  Admit to med/tele  Flu/covid/strep negative  Get resp infection panel  Need sputum culture  Empirically treat with cefepime/linezolid  Reported allergy to vanc - sounds like he red man syndrome  Check MRSA screen and de-escalate abx as per clinical course  Cxr does not reveal pna  Add procal  CPT with vest  Pulmonary toileting   Nebs - saline and albuterol    CVID (common variable immunodeficiency)  Aware   On Hizentra  IgG 4 days ago - 1200 (WNL)  Transaminitis  Chronic  Slightly worse  Known fatty liver "   monitor    Sepsis  This patient does have evidence of infective focus  My overall impression is sepsis.  Source: Respiratory  Antibiotics given-   Antibiotics (72h ago, onward)      None          Latest lactate reviewed-  Recent Labs   Lab 04/27/25  0318   POCLAC 0.98     Organ dysfunction indicated by  n/a    Fluid challenge Fluid Not Needed - Patient is not hypotensive and/or lactate is less than 4.0.     Post- resuscitation assessment No - Post resuscitation assessment not needed       Will Not start Pressors- Levophed for MAP of 65  Source control achieved by: abx  VTE Risk Mitigation (From admission, onward)           Ordered     enoxaparin injection 40 mg  Daily         04/27/25 0516     IP VTE HIGH RISK PATIENT  Once         04/27/25 0516     Place sequential compression device  Until discontinued         04/27/25 0516                                Desire Smiley NP  Department of Hospital Medicine  [facility]

## 2025-04-27 NOTE — HPI
Mr. 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 It is moderate. Temp was 102 at home. It is associated with cough productive of dark green sputum, malodorous nasal discharge, pleuritic chest pain, and fatigue. Denies N/V/D, SOB, dizziness, LOC, or known sick contacts. He was previously on preventative/rotating abx with azithromycin, but has been off since at least December. He reports intermittent compliance with CPT vest/pulmonary toileting. In the ED, he's tachycardic 110s, RR 24, afebrile, normotensive, and satting 94% or better on room air. CXR with no acute consolidations. WBC 15K with a left shift. Flu/covid/strep negative. Hospital medicine is consulted for admission for bronchiectasis with acute exacerbation and sepsis.

## 2025-04-27 NOTE — SUBJECTIVE & OBJECTIVE
Past Medical History:   Diagnosis Date    Fatty liver     Immunoglobulin deficiency     Immunoglobulin deficiency     CVID    Pneumonia        Past Surgical History:   Procedure Laterality Date    ADENOIDECTOMY      BIOPSY N/A 9/23/2020    Procedure: BIOPSY;  Surgeon: Kaleb Diagnostic Provider;  Location: [facility] OR;  Service: Interventional Radiology;  Laterality: N/A;    MANDIBLE FRACTURE SURGERY      SINUS SURGERY      TONSILLECTOMY      TYMPANOSTOMY TUBE PLACEMENT      TYMPANOSTOMY TUBE PLACEMENT      x 4       Review of patient's allergies indicates:   Allergen Reactions    Vancomycin Hives and Itching    Levaquin [levofloxacin]     Singulair [montelukast] Other (See Comments)     arrhythmias       Current Facility-Administered Medications on File Prior to Encounter   Medication    oxymetazoline 0.05 % nasal spray 2 spray     Current Outpatient Medications on File Prior to Encounter   Medication Sig    acetaminophen (TYLENOL) 500 MG tablet Take 1,000 mg by mouth every 8 (eight) hours as needed for Pain.    albuterol (ACCUNEB) 1.25 mg/3 mL Nebu Take 1.25 mg by nebulization 2 (two) times a day.    albuterol (VENTOLIN HFA) 90 mcg/actuation inhaler Inhale 2 puffs into the lungs every 6 (six) hours as needed for Wheezing. Rescue    azelastine (ASTELIN) 137 mcg (0.1 %) nasal spray 1 spray (137 mcg total) by Nasal route 2 (two) times daily.    azithromycin (ZITHROMAX) 500 MG tablet Take 500 mg by mouth every Mon, Wed, Fri.    cetirizine (ZYRTEC) 10 MG tablet Take 1 tablet (10 mg total) by mouth once daily.    diclofenac (VOLTAREN) 50 MG EC tablet Take 1 tablet (50 mg total) by mouth 3 (three) times daily as needed (pain).    EPINEPHrine (EPIPEN) 0.3 mg/0.3 mL AtIn     HIZENTRA 4 gram/20 mL (20 %) Syrg Inject 200 mg/kg into the skin every 7 days. Tuesdays    promethazine-dextromethorphan (PROMETHAZINE-DM) 6.25-15 mg/5 mL Syrp Take 5 mLs by mouth nightly as needed (cough).    sodium chloride 3% 3 % nebulizer solution Take 4  mLs by nebulization 2 (two) times a day.     Family History       Problem Relation (Age of Onset)    Cancer Mother    Liver disease Father          Tobacco Use    Smoking status: Never    Smokeless tobacco: Never   Substance and Sexual Activity    Alcohol use: Never    Drug use: No    Sexual activity: Yes     Partners: Female     Review of Systems   Constitutional:  Positive for chills, fatigue and fever.   HENT:  Positive for congestion. Negative for ear pain.    Eyes:  Negative for visual disturbance.   Respiratory:  Positive for cough and chest tightness. Negative for shortness of breath.    Cardiovascular:  Positive for chest pain. Negative for palpitations.   Gastrointestinal:  Negative for diarrhea, nausea and vomiting.   Genitourinary:  Negative for difficulty urinating.   Musculoskeletal:  Negative for neck stiffness.   Skin:  Negative for wound.   Neurological:  Negative for dizziness.   Psychiatric/Behavioral:  Negative for confusion.      Objective:     Vital Signs (Most Recent):  Temp: 98.3 °F (36.8 °C) (04/27/25 0113)  Pulse: 110 (04/27/25 0535)  Resp: 18 (04/27/25 0545)  BP: 120/63 (04/27/25 0532)  SpO2: (!) 94 % (04/27/25 0534) Vital Signs (24h Range):  Temp:  [98.3 °F (36.8 °C)] 98.3 °F (36.8 °C)  Pulse:  [110-118] 110  Resp:  [18-24] 18  SpO2:  [94 %-99 %] 94 %  BP: (114-124)/(62-75) 120/63     Weight: 108.9 kg (240 lb)  Body mass index is 33.47 kg/m².     Physical Exam  Vitals and nursing note reviewed.   Constitutional:       General: He is not in acute distress.     Appearance: He is obese.   HENT:      Head: Normocephalic and atraumatic.      Nose: Congestion present.      Mouth/Throat:      Mouth: Mucous membranes are moist.      Pharynx: Oropharynx is clear.   Eyes:      Extraocular Movements: Extraocular movements intact.      Pupils: Pupils are equal, round, and reactive to light.   Cardiovascular:      Rate and Rhythm: Normal rate and regular rhythm.   Pulmonary:      Effort: Pulmonary  "effort is normal.      Comments: Diminished throughout, coarse   Abdominal:      General: Bowel sounds are normal.      Palpations: Abdomen is soft.   Musculoskeletal:         General: Normal range of motion.      Cervical back: Normal range of motion and neck supple.   Skin:     General: Skin is warm and dry.      Capillary Refill: Capillary refill takes less than 2 seconds.   Neurological:      General: No focal deficit present.      Mental Status: He is alert and oriented to person, place, and time.   Psychiatric:         Mood and Affect: Mood normal.         Behavior: Behavior normal.              CRANIAL NERVES     CN III, IV, VI   Pupils are equal, round, and reactive to light.       Significant Labs: All pertinent labs within the past 24 hours have been reviewed.  CBC:   Recent Labs   Lab 04/27/25 0315   WBC 15.85*   HGB 15.0   HCT 45.0   *     CMP:   Recent Labs   Lab 04/27/25 0315      K 3.9   CO2 23   BUN 13   CREATININE 1.1   CALCIUM 9.7   ALBUMIN 4.2   BILITOT 1.3*   ALKPHOS 105   AST 81*   ALT 87*       Significant Imaging: I have reviewed all pertinent imaging results/findings within the past 24 hours.    X-Ray Chest AP Portable  Result Date: 4/27/2025  EXAMINATION: XR CHEST AP PORTABLE CLINICAL HISTORY: Provided history is "Sepsis;  ". TECHNIQUE: One view of the chest. COMPARISON: 12/10/2024. FINDINGS: Patient is rotated.  Cardiomediastinal silhouette is not enlarged.  Lung volumes are relatively low, accentuating basilar markings.  No focal consolidation.  No sizable pleural effusion.  No pneumothorax.  Operative changes partially imaged in the mandible.     No acute cardiopulmonary finding identified on this limited single view. Electronically signed by: Aiden Knox MD Date:    04/27/2025 Time:    04:22      "

## 2025-04-27 NOTE — NURSING
Pt arrived to the unit via wheelchair and RN. IV fluids and magnesium infusing. Vitals done. Tele monitor [id number] verified with monitor tech. Personal items, urinal, and call light within reach. Blinds closed per pt's request.

## 2025-04-27 NOTE — ED PROVIDER NOTES
Encounter Date: 4/27/2025       History     Chief Complaint   Patient presents with    Fever     102 at home, took no medications.     Pleurisy     Began yesterday, productive      Patient presents emergency department with reported upper respiratory symptoms began having a cough yesterday which become increasingly productive with associated sore throat and congestion he 6 cough is productive of a greenish sputum he has a history of prior bronchiectasis variable immunodeficiency disorder and pneumonias in the past he had fever to 102.6 at home states he was also hypoxic with oxygen saturation 90%        Review of patient's allergies indicates:   Allergen Reactions    Vancomycin Hives and Itching    Levaquin [levofloxacin]     Singulair [montelukast] Other (See Comments)     arrhythmias     Past Medical History:   Diagnosis Date    Fatty liver     Immunoglobulin deficiency     Immunoglobulin deficiency     CVID    Pneumonia      Past Surgical History:   Procedure Laterality Date    ADENOIDECTOMY      BIOPSY N/A 9/23/2020    Procedure: BIOPSY;  Surgeon: Kaleb Diagnostic Provider;  Location: [facility] OR;  Service: Interventional Radiology;  Laterality: N/A;    MANDIBLE FRACTURE SURGERY      SINUS SURGERY      TONSILLECTOMY      TYMPANOSTOMY TUBE PLACEMENT      TYMPANOSTOMY TUBE PLACEMENT      x 4     Family History   Problem Relation Name Age of Onset    Cancer Mother      Liver disease Father       Social History[1]  Review of Systems    Physical Exam     Initial Vitals [04/27/25 0113]   BP Pulse Resp Temp SpO2   124/62 (!) 118 (!) 24 98.3 °F (36.8 °C) 95 %      MAP       --         Physical Exam    ED Course   Procedures  Labs Reviewed   COMPREHENSIVE METABOLIC PANEL - Abnormal       Result Value    Sodium 136      Potassium 3.9      Chloride 103      CO2 23      Glucose 96      BUN 13      Creatinine 1.1      Calcium 9.7      Protein Total 7.6      Albumin 4.2      Bilirubin Total 1.3 (*)           AST 81 (*)      ALT 87 (*)     Anion Gap 10      eGFR >60     URINALYSIS, REFLEX TO URINE CULTURE - Abnormal    Color, UA Yellow      Appearance, UA Clear      Spec Grav UA 1.015      pH, UA 6.0      Protein, UA Negative      Glucose, UA Negative      Ketones, UA 1+ (*)     Blood, UA Negative      Bilirubin, UA Negative      Urobilinogen, UA Negative      Nitrites, UA Negative      Leukocyte Esterase, UA Negative     CBC WITH DIFFERENTIAL - Abnormal    WBC 15.85 (*)     RBC 5.46      Hgb 15.0      Hct 45.0      MCV 82      MCH 27.5      MCHC 33.3      RDW 13.1      Platelet Count 141 (*)     MPV 11.5      Nucleated RBC 0      Neut % 85.6 (*)     Lymph % 7.4 (*)     Mono % 6.2      Eos % 0.1      Basophil % 0.3      Imm Grans % 0.4      Neut # 13.5 (*)     Lymph # 1.18      Mono # 0.99      Eos # 0.02      Baso # 0.05      Imm Grans # 0.07 (*)    C-REACTIVE PROTEIN - Abnormal    CRP 20.0 (*)    ISTAT PROCEDURE - Abnormal    POC PH 7.396      POC PCO2 36.3      POC PO2 20 (*)     POC HCO3 22.3 (*)     POC BE -3 (*)     POC SATURATED O2 32      POC Lactate 0.98      POC TCO2 23 (*)     Rate 18      Sample VENOUS      Site Other      Allens Test N/A      DelSys Room Air      Mode SPONT      FiO2 21      Sp02 97     INFLUENZA A & B BY MOLECULAR - Normal    INFLUENZA A MOLECULAR Negative      INFLUENZA B MOLECULAR  Negative     GROUP A STREP, MOLECULAR - Normal    Group A Strep Molecular Negative     SARS-COV-2 RNA AMPLIFICATION, QUAL - Normal    SARS COV-2 Molecular Negative     MAGNESIUM - Normal    Magnesium 1.8     TROPONIN I HIGH SENSITIVITY - Normal    Troponin High Sensitive <3     CBC W/ AUTO DIFFERENTIAL    Narrative:     The following orders were created for panel order CBC auto differential.  Procedure                               Abnormality         Status                     ---------                               -----------         ------                     CBC with Differential[[id number]]       Abnormal             "Final result                 Please view results for these tests on the individual orders.          Imaging Results              X-Ray Chest AP Portable (Final result)  Result time 04/27/25 04:22:45      Final result by Aiden Knox MD (04/27/25 04:22:45)                   Impression:      No acute cardiopulmonary finding identified on this limited single view.      Electronically signed by: Aiden Knox MD  Date:    04/27/2025  Time:    04:22               Narrative:    EXAMINATION:  XR CHEST AP PORTABLE    CLINICAL HISTORY:  Provided history is "Sepsis;  ".    TECHNIQUE:  One view of the chest.    COMPARISON:  12/10/2024.    FINDINGS:  Patient is rotated.  Cardiomediastinal silhouette is not enlarged.  Lung volumes are relatively low, accentuating basilar markings.  No focal consolidation.  No sizable pleural effusion.  No pneumothorax.  Operative changes partially imaged in the mandible.                                       Medications   azelastine 137 mcg (0.1 %) nasal spray 137 mcg (137 mcg Nasal Given 4/27/25 1400)   cetirizine tablet 10 mg (10 mg Oral Given 4/27/25 0919)   sodium chloride 3% nebulizer solution 4 mL (4 mLs Nebulization Given 4/27/25 1850)   sodium chloride 0.9% flush 10 mL (has no administration in time range)   melatonin tablet 6 mg (has no administration in time range)   ondansetron injection 4 mg (has no administration in time range)   polyethylene glycol packet 17 g (has no administration in time range)   senna-docusate 8.6-50 mg per tablet 1 tablet (1 tablet Oral Given 4/27/25 0919)   acetaminophen tablet 650 mg (has no administration in time range)   aluminum-magnesium hydroxide-simethicone 200-200-20 mg/5 mL suspension 30 mL (has no administration in time range)   naloxone 0.4 mg/mL injection 0.02 mg (has no administration in time range)   potassium bicarbonate disintegrating tablet 50 mEq (has no administration in time range)   potassium bicarbonate disintegrating tablet 35 " mEq (has no administration in time range)   potassium bicarbonate disintegrating tablet 60 mEq (has no administration in time range)   magnesium oxide tablet 800 mg (has no administration in time range)   magnesium oxide tablet 800 mg (has no administration in time range)   glucose chewable tablet 16 g (has no administration in time range)   glucose chewable tablet 24 g (has no administration in time range)   dextrose 50% injection 12.5 g (has no administration in time range)   dextrose 50% injection 25 g (has no administration in time range)   glucagon (human recombinant) injection 1 mg (has no administration in time range)   enoxaparin injection 40 mg (40 mg Subcutaneous Given 4/27/25 1746)   HYDROcodone-acetaminophen 5-325 mg per tablet 1 tablet (1 tablet Oral Given 4/27/25 1746)   albuterol-ipratropium 2.5 mg-0.5 mg/3 mL nebulizer solution 3 mL (3 mLs Nebulization Given 4/27/25 5939)   ceFEPIme injection 2 g (2 g Intravenous Given 4/27/25 1746)   linezolid tablet 600 mg (600 mg Oral Given 4/27/25 0919)   piperacillin-tazobactam (ZOSYN) 3.375 g in D5W 100 mL IVPB (MB+) (0 g Intravenous Stopped 4/27/25 0551)   albuterol-ipratropium 2.5 mg-0.5 mg/3 mL nebulizer solution 3 mL (3 mLs Nebulization Given 4/27/25 9278)   sodium chloride 0.9% bolus 1,000 mL 1,000 mL (0 mLs Intravenous Stopped 4/27/25 0956)   magnesium sulfate 2g in water 50mL IVPB (premix) (0 g Intravenous Stopped 4/27/25 9475)     Medical Decision Making  Chest x-ray shows no evidence of infiltrates patient has received nebulized therapy in the emergency department lactate is negative white blood cell count is elevated however at over 15,000 given patient's history of immune deficiency will admit for IV antibiotics until sure that his blood cultures are negative I have discussed patient's findings with Ms. Baljit KEY on-call for Hospital Medicine she will evaluate patient in the ER for admission    Amount and/or Complexity of Data Reviewed  Labs:  ordered.  Radiology: ordered.    Risk  Prescription drug management.                                      Clinical Impression:  Final diagnoses:  [R07.9] Chest pain  [Z13.6] Screening for cardiovascular condition  [J18.9] Pneumonia due to infectious organism, unspecified laterality, unspecified part of lung (Primary)          ED Disposition Condition    Observation                     [1]   Social History  Tobacco Use    Smoking status: Never    Smokeless tobacco: Never   Substance Use Topics    Alcohol use: Never    Drug use: No        Boone Farooq MD  04/27/25 1920

## 2025-04-27 NOTE — ASSESSMENT & PLAN NOTE
----- Message from Robina Mcdonald MD sent at 1/12/2023  3:52 PM CST -----  Called and spoke to daughter.      Ok to refer to pulmonary rehab.      ----- Message -----  From: Sybil Estrada RN  Sent: 1/11/2023   4:01 PM CST  To: Robina Mcdonald MD    Patient's daughter would like to speak with you to get an update on patient's health status.  Patient approves us speaking with her.  Her name is Sybil, phone number [phone].  They are trying to decide if he should move to [place] to be closer to her.  She asked about transfer of care and how that works.  I explained it to her.  She asked if we could refer to Pulmonary Rehab again.  I told her we can.           Admit to med/tele  Flu/covid/strep negative  Get resp infection panel  Need sputum culture  Empirically treat with cefepime/linezolid  Reported allergy to vanc - sounds like he red man syndrome  Check MRSA screen and de-escalate abx as per clinical course  Cxr does not reveal pna  Add procal  CPT with vest  Pulmonary toileting   Nebs - saline and albuterol

## 2025-04-27 NOTE — CARE UPDATE
04/27/25 0323   Patient Assessment/Suction   Level of Consciousness (AVPU) alert   Expansion/Accessory Muscles/Retractions no retractions   Rhythm/Pattern, Respiratory pattern regular   Cough Frequency no cough   Cough Type no productive sputum   PRE-TX-O2   Device (Oxygen Therapy) room air   SpO2 98 %   Pulse (!) 113   Resp (!) 22   Labs   $ Was an ABG obtained? Venous Puncture;POCT - Blood gas;POCT - Lactate  (lab drawn)   $ Labs Tech Time 15 min

## 2025-04-28 LAB
ABSOLUTE EOSINOPHIL (SMH): 0.09 K/UL
ABSOLUTE MONOCYTE (SMH): 0.91 K/UL (ref 0.3–1)
ABSOLUTE NEUTROPHIL COUNT (SMH): 6.8 K/UL (ref 1.8–7.7)
ALBUMIN SERPL-MCNC: 3.7 G/DL (ref 3.5–5.2)
ALP SERPL-CCNC: 101 UNIT/L (ref 40–150)
ALT SERPL-CCNC: 79 UNIT/L (ref 10–44)
ANION GAP (SMH): 7 MMOL/L (ref 8–16)
AST SERPL-CCNC: 71 UNIT/L (ref 11–45)
BASOPHILS # BLD AUTO: 0.04 K/UL
BASOPHILS NFR BLD AUTO: 0.4 %
BILIRUB SERPL-MCNC: 0.9 MG/DL (ref 0.1–1)
BUN SERPL-MCNC: 11 MG/DL (ref 6–20)
CALCIUM SERPL-MCNC: 9.1 MG/DL (ref 8.7–10.5)
CHLORIDE SERPL-SCNC: 109 MMOL/L (ref 95–110)
CO2 SERPL-SCNC: 22 MMOL/L (ref 23–29)
CREAT SERPL-MCNC: 1.1 MG/DL (ref 0.5–1.4)
ERYTHROCYTE [DISTWIDTH] IN BLOOD BY AUTOMATED COUNT: 13.2 % (ref 11.5–14.5)
GFR SERPLBLD CREATININE-BSD FMLA CKD-EPI: >60 ML/MIN/1.73/M2
GLUCOSE SERPL-MCNC: 105 MG/DL (ref 70–110)
HCT VFR BLD AUTO: 43.7 % (ref 40–54)
HGB BLD-MCNC: 14.3 GM/DL (ref 14–18)
IMM GRANULOCYTES # BLD AUTO: 0.03 K/UL (ref 0–0.04)
IMM GRANULOCYTES NFR BLD AUTO: 0.3 % (ref 0–0.5)
LYMPHOCYTES # BLD AUTO: 1.34 K/UL (ref 1–4.8)
MAGNESIUM SERPL-MCNC: 2 MG/DL (ref 1.6–2.6)
MCH RBC QN AUTO: 27.1 PG (ref 27–31)
MCHC RBC AUTO-ENTMCNC: 32.7 G/DL (ref 32–36)
MCV RBC AUTO: 83 FL (ref 82–98)
NUCLEATED RBC (/100WBC) (SMH): 0 /100 WBC
PLATELET # BLD AUTO: 145 K/UL (ref 150–450)
PMV BLD AUTO: 11.7 FL (ref 9.2–12.9)
POTASSIUM SERPL-SCNC: 4.1 MMOL/L (ref 3.5–5.1)
PROT SERPL-MCNC: 7.2 GM/DL (ref 6–8.4)
RBC # BLD AUTO: 5.28 M/UL (ref 4.6–6.2)
RELATIVE EOSINOPHIL (SMH): 1 % (ref 0–8)
RELATIVE LYMPHOCYTE (SMH): 14.6 % (ref 18–48)
RELATIVE MONOCYTE (SMH): 9.9 % (ref 4–15)
RELATIVE NEUTROPHIL (SMH): 73.8 % (ref 38–73)
SODIUM SERPL-SCNC: 138 MMOL/L (ref 136–145)
WBC # BLD AUTO: 9.16 K/UL (ref 3.9–12.7)

## 2025-04-28 PROCEDURE — 94669 MECHANICAL CHEST WALL OSCILL: CPT

## 2025-04-28 PROCEDURE — 27000221 HC OXYGEN, UP TO 24 HOURS

## 2025-04-28 PROCEDURE — 36415 COLL VENOUS BLD VENIPUNCTURE: CPT | Performed by: NURSE PRACTITIONER

## 2025-04-28 PROCEDURE — 99223 1ST HOSP IP/OBS HIGH 75: CPT | Mod: ,,, | Performed by: INTERNAL MEDICINE

## 2025-04-28 PROCEDURE — 94664 DEMO&/EVAL PT USE INHALER: CPT | Mod: XB

## 2025-04-28 PROCEDURE — 25000003 PHARM REV CODE 250: Performed by: NURSE PRACTITIONER

## 2025-04-28 PROCEDURE — 83735 ASSAY OF MAGNESIUM: CPT | Performed by: NURSE PRACTITIONER

## 2025-04-28 PROCEDURE — 80053 COMPREHEN METABOLIC PANEL: CPT | Performed by: NURSE PRACTITIONER

## 2025-04-28 PROCEDURE — 25000242 PHARM REV CODE 250 ALT 637 W/ HCPCS: Performed by: INTERNAL MEDICINE

## 2025-04-28 PROCEDURE — 25000242 PHARM REV CODE 250 ALT 637 W/ HCPCS: Performed by: NURSE PRACTITIONER

## 2025-04-28 PROCEDURE — 99900035 HC TECH TIME PER 15 MIN (STAT)

## 2025-04-28 PROCEDURE — 94761 N-INVAS EAR/PLS OXIMETRY MLT: CPT

## 2025-04-28 PROCEDURE — 94640 AIRWAY INHALATION TREATMENT: CPT | Mod: XB

## 2025-04-28 PROCEDURE — 25000003 PHARM REV CODE 250: Performed by: INTERNAL MEDICINE

## 2025-04-28 PROCEDURE — 85025 COMPLETE CBC W/AUTO DIFF WBC: CPT | Performed by: NURSE PRACTITIONER

## 2025-04-28 PROCEDURE — 11000001 HC ACUTE MED/SURG PRIVATE ROOM

## 2025-04-28 PROCEDURE — 63600175 PHARM REV CODE 636 W HCPCS: Performed by: NURSE PRACTITIONER

## 2025-04-28 RX ORDER — ACETYLCYSTEINE 200 MG/ML
4 SOLUTION ORAL; RESPIRATORY (INHALATION) 2 TIMES DAILY
Status: DISCONTINUED | OUTPATIENT
Start: 2025-04-28 | End: 2025-04-30 | Stop reason: HOSPADM

## 2025-04-28 RX ORDER — GUAIFENESIN 600 MG/1
600 TABLET, EXTENDED RELEASE ORAL 2 TIMES DAILY
Status: DISCONTINUED | OUTPATIENT
Start: 2025-04-28 | End: 2025-04-30 | Stop reason: HOSPADM

## 2025-04-28 RX ADMIN — CEFEPIME 2 G: 2 INJECTION, POWDER, FOR SOLUTION INTRAVENOUS at 08:04

## 2025-04-28 RX ADMIN — ACETYLCYSTEINE 4 ML: 200 INHALANT RESPIRATORY (INHALATION) at 06:04

## 2025-04-28 RX ADMIN — IPRATROPIUM BROMIDE AND ALBUTEROL SULFATE 3 ML: 2.5; .5 SOLUTION RESPIRATORY (INHALATION) at 07:04

## 2025-04-28 RX ADMIN — HYDROCODONE BITARTRATE AND ACETAMINOPHEN 1 TABLET: 5; 325 TABLET ORAL at 08:04

## 2025-04-28 RX ADMIN — ONDANSETRON 4 MG: 2 INJECTION INTRAMUSCULAR; INTRAVENOUS at 07:04

## 2025-04-28 RX ADMIN — LINEZOLID 600 MG: 600 TABLET, FILM COATED ORAL at 08:04

## 2025-04-28 RX ADMIN — CEFEPIME 2 G: 2 INJECTION, POWDER, FOR SOLUTION INTRAVENOUS at 01:04

## 2025-04-28 RX ADMIN — SENNOSIDES AND DOCUSATE SODIUM 1 TABLET: 50; 8.6 TABLET ORAL at 08:04

## 2025-04-28 RX ADMIN — SODIUM CHLORIDE SOLN NEBU 3% 4 ML: 3 NEBU SOLN at 07:04

## 2025-04-28 RX ADMIN — CETIRIZINE HYDROCHLORIDE 10 MG: 10 TABLET, FILM COATED ORAL at 08:04

## 2025-04-28 RX ADMIN — ENOXAPARIN SODIUM 40 MG: 40 INJECTION SUBCUTANEOUS at 05:04

## 2025-04-28 RX ADMIN — IPRATROPIUM BROMIDE AND ALBUTEROL SULFATE 3 ML: 2.5; .5 SOLUTION RESPIRATORY (INHALATION) at 01:04

## 2025-04-28 RX ADMIN — CEFEPIME 2 G: 2 INJECTION, POWDER, FOR SOLUTION INTRAVENOUS at 05:04

## 2025-04-28 RX ADMIN — SODIUM CHLORIDE SOLN NEBU 3% 4 ML: 3 NEBU SOLN at 06:04

## 2025-04-28 RX ADMIN — GUAIFENESIN 600 MG: 600 TABLET, EXTENDED RELEASE ORAL at 08:04

## 2025-04-28 RX ADMIN — AZELASTINE 137 MCG: 1 SPRAY, METERED NASAL at 08:04

## 2025-04-28 NOTE — CARE UPDATE
04/28/25 0744 04/28/25 0745   Patient Assessment/Suction   Level of Consciousness (AVPU)  --  alert   PRE-TX-O2   Device (Oxygen Therapy)  --  nasal cannula   $ Is the patient on Low Flow Oxygen?  --  Yes   Flow (L/min) (Oxygen Therapy)  --  2   SpO2 (!) 93 % (!) 94 %   Pulse Oximetry Type Intermittent Intermittent   $ Pulse Oximetry - Multiple Charge Pulse Oximetry - Multiple Pulse Oximetry - Multiple   Pulse (!) 117 (!) 117   Resp (!) 24 (!) 24     Dr. Brooke notified of Oxygen change.

## 2025-04-28 NOTE — SUBJECTIVE & OBJECTIVE
Interval History:  Patient seen and examined.  Feeling worse today.  Satting 94% on 2 L. complains of cough.  We will consult with pulmonology.    Review of Systems   Constitutional:  Positive for activity change and fatigue.   Respiratory:  Positive for cough and shortness of breath.      Objective:     Vital Signs (Most Recent):  Temp: 98.4 °F (36.9 °C) (04/28/25 1145)  Pulse: 98 (04/28/25 1145)  Resp: 18 (04/28/25 1145)  BP: (!) 109/56 (04/28/25 1145)  SpO2: (!) 94 % (04/28/25 1145) Vital Signs (24h Range):  Temp:  [98.4 °F (36.9 °C)-99.5 °F (37.5 °C)] 98.4 °F (36.9 °C)  Pulse:  [] 98  Resp:  [14-24] 18  SpO2:  [92 %-96 %] 94 %  BP: (100-135)/(53-79) 109/56     Weight: 108.9 kg (240 lb)  Body mass index is 33.47 kg/m².    Intake/Output Summary (Last 24 hours) at 4/28/2025 1319  Last data filed at 4/27/2025 1800  Gross per 24 hour   Intake 240 ml   Output --   Net 240 ml         Physical Exam  Vitals and nursing note reviewed.   Constitutional:       General: He is not in acute distress.     Appearance: He is obese.   HENT:      Head: Normocephalic and atraumatic.      Nose: Congestion present.      Mouth/Throat:      Mouth: Mucous membranes are moist.      Pharynx: Oropharynx is clear.   Eyes:      Extraocular Movements: Extraocular movements intact.      Pupils: Pupils are equal, round, and reactive to light.   Cardiovascular:      Rate and Rhythm: Normal rate and regular rhythm.   Pulmonary:      Effort: Pulmonary effort is normal.      Breath sounds: Wheezing (scattered) and rhonchi (scattered) present.      Comments: Diminished throughout, coarse   Abdominal:      General: Bowel sounds are normal.      Palpations: Abdomen is soft.   Musculoskeletal:         General: Normal range of motion.      Cervical back: Normal range of motion and neck supple.   Skin:     General: Skin is warm and dry.      Capillary Refill: Capillary refill takes less than 2 seconds.   Neurological:      General: No focal deficit  present.      Mental Status: He is alert and oriented to person, place, and time.   Psychiatric:         Mood and Affect: Mood normal.         Behavior: Behavior normal.               Significant Labs: All pertinent labs within the past 24 hours have been reviewed.    Significant Imaging: I have reviewed all pertinent imaging results/findings within the past 24 hours.

## 2025-04-28 NOTE — PLAN OF CARE
Problem: Adult Inpatient Plan of Care  Goal: Plan of Care Review  4/28/2025 1558 by Rafaela Reveles RN  Outcome: Progressing  4/28/2025 1526 by Rafaela Reveles RN  Outcome: Progressing  Goal: Patient-Specific Goal (Individualized)  4/28/2025 1558 by Rafaela Reveles RN  Outcome: Progressing  4/28/2025 1526 by Rafaela Reveles RN  Outcome: Progressing  Goal: Optimal Comfort and Wellbeing  4/28/2025 1558 by Rafaela Reveles RN  Outcome: Progressing  4/28/2025 1526 by Rafaela Reveles RN  Outcome: Progressing     Problem: Pain Acute  Goal: Optimal Pain Control and Function  Outcome: Progressing     Problem: Pneumonia  Goal: Effective Oxygenation and Ventilation  Outcome: Progressing   POC has been discussed with pt.  Pt continues on IV ABTs.  Pulmonology was consulted today.  No replacements needed today.  Pt was put on 2 L NC this am d/t hypoxia.  Pain has been controlled with current pain regimen.  No falls during this shift.  Pt now has an order for CT chest without contrast.  No possible discharge at this time.

## 2025-04-28 NOTE — CARE UPDATE
04/27/25 1850 04/27/25 1859   Patient Assessment/Suction   Level of Consciousness (AVPU) alert alert   Respiratory Effort Unlabored Unlabored   Expansion/Accessory Muscles/Retractions no retractions;no use of accessory muscles no use of accessory muscles;no retractions   All Lung Fields Breath Sounds diminished Anterior:;Posterior:;diminished   Cough Frequency infrequent  --    Cough Type no productive sputum  (previously was green per pt)  --    PRE-TX-O2   Device (Oxygen Therapy) room air room air   SpO2 95 % 95 %   Pulse Oximetry Type Intermittent Intermittent   $ Pulse Oximetry - Multiple Charge Pulse Oximetry - Multiple Pulse Oximetry - Multiple   Pulse 94 95   Resp 20 20   Aerosol Therapy   $ Aerosol Therapy Charges Aerosol Treatment  (3% NACL) Aerosol Treatment  (duoneb)   Respiratory Treatment Status (SVN) given given   Treatment Route (SVN) oxygen;mask mask;oxygen   Patient Position HOB elevated HOB elevated   Post Treatment Assessment (SVN) breath sounds unchanged breath sounds unchanged   Signs of Intolerance (SVN) none none   Breath Sounds Post-Respiratory Treatment   Throughout All Fields Post-Treatment All Fields All Fields   Throughout All Fields Post-Treatment no change aeration increased   Post-treatment Heart Rate (beats/min) 94 92   Post-treatment Resp Rate (breaths/min) 20 20   High Frequency Chest Compression Vest   $ Chest Compression Charges Therapy;Vest - Equipment  --    Daily Review of Necessity (HFCCV) completed  --    Vest Type (HFCCV) chest wrap vest  --    Frequency Type (HFCCV) single frequency  --    Single Frequency (Hz) 10  --    Pressure (cm H20) 7  --    Duration (HFCCV) 15 mins  --    Patient Position (HFCCV) other (see comments)  (sitting up in the bed)  --    Post Treatment Assessment (HFCCV) breath sounds unchanged  --    Signs of Intolerance (HFCCV) none  --

## 2025-04-28 NOTE — ASSESSMENT & PLAN NOTE
Admit to med/tele  Flu/covid/strep negative  Respiratory infection panel negative  Sputum culture pending  Empirically treat with cefepime/linezolid  MRSA screen negative.  Cxr does not reveal pna  CPT with vest  Pulmonary toileting   Nebs - saline and albuterol  Consult with pulmonology  Currently requiring supplemental oxygen

## 2025-04-28 NOTE — ASSESSMENT & PLAN NOTE
This patient does have evidence of infective focus  My overall impression is sepsis.  Source: Respiratory  Antibiotics given-   Antibiotics (72h ago, onward)      Start     Stop Route Frequency Ordered    04/27/25 0900  ceFEPIme injection 2 g         -- IV Every 8 hours (non-standard times) 04/27/25 0851    04/27/25 0900  linezolid tablet 600 mg         -- Oral Every 12 hours 04/27/25 0851          Latest lactate reviewed-  Recent Labs   Lab 04/27/25  0318   POCLAC 0.98     Organ dysfunction indicated by n/a    Fluid challenge Fluid Not Needed - Patient is not hypotensive and/or lactate is less than 4.0.     Post- resuscitation assessment No - Post resuscitation assessment not needed       Will Not start Pressors- Levophed for MAP of 65  Source control achieved by: abx

## 2025-04-28 NOTE — PROGRESS NOTES
[facility] Medicine  Progress Note    Patient Name: Charly Mattson  MRN: [id number]  Patient Class: OP- Observation   Admission Date: 4/27/2025  Length of Stay: 0 days  Attending Physician: Elizabeth Brooke MD  Primary Care Provider: Porfirio Novoa MD        Subjective     Principal Problem:Bronchiectasis with acute exacerbation        HPI:  Mr. 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 It is moderate. Temp was 102 at home. It is associated with cough productive of dark green sputum, malodorous nasal discharge, pleuritic chest pain, and fatigue. Denies N/V/D, SOB, dizziness, LOC, or known sick contacts. He was previously on preventative/rotating abx with azithromycin, but has been off since at least December. He reports intermittent compliance with CPT vest/pulmonary toileting. In the ED, he's tachycardic 110s, RR 24, afebrile, normotensive, and satting 94% or better on room air. CXR with no acute consolidations. WBC 15K with a left shift. Flu/covid/strep negative. Hospital medicine is consulted for admission for bronchiectasis with acute exacerbation and sepsis.     Overview/Hospital Course:  No notes on file    Interval History:  Patient seen and examined.  Feeling worse today.  Satting 94% on 2 L. complains of cough.  We will consult with pulmonology.    Review of Systems   Constitutional:  Positive for activity change and fatigue.   Respiratory:  Positive for cough and shortness of breath.      Objective:     Vital Signs (Most Recent):  Temp: 98.4 °F (36.9 °C) (04/28/25 1145)  Pulse: 98 (04/28/25 1145)  Resp: 18 (04/28/25 1145)  BP: (!) 109/56 (04/28/25 1145)  SpO2: (!) 94 % (04/28/25 1145) Vital Signs (24h Range):  Temp:  [98.4 °F (36.9 °C)-99.5 °F (37.5 °C)] 98.4 °F (36.9 °C)  Pulse:  [] 98  Resp:   [14-24] 18  SpO2:  [92 %-96 %] 94 %  BP: (100-135)/(53-79) 109/56     Weight: 108.9 kg (240 lb)  Body mass index is 33.47 kg/m².    Intake/Output Summary (Last 24 hours) at 4/28/2025 1319  Last data filed at 4/27/2025 1800  Gross per 24 hour   Intake 240 ml   Output --   Net 240 ml         Physical Exam  Vitals and nursing note reviewed.   Constitutional:       General: He is not in acute distress.     Appearance: He is obese.   HENT:      Head: Normocephalic and atraumatic.      Nose: Congestion present.      Mouth/Throat:      Mouth: Mucous membranes are moist.      Pharynx: Oropharynx is clear.   Eyes:      Extraocular Movements: Extraocular movements intact.      Pupils: Pupils are equal, round, and reactive to light.   Cardiovascular:      Rate and Rhythm: Normal rate and regular rhythm.   Pulmonary:      Effort: Pulmonary effort is normal.      Breath sounds: Wheezing (scattered) and rhonchi (scattered) present.      Comments: Diminished throughout, coarse   Abdominal:      General: Bowel sounds are normal.      Palpations: Abdomen is soft.   Musculoskeletal:         General: Normal range of motion.      Cervical back: Normal range of motion and neck supple.   Skin:     General: Skin is warm and dry.      Capillary Refill: Capillary refill takes less than 2 seconds.   Neurological:      General: No focal deficit present.      Mental Status: He is alert and oriented to person, place, and time.   Psychiatric:         Mood and Affect: Mood normal.         Behavior: Behavior normal.               Significant Labs: All pertinent labs within the past 24 hours have been reviewed.    Significant Imaging: I have reviewed all pertinent imaging results/findings within the past 24 hours.      Assessment & Plan  Bronchiectasis with acute exacerbation  Admit to med/tele  Flu/covid/strep negative  Respiratory infection panel negative  Sputum culture pending  Empirically treat with cefepime/linezolid  MRSA screen  negative.  Cxr does not reveal pna  CPT with vest  Pulmonary toileting   Nebs - saline and albuterol  Consult with pulmonology  Currently requiring supplemental oxygen    CVID (common variable immunodeficiency)  Aware   On Hizentra  IgG 4 days ago - 1200 (WNL)  Transaminitis  Chronic  Slightly worse  Known fatty liver   monitor    Sepsis  This patient does have evidence of infective focus  My overall impression is sepsis.  Source: Respiratory  Antibiotics given-   Antibiotics (72h ago, onward)      Start     Stop Route Frequency Ordered    04/27/25 0900  ceFEPIme injection 2 g         -- IV Every 8 hours (non-standard times) 04/27/25 0851    04/27/25 0900  linezolid tablet 600 mg         -- Oral Every 12 hours 04/27/25 0851          Latest lactate reviewed-  Recent Labs   Lab 04/27/25  0318   POCLAC 0.98     Organ dysfunction indicated by  n/a    Fluid challenge Fluid Not Needed - Patient is not hypotensive and/or lactate is less than 4.0.     Post- resuscitation assessment No - Post resuscitation assessment not needed       Will Not start Pressors- Levophed for MAP of 65  Source control achieved by: abx  VTE Risk Mitigation (From admission, onward)           Ordered     enoxaparin injection 40 mg  Daily         04/27/25 0516     IP VTE HIGH RISK PATIENT  Once         04/27/25 0516     Place sequential compression device  Until discontinued         04/27/25 0516                    Discharge Planning   LAILA: 4/30/2025     Code Status: Full Code   Medical Readiness for Discharge Date:   Discharge Plan A: Home with family                        Elizabeth Brooke MD  Department of Hospital Medicine   [facility]/Surg

## 2025-04-28 NOTE — CARE UPDATE
04/28/25 0728 04/28/25 0735   Patient Assessment/Suction   Level of Consciousness (AVPU) alert  --    Respiratory Effort Mild  --    Expansion/Accessory Muscles/Retractions no use of accessory muscles;no retractions  --    All Lung Fields Breath Sounds Anterior:;Lateral:;diminished  --    Rhythm/Pattern, Respiratory tachypneic;shortness of breath  --    Cough Frequency no cough  --    PRE-TX-O2   Device (Oxygen Therapy) room air  --    SpO2 (!) 92 % 96 %   Pulse Oximetry Type Intermittent  --    $ Pulse Oximetry - Multiple Charge Pulse Oximetry - Multiple  --    Pulse 107 102   Resp (!) 24 (!) 24   Aerosol Therapy   $ Aerosol Therapy Charges Aerosol Treatment Aerosol Treatment   Respiratory Treatment Status (SVN) given given   Treatment Route (SVN) mask;oxygen mask;oxygen   Patient Position HOB elevated HOB elevated   Post Treatment Assessment (SVN)  --  increased aeration   Signs of Intolerance (SVN) none none   Breath Sounds Post-Respiratory Treatment   Throughout All Fields Post-Treatment  --  aeration increased   Post-treatment Heart Rate (beats/min) 102 104   Post-treatment Resp Rate (breaths/min) 24 24   High Frequency Chest Compression Vest   $ Chest Compression Charges Unable to perform  (pt nauseous primary nurse notified)  --

## 2025-04-28 NOTE — CONSULTS
Pulmonary/Critical Care Consult      Patient name: Charly Mattson  MRN: [id number]  Date: 04/28/2025    Admit Date: 4/27/2025  Consult Requested By: Elizabeth Brooke MD    Reason for Consult: CVID patient here with hypoxia/bronchiectasis     HPI:    Pt is a 31 yo male with CVID, chronic bronchiectasis, recurrent respiratory infections and ear infections, obesity who presented to the ED with fever 102 associated with dark green phlegm, pleuritic chest pain, cough and fatigue.  He has noticed increased coughing since Thursday which progressively worsened started to have fevers over the weekend.  Two weeks ago he got bilateral tympanostomy tubes.  After that procedure he was sick for a few days then got better.  At home he uses 3% saline nebs twice a day, intermittently uses percussive vest and regularly uses Acapella device.    Review of Systems    Review of Systems   Constitutional:  Positive for chills, fever and malaise/fatigue.   HENT:  Positive for sore throat.    Respiratory:  Positive for cough, sputum production, shortness of breath and wheezing.    Cardiovascular:  Positive for chest pain.   Gastrointestinal: Negative.    Genitourinary: Negative.    Musculoskeletal: Negative.    Skin: Negative.    Neurological: Negative.    Endo/Heme/Allergies: Negative.    Psychiatric/Behavioral: Negative.         Past Medical History    Past Medical History:   Diagnosis Date    Fatty liver     Immunoglobulin deficiency     Immunoglobulin deficiency     CVID    Pneumonia        Past Surgical History    Past Surgical History:   Procedure Laterality Date    ADENOIDECTOMY      BIOPSY N/A 9/23/2020    Procedure: BIOPSY;  Surgeon: Kaleb Diagnostic Provider;  Location: [facility] OR;  Service: Interventional Radiology;  Laterality: N/A;    MANDIBLE FRACTURE SURGERY      SINUS SURGERY      TONSILLECTOMY      TYMPANOSTOMY TUBE PLACEMENT      TYMPANOSTOMY TUBE PLACEMENT      x 4       Medications (scheduled):      acetylcysteine 200 mg/ml  (20%)  4 mL Nebulization BID    albuterol-ipratropium  3 mL Nebulization Q6H WAKE    azelastine  1 spray Nasal BID    ceFEPime IV (PEDS and ADULTS)  2 g Intravenous Q8H    cetirizine  10 mg Oral Daily    enoxparin  40 mg Subcutaneous Daily    guaiFENesin  600 mg Oral BID    linezolid  600 mg Oral Q12H    senna-docusate  1 tablet Oral BID    sodium chloride 3%  4 mL Nebulization BID       Medications (infusions):         Medications (prn):       Current Facility-Administered Medications:     acetaminophen, 650 mg, Oral, Q8H PRN    aluminum-magnesium hydroxide-simethicone, 30 mL, Oral, QID PRN    dextrose 50%, 12.5 g, Intravenous, PRN    dextrose 50%, 25 g, Intravenous, PRN    glucagon (human recombinant), 1 mg, Intramuscular, PRN    glucose, 16 g, Oral, PRN    glucose, 24 g, Oral, PRN    HYDROcodone-acetaminophen, 1 tablet, Oral, Q6H PRN    magnesium oxide, 800 mg, Oral, PRN    magnesium oxide, 800 mg, Oral, PRN    melatonin, 6 mg, Oral, Nightly PRN    naloxone, 0.02 mg, Intravenous, PRN    ondansetron, 4 mg, Intravenous, Q6H PRN    polyethylene glycol, 17 g, Oral, BID PRN    potassium bicarbonate, 35 mEq, Oral, PRN    potassium bicarbonate, 50 mEq, Oral, PRN    potassium bicarbonate, 60 mEq, Oral, PRN    sodium chloride 0.9%, 10 mL, Intravenous, Q12H PRN    Family History:   Family History   Problem Relation Name Age of Onset    Cancer Mother      Liver disease Father         Social History: Tobacco: Tobacco Use History[1]                             EtOH:   Social History     Substance and Sexual Activity   Alcohol Use Never                                Drugs:   Social History     Substance and Sexual Activity   Drug Use No       Physical Exam    Vital signs:  Temp:  [98.4 °F (36.9 °C)-99.5 °F (37.5 °C)]   Pulse:  []   Resp:  [14-24]   BP: (100-135)/(53-79)   SpO2:  [92 %-96 %]     Intake/Output:   Intake/Output Summary (Last 24 hours) at 4/28/2025 1541  Last data filed at 4/27/2025 1800  Gross per 24 hour  "  Intake 240 ml   Output --   Net 240 ml        BMI: Estimated body mass index is 33.47 kg/m² as calculated from the following:    Height as of this encounter: 5' 11" (1.803 m).    Weight as of this encounter: 108.9 kg (240 lb).    Physical Exam  Constitutional:       General: He is not in acute distress.     Appearance: Normal appearance. He is ill-appearing.   HENT:      Head: Normocephalic and atraumatic.      Right Ear: External ear normal.      Left Ear: External ear normal.      Nose: Nose normal. No congestion or rhinorrhea.      Mouth/Throat:      Mouth: Mucous membranes are moist.      Pharynx: Oropharynx is clear. No oropharyngeal exudate or posterior oropharyngeal erythema.   Eyes:      General: No scleral icterus.        Right eye: No discharge.         Left eye: No discharge.      Extraocular Movements: Extraocular movements intact.      Conjunctiva/sclera: Conjunctivae normal.      Pupils: Pupils are equal, round, and reactive to light.   Cardiovascular:      Rate and Rhythm: Normal rate and regular rhythm.      Pulses: Normal pulses.      Heart sounds: No murmur heard.     No friction rub. No gallop.   Pulmonary:      Effort: Pulmonary effort is normal. No respiratory distress.      Breath sounds: No stridor. Wheezing, rhonchi and rales present.   Abdominal:      General: Abdomen is flat. Bowel sounds are normal. There is no distension.      Palpations: Abdomen is soft. There is no mass.      Tenderness: There is no abdominal tenderness.   Musculoskeletal:         General: Normal range of motion.      Cervical back: Normal range of motion and neck supple. No rigidity or tenderness.      Right lower leg: No edema.      Left lower leg: No edema.   Lymphadenopathy:      Cervical: No cervical adenopathy.   Skin:     General: Skin is warm and dry.      Findings: No bruising, erythema or lesion.   Neurological:      General: No focal deficit present.      Mental Status: He is alert and oriented to person, " "place, and time.   Psychiatric:         Mood and Affect: Mood normal.         Behavior: Behavior normal.         Thought Content: Thought content normal.         Judgment: Judgment normal.         Laboratory    Recent Labs   Lab 04/28/25  0439   WBC 9.16   RBC 5.28   HGB 14.3   HCT 43.7   *   MCV 83   MCH 27.1   MCHC 32.7       Recent Labs   Lab 04/28/25  0439   GLUCOSE 105   CALCIUM 9.1   ALBUMIN 3.7      K 4.1   CO2 22*   BUN 11   CREATININE 1.1   ALKPHOS 101   ALT 79*   AST 71*   BILITOT 0.9       No results for input(s): "PT", "INR", "APTT" in the last 24 hours.    No results for input(s): "CPK", "CPKMB", "TROPONINI", "MB" in the last 24 hours.    Additional labs:    Microbiology:       Microbiology Results (last 7 days)       Procedure Component Value Units Date/Time    Culture, Respiratory with Gram Stain [[id number]] Collected: 04/27/25 0934    Order Status: Completed Specimen: Respiratory from Sputum, Expectorated Updated: 04/28/25 0903     Respiratory Culture Normal respiratory ashlee     GRAM STAIN (RESPIRATORY) Many WBC seen      Few Gram positive cocci    Blood culture x two cultures. Draw prior to antibiotics. [[id number]]  (Normal) Collected: 04/27/25 0315    Order Status: Completed Specimen: Blood Updated: 04/28/25 0900     CULTURE, BLOOD ([facility]) No Growth After 24 Hours    Blood culture x two cultures. Draw prior to antibiotics. [[id number]]  (Normal) Collected: 04/27/25 0322    Order Status: Completed Specimen: Blood Updated: 04/28/25 0900     CULTURE, BLOOD ([facility]) No Growth After 24 Hours    Culture, Respiratory with Gram Stain [[id number]] Collected: 04/28/25 0350    Order Status: Sent Specimen: Respiratory from Sputum, Expectorated     MRSA Screen by PCR [[id number]]  (Normal) Collected: 04/27/25 0934    Order Status: Completed Specimen: Nasal Swab Updated: 04/27/25 1535     MRSA PCR SCRN ([facility]) Not Detected    Respiratory Infection Panel (PCR), Nasopharyngeal [[id number]] Collected: " "04/27/25 0934    Order Status: Completed Specimen: Nasopharyngeal Swab Updated: 04/27/25 1454     Respiratory Infection Panel Source Nasopharyngeal Swab     Adenovirus Not Detected     Coronavirus 229E, Common Cold Virus Not Detected     Coronavirus HKU1, Common Cold Virus Not Detected     Coronavirus NL63, Common Cold Virus Not Detected     Coronavirus OC43, Common Cold Virus Not Detected     SARS-CoV2 (COVID-19) Qualitative PCR Not Detected     Human Metapneumovirus Not Detected     Human Rhinovirus/Enterovirus Not Detected     Influenza A (subtypes H1, H1-2009,H3) Not Detected     Influenza B Not Detected     Parainfluenza Virus 1 Not Detected     Parainfluenza Virus 2 Not Detected     Parainfluenza Virus 3 Not Detected     Parainfluenza Virus 4 Not Detected     Respiratory Syncytial Virus Not Detected     Bordetella Parapertussis (WH1576) Not Detected     Bordetella pertussis (ptxP) Not Detected     Chlamydia pneumoniae Not Detected     Mycoplasma pneumoniae Not Detected    Group A Strep, Molecular [[id number]]  (Normal) Collected: 04/27/25 0153    Order Status: Completed Specimen: Throat Updated: 04/27/25 0219     Group A Strep Molecular Negative    Influenza A & B by Molecular [[id number]]  (Normal) Collected: 04/27/25 0153    Order Status: Completed Specimen: Nasal Swab Updated: 04/27/25 0219     INFLUENZA A MOLECULAR Negative     INFLUENZA B MOLECULAR  Negative            Radiology    X-Ray Chest AP Portable  Narrative: EXAMINATION:  XR CHEST AP PORTABLE    CLINICAL HISTORY:  Provided history is "Sepsis;  ".    TECHNIQUE:  One view of the chest.    COMPARISON:  12/10/2024.    FINDINGS:  Patient is rotated.  Cardiomediastinal silhouette is not enlarged.  Lung volumes are relatively low, accentuating basilar markings.  No focal consolidation.  No sizable pleural effusion.  No pneumothorax.  Operative changes partially imaged in the mandible.  Impression: No acute cardiopulmonary finding identified on this " limited single view.    Electronically signed by: Aiden Knox MD  Date:    04/27/2025  Time:    04:22        Additional Studies        Ventilator Information                  Recent Labs     04/27/25  0318   PH 7.396   PCO2 36.3   PO2 20*   HCO3 22.3*   POCSATURATED 32   BE -3*         Impression    Active Hospital Problems    Diagnosis  POA    *Bronchiectasis with acute exacerbation [J47.1]  Yes    Sepsis [A41.9]  Yes    Transaminitis [R74.01]  Yes    CVID (common variable immunodeficiency) [D83.9]  Yes      Resolved Hospital Problems   No resolved problems to display.       Plan    - continue cefepime  - continue and escalate airway clearance regimen- add mucomyst and flutter valve  - add guaifenesin bid  - obtain CT chest    Thank you for this consult.  I will follow with you while the patient is hospitalized.      Chrissy Mckeon MD  Pulmonary & Critical Care Medicine                 [1]   Social History  Tobacco Use   Smoking Status Never   Smokeless Tobacco Never

## 2025-04-29 LAB
ABSOLUTE EOSINOPHIL (SMH): 0.14 K/UL
ABSOLUTE MONOCYTE (SMH): 0.76 K/UL (ref 0.3–1)
ABSOLUTE NEUTROPHIL COUNT (SMH): 3.2 K/UL (ref 1.8–7.7)
ALBUMIN SERPL-MCNC: 3.5 G/DL (ref 3.5–5.2)
ALP SERPL-CCNC: 99 UNIT/L (ref 40–150)
ALT SERPL-CCNC: 45 UNIT/L (ref 10–44)
ANION GAP (SMH): 9 MMOL/L (ref 8–16)
AST SERPL-CCNC: 62 UNIT/L (ref 11–45)
BACTERIA SPEC CULT: NORMAL
BASOPHILS # BLD AUTO: 0.06 K/UL
BASOPHILS NFR BLD AUTO: 1 %
BILIRUB SERPL-MCNC: 0.7 MG/DL (ref 0.1–1)
BUN SERPL-MCNC: 12 MG/DL (ref 6–20)
CALCIUM SERPL-MCNC: 9.4 MG/DL (ref 8.7–10.5)
CHLORIDE SERPL-SCNC: 106 MMOL/L (ref 95–110)
CO2 SERPL-SCNC: 22 MMOL/L (ref 23–29)
CREAT SERPL-MCNC: 0.9 MG/DL (ref 0.5–1.4)
ERYTHROCYTE [DISTWIDTH] IN BLOOD BY AUTOMATED COUNT: 13.2 % (ref 11.5–14.5)
GFR SERPLBLD CREATININE-BSD FMLA CKD-EPI: >60 ML/MIN/1.73/M2
GLUCOSE SERPL-MCNC: 100 MG/DL (ref 70–110)
GRAM STAIN (RESPIRATORY) (SMH): NORMAL
GRAM STAIN (RESPIRATORY) (SMH): NORMAL
HCT VFR BLD AUTO: 41.9 % (ref 40–54)
HGB BLD-MCNC: 13.5 GM/DL (ref 14–18)
IMM GRANULOCYTES # BLD AUTO: 0.02 K/UL (ref 0–0.04)
IMM GRANULOCYTES NFR BLD AUTO: 0.3 % (ref 0–0.5)
LYMPHOCYTES # BLD AUTO: 1.75 K/UL (ref 1–4.8)
MAGNESIUM SERPL-MCNC: 2 MG/DL (ref 1.6–2.6)
MCH RBC QN AUTO: 26.9 PG (ref 27–31)
MCHC RBC AUTO-ENTMCNC: 32.2 G/DL (ref 32–36)
MCV RBC AUTO: 84 FL (ref 82–98)
NUCLEATED RBC (/100WBC) (SMH): 0 /100 WBC
OHS QRS DURATION: 82 MS
OHS QTC CALCULATION: 435 MS
PLATELET # BLD AUTO: 149 K/UL (ref 150–450)
PMV BLD AUTO: 11.1 FL (ref 9.2–12.9)
POTASSIUM SERPL-SCNC: 4.3 MMOL/L (ref 3.5–5.1)
PROT SERPL-MCNC: 7 GM/DL (ref 6–8.4)
RBC # BLD AUTO: 5.02 M/UL (ref 4.6–6.2)
RELATIVE EOSINOPHIL (SMH): 2.4 % (ref 0–8)
RELATIVE LYMPHOCYTE (SMH): 29.7 % (ref 18–48)
RELATIVE MONOCYTE (SMH): 12.9 % (ref 4–15)
RELATIVE NEUTROPHIL (SMH): 53.7 % (ref 38–73)
SODIUM SERPL-SCNC: 137 MMOL/L (ref 136–145)
WBC # BLD AUTO: 5.9 K/UL (ref 3.9–12.7)

## 2025-04-29 PROCEDURE — 94664 DEMO&/EVAL PT USE INHALER: CPT

## 2025-04-29 PROCEDURE — 83735 ASSAY OF MAGNESIUM: CPT | Performed by: NURSE PRACTITIONER

## 2025-04-29 PROCEDURE — 27000221 HC OXYGEN, UP TO 24 HOURS

## 2025-04-29 PROCEDURE — 85025 COMPLETE CBC W/AUTO DIFF WBC: CPT | Performed by: NURSE PRACTITIONER

## 2025-04-29 PROCEDURE — 99900031 HC PATIENT EDUCATION (STAT)

## 2025-04-29 PROCEDURE — 99900035 HC TECH TIME PER 15 MIN (STAT)

## 2025-04-29 PROCEDURE — 11000001 HC ACUTE MED/SURG PRIVATE ROOM

## 2025-04-29 PROCEDURE — 36415 COLL VENOUS BLD VENIPUNCTURE: CPT | Performed by: NURSE PRACTITIONER

## 2025-04-29 PROCEDURE — 63600175 PHARM REV CODE 636 W HCPCS: Performed by: NURSE PRACTITIONER

## 2025-04-29 PROCEDURE — 94669 MECHANICAL CHEST WALL OSCILL: CPT

## 2025-04-29 PROCEDURE — 25000003 PHARM REV CODE 250: Performed by: INTERNAL MEDICINE

## 2025-04-29 PROCEDURE — 94640 AIRWAY INHALATION TREATMENT: CPT

## 2025-04-29 PROCEDURE — 25000242 PHARM REV CODE 250 ALT 637 W/ HCPCS: Performed by: NURSE PRACTITIONER

## 2025-04-29 PROCEDURE — 82040 ASSAY OF SERUM ALBUMIN: CPT | Performed by: NURSE PRACTITIONER

## 2025-04-29 PROCEDURE — 94761 N-INVAS EAR/PLS OXIMETRY MLT: CPT

## 2025-04-29 PROCEDURE — 25000242 PHARM REV CODE 250 ALT 637 W/ HCPCS: Performed by: INTERNAL MEDICINE

## 2025-04-29 PROCEDURE — 25000003 PHARM REV CODE 250: Performed by: NURSE PRACTITIONER

## 2025-04-29 RX ADMIN — AZELASTINE 137 MCG: 1 SPRAY, METERED NASAL at 09:04

## 2025-04-29 RX ADMIN — SENNOSIDES AND DOCUSATE SODIUM 1 TABLET: 50; 8.6 TABLET ORAL at 08:04

## 2025-04-29 RX ADMIN — CEFEPIME 2 G: 2 INJECTION, POWDER, FOR SOLUTION INTRAVENOUS at 08:04

## 2025-04-29 RX ADMIN — ENOXAPARIN SODIUM 40 MG: 40 INJECTION SUBCUTANEOUS at 05:04

## 2025-04-29 RX ADMIN — ACETYLCYSTEINE 4 ML: 200 INHALANT RESPIRATORY (INHALATION) at 07:04

## 2025-04-29 RX ADMIN — CETIRIZINE HYDROCHLORIDE 10 MG: 10 TABLET, FILM COATED ORAL at 08:04

## 2025-04-29 RX ADMIN — IPRATROPIUM BROMIDE AND ALBUTEROL SULFATE 3 ML: 2.5; .5 SOLUTION RESPIRATORY (INHALATION) at 01:04

## 2025-04-29 RX ADMIN — CEFEPIME 2 G: 2 INJECTION, POWDER, FOR SOLUTION INTRAVENOUS at 02:04

## 2025-04-29 RX ADMIN — GUAIFENESIN 600 MG: 600 TABLET, EXTENDED RELEASE ORAL at 09:04

## 2025-04-29 RX ADMIN — IPRATROPIUM BROMIDE AND ALBUTEROL SULFATE 3 ML: 2.5; .5 SOLUTION RESPIRATORY (INHALATION) at 07:04

## 2025-04-29 RX ADMIN — LINEZOLID 600 MG: 600 TABLET, FILM COATED ORAL at 08:04

## 2025-04-29 RX ADMIN — GUAIFENESIN 600 MG: 600 TABLET, EXTENDED RELEASE ORAL at 08:04

## 2025-04-29 RX ADMIN — AZELASTINE 137 MCG: 1 SPRAY, METERED NASAL at 08:04

## 2025-04-29 RX ADMIN — SODIUM CHLORIDE SOLN NEBU 3% 4 ML: 3 NEBU SOLN at 07:04

## 2025-04-29 RX ADMIN — SODIUM CHLORIDE SOLN NEBU 3% 4 ML: 3 NEBU SOLN at 08:04

## 2025-04-29 RX ADMIN — CEFEPIME 2 G: 2 INJECTION, POWDER, FOR SOLUTION INTRAVENOUS at 05:04

## 2025-04-29 NOTE — SUBJECTIVE & OBJECTIVE
Interval History:  Patient seen and examined.  Discussed with pulmonology, discontinue linezolid and continue cefepime.  CT chest with multifocal pneumonia.  Patient on 1 L of oxygen today.  White blood cell count 5.9.    Review of Systems   Constitutional:  Positive for activity change and fatigue.   Respiratory:  Positive for cough and shortness of breath.      Objective:     Vital Signs (Most Recent):  Temp: 98.1 °F (36.7 °C) (04/29/25 1115)  Pulse: 89 (04/29/25 1115)  Resp: 17 (04/29/25 1115)  BP: 117/67 (04/29/25 1115)  SpO2: (!) 94 % (04/29/25 1115) Vital Signs (24h Range):  Temp:  [98 °F (36.7 °C)-98.4 °F (36.9 °C)] 98.1 °F (36.7 °C)  Pulse:  [] 89  Resp:  [17-20] 17  SpO2:  [92 %-98 %] 94 %  BP: (103-125)/(56-71) 117/67     Weight: 108.9 kg (240 lb)  Body mass index is 33.47 kg/m².    Intake/Output Summary (Last 24 hours) at 4/29/2025 1143  Last data filed at 4/29/2025 0525  Gross per 24 hour   Intake 840 ml   Output --   Net 840 ml         Physical Exam  Vitals and nursing note reviewed.   Constitutional:       General: He is not in acute distress.     Appearance: He is obese.   HENT:      Head: Normocephalic and atraumatic.      Nose: Congestion present.      Mouth/Throat:      Mouth: Mucous membranes are moist.      Pharynx: Oropharynx is clear.   Eyes:      Extraocular Movements: Extraocular movements intact.      Pupils: Pupils are equal, round, and reactive to light.   Cardiovascular:      Rate and Rhythm: Normal rate and regular rhythm.   Pulmonary:      Effort: Pulmonary effort is normal.      Breath sounds: Rhonchi (scattered) present. No wheezing.      Comments: Diminished throughout, coarse   Abdominal:      General: Bowel sounds are normal.      Palpations: Abdomen is soft.   Musculoskeletal:         General: Normal range of motion.      Cervical back: Normal range of motion and neck supple.   Skin:     General: Skin is warm and dry.      Capillary Refill: Capillary refill takes less than 2  seconds.   Neurological:      General: No focal deficit present.      Mental Status: He is alert and oriented to person, place, and time.   Psychiatric:         Mood and Affect: Mood normal.         Behavior: Behavior normal.               Significant Labs: All pertinent labs within the past 24 hours have been reviewed.    Significant Imaging: I have reviewed all pertinent imaging results/findings within the past 24 hours.

## 2025-04-29 NOTE — ASSESSMENT & PLAN NOTE
Sputum culture pending  Continue cefepime, stop linezolid  CT chest with multifocal pneumonia  Appreciate pulmonology recommendations  Airway clearance regimen  Supplemental oxygen as needed

## 2025-04-29 NOTE — PLAN OF CARE
Problem: Adult Inpatient Plan of Care  Goal: Plan of Care Review  Outcome: Progressing  Goal: Patient-Specific Goal (Individualized)  Outcome: Progressing  Goal: Optimal Comfort and Wellbeing  Outcome: Progressing     Problem: Pain Acute  Goal: Optimal Pain Control and Function  Outcome: Progressing     Problem: Pneumonia  Goal: Effective Oxygenation and Ventilation  Outcome: Progressing   POC has been discussed with pt.  Pt still continues on IV ABTs.  Pt is still on O2 1L NC.  No c/o pain voiced today.  No falls during this shift.  Pt had a BM today.  No replacements needed today.  Possible discharge home tomorrow.

## 2025-04-29 NOTE — PLAN OF CARE
04/29/25 0759   Patient Assessment/Suction   Level of Consciousness (AVPU) alert   Respiratory Effort Normal;Unlabored   Expansion/Accessory Muscles/Retractions no use of accessory muscles   All Lung Fields Breath Sounds Anterior:;Lateral:;diminished;coarse   Rhythm/Pattern, Respiratory pattern regular   Cough Frequency infrequent   Cough Type nonproductive   Skin Integrity   $ Wound Care Tech Time 15 min   Area Observed Left;Right;Behind ear;Cheek;Upper lip;Nares   Skin Appearance without discoloration   PRE-TX-O2   Device (Oxygen Therapy) nasal cannula   $ Is the patient on Low Flow Oxygen? Yes   Flow (L/min) (Oxygen Therapy) 1   Oxygen Concentration (%) 24   SpO2 96 %   Pulse Oximetry Type Intermittent   $ Pulse Oximetry - Multiple Charge Pulse Oximetry - Multiple   Pulse 77   Resp 17   Aerosol Therapy   $ Aerosol Therapy Charges Aerosol Treatment  (duoneb/MM)   Daily Review of Necessity (SVN) completed   Respiratory Treatment Status (SVN) given   Treatment Route (SVN) mask;oxygen   Patient Position HOB elevated   Post Treatment Assessment (SVN) breath sounds unchanged   Signs of Intolerance (SVN) none   Breath Sounds Post-Respiratory Treatment   Throughout All Fields Post-Treatment All Fields   Throughout All Fields Post-Treatment no change   Post-treatment Heart Rate (beats/min) 79   Post-treatment Resp Rate (breaths/min) 19   General Safety Checklist   Safety Promotion/Fall Prevention side rails raised   Respiratory Interventions   Cough And Deep Breathing done with encouragement   Ready to Wean/Extubation Screen   FIO2<=50 (chart decimal) 0.24   Education   $ Education Bronchodilator;Oxygen;30 min

## 2025-04-29 NOTE — PROGRESS NOTES
[facility] Medicine  Progress Note    Patient Name: Charly Mattson  MRN: [id number]  Patient Class: IP- Inpatient   Admission Date: 4/27/2025  Length of Stay: 1 days  Attending Physician: Elizabeth Brooke MD  Primary Care Provider: Porfirio Novoa MD        Subjective     Principal Problem:Bronchiectasis with acute exacerbation        HPI:  Mr. 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 It is moderate. Temp was 102 at home. It is associated with cough productive of dark green sputum, malodorous nasal discharge, pleuritic chest pain, and fatigue. Denies N/V/D, SOB, dizziness, LOC, or known sick contacts. He was previously on preventative/rotating abx with azithromycin, but has been off since at least December. He reports intermittent compliance with CPT vest/pulmonary toileting. In the ED, he's tachycardic 110s, RR 24, afebrile, normotensive, and satting 94% or better on room air. CXR with no acute consolidations. WBC 15K with a left shift. Flu/covid/strep negative. Hospital medicine is consulted for admission for bronchiectasis with acute exacerbation and sepsis.     Overview/Hospital Course:  No notes on file    Interval History:  Patient seen and examined.  Discussed with pulmonology, discontinue linezolid and continue cefepime.  CT chest with multifocal pneumonia.  Patient on 1 L of oxygen today.  White blood cell count 5.9.    Review of Systems   Constitutional:  Positive for activity change and fatigue.   Respiratory:  Positive for cough and shortness of breath.      Objective:     Vital Signs (Most Recent):  Temp: 98.1 °F (36.7 °C) (04/29/25 1115)  Pulse: 89 (04/29/25 1115)  Resp: 17 (04/29/25 1115)  BP: 117/67 (04/29/25 1115)  SpO2: (!) 94 % (04/29/25 1115) Vital Signs (24h Range):  Temp:  [98 °F (36.7  °C)-98.4 °F (36.9 °C)] 98.1 °F (36.7 °C)  Pulse:  [] 89  Resp:  [17-20] 17  SpO2:  [92 %-98 %] 94 %  BP: (103-125)/(56-71) 117/67     Weight: 108.9 kg (240 lb)  Body mass index is 33.47 kg/m².    Intake/Output Summary (Last 24 hours) at 4/29/2025 1143  Last data filed at 4/29/2025 0525  Gross per 24 hour   Intake 840 ml   Output --   Net 840 ml         Physical Exam  Vitals and nursing note reviewed.   Constitutional:       General: He is not in acute distress.     Appearance: He is obese.   HENT:      Head: Normocephalic and atraumatic.      Nose: Congestion present.      Mouth/Throat:      Mouth: Mucous membranes are moist.      Pharynx: Oropharynx is clear.   Eyes:      Extraocular Movements: Extraocular movements intact.      Pupils: Pupils are equal, round, and reactive to light.   Cardiovascular:      Rate and Rhythm: Normal rate and regular rhythm.   Pulmonary:      Effort: Pulmonary effort is normal.      Breath sounds: Rhonchi (scattered) present. No wheezing.      Comments: Diminished throughout, coarse   Abdominal:      General: Bowel sounds are normal.      Palpations: Abdomen is soft.   Musculoskeletal:         General: Normal range of motion.      Cervical back: Normal range of motion and neck supple.   Skin:     General: Skin is warm and dry.      Capillary Refill: Capillary refill takes less than 2 seconds.   Neurological:      General: No focal deficit present.      Mental Status: He is alert and oriented to person, place, and time.   Psychiatric:         Mood and Affect: Mood normal.         Behavior: Behavior normal.               Significant Labs: All pertinent labs within the past 24 hours have been reviewed.    Significant Imaging: I have reviewed all pertinent imaging results/findings within the past 24 hours.      Assessment & Plan  Bronchiectasis with acute exacerbation  Sputum culture pending  Continue cefepime, stop linezolid  CT chest with multifocal pneumonia  Appreciate  pulmonology recommendations  Airway clearance regimen  Supplemental oxygen as needed    CVID (common variable immunodeficiency)  Aware   On Hizentra  IgG 4 days ago - 1200 (WNL)  Transaminitis  Chronic  Slightly worse  Known fatty liver   monitor    Sepsis  This patient does have evidence of infective focus  My overall impression is sepsis.  Source: Respiratory  Antibiotics given-   Antibiotics (72h ago, onward)      Start     Stop Route Frequency Ordered    04/27/25 0900  ceFEPIme injection 2 g         -- IV Every 8 hours (non-standard times) 04/27/25 0851          Latest lactate reviewed-  Recent Labs   Lab 04/27/25  0318 04/27/25  0733   LACTATE  --  1.2   POCLAC 0.98  --      Organ dysfunction indicated by  n/a    Fluid challenge Fluid Not Needed - Patient is not hypotensive and/or lactate is less than 4.0.     Post- resuscitation assessment No - Post resuscitation assessment not needed       Will Not start Pressors- Levophed for MAP of 65  Source control achieved by: abx  VTE Risk Mitigation (From admission, onward)           Ordered     enoxaparin injection 40 mg  Daily         04/27/25 0516     IP VTE HIGH RISK PATIENT  Once         04/27/25 0516     Place sequential compression device  Until discontinued         04/27/25 0516                    Discharge Planning   LAILA: 5/1/2025     Code Status: Full Code   Medical Readiness for Discharge Date:   Discharge Plan A: Home with family                        Elizabeth Brooke MD  Department of Hospital Medicine   [facility]/Surg

## 2025-04-29 NOTE — ASSESSMENT & PLAN NOTE
This patient does have evidence of infective focus  My overall impression is sepsis.  Source: Respiratory  Antibiotics given-   Antibiotics (72h ago, onward)      Start     Stop Route Frequency Ordered    04/27/25 0900  ceFEPIme injection 2 g         -- IV Every 8 hours (non-standard times) 04/27/25 0851          Latest lactate reviewed-  Recent Labs   Lab 04/27/25  0318 04/27/25  0733   LACTATE  --  1.2   POCLAC 0.98  --      Organ dysfunction indicated by n/a    Fluid challenge Fluid Not Needed - Patient is not hypotensive and/or lactate is less than 4.0.     Post- resuscitation assessment No - Post resuscitation assessment not needed       Will Not start Pressors- Levophed for MAP of 65  Source control achieved by: abx

## 2025-04-29 NOTE — RESPIRATORY THERAPY
04/29/25 0811   Patient Assessment/Suction   Level of Consciousness (AVPU) alert   Respiratory Effort Normal;Unlabored   Expansion/Accessory Muscles/Retractions no use of accessory muscles   All Lung Fields Breath Sounds Anterior:;Lateral:;diminished;coarse   Rhythm/Pattern, Respiratory pattern regular   Cough Frequency infrequent   Cough Type nonproductive   PRE-TX-O2   SpO2 96 %   Pulse 78   Resp 18   Aerosol Therapy   $ Aerosol Therapy Charges Aerosol Treatment  (duoneb/3% NaCl)   Daily Review of Necessity (SVN) completed   Respiratory Treatment Status (SVN) given   Treatment Route (SVN) mask;oxygen   Patient Position HOB elevated   Post Treatment Assessment (SVN) breath sounds unchanged   Signs of Intolerance (SVN) none   Breath Sounds Post-Respiratory Treatment   Throughout All Fields Post-Treatment All Fields   Throughout All Fields Post-Treatment no change   Post-treatment Heart Rate (beats/min) 80   Post-treatment Resp Rate (breaths/min) 19   Vibratory PEP Therapy   $ Vibratory PEP Charges Aerobika Therapy   $ Vibratory PEP Equipment Aerobika Equipment   $ Vibratory PEP Tech Time Charges 15 min   Daily Review of Necessity (PEP Therapy) completed   Type (PEP Therapy) vibratory/oscillatory   Device (PEP Therapy) flutter   Route (PEP Therapy) mouthpiece   Breaths per Cycle (PEP Therapy) 10   Cycles (PEP Therapy) 1   Settings (PEP Therapy) PEP 5   Post Treatment Assessment (PEP) breath sounds unchanged   Signs of Intolerance (PEP Therapy) none   High Frequency Chest Compression Vest   $ Chest Compression Charges Therapy   Daily Review of Necessity (HFCCV) completed   Vest Type (HFCCV) chest wrap vest   Frequency Type (HFCCV) single frequency   Single Frequency (Hz) 11   Pressure (cm H20) 7   Duration (HFCCV) 15 mins   Patient Position (HFCCV) HOB elevated  (sitting up)   Post Treatment Assessment (HFCCV) breath sounds unchanged   Signs of Intolerance (HFCCV) none   Education   $ Education PEP Therapy;Other  (see comment);15 min  (VEST)

## 2025-04-29 NOTE — CARE UPDATE
04/28/25 1850 04/28/25 1858 04/28/25 1903   Patient Assessment/Suction   Level of Consciousness (AVPU) alert alert alert   Respiratory Effort Unlabored;Short of breath Unlabored Unlabored   Expansion/Accessory Muscles/Retractions no retractions;no use of accessory muscles no use of accessory muscles;no retractions no use of accessory muscles;no retractions   All Lung Fields Breath Sounds Anterior:;Posterior:;diminished  --  diminished   NAN Breath Sounds  --  Posterior:;Anterior:;diminished  --    LLL Breath Sounds  --  diminished  --    RUL Breath Sounds  --  rhonchi  --    RML Breath Sounds  --  diminished  --    RLL Breath Sounds  --  diminished  --    Cough Frequency frequent  --   --    Cough Type nonproductive  --   --    PRE-TX-O2   Device (Oxygen Therapy) nasal cannula nasal cannula nasal cannula   $ Is the patient on Low Flow Oxygen? Yes Yes Yes   Flow (L/min) (Oxygen Therapy) 1 1 1   SpO2 97 % 97 % 98 %   Pulse Oximetry Type Intermittent Intermittent Intermittent   $ Pulse Oximetry - Multiple Charge Pulse Oximetry - Multiple Pulse Oximetry - Multiple Pulse Oximetry - Multiple   Pulse 90 93 94   Resp 20 20 20   Aerosol Therapy   $ Aerosol Therapy Charges Aerosol Treatment  (MM) Aerosol Treatment  (nacl 3%) Aerosol Treatment  (duoneb)   Respiratory Treatment Status (SVN) given given given   Treatment Route (SVN) mask;oxygen oxygen;mask mask;oxygen   Patient Position HOB elevated HOB elevated HOB elevated   Post Treatment Assessment (SVN) breath sounds unchanged increased aeration breath sounds unchanged   Signs of Intolerance (SVN) other (see comments)  (did start coughing during this tx) none none   Breath Sounds Post-Respiratory Treatment   Throughout All Fields Post-Treatment All Fields All Fields All Fields   Throughout All Fields Post-Treatment aeration increased aeration increased aeration increased   Post-treatment Heart Rate (beats/min) 90 92 94   Post-treatment Resp Rate (breaths/min) 20 20 20    Vibratory PEP Therapy   $ Vibratory PEP Charges  --   --  Aerobika Therapy   $ Vibratory PEP Tech Time Charges  --   --  15 min   Daily Review of Necessity (PEP Therapy)  --   --  completed   Type (PEP Therapy)  --   --  vibratory/oscillatory   Device (PEP Therapy)  --   --  flutter   Route (PEP Therapy)  --   --  proper technique demonstrated;mouthpiece   Breaths per Cycle (PEP Therapy)  --   --  5   Cycles (PEP Therapy)  --   --  1   Settings (PEP Therapy)  --   --  PEP 5   Post Treatment Assessment (PEP)  --   --  breath sounds unchanged   Signs of Intolerance (PEP Therapy)  --   --  none   High Frequency Chest Compression Vest   $ Chest Compression Charges Therapy  --   --    Daily Review of Necessity (HFCCV) completed  --   --    Vest Type (HFCCV) chest wrap vest  --   --    Frequency Type (HFCCV) single frequency  --   --    Single Frequency (Hz) 11  --   --    Pressure (cm H20) 7  --   --    Duration (HFCCV) 15 mins  --   --    Patient Position (HFCCV) other (see comments)  (sitting up in the bed good posture)  --   --    Post Treatment Assessment (HFCCV) increased aeration  --   --    Signs of Intolerance (HFCCV) none  --   --

## 2025-04-30 ENCOUNTER — TELEPHONE (OUTPATIENT)
Dept: FAMILY MEDICINE | Facility: CLINIC | Age: 30
End: 2025-04-30
Payer: MEDICAID

## 2025-04-30 VITALS
RESPIRATION RATE: 17 BRPM | BODY MASS INDEX: 33.6 KG/M2 | HEART RATE: 83 BPM | OXYGEN SATURATION: 93 % | SYSTOLIC BLOOD PRESSURE: 123 MMHG | DIASTOLIC BLOOD PRESSURE: 68 MMHG | WEIGHT: 240 LBS | HEIGHT: 71 IN | TEMPERATURE: 98 F

## 2025-04-30 LAB
ABSOLUTE EOSINOPHIL (SMH): 0.17 K/UL
ABSOLUTE MONOCYTE (SMH): 0.79 K/UL (ref 0.3–1)
ABSOLUTE NEUTROPHIL COUNT (SMH): 3.5 K/UL (ref 1.8–7.7)
ALBUMIN SERPL-MCNC: 3.7 G/DL (ref 3.5–5.2)
ALP SERPL-CCNC: 106 UNIT/L (ref 40–150)
ALT SERPL-CCNC: 80 UNIT/L (ref 10–44)
ANION GAP (SMH): 12 MMOL/L (ref 8–16)
AST SERPL-CCNC: 78 UNIT/L (ref 11–45)
BASOPHILS # BLD AUTO: 0.09 K/UL
BASOPHILS NFR BLD AUTO: 1.4 %
BILIRUB SERPL-MCNC: 0.5 MG/DL (ref 0.1–1)
BUN SERPL-MCNC: 13 MG/DL (ref 6–20)
CALCIUM SERPL-MCNC: 9.9 MG/DL (ref 8.7–10.5)
CHLORIDE SERPL-SCNC: 104 MMOL/L (ref 95–110)
CO2 SERPL-SCNC: 22 MMOL/L (ref 23–29)
CREAT SERPL-MCNC: 0.9 MG/DL (ref 0.5–1.4)
ERYTHROCYTE [DISTWIDTH] IN BLOOD BY AUTOMATED COUNT: 12.9 % (ref 11.5–14.5)
GFR SERPLBLD CREATININE-BSD FMLA CKD-EPI: >60 ML/MIN/1.73/M2
GLUCOSE SERPL-MCNC: 101 MG/DL (ref 70–110)
HCT VFR BLD AUTO: 44.9 % (ref 40–54)
HGB BLD-MCNC: 14.7 GM/DL (ref 14–18)
IMM GRANULOCYTES # BLD AUTO: 0.03 K/UL (ref 0–0.04)
IMM GRANULOCYTES NFR BLD AUTO: 0.5 % (ref 0–0.5)
LYMPHOCYTES # BLD AUTO: 1.78 K/UL (ref 1–4.8)
MAGNESIUM SERPL-MCNC: 2.1 MG/DL (ref 1.6–2.6)
MCH RBC QN AUTO: 26.8 PG (ref 27–31)
MCHC RBC AUTO-ENTMCNC: 32.7 G/DL (ref 32–36)
MCV RBC AUTO: 82 FL (ref 82–98)
NUCLEATED RBC (/100WBC) (SMH): 0 /100 WBC
PLATELET # BLD AUTO: 195 K/UL (ref 150–450)
PMV BLD AUTO: 10.8 FL (ref 9.2–12.9)
POTASSIUM SERPL-SCNC: 4.3 MMOL/L (ref 3.5–5.1)
PROT SERPL-MCNC: 7.6 GM/DL (ref 6–8.4)
RBC # BLD AUTO: 5.48 M/UL (ref 4.6–6.2)
RELATIVE EOSINOPHIL (SMH): 2.7 % (ref 0–8)
RELATIVE LYMPHOCYTE (SMH): 27.8 % (ref 18–48)
RELATIVE MONOCYTE (SMH): 12.3 % (ref 4–15)
RELATIVE NEUTROPHIL (SMH): 55.3 % (ref 38–73)
SODIUM SERPL-SCNC: 138 MMOL/L (ref 136–145)
WBC # BLD AUTO: 6.4 K/UL (ref 3.9–12.7)

## 2025-04-30 PROCEDURE — 63600175 PHARM REV CODE 636 W HCPCS: Performed by: NURSE PRACTITIONER

## 2025-04-30 PROCEDURE — 94664 DEMO&/EVAL PT USE INHALER: CPT

## 2025-04-30 PROCEDURE — 36415 COLL VENOUS BLD VENIPUNCTURE: CPT | Performed by: NURSE PRACTITIONER

## 2025-04-30 PROCEDURE — 25000242 PHARM REV CODE 250 ALT 637 W/ HCPCS: Performed by: INTERNAL MEDICINE

## 2025-04-30 PROCEDURE — 94761 N-INVAS EAR/PLS OXIMETRY MLT: CPT

## 2025-04-30 PROCEDURE — 25000003 PHARM REV CODE 250: Performed by: INTERNAL MEDICINE

## 2025-04-30 PROCEDURE — 99231 SBSQ HOSP IP/OBS SF/LOW 25: CPT | Mod: ,,, | Performed by: INTERNAL MEDICINE

## 2025-04-30 PROCEDURE — 94640 AIRWAY INHALATION TREATMENT: CPT

## 2025-04-30 PROCEDURE — 25000003 PHARM REV CODE 250: Performed by: NURSE PRACTITIONER

## 2025-04-30 PROCEDURE — 94669 MECHANICAL CHEST WALL OSCILL: CPT

## 2025-04-30 PROCEDURE — 99900035 HC TECH TIME PER 15 MIN (STAT)

## 2025-04-30 PROCEDURE — 25000242 PHARM REV CODE 250 ALT 637 W/ HCPCS: Performed by: NURSE PRACTITIONER

## 2025-04-30 PROCEDURE — 27000221 HC OXYGEN, UP TO 24 HOURS

## 2025-04-30 PROCEDURE — 83735 ASSAY OF MAGNESIUM: CPT | Performed by: NURSE PRACTITIONER

## 2025-04-30 PROCEDURE — 82040 ASSAY OF SERUM ALBUMIN: CPT | Performed by: NURSE PRACTITIONER

## 2025-04-30 PROCEDURE — 85025 COMPLETE CBC W/AUTO DIFF WBC: CPT | Performed by: NURSE PRACTITIONER

## 2025-04-30 RX ORDER — AMOXICILLIN AND CLAVULANATE POTASSIUM 875; 125 MG/1; MG/1
1 TABLET, FILM COATED ORAL EVERY 12 HOURS
Qty: 24 TABLET | Refills: 0 | Status: SHIPPED | OUTPATIENT
Start: 2025-04-30 | End: 2025-05-12

## 2025-04-30 RX ORDER — AZITHROMYCIN 500 MG/1
500 TABLET, FILM COATED ORAL DAILY
Qty: 12 TABLET | Refills: 0 | Status: SHIPPED | OUTPATIENT
Start: 2025-04-30 | End: 2025-05-12

## 2025-04-30 RX ADMIN — ACETYLCYSTEINE 4 ML: 200 INHALANT RESPIRATORY (INHALATION) at 06:04

## 2025-04-30 RX ADMIN — IPRATROPIUM BROMIDE AND ALBUTEROL SULFATE 3 ML: 2.5; .5 SOLUTION RESPIRATORY (INHALATION) at 01:04

## 2025-04-30 RX ADMIN — SODIUM CHLORIDE SOLN NEBU 3% 4 ML: 3 NEBU SOLN at 07:04

## 2025-04-30 RX ADMIN — CETIRIZINE HYDROCHLORIDE 10 MG: 10 TABLET, FILM COATED ORAL at 09:04

## 2025-04-30 RX ADMIN — CEFEPIME 2 G: 2 INJECTION, POWDER, FOR SOLUTION INTRAVENOUS at 12:04

## 2025-04-30 RX ADMIN — SENNOSIDES AND DOCUSATE SODIUM 1 TABLET: 50; 8.6 TABLET ORAL at 09:04

## 2025-04-30 RX ADMIN — CEFEPIME 2 G: 2 INJECTION, POWDER, FOR SOLUTION INTRAVENOUS at 05:04

## 2025-04-30 RX ADMIN — CEFEPIME 2 G: 2 INJECTION, POWDER, FOR SOLUTION INTRAVENOUS at 09:04

## 2025-04-30 RX ADMIN — AZELASTINE 137 MCG: 1 SPRAY, METERED NASAL at 09:04

## 2025-04-30 RX ADMIN — GUAIFENESIN 600 MG: 600 TABLET, EXTENDED RELEASE ORAL at 09:04

## 2025-04-30 RX ADMIN — IPRATROPIUM BROMIDE AND ALBUTEROL SULFATE 3 ML: 2.5; .5 SOLUTION RESPIRATORY (INHALATION) at 06:04

## 2025-04-30 NOTE — ASSESSMENT & PLAN NOTE
Sputum culture with normal ashlee  Continue cefepime  CT chest with multifocal pneumonia  Appreciate pulmonology recommendations  Airway clearance regimen  Supplemental oxygen as needed

## 2025-04-30 NOTE — NURSING
Pt discharged to home by-self; he drove himself here.  Pt rolled down with w/c to vehicle.  Pt verbalized understanding of discharge orders.   no c/o pain/nausea/vomitting.  Will CTM.

## 2025-04-30 NOTE — DISCHARGE INSTRUCTIONS
Our goal at [facility] is to always give you outstanding care and exceptional service. You may receive a survey from [employer] by mail, text or e-mail in the next 24-48 hours asking about the care you received with us. The survey should only take 5-10 minutes to complete and is very important to us.     Your feedback provides us with a way to recognize our staff who work tirelessly to provide the best care! Also, your responses help us learn how to improve when your experience was below our aspiration of excellence. We are always looking for ways to improve your stay. We WILL use your feedback to continue making improvements to help us provide the highest quality care. We keep your personal information and feedback confidential. We appreciate your time completing this survey and can't wait to hear from you!!!    We look forward to your continued care with us! Thanks so much for choosing [facility] for your healthcare needs!

## 2025-04-30 NOTE — PLAN OF CARE
Problem: Adult Inpatient Plan of Care  Goal: Plan of Care Review  Outcome: Progressing  Goal: Patient-Specific Goal (Individualized)  Outcome: Progressing  Goal: Absence of Hospital-Acquired Illness or Injury  Outcome: Progressing  Goal: Optimal Comfort and Wellbeing  Outcome: Progressing  Goal: Readiness for Transition of Care  Outcome: Progressing     Problem: Sepsis/Septic Shock  Goal: Optimal Coping  Outcome: Progressing  Goal: Absence of Bleeding  Outcome: Progressing  Goal: Blood Glucose Level Within Targeted Range  Outcome: Progressing  Goal: Absence of Infection Signs and Symptoms  Outcome: Progressing  Goal: Optimal Nutrition Intake  Outcome: Progressing     Problem: Pain Acute  Goal: Optimal Pain Control and Function  Outcome: Progressing     Problem: Fatigue  Goal: Improved Activity Tolerance  Outcome: Progressing     Problem: Breathing Pattern Ineffective  Goal: Effective Breathing Pattern  Outcome: Progressing     Problem: Airway Clearance Ineffective  Goal: Effective Airway Clearance  Outcome: Progressing     Problem: Pneumonia  Goal: Fluid Balance  Outcome: Progressing  Goal: Resolution of Infection Signs and Symptoms  Outcome: Progressing  Goal: Effective Oxygenation and Ventilation  Outcome: Progressing

## 2025-04-30 NOTE — TELEPHONE ENCOUNTER
----- Message from  Desire sent at 4/30/2025  2:35 PM CDT -----  Regarding: Hospital follow up  Good afternoon,Pt anticipated for discharge from [facility] tomorrow.  Per smart scheduling recs, follow up appointment within 8-14 days.  No appointments available within this time frame.  Please contact pt to schedule hospital follow up.Thank you,Desire

## 2025-04-30 NOTE — PROGRESS NOTES
Pulmonary/Critical Care Progress Note      Patient name: Charly Mattson  MRN: [id number]  Date: 04/30/2025    Admit Date: 4/27/2025  Consult Requested By: Elizabeth Brooke MD    Reason for Consult: CVID patient here with hypoxia/bronchiectasis     HPI:    Pt is a 31 yo male with CVID, chronic bronchiectasis, recurrent respiratory infections and ear infections, obesity who presented to the ED with fever 102 associated with dark green phlegm, pleuritic chest pain, cough and fatigue.  He has noticed increased coughing since Thursday which progressively worsened started to have fevers over the weekend.  Two weeks ago he got bilateral tympanostomy tubes.  After that procedure he was sick for a few days then got better.  At home he uses 3% saline nebs twice a day, intermittently uses percussive vest and regularly uses Acapella device.    4/30 - feeling improved, no fevers    Review of Systems    Review of Systems   Constitutional:  Positive for chills, fever and malaise/fatigue.   HENT:  Positive for sore throat.    Respiratory:  Positive for cough, sputum production, shortness of breath and wheezing.    Cardiovascular:  Positive for chest pain.   Gastrointestinal: Negative.    Genitourinary: Negative.    Musculoskeletal: Negative.    Skin: Negative.    Neurological: Negative.    Endo/Heme/Allergies: Negative.    Psychiatric/Behavioral: Negative.         Past Medical History    Past Medical History:   Diagnosis Date    Fatty liver     Immunoglobulin deficiency     Immunoglobulin deficiency     CVID    Pneumonia        Past Surgical History    Past Surgical History:   Procedure Laterality Date    ADENOIDECTOMY      BIOPSY N/A 9/23/2020    Procedure: BIOPSY;  Surgeon: Kaleb Diagnostic Provider;  Location: [facility];  Service: Interventional Radiology;  Laterality: N/A;    MANDIBLE FRACTURE SURGERY      SINUS SURGERY      TONSILLECTOMY      TYMPANOSTOMY TUBE PLACEMENT      TYMPANOSTOMY TUBE PLACEMENT      x 4       Medications  (scheduled):      acetylcysteine 200 mg/ml (20%)  4 mL Nebulization BID    albuterol-ipratropium  3 mL Nebulization Q6H WAKE    azelastine  1 spray Nasal BID    ceFEPime IV (PEDS and ADULTS)  2 g Intravenous Q8H    cetirizine  10 mg Oral Daily    enoxparin  40 mg Subcutaneous Daily    guaiFENesin  600 mg Oral BID    senna-docusate  1 tablet Oral BID    sodium chloride 3%  4 mL Nebulization BID       Medications (infusions):         Medications (prn):       Current Facility-Administered Medications:     acetaminophen, 650 mg, Oral, Q8H PRN    aluminum-magnesium hydroxide-simethicone, 30 mL, Oral, QID PRN    dextrose 50%, 12.5 g, Intravenous, PRN    dextrose 50%, 25 g, Intravenous, PRN    glucagon (human recombinant), 1 mg, Intramuscular, PRN    glucose, 16 g, Oral, PRN    glucose, 24 g, Oral, PRN    HYDROcodone-acetaminophen, 1 tablet, Oral, Q6H PRN    magnesium oxide, 800 mg, Oral, PRN    magnesium oxide, 800 mg, Oral, PRN    melatonin, 6 mg, Oral, Nightly PRN    naloxone, 0.02 mg, Intravenous, PRN    ondansetron, 4 mg, Intravenous, Q6H PRN    polyethylene glycol, 17 g, Oral, BID PRN    potassium bicarbonate, 35 mEq, Oral, PRN    potassium bicarbonate, 50 mEq, Oral, PRN    potassium bicarbonate, 60 mEq, Oral, PRN    sodium chloride 0.9%, 10 mL, Intravenous, Q12H PRN    Family History:   Family History   Problem Relation Name Age of Onset    Cancer Mother      Liver disease Father         Social History: Tobacco: Tobacco Use History[1]                             EtOH:   Social History     Substance and Sexual Activity   Alcohol Use Never                                Drugs:   Social History     Substance and Sexual Activity   Drug Use No       Physical Exam    Vital signs:  Temp:  [97.4 °F (36.3 °C)-98.3 °F (36.8 °C)]   Pulse:  [80-95]   Resp:  [17-20]   BP: (108-139)/(59-74)   SpO2:  [93 %-96 %]     Intake/Output:   Intake/Output Summary (Last 24 hours) at 4/30/2025 9527  Last data filed at 4/30/2025 0400  Gross  "per 24 hour   Intake 522.14 ml   Output --   Net 522.14 ml        BMI: Estimated body mass index is 33.47 kg/m² as calculated from the following:    Height as of this encounter: 5' 11" (1.803 m).    Weight as of this encounter: 108.9 kg (240 lb).    Physical Exam  Constitutional:       General: He is not in acute distress.     Appearance: Normal appearance. He is ill-appearing.   HENT:      Head: Normocephalic and atraumatic.      Right Ear: External ear normal.      Left Ear: External ear normal.      Nose: Nose normal. No congestion or rhinorrhea.      Mouth/Throat:      Mouth: Mucous membranes are moist.      Pharynx: Oropharynx is clear. No oropharyngeal exudate or posterior oropharyngeal erythema.   Eyes:      General: No scleral icterus.        Right eye: No discharge.         Left eye: No discharge.      Extraocular Movements: Extraocular movements intact.      Conjunctiva/sclera: Conjunctivae normal.      Pupils: Pupils are equal, round, and reactive to light.   Cardiovascular:      Rate and Rhythm: Normal rate and regular rhythm.      Pulses: Normal pulses.      Heart sounds: No murmur heard.     No friction rub. No gallop.   Pulmonary:      Effort: Pulmonary effort is normal. No respiratory distress.      Breath sounds: No stridor. Wheezing, rhonchi and rales present.   Abdominal:      General: Abdomen is flat. Bowel sounds are normal. There is no distension.      Palpations: Abdomen is soft. There is no mass.      Tenderness: There is no abdominal tenderness.   Musculoskeletal:         General: Normal range of motion.      Cervical back: Normal range of motion and neck supple. No rigidity or tenderness.      Right lower leg: No edema.      Left lower leg: No edema.   Lymphadenopathy:      Cervical: No cervical adenopathy.   Skin:     General: Skin is warm and dry.      Findings: No bruising, erythema or lesion.   Neurological:      General: No focal deficit present.      Mental Status: He is alert and " "oriented to person, place, and time.   Psychiatric:         Mood and Affect: Mood normal.         Behavior: Behavior normal.         Thought Content: Thought content normal.         Judgment: Judgment normal.         Laboratory    Recent Labs   Lab 04/30/25  0425   WBC 6.40   RBC 5.48   HGB 14.7   HCT 44.9      MCV 82   MCH 26.8*   MCHC 32.7       Recent Labs   Lab 04/30/25  0425   CALCIUM 9.9   ALBUMIN 3.7   PROT 7.6      K 4.3   CO2 22*      BUN 13   CREATININE 0.9   ALKPHOS 106   ALT 80*   AST 78*   BILITOT 0.5       No results for input(s): "PT", "INR", "APTT" in the last 24 hours.    No results for input(s): "CPK", "CPKMB", "TROPONINI", "MB" in the last 24 hours.    Additional labs:    Microbiology:       Microbiology Results (last 7 days)       Procedure Component Value Units Date/Time    Blood culture x two cultures. Draw prior to antibiotics. [[id number]]  (Normal) Collected: 04/27/25 0315    Order Status: Completed Specimen: Blood Updated: 04/30/25 0900     CULTURE, BLOOD (H) No Growth After 72 Hours    Blood culture x two cultures. Draw prior to antibiotics. [[id number]]  (Normal) Collected: 04/27/25 0322    Order Status: Completed Specimen: Blood Updated: 04/30/25 0900     CULTURE, BLOOD (H) No Growth After 72 Hours    Culture, Respiratory with Gram Stain [[id number]] Collected: 04/27/25 0934    Order Status: Completed Specimen: Respiratory from Sputum, Expectorated Updated: 04/29/25 0751     Respiratory Culture Normal respiratory ashlee     GRAM STAIN (RESPIRATORY) Many WBC seen      Few Gram positive cocci    Culture, Respiratory with Gram Stain [[id number]] Collected: 04/28/25 0350    Order Status: Canceled Specimen: Respiratory from Sputum, Expectorated     MRSA Screen by PCR [[id number]]  (Normal) Collected: 04/27/25 0934    Order Status: Completed Specimen: Nasal Swab Updated: 04/27/25 1535     MRSA PCR SCRN ([facility]) Not Detected    Respiratory Infection Panel (PCR), " Nasopharyngeal [[id number]] Collected: 04/27/25 0934    Order Status: Completed Specimen: Nasopharyngeal Swab Updated: 04/27/25 1454     Respiratory Infection Panel Source Nasopharyngeal Swab     Adenovirus Not Detected     Coronavirus 229E, Common Cold Virus Not Detected     Coronavirus HKU1, Common Cold Virus Not Detected     Coronavirus NL63, Common Cold Virus Not Detected     Coronavirus OC43, Common Cold Virus Not Detected     SARS-CoV2 (COVID-19) Qualitative PCR Not Detected     Human Metapneumovirus Not Detected     Human Rhinovirus/Enterovirus Not Detected     Influenza A (subtypes H1, H1-2009,H3) Not Detected     Influenza B Not Detected     Parainfluenza Virus 1 Not Detected     Parainfluenza Virus 2 Not Detected     Parainfluenza Virus 3 Not Detected     Parainfluenza Virus 4 Not Detected     Respiratory Syncytial Virus Not Detected     Bordetella Parapertussis (OL5923) Not Detected     Bordetella pertussis (ptxP) Not Detected     Chlamydia pneumoniae Not Detected     Mycoplasma pneumoniae Not Detected    Group A Strep, Molecular [[id number]]  (Normal) Collected: 04/27/25 0153    Order Status: Completed Specimen: Throat Updated: 04/27/25 0219     Group A Strep Molecular Negative    Influenza A & B by Molecular [[id number]]  (Normal) Collected: 04/27/25 0153    Order Status: Completed Specimen: Nasal Swab Updated: 04/27/25 0219     INFLUENZA A MOLECULAR Negative     INFLUENZA B MOLECULAR  Negative            Radiology    CT Chest Without Contrast  Narrative: EXAMINATION:  CT CHEST WITHOUT CONTRAST    CLINICAL HISTORY:  pneumonia;    TECHNIQUE:  Low dose axial images, sagittal and coronal reformations were obtained from the thoracic inlet to the lung bases. Contrast was not administered.    COMPARISON:  CT chest 04/09/2023    FINDINGS:  No significant abnormality of the base of neck or thoracic soft tissues.    Thoracic aorta is normal caliber.  Heart is normal size.  No pericardial effusion.    Stable  "mildly prominent subcarinal lymph node measuring 1.2 cm, similar to 04/09/2023.  No new lymphadenopathy.    Central airways are patent.    Consolidation with air bronchograms in the bilateral posterior lower lobes, right middle lobe, and lingula at the lung bases.  Findings are concerning for multifocal pulmonary infection/pneumonia.  No pleural effusion or pneumothorax.    No significant abnormality of the partially visualized upper abdomen.    No acute or aggressive bony abnormality.  Impression: 1. Consolidation with air bronchograms in the bilateral lung bases concerning for multifocal pulmonary infection/pneumonia.  Recommend follow-up CT chest after treatment.  2. Stable mildly prominent subcarinal lymph node.    Electronically signed by: Juan C Rod  Date:    04/29/2025  Time:    08:00        Additional Studies        Ventilator Information    Oxygen Concentration (%):  [24] 24             No results for input(s): "PH", "PCO2", "PO2", "HCO3", "POCSATURATED", "BE" in the last 72 hours.        Impression    Active Hospital Problems    Diagnosis  POA    *Bronchiectasis with acute exacerbation [J47.1]  Yes    Sepsis [A41.9]  Yes    Transaminitis [R74.01]  Yes    CVID (common variable immunodeficiency) [D83.9]  Yes      Resolved Hospital Problems   No resolved problems to display.       Plan    - deescalate to augmentin, azithro  - continue airway clearance regimen- has vest at home  - add guaifenesin bid  - sputum no growth  - f/u outpatient with Dr. Giorgio escobedo for discharge    Thank you for this consult.  I will follow with you while the patient is hospitalized.      Chrissy Mckeon MD  Pulmonary & Critical Care Medicine                 [1]   Social History  Tobacco Use   Smoking Status Never   Smokeless Tobacco Never     "

## 2025-04-30 NOTE — PLAN OF CARE
Problem: Adult Inpatient Plan of Care  Goal: Plan of Care Review  Outcome: Progressing  Goal: Patient-Specific Goal (Individualized)  Outcome: Progressing  Goal: Optimal Comfort and Wellbeing  Outcome: Progressing     Problem: Sepsis/Septic Shock  Goal: Optimal Nutrition Intake  Outcome: Progressing     Problem: Pain Acute  Goal: Optimal Pain Control and Function  Outcome: Progressing     Problem: Pneumonia  Goal: Effective Oxygenation and Ventilation  Outcome: Progressing   POC has been discussed with pt.  Pt still continues on breathing txs and IV ABTs.  Pt has only been holding sats at 93% or less with 1 liter of O2 NC.  No c/o pain voiced.  No falls during this shift.  No replacements needed today.  Pt is waiting on Dr. Mckeon to give clearance for him to be able to go home.  Pt still continues on CPT from [facility].  Possible discharge home tomorrow.

## 2025-04-30 NOTE — NURSING
Pt walked with nurse 2 laps around the nurses station and his O2 sats never dropped lower than 94% on RA.   Pt is ready to go home and notified Dr. Brooke.  Will CTM.

## 2025-04-30 NOTE — SUBJECTIVE & OBJECTIVE
Interval History:  Patient seen and examined.  Continues on IV cefepime for multifocal pneumonia.  Satting 93% on 1 L.    Review of Systems   Constitutional:  Positive for activity change and fatigue.   Respiratory:  Positive for cough and shortness of breath.      Objective:     Vital Signs (Most Recent):  Temp: 97.4 °F (36.3 °C) (04/30/25 0802)  Pulse: 95 (04/30/25 1152)  Resp: 17 (04/30/25 1152)  BP: 139/70 (04/30/25 1152)  SpO2: (!) 93 % (04/30/25 1152) Vital Signs (24h Range):  Temp:  [97.4 °F (36.3 °C)-98.2 °F (36.8 °C)] 97.4 °F (36.3 °C)  Pulse:  [80-95] 95  Resp:  [17-20] 17  SpO2:  [92 %-96 %] 93 %  BP: (108-139)/(59-74) 139/70     Weight: 108.9 kg (240 lb)  Body mass index is 33.47 kg/m².    Intake/Output Summary (Last 24 hours) at 4/30/2025 1244  Last data filed at 4/30/2025 0400  Gross per 24 hour   Intake 522.14 ml   Output --   Net 522.14 ml         Physical Exam  Vitals and nursing note reviewed.   Constitutional:       General: He is not in acute distress.     Appearance: He is obese.   HENT:      Head: Normocephalic and atraumatic.      Nose: Congestion present.      Mouth/Throat:      Mouth: Mucous membranes are moist.      Pharynx: Oropharynx is clear.   Eyes:      Extraocular Movements: Extraocular movements intact.      Pupils: Pupils are equal, round, and reactive to light.   Cardiovascular:      Rate and Rhythm: Normal rate and regular rhythm.   Pulmonary:      Effort: Pulmonary effort is normal.      Breath sounds: Rhonchi (scattered) present. No wheezing.      Comments: Diminished throughout, coarse   Abdominal:      General: Bowel sounds are normal.      Palpations: Abdomen is soft.   Musculoskeletal:         General: Normal range of motion.      Cervical back: Normal range of motion and neck supple.   Skin:     General: Skin is warm and dry.      Capillary Refill: Capillary refill takes less than 2 seconds.   Neurological:      General: No focal deficit present.      Mental Status: He is  alert and oriented to person, place, and time.   Psychiatric:         Mood and Affect: Mood normal.         Behavior: Behavior normal.               Significant Labs: All pertinent labs within the past 24 hours have been reviewed.    Significant Imaging: I have reviewed all pertinent imaging results/findings within the past 24 hours.

## 2025-04-30 NOTE — NURSING
Pt refused to have labs drawn this morning. Blood Tech was encourage to come back during the day when patient  is more calm and pleasant.

## 2025-04-30 NOTE — PLAN OF CARE
Problem: Adult Inpatient Plan of Care  Goal: Plan of Care Review  4/30/2025 1710 by Rafaela Reveles RN  Outcome: Met  4/30/2025 1621 by Rafaela Reveles RN  Outcome: Progressing  Goal: Patient-Specific Goal (Individualized)  4/30/2025 1710 by Rafaela Reveles RN  Outcome: Met  4/30/2025 1621 by Rafaela Reveles RN  Outcome: Progressing  Goal: Absence of Hospital-Acquired Illness or Injury  Outcome: Met  Goal: Optimal Comfort and Wellbeing  4/30/2025 1710 by Rafaela Reveles RN  Outcome: Met  4/30/2025 1621 by Rafaela Reveles RN  Outcome: Progressing  Goal: Readiness for Transition of Care  Outcome: Met     Problem: Sepsis/Septic Shock  Goal: Optimal Coping  Outcome: Met  Goal: Absence of Bleeding  Outcome: Met  Goal: Blood Glucose Level Within Targeted Range  Outcome: Met  Goal: Absence of Infection Signs and Symptoms  Outcome: Met  Goal: Optimal Nutrition Intake  4/30/2025 1710 by Rafaela Reveles RN  Outcome: Met  4/30/2025 1621 by Rafaela Reveles RN  Outcome: Progressing     Problem: Pain Acute  Goal: Optimal Pain Control and Function  4/30/2025 1710 by Rafaela Reveles RN  Outcome: Met  4/30/2025 1621 by Rafaela Reveles RN  Outcome: Progressing     Problem: Fatigue  Goal: Improved Activity Tolerance  Outcome: Met     Problem: Breathing Pattern Ineffective  Goal: Effective Breathing Pattern  Outcome: Met     Problem: Airway Clearance Ineffective  Goal: Effective Airway Clearance  Outcome: Met     Problem: Pneumonia  Goal: Fluid Balance  Outcome: Met  Goal: Resolution of Infection Signs and Symptoms  Outcome: Met  Goal: Effective Oxygenation and Ventilation  4/30/2025 1710 by Rafaela Reveles RN  Outcome: Met  4/30/2025 1621 by Rafaela Reveles RN  Outcome: Progressing

## 2025-04-30 NOTE — PROGRESS NOTES
[facility]  Progress Note    Patient Name: Charly Mattson  MRN: [id number]  Patient Class: IP- Inpatient   Admission Date: 4/27/2025  Length of Stay: 2 days  Attending Physician: Elizabeth Brooke MD  Primary Care Provider: Porfirio Novoa MD        Subjective     Principal Problem:Bronchiectasis with acute exacerbation        HPI:  Mr. 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 It is moderate. Temp was 102 at home. It is associated with cough productive of dark green sputum, malodorous nasal discharge, pleuritic chest pain, and fatigue. Denies N/V/D, SOB, dizziness, LOC, or known sick contacts. He was previously on preventative/rotating abx with azithromycin, but has been off since at least December. He reports intermittent compliance with CPT vest/pulmonary toileting. In the ED, he's tachycardic 110s, RR 24, afebrile, normotensive, and satting 94% or better on room air. CXR with no acute consolidations. WBC 15K with a left shift. Flu/covid/strep negative. Hospital medicine is consulted for admission for bronchiectasis with acute exacerbation and sepsis.     Overview/Hospital Course:  No notes on file    Interval History:  Patient seen and examined.  Continues on IV cefepime for multifocal pneumonia.  Satting 93% on 1 L.    Review of Systems   Constitutional:  Positive for activity change and fatigue.   Respiratory:  Positive for cough and shortness of breath.      Objective:     Vital Signs (Most Recent):  Temp: 97.4 °F (36.3 °C) (04/30/25 0802)  Pulse: 95 (04/30/25 1152)  Resp: 17 (04/30/25 1152)  BP: 139/70 (04/30/25 1152)  SpO2: (!) 93 % (04/30/25 1152) Vital Signs (24h Range):  Temp:  [97.4 °F (36.3 °C)-98.2 °F (36.8 °C)] 97.4 °F (36.3 °C)  Pulse:  [80-95] 95  Resp:  [17-20] 17  SpO2:  [92 %-96 %] 93  %  BP: (108-139)/(59-74) 139/70     Weight: 108.9 kg (240 lb)  Body mass index is 33.47 kg/m².    Intake/Output Summary (Last 24 hours) at 4/30/2025 1244  Last data filed at 4/30/2025 0400  Gross per 24 hour   Intake 522.14 ml   Output --   Net 522.14 ml         Physical Exam  Vitals and nursing note reviewed.   Constitutional:       General: He is not in acute distress.     Appearance: He is obese.   HENT:      Head: Normocephalic and atraumatic.      Nose: Congestion present.      Mouth/Throat:      Mouth: Mucous membranes are moist.      Pharynx: Oropharynx is clear.   Eyes:      Extraocular Movements: Extraocular movements intact.      Pupils: Pupils are equal, round, and reactive to light.   Cardiovascular:      Rate and Rhythm: Normal rate and regular rhythm.   Pulmonary:      Effort: Pulmonary effort is normal.      Breath sounds: Rhonchi (scattered) present. No wheezing.      Comments: Diminished throughout, coarse   Abdominal:      General: Bowel sounds are normal.      Palpations: Abdomen is soft.   Musculoskeletal:         General: Normal range of motion.      Cervical back: Normal range of motion and neck supple.   Skin:     General: Skin is warm and dry.      Capillary Refill: Capillary refill takes less than 2 seconds.   Neurological:      General: No focal deficit present.      Mental Status: He is alert and oriented to person, place, and time.   Psychiatric:         Mood and Affect: Mood normal.         Behavior: Behavior normal.               Significant Labs: All pertinent labs within the past 24 hours have been reviewed.    Significant Imaging: I have reviewed all pertinent imaging results/findings within the past 24 hours.      Assessment & Plan  Bronchiectasis with acute exacerbation  Sputum culture with normal ashlee  Continue cefepime  CT chest with multifocal pneumonia  Appreciate pulmonology recommendations  Airway clearance regimen  Supplemental oxygen as needed    CVID (common variable  "immunodeficiency)  Aware   On Hizentra  IgG 4 days ago - 1200 (WNL)  Transaminitis  Chronic  Slightly worse  Known fatty liver   monitor    Sepsis  This patient does have evidence of infective focus  My overall impression is sepsis.  Source: Respiratory  Antibiotics given-   Antibiotics (72h ago, onward)      Start     Stop Route Frequency Ordered    04/27/25 0900  ceFEPIme injection 2 g         -- IV Every 8 hours (non-standard times) 04/27/25 0851          Latest lactate reviewed-  No results for input(s): "LACTATE", "POCLAC" in the last 72 hours.    Organ dysfunction indicated by  n/a    Fluid challenge Fluid Not Needed - Patient is not hypotensive and/or lactate is less than 4.0.     Post- resuscitation assessment No - Post resuscitation assessment not needed       Will Not start Pressors- Levophed for MAP of 65  Source control achieved by: abx  VTE Risk Mitigation (From admission, onward)           Ordered     enoxaparin injection 40 mg  Daily         04/27/25 0516     IP VTE HIGH RISK PATIENT  Once         04/27/25 0516     Place sequential compression device  Until discontinued         04/27/25 0516                    Discharge Planning   LAILA: 5/1/2025     Code Status: Full Code   Medical Readiness for Discharge Date:   Discharge Plan A: Home with family                        Elizabeth Brooke MD  Department of Hospital Medicine   [facility]/Surg    "

## 2025-04-30 NOTE — ASSESSMENT & PLAN NOTE
"This patient does have evidence of infective focus  My overall impression is sepsis.  Source: Respiratory  Antibiotics given-   Antibiotics (72h ago, onward)      Start     Stop Route Frequency Ordered    04/27/25 0900  ceFEPIme injection 2 g         -- IV Every 8 hours (non-standard times) 04/27/25 0851          Latest lactate reviewed-  No results for input(s): "LACTATE", "POCLAC" in the last 72 hours.    Organ dysfunction indicated by n/a    Fluid challenge Fluid Not Needed - Patient is not hypotensive and/or lactate is less than 4.0.     Post- resuscitation assessment No - Post resuscitation assessment not needed       Will Not start Pressors- Levophed for MAP of 65  Source control achieved by: abx  "

## 2025-04-30 NOTE — CARE UPDATE
04/29/25 1921 04/29/25 1928 04/29/25 1938   Patient Assessment/Suction   Level of Consciousness (AVPU) alert alert alert   Respiratory Effort Unlabored;Short of breath  (when ambulating) Unlabored Unlabored   Expansion/Accessory Muscles/Retractions no use of accessory muscles;no retractions no retractions;no use of accessory muscles no retractions;no use of accessory muscles   All Lung Fields Breath Sounds Anterior:;Posterior:;diminished diminished aeration increased   NAN Breath Sounds  --   --  diminished   PRE-TX-O2   Device (Oxygen Therapy) room air room air room air   SpO2 95 % 95 % 96 %   Pulse Oximetry Type Intermittent Intermittent Intermittent   $ Pulse Oximetry - Multiple Charge Pulse Oximetry - Multiple  --  Pulse Oximetry - Multiple   Pulse 91 93 84   Resp 20 20 20   Aerosol Therapy   $ Aerosol Therapy Charges Aerosol Treatment  (mm) Aerosol Treatment  (duoneb) Aerosol Treatment  (nacl 3%)   Respiratory Treatment Status (SVN) given given given   Treatment Route (SVN) mask;oxygen mask;oxygen mask;oxygen   Patient Position  --  HOB elevated HOB elevated   Post Treatment Assessment (SVN) breath sounds unchanged breath sounds unchanged increased aeration   Signs of Intolerance (SVN) none none none   Breath Sounds Post-Respiratory Treatment   Throughout All Fields Post-Treatment All Fields All Fields All Fields   Throughout All Fields Post-Treatment no change no change aeration increased   Post-treatment Heart Rate (beats/min) 90 93 89   Post-treatment Resp Rate (breaths/min) 20 20 20   Vibratory PEP Therapy   $ Vibratory PEP Charges Aerobika Therapy  --   --    $ Vibratory PEP Tech Time Charges 15 min  --   --    Type (PEP Therapy) vibratory/oscillatory  --   --    Device (PEP Therapy) flutter  --   --    Route (PEP Therapy) mouthpiece;proper technique demonstrated  --   --    Breaths per Cycle (PEP Therapy) 8  --   --    Cycles (PEP Therapy) 1  --   --    PEP Pressure (cm H2O) 5  --   --    PEP Duration  (min) 5  --   --    Settings (PEP Therapy) PEP 5  --   --    Post Treatment Assessment (PEP) breath sounds unchanged  --   --    Signs of Intolerance (PEP Therapy) none  --   --    High Frequency Chest Compression Vest   $ Chest Compression Charges Therapy  --   --    Daily Review of Necessity (HFCCV) completed  --   --    Vest Type (HFCCV) chest wrap vest  --   --    Frequency Type (HFCCV) single frequency  --   --    Single Frequency (Hz) 11  --   --    Pressure (cm H20) 7  --   --    Duration (HFCCV) 15 mins  --   --    Patient Position (HFCCV) HOB elevated  --   --    Post Treatment Assessment (HFCCV) breath sounds unchanged  --   --    Signs of Intolerance (HFCCV) none  --   --

## 2025-04-30 NOTE — CARE UPDATE
04/30/25 0700   Patient Assessment/Suction   Level of Consciousness (AVPU) alert   Respiratory Effort Normal;Unlabored   NAN Breath Sounds clear   LLL Breath Sounds diminished   RUL Breath Sounds clear   RML Breath Sounds diminished   RLL Breath Sounds diminished   PRE-TX-O2   Device (Oxygen Therapy) room air;nasal cannula  (pt. had 02 off)   $ Is the patient on Low Flow Oxygen? Yes   Flow (L/min) (Oxygen Therapy) 1   Oxygen Concentration (%) 24   SpO2 (!) 94 %   Pulse Oximetry Type Intermittent   $ Pulse Oximetry - Multiple Charge Pulse Oximetry - Multiple   Pulse 87   Resp 18   Positioning HOB elevated 30 degrees   Aerosol Therapy   $ Aerosol Therapy Charges Aerosol Treatment   Respiratory Treatment Status (SVN) given   Treatment Route (SVN) mask   Patient Position HOB elevated   Signs of Intolerance (SVN) none   Breath Sounds Post-Respiratory Treatment   Throughout All Fields Post-Treatment All Fields   Throughout All Fields Post-Treatment aeration increased   Post-treatment Heart Rate (beats/min) 82   Post-treatment Resp Rate (breaths/min) 18   Vibratory PEP Therapy   $ Vibratory PEP Charges   (not given, pt. getting CPT via vest)   High Frequency Chest Compression Vest   $ Chest Compression Charges Therapy   Daily Review of Necessity (HFCCV) completed   Vest Type (HFCCV) chest wrap vest   Frequency Type (HFCCV) single frequency   Single Frequency (Hz) 11   Pressure (cm H20) 7   Duration (HFCCV) 15 mins   Patient Position (HFCCV) HOB elevated   Signs of Intolerance (HFCCV) none   Ready to Wean/Extubation Screen   FIO2<=50 (chart decimal) 0.24

## 2025-05-01 NOTE — HOSPITAL COURSE
Patient was admitted to the hospital medicine service.  He was started on broad-spectrum antibiotics, supplemental oxygen as needed, airway clearance regimen.  Pulmonology was consulted recommended to continue cefepime and stop linezolid and checked a CT scan which showed a multifocal pneumonia.  Patient clinically improved with treatment.  Ambulated with nurse on day of discharge and did not require supplemental oxygen.  He was cleared by Dr. Mckeon on day of discharge for discharge with  a course of p.o. antibiotics.  He will follow up with PCP and pulmonology.  Patient was seen and examined on day of discharge and deemed suitable for discharge home

## 2025-05-01 NOTE — ASSESSMENT & PLAN NOTE
"This patient does have evidence of infective focus  My overall impression is sepsis.  Source: Respiratory  Antibiotics given-   Antibiotics (72h ago, onward)      None          Latest lactate reviewed-  No results for input(s): "LACTATE", "POCLAC" in the last 72 hours.    Organ dysfunction indicated by n/a    Fluid challenge Fluid Not Needed - Patient is not hypotensive and/or lactate is less than 4.0.     Post- resuscitation assessment No - Post resuscitation assessment not needed       Will Not start Pressors- Levophed for MAP of 65  Source control achieved by: abx  "

## 2025-05-01 NOTE — DISCHARGE SUMMARY
[facility] Medicine  Discharge Summary      Patient Name: Charly Mattson  MRN: [id number]  LU: [id number]  Patient Class: IP- Inpatient  Admission Date: 4/27/2025  Hospital Length of Stay: 2 days  Discharge Date and Time: 4/30/2025  6:06 PM  Attending Physician: No att. providers found   Discharging Provider: Elizabeth Brooke MD  Primary Care Provider: Porfirio Novoa MD    Primary Care Team: Networked reference to record PCT     HPI:   Mr. 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 It is moderate. Temp was 102 at home. It is associated with cough productive of dark green sputum, malodorous nasal discharge, pleuritic chest pain, and fatigue. Denies N/V/D, SOB, dizziness, LOC, or known sick contacts. He was previously on preventative/rotating abx with azithromycin, but has been off since at least December. He reports intermittent compliance with CPT vest/pulmonary toileting. In the ED, he's tachycardic 110s, RR 24, afebrile, normotensive, and satting 94% or better on room air. CXR with no acute consolidations. WBC 15K with a left shift. Flu/covid/strep negative. Hospital medicine is consulted for admission for bronchiectasis with acute exacerbation and sepsis.     * No surgery found *      Hospital Course:   Patient was admitted to the hospital medicine service.  He was started on broad-spectrum antibiotics, supplemental oxygen as needed, airway clearance regimen.  Pulmonology was consulted recommended to continue cefepime and stop linezolid and checked a CT scan which showed a multifocal pneumonia.  Patient clinically improved with treatment.  Ambulated with nurse on day of discharge and did not require supplemental oxygen.  He was cleared by Dr. Mckeon on day of discharge for discharge with  a course of p.o.  "antibiotics.  He will follow up with PCP and pulmonology.  Patient was seen and examined on day of discharge and deemed suitable for discharge home     Goals of Care Treatment Preferences:  Code Status: Full Code      SDOH Screening:  The patient declined to be screened for utility difficulties, food insecurity, transport difficulties, housing insecurity, and interpersonal safety, so no concerns could be identified this admission.     Consults:   Consults (From admission, onward)          Status Ordering Provider     Inpatient consult to Pulmonology  Once        Provider:  Chrissy Mckeon MD    Completed TREVOR BARFIELD            Assessment & Plan  Bronchiectasis with acute exacerbation  Sputum culture with normal ashlee  Continue cefepime  CT chest with multifocal pneumonia  Appreciate pulmonology recommendations  Airway clearance regimen  Supplemental oxygen as needed    CVID (common variable immunodeficiency)  Aware   On Hizentra  IgG 4 days ago - 1200 (WNL)  Transaminitis  Chronic  Slightly worse  Known fatty liver   monitor    Sepsis  This patient does have evidence of infective focus  My overall impression is sepsis.  Source: Respiratory  Antibiotics given-   Antibiotics (72h ago, onward)      None          Latest lactate reviewed-  No results for input(s): "LACTATE", "POCLAC" in the last 72 hours.    Organ dysfunction indicated by  n/a    Fluid challenge Fluid Not Needed - Patient is not hypotensive and/or lactate is less than 4.0.     Post- resuscitation assessment No - Post resuscitation assessment not needed       Will Not start Pressors- Levophed for MAP of 65  Source control achieved by: abx  Final Active Diagnoses:    Diagnosis Date Noted POA    PRINCIPAL PROBLEM:  Bronchiectasis with acute exacerbation [J47.1] 08/10/2021 Yes    Sepsis [A41.9] 08/10/2021 Yes    Transaminitis [R74.01] 05/28/2020 Yes    CVID (common variable immunodeficiency) [D83.9] 05/14/2019 Yes      Problems Resolved During this " Admission:       Discharged Condition: good    Disposition: Home or Self Care    Follow Up:   Follow-up Information       Porfirio Novoa MD Follow up.    Specialty: Family Medicine  Contact information:  [address] [id number] [phone]                           Patient Instructions:      Notify your health care provider if you experience any of the following:  difficulty breathing or increased cough     Activity as tolerated       Significant Diagnostic Studies: Labs: BMP:   Recent Labs   Lab 04/30/25 0425         K 4.3      CO2 22*   BUN 13   CREATININE 0.9   CALCIUM 9.9   MG 2.1    and CBC   Recent Labs   Lab 04/30/25 0425   WBC 6.40   HGB 14.7   HCT 44.9        Radiology Results (last 7 days)    Procedure Component Value Units Date/Time   CT Chest Without Contrast [[id number]] Resulted: 04/29/25 0800   Order Status: Completed Updated: 04/29/25 0802   Narrative:     EXAMINATION:  CT CHEST WITHOUT CONTRAST    CLINICAL HISTORY:  pneumonia;    TECHNIQUE:  Low dose axial images, sagittal and coronal reformations were obtained from the thoracic inlet to the lung bases. Contrast was not administered.    COMPARISON:  CT chest 04/09/2023    FINDINGS:  No significant abnormality of the base of neck or thoracic soft tissues.    Thoracic aorta is normal caliber.  Heart is normal size.  No pericardial effusion.    Stable mildly prominent subcarinal lymph node measuring 1.2 cm, similar to 04/09/2023.  No new lymphadenopathy.    Central airways are patent.    Consolidation with air bronchograms in the bilateral posterior lower lobes, right middle lobe, and lingula at the lung bases.  Findings are concerning for multifocal pulmonary infection/pneumonia.  No pleural effusion or pneumothorax.    No significant abnormality of the partially visualized upper abdomen.    No acute or aggressive bony abnormality.   Impression:       1. Consolidation with air bronchograms in the  "bilateral lung bases concerning for multifocal pulmonary infection/pneumonia.  Recommend follow-up CT chest after treatment.  2. Stable mildly prominent subcarinal lymph node.      Electronically signed by: Juan C Rod  Date: 04/29/2025  Time: 08:00   X-Ray Chest AP Portable [[id number]] Resulted: 04/27/25 0422   Order Status: Completed Updated: 04/27/25 0425   Narrative:     EXAMINATION:  XR CHEST AP PORTABLE    CLINICAL HISTORY:  Provided history is "Sepsis;  ".    TECHNIQUE:  One view of the chest.    COMPARISON:  12/10/2024.    FINDINGS:  Patient is rotated.  Cardiomediastinal silhouette is not enlarged.  Lung volumes are relatively low, accentuating basilar markings.  No focal consolidation.  No sizable pleural effusion.  No pneumothorax.  Operative changes partially imaged in the mandible.   Impression:       No acute cardiopulmonary finding identified on this limited single view.      Electronically signed by: Aiden Knox MD  Date: 04/27/2025  Time: 04:22   Microbiology Results (Last 365 Days)    Procedure Component Value Units Date/Time   Culture, Respiratory with Gram Stain [[id number]] Collected: 04/28/25 0350   Order Status: Canceled Specimen: Respiratory from Sputum, Expectorated    MRSA Screen by PCR [[id number]] (Normal) Collected: 04/27/25 0934   Order Status: Completed Specimen: Nasal Swab Updated: 04/27/25 1535    MRSA PCR SCRN ([facility]) Not Detected   Respiratory Infection Panel (PCR), Nasopharyngeal [[id number]] Collected: 04/27/25 0934   Order Status: Completed Specimen: Nasopharyngeal Swab Updated: 04/27/25 1454    Respiratory Infection Panel Source Nasopharyngeal Swab    Adenovirus Not Detected    Coronavirus 229E, Common Cold Virus Not Detected    Coronavirus HKU1, Common Cold Virus Not Detected    Coronavirus NL63, Common Cold Virus Not Detected    Coronavirus OC43, Common Cold Virus Not Detected    SARS-CoV2 (COVID-19) Qualitative PCR Not Detected    Human Metapneumovirus Not Detected    " Human Rhinovirus/Enterovirus Not Detected    Influenza A (subtypes H1, H1-2009,H3) Not Detected    Influenza B Not Detected    Parainfluenza Virus 1 Not Detected    Parainfluenza Virus 2 Not Detected    Parainfluenza Virus 3 Not Detected    Parainfluenza Virus 4 Not Detected    Respiratory Syncytial Virus Not Detected    Bordetella Parapertussis (WV1523) Not Detected    Bordetella pertussis (ptxP) Not Detected    Chlamydia pneumoniae Not Detected    Mycoplasma pneumoniae Not Detected   Culture, Respiratory with Gram Stain [[id number]] Collected: 04/27/25 0934   Order Status: Completed Specimen: Respiratory from Sputum, Expectorated Updated: 04/29/25 0751    Respiratory Culture Normal respiratory ashlee    GRAM STAIN (RESPIRATORY) Many WBC seen     Few Gram positive cocci   Blood culture x two cultures. Draw prior to antibiotics. [[id number]] (Normal) Collected: 04/27/25 0322   Order Status: Completed Specimen: Blood Updated: 04/30/25 0900    CULTURE, BLOOD ([facility]) No Growth After 72 Hours   Blood culture x two cultures. Draw prior to antibiotics. [[id number]] (Normal) Collected: 04/27/25 0315   Order Status: Completed Specimen: Blood Updated: 04/30/25 0900    CULTURE, BLOOD ([facility]) No Growth After 72 Hours   Influenza A & B by Molecular [[id number]] (Normal) Collected: 04/27/25 0153   Order Status: Completed Specimen: Nasal Swab Updated: 04/27/25 0219    INFLUENZA A MOLECULAR Negative    INFLUENZA B MOLECULAR Negative   Group A Strep, Molecular [[id number]] (Normal) Collected: 04/27/25 0153   Order Status: Completed Specimen: Throat Updated: 04/27/25 0219    Group A Strep Molecular Negative     Pending Diagnostic Studies:       None           Medications:  Reconciled Home Medications:      Medication List        PAUSE taking these medications      * azithromycin 500 MG tablet  Wait to take this until your doctor or other care provider tells you to start again.  Commonly known as: ZITHROMAX  Take 500 mg by mouth every  Mon, Wed, Fri.  You also have another medication with the same name that you may need to continue taking.           * This list has 1 medication(s) that are the same as other medications prescribed for you. Read the directions carefully, and ask your doctor or other care provider to review them with you.                START taking these medications      amoxicillin-clavulanate 875-125mg 875-125 mg per tablet  Commonly known as: AUGMENTIN  Take 1 tablet by mouth every 12 (twelve) hours. for 12 days            CHANGE how you take these medications      * azithromycin 500 MG tablet  Commonly known as: ZITHROMAX  Take 1 tablet (500 mg total) by mouth once daily. for 12 days  What changed: Another medication with the same name was paused. Ask your nurse or doctor if you should take this medication.           * This list has 1 medication(s) that are the same as other medications prescribed for you. Read the directions carefully, and ask your doctor or other care provider to review them with you.                CONTINUE taking these medications      acetaminophen 500 MG tablet  Commonly known as: TYLENOL  Take 1,000 mg by mouth every 8 (eight) hours as needed for Pain.     * albuterol 1.25 mg/3 mL Nebu  Commonly known as: ACCUNEB  Take 1.25 mg by nebulization 2 (two) times a day.     * albuterol 90 mcg/actuation inhaler  Commonly known as: VENTOLIN HFA  Inhale 2 puffs into the lungs every 6 (six) hours as needed for Wheezing. Rescue     azelastine 137 mcg (0.1 %) nasal spray  Commonly known as: ASTELIN  1 spray (137 mcg total) by Nasal route 2 (two) times daily.     cetirizine 10 MG tablet  Commonly known as: ZYRTEC  Take 1 tablet (10 mg total) by mouth once daily.     diclofenac 50 MG EC tablet  Commonly known as: VOLTAREN  Take 1 tablet (50 mg total) by mouth 3 (three) times daily as needed (pain).     EPINEPHrine 0.3 mg/0.3 mL Atin  Commonly known as: EPIPEN     HIZENTRA 4 gram/20 mL (20 %) Syrg  Generic drug: immun  glob G(IgG)-pro-IgA 0-50  Inject 200 mg/kg into the skin every 7 days. Tuesdays     promethazine-dextromethorphan 6.25-15 mg/5 mL Syrp  Commonly known as: PROMETHAZINE-DM  Take 5 mLs by mouth nightly as needed (cough).     sodium chloride 3% 3 % nebulizer solution  Take 4 mLs by nebulization 2 (two) times a day.           * This list has 2 medication(s) that are the same as other medications prescribed for you. Read the directions carefully, and ask your doctor or other care provider to review them with you.                  Indwelling Lines/Drains at time of discharge:   Lines/Drains/Airways       None                   Time spent on the discharge of patient: 35 minutes  Discharge plans discussed with pulmonology Dr. Mckeon on day of discharge       Elizabeth Brooke MD  Department of Hospital Medicine  [facility]

## 2025-05-02 LAB
BACTERIA BLD CULT: NORMAL
BACTERIA BLD CULT: NORMAL

## 2025-07-11 ENCOUNTER — OFFICE VISIT (OUTPATIENT)
Dept: URGENT CARE | Facility: CLINIC | Age: 30
End: 2025-07-11
Payer: MEDICAID

## 2025-07-11 VITALS
SYSTOLIC BLOOD PRESSURE: 111 MMHG | RESPIRATION RATE: 16 BRPM | BODY MASS INDEX: 30.8 KG/M2 | WEIGHT: 220 LBS | OXYGEN SATURATION: 98 % | HEART RATE: 81 BPM | HEIGHT: 71 IN | DIASTOLIC BLOOD PRESSURE: 74 MMHG | TEMPERATURE: 99 F

## 2025-07-11 DIAGNOSIS — D80.9 IMMUNOGLOBULIN DEFICIENCY: ICD-10-CM

## 2025-07-11 DIAGNOSIS — Z87.01 HISTORY OF PNEUMONIA: ICD-10-CM

## 2025-07-11 DIAGNOSIS — J22 LOWER RESPIRATORY INFECTION: ICD-10-CM

## 2025-07-11 DIAGNOSIS — R05.9 COUGH, UNSPECIFIED TYPE: Primary | ICD-10-CM

## 2025-07-11 DIAGNOSIS — J47.1 BRONCHIECTASIS WITH ACUTE EXACERBATION: ICD-10-CM

## 2025-07-11 RX ORDER — CEFTRIAXONE 1 G/1
1 INJECTION, POWDER, FOR SOLUTION INTRAMUSCULAR; INTRAVENOUS
Status: COMPLETED | OUTPATIENT
Start: 2025-07-11 | End: 2025-07-11

## 2025-07-11 RX ORDER — DOXYCYCLINE 100 MG/1
100 CAPSULE ORAL 2 TIMES DAILY
Qty: 14 CAPSULE | Refills: 0 | Status: SHIPPED | OUTPATIENT
Start: 2025-07-11 | End: 2025-07-18

## 2025-07-11 RX ORDER — PREDNISONE 20 MG/1
20 TABLET ORAL 2 TIMES DAILY
Qty: 8 TABLET | Refills: 0 | Status: SHIPPED | OUTPATIENT
Start: 2025-07-11 | End: 2025-07-15

## 2025-07-11 RX ORDER — DEXAMETHASONE SODIUM PHOSPHATE 4 MG/ML
8 INJECTION, SOLUTION INTRA-ARTICULAR; INTRALESIONAL; INTRAMUSCULAR; INTRAVENOUS; SOFT TISSUE
Status: COMPLETED | OUTPATIENT
Start: 2025-07-11 | End: 2025-07-11

## 2025-07-11 RX ORDER — CEFUROXIME AXETIL 500 MG/1
500 TABLET ORAL EVERY 12 HOURS
Qty: 14 TABLET | Refills: 0 | Status: SHIPPED | OUTPATIENT
Start: 2025-07-11 | End: 2025-07-18

## 2025-07-11 RX ADMIN — DEXAMETHASONE SODIUM PHOSPHATE 8 MG: 4 INJECTION, SOLUTION INTRA-ARTICULAR; INTRALESIONAL; INTRAMUSCULAR; INTRAVENOUS; SOFT TISSUE at 03:07

## 2025-07-11 RX ADMIN — CEFTRIAXONE 1 G: 1 INJECTION, POWDER, FOR SOLUTION INTRAMUSCULAR; INTRAVENOUS at 03:07

## 2025-07-11 NOTE — PATIENT INSTRUCTIONS
Ceftin twice a day for 7 days    Doxycycline twice a day for 7 days.  Always take with food and a full glass of water and do not lay down for 1 hour after taking each dose.  Protect your skin against the sun as doxycycline is well known for causing excessive skin sun sensitivity resulting in severe sunburn, rash, blistering.    Start prednisone pills tomorrow and take as prescribed.  Always take with food  Personal protection against illness for 1-2 weeks due to lowered immune system from steroid therapy.  Please wear a mask and eye protection around others and wash hands often    If you were prescribed antibiotics please take until completion.  Take with food to minimize nausea commonly associated with taking on empty stomach.      It is recommended to take a probiotic (which can be purchased over the counter ) while you are on antibiotics and to continue the probiotic for a few weeks to one month.  Take them 2 hours apart from each other.     Albuterol nebulizer every 4-6 hours while awake for the next 3 days  or until symptoms are significantly improved, then just as needed for persistent cough, shortness of breath, wheezing, chest tightness.    Cough and deep breathing exercises every 1-2 hours while awake until symptoms are improving then as needed.    Increase fluid intake significantly to help thin secretions.  Maintaining hydration is your choice but plays an important role in ability to manage mucus to expectorate otherwise  thickened mucus may lingering in areas prolonging symptoms and possibility causing worsening illness.     Guaifenesin 600-1200 mg twice a day over-the-counter for 10 days.  Mucinex blue box or generic equivalent      The following allergy medications are recommended, not required:  However they do help minimize symptoms now but will reduce lingering symptoms such as plugged/congested ears.   Zyrtec or antihistamine of choice over-the-counter daily until symptoms have resolved  Flonase  or nasal steroid of choice over-the-counter daily until symptoms have resolved  Astelin nasal antihistamine as prescribed for faster relief of nasal/sinus inflammation.  If insurance does not cover, you can purchase over the counter as Astepro much cheaper.      ER for any significant worsening of respiratory status such as difficulty breathing, shortness of breath not relieved by rest, air hunger, excessive fatigue/lethargy, upper airway swelling making it hard to swallow.

## 2025-07-11 NOTE — PROGRESS NOTES
"Subjective:      Patient ID: Charly Mattson is a 30 y.o. male.    Vitals:  height is 5' 11" (1.803 m) and weight is 99.8 kg (220 lb). His oral temperature is 98.5 °F (36.9 °C). His blood pressure is 111/74 and his pulse is 81. His respiration is 16 and oxygen saturation is 98%.     Chief Complaint: Cough    Recently completed a course of Augmentin for same illness, states he was feeling better but significant worsening starting yesterday.  Overall original onset of symptoms or proximally 1-1/2 week ago.  History of pneumonia and     Cough  This is a new problem. Episode onset: x 1 1/2 weeks. The problem has been unchanged. Associated symptoms include chills, myalgias, nasal congestion, postnasal drip and wheezing. Pertinent negatives include no ear pain, fever or rash.       Constitution: Positive for chills and fatigue. Negative for fever.   HENT:  Positive for congestion (green, "stinks"), postnasal drip and sinus pressure. Negative for ear pain and ear discharge.    Respiratory:  Positive for cough, sputum production (green, thick) and wheezing.    Gastrointestinal:  Negative for vomiting and diarrhea.   Musculoskeletal:  Positive for muscle ache.   Skin:  Negative for rash.      Objective:     Physical Exam   Constitutional: He is oriented to person, place, and time. He is cooperative.  Non-toxic appearance. He does not appear ill. No distress. awake  HENT:   Head: Normocephalic and atraumatic.   Ears:   Right Ear: Tympanic membrane, external ear and ear canal normal.   Left Ear: Tympanic membrane, external ear and ear canal normal.   Nose: Congestion present. No rhinorrhea.   Mouth/Throat: Mucous membranes are moist. Posterior oropharyngeal erythema present. No oropharyngeal exudate.   Eyes: Conjunctivae are normal. Right eye exhibits no discharge. Left eye exhibits no discharge.   Neck: Neck supple. No neck rigidity present.   Cardiovascular: Normal rate, regular rhythm and normal heart sounds. "   Pulmonary/Chest: Effort normal. No accessory muscle usage. No tachypnea. No respiratory distress. He has no wheezes. He has rhonchi (Cough) in the right middle field, the right lower field, the left middle field and the left lower field. He has rales in the right lower field and the left lower field. He exhibits no tenderness.   Abdominal: Normal appearance.   Musculoskeletal:      Cervical back: He exhibits no tenderness.   Lymphadenopathy:     He has no cervical adenopathy.   Neurological: no focal deficit. He is alert and oriented to person, place, and time. No sensory deficit.   Skin: Skin is warm, dry, not diaphoretic and no rash. Capillary refill takes 2 to 3 seconds.   Psychiatric: His behavior is normal. Mood normal.   Nursing note and vitals reviewed.      Assessment:     1. Cough, unspecified type    2. History of pneumonia    3. Immunoglobulin deficiency    4. Lower respiratory infection    5. Bronchiectasis with acute exacerbation      CXR:  FINDINGS:  The cardiomediastinal silhouette is within normal limits.  The lungs are well expanded without consolidation or pleural effusion.     Impression:     Negative chest.    Plan:       Cough, unspecified type  -     XR CHEST PA AND LATERAL; Future; Expected date: 07/11/2025    History of pneumonia    Immunoglobulin deficiency    Lower respiratory infection  -     dexAMETHasone injection 8 mg  -     doxycycline (MONODOX) 100 MG capsule; Take 1 capsule (100 mg total) by mouth 2 (two) times daily. for 7 days  Dispense: 14 capsule; Refill: 0  -     cefUROXime (CEFTIN) 500 MG tablet; Take 1 tablet (500 mg total) by mouth every 12 (twelve) hours. for 7 days  Dispense: 14 tablet; Refill: 0  -     cefTRIAXone injection 1 g  -     predniSONE (DELTASONE) 20 MG tablet; Take 1 tablet (20 mg total) by mouth 2 (two) times daily. for 4 days  Dispense: 8 tablet; Refill: 0    Bronchiectasis with acute exacerbation                Suspect patient is experiencing postviral  secondary infection of sinuses and lower respiratory tract.  Will initiate outpatient antibiotics,  and steroids to help with chest tightness and wheezing to help expedite relief of symptoms.  Already has albuterol nebulizer at home, using morning and night.  Pulmonologist told him that inhalers will not work effectively for him due to his dx is not asthma. Patient is well-appearing, nontoxic and in no respiratory distress. CXR neg for pneumonia.  I have low suspicion and concern for rapid respiratory decline and does not need ER work up at this time.  Trial OP therapy with close F/u or ER for persistent or worsening symptoms.  Do to pulmonary history, Pt  desires antibiotic injection of rocephin today.  States if he worsens he usually ends up admitted hospital and hoping to treat aggressively in hopes to prevent that.

## 2025-07-14 ENCOUNTER — TELEPHONE (OUTPATIENT)
Dept: URGENT CARE | Facility: CLINIC | Age: 30
End: 2025-07-14
Payer: MEDICAID

## 2025-07-14 NOTE — TELEPHONE ENCOUNTER
Contacted patient.  He states that he is feeling much better and we are his chosen urgent care for [facility].

## 2025-07-29 ENCOUNTER — CLINICAL SUPPORT (OUTPATIENT)
Dept: URGENT CARE | Facility: CLINIC | Age: 30
End: 2025-07-29

## 2025-07-29 DIAGNOSIS — Z00.00 ROUTINE GENERAL MEDICAL EXAMINATION AT A HEALTH CARE FACILITY: Primary | ICD-10-CM

## 2025-07-29 PROCEDURE — 99499 UNLISTED E&M SERVICE: CPT | Mod: S$GLB,,, | Performed by: NURSE PRACTITIONER

## 2025-09-02 ENCOUNTER — OFFICE VISIT (OUTPATIENT)
Dept: URGENT CARE | Facility: CLINIC | Age: 30
End: 2025-09-02
Payer: MEDICAID

## 2025-09-02 VITALS
DIASTOLIC BLOOD PRESSURE: 76 MMHG | WEIGHT: 220 LBS | HEART RATE: 96 BPM | HEIGHT: 71 IN | TEMPERATURE: 98 F | OXYGEN SATURATION: 96 % | RESPIRATION RATE: 20 BRPM | BODY MASS INDEX: 30.8 KG/M2 | SYSTOLIC BLOOD PRESSURE: 113 MMHG

## 2025-09-02 DIAGNOSIS — R05.9 COUGH, UNSPECIFIED TYPE: ICD-10-CM

## 2025-09-02 DIAGNOSIS — J02.9 SORE THROAT: Primary | ICD-10-CM

## 2025-09-02 DIAGNOSIS — Z87.09 HISTORY OF CHRONIC LUNG DISEASE: ICD-10-CM

## 2025-09-02 DIAGNOSIS — J01.41 ACUTE RECURRENT PANSINUSITIS: ICD-10-CM

## 2025-09-02 LAB
CTP QC/QA: YES
FLUAV AG NPH QL: NEGATIVE
FLUBV AG NPH QL: NEGATIVE
S PYO RRNA THROAT QL PROBE: NEGATIVE
SARS-COV+SARS-COV-2 AG RESP QL IA.RAPID: NEGATIVE

## 2025-09-02 PROCEDURE — 99214 OFFICE O/P EST MOD 30 MIN: CPT | Mod: S$GLB,,, | Performed by: STUDENT IN AN ORGANIZED HEALTH CARE EDUCATION/TRAINING PROGRAM

## 2025-09-02 PROCEDURE — 87880 STREP A ASSAY W/OPTIC: CPT | Mod: QW,,, | Performed by: STUDENT IN AN ORGANIZED HEALTH CARE EDUCATION/TRAINING PROGRAM

## 2025-09-02 PROCEDURE — 87804 INFLUENZA ASSAY W/OPTIC: CPT | Mod: QW,,, | Performed by: STUDENT IN AN ORGANIZED HEALTH CARE EDUCATION/TRAINING PROGRAM

## 2025-09-02 PROCEDURE — 87811 SARS-COV-2 COVID19 W/OPTIC: CPT | Mod: QW,S$GLB,, | Performed by: STUDENT IN AN ORGANIZED HEALTH CARE EDUCATION/TRAINING PROGRAM

## 2025-09-02 RX ORDER — PROMETHAZINE HYDROCHLORIDE AND DEXTROMETHORPHAN HYDROBROMIDE 6.25; 15 MG/5ML; MG/5ML
5 SYRUP ORAL EVERY 4 HOURS PRN
Qty: 118 ML | Refills: 0 | Status: SHIPPED | OUTPATIENT
Start: 2025-09-02 | End: 2025-09-12

## 2025-09-02 RX ORDER — FLUTICASONE PROPIONATE 50 MCG
2 SPRAY, SUSPENSION (ML) NASAL DAILY
Qty: 15.8 ML | Refills: 0 | Status: SHIPPED | OUTPATIENT
Start: 2025-09-02

## 2025-09-02 RX ORDER — AMOXICILLIN AND CLAVULANATE POTASSIUM 875; 125 MG/1; MG/1
1 TABLET, FILM COATED ORAL EVERY 12 HOURS
Qty: 20 TABLET | Refills: 0 | Status: SHIPPED | OUTPATIENT
Start: 2025-09-02 | End: 2025-09-12